# Patient Record
Sex: FEMALE | Race: WHITE | NOT HISPANIC OR LATINO
[De-identification: names, ages, dates, MRNs, and addresses within clinical notes are randomized per-mention and may not be internally consistent; named-entity substitution may affect disease eponyms.]

---

## 2017-01-02 DIAGNOSIS — E55.9 VITAMIN D DEFICIENCY, UNSPECIFIED: ICD-10-CM

## 2017-01-10 ENCOUNTER — APPOINTMENT (OUTPATIENT)
Dept: FAMILY MEDICINE | Facility: CLINIC | Age: 62
End: 2017-01-10

## 2017-01-10 VITALS
WEIGHT: 241 LBS | BODY MASS INDEX: 41.15 KG/M2 | HEIGHT: 64 IN | SYSTOLIC BLOOD PRESSURE: 140 MMHG | DIASTOLIC BLOOD PRESSURE: 80 MMHG

## 2017-03-17 ENCOUNTER — EMERGENCY (EMERGENCY)
Facility: HOSPITAL | Age: 62
LOS: 1 days | Discharge: HOME | End: 2017-03-17
Attending: EMERGENCY MEDICINE
Payer: COMMERCIAL

## 2017-03-17 VITALS
SYSTOLIC BLOOD PRESSURE: 137 MMHG | RESPIRATION RATE: 16 BRPM | DIASTOLIC BLOOD PRESSURE: 79 MMHG | TEMPERATURE: 98 F | OXYGEN SATURATION: 97 % | HEART RATE: 61 BPM

## 2017-03-17 VITALS
WEIGHT: 235.89 LBS | SYSTOLIC BLOOD PRESSURE: 167 MMHG | OXYGEN SATURATION: 99 % | HEIGHT: 64 IN | DIASTOLIC BLOOD PRESSURE: 98 MMHG | HEART RATE: 73 BPM | RESPIRATION RATE: 16 BRPM | TEMPERATURE: 98 F

## 2017-03-17 DIAGNOSIS — M54.2 CERVICALGIA: ICD-10-CM
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
## 2017-03-17 DIAGNOSIS — I10 ESSENTIAL (PRIMARY) HYPERTENSION: ICD-10-CM

## 2017-03-17 DIAGNOSIS — Z98.891 HISTORY OF UTERINE SCAR FROM PREVIOUS SURGERY: Chronic | ICD-10-CM

## 2017-03-17 DIAGNOSIS — Z98.890 OTHER SPECIFIED POSTPROCEDURAL STATES: ICD-10-CM

## 2017-03-17 DIAGNOSIS — S32.000A WEDGE COMPRESSION FRACTURE OF UNSPECIFIED LUMBAR VERTEBRA, INITIAL ENCOUNTER FOR CLOSED FRACTURE: ICD-10-CM

## 2017-03-17 DIAGNOSIS — R07.9 CHEST PAIN, UNSPECIFIED: ICD-10-CM

## 2017-03-17 DIAGNOSIS — E03.9 HYPOTHYROIDISM, UNSPECIFIED: ICD-10-CM
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
## 2017-03-17 LAB
ALBUMIN SERPL ELPH-MCNC: 4.2 G/DL — SIGNIFICANT CHANGE UP (ref 3.3–5.2)
ALP SERPL-CCNC: 87 U/L — SIGNIFICANT CHANGE UP (ref 40–120)
ALT FLD-CCNC: 32 U/L — SIGNIFICANT CHANGE UP
ANION GAP SERPL CALC-SCNC: 14 MMOL/L — SIGNIFICANT CHANGE UP (ref 5–17)
APTT BLD: 32.9 SEC — SIGNIFICANT CHANGE UP (ref 27.5–37.4)
AST SERPL-CCNC: 33 U/L — HIGH
BASOPHILS # BLD AUTO: 0 K/UL — SIGNIFICANT CHANGE UP (ref 0–0.2)
BASOPHILS NFR BLD AUTO: 0.1 % — SIGNIFICANT CHANGE UP (ref 0–2)
BILIRUB SERPL-MCNC: 0.5 MG/DL — SIGNIFICANT CHANGE UP (ref 0.4–2)
BUN SERPL-MCNC: 19 MG/DL — SIGNIFICANT CHANGE UP (ref 8–20)
CALCIUM SERPL-MCNC: 9.7 MG/DL — SIGNIFICANT CHANGE UP (ref 8.6–10.2)
CHLORIDE SERPL-SCNC: 99 MMOL/L — SIGNIFICANT CHANGE UP (ref 98–107)
CO2 SERPL-SCNC: 25 MMOL/L — SIGNIFICANT CHANGE UP (ref 22–29)
CREAT SERPL-MCNC: 0.8 MG/DL — SIGNIFICANT CHANGE UP (ref 0.5–1.3)
EOSINOPHIL # BLD AUTO: 0.1 K/UL — SIGNIFICANT CHANGE UP (ref 0–0.5)
EOSINOPHIL NFR BLD AUTO: 1.4 % — SIGNIFICANT CHANGE UP (ref 0–6)
GLUCOSE SERPL-MCNC: 104 MG/DL — SIGNIFICANT CHANGE UP (ref 70–115)
HCT VFR BLD CALC: 42.2 % — SIGNIFICANT CHANGE UP (ref 37–47)
HGB BLD-MCNC: 14.6 G/DL — SIGNIFICANT CHANGE UP (ref 12–16)
INR BLD: 1.04 RATIO — SIGNIFICANT CHANGE UP (ref 0.88–1.16)
LYMPHOCYTES # BLD AUTO: 1.6 K/UL — SIGNIFICANT CHANGE UP (ref 1–4.8)
LYMPHOCYTES # BLD AUTO: 19.3 % — LOW (ref 20–55)
MCHC RBC-ENTMCNC: 31.7 PG — HIGH (ref 27–31)
MCHC RBC-ENTMCNC: 34.6 G/DL — SIGNIFICANT CHANGE UP (ref 32–36)
MCV RBC AUTO: 91.7 FL — SIGNIFICANT CHANGE UP (ref 81–99)
MONOCYTES # BLD AUTO: 0.5 K/UL — SIGNIFICANT CHANGE UP (ref 0–0.8)
MONOCYTES NFR BLD AUTO: 6.1 % — SIGNIFICANT CHANGE UP (ref 3–10)
NEUTROPHILS # BLD AUTO: 6 K/UL — SIGNIFICANT CHANGE UP (ref 1.8–8)
NEUTROPHILS NFR BLD AUTO: 72.6 % — SIGNIFICANT CHANGE UP (ref 37–73)
PLATELET # BLD AUTO: 258 K/UL — SIGNIFICANT CHANGE UP (ref 150–400)
POTASSIUM SERPL-MCNC: 3.9 MMOL/L — SIGNIFICANT CHANGE UP (ref 3.5–5.3)
POTASSIUM SERPL-SCNC: 3.9 MMOL/L — SIGNIFICANT CHANGE UP (ref 3.5–5.3)
PROT SERPL-MCNC: 7.8 G/DL — SIGNIFICANT CHANGE UP (ref 6.6–8.7)
PROTHROM AB SERPL-ACNC: 11.5 SEC — SIGNIFICANT CHANGE UP (ref 10–13.1)
RBC # BLD: 4.6 M/UL — SIGNIFICANT CHANGE UP (ref 4.4–5.2)
RBC # FLD: 13 % — SIGNIFICANT CHANGE UP (ref 11–15.6)
SODIUM SERPL-SCNC: 138 MMOL/L — SIGNIFICANT CHANGE UP (ref 135–145)
WBC # BLD: 8.3 K/UL — SIGNIFICANT CHANGE UP (ref 4.8–10.8)
WBC # FLD AUTO: 8.3 K/UL — SIGNIFICANT CHANGE UP (ref 4.8–10.8)

## 2017-03-17 PROCEDURE — 71260 CT THORAX DX C+: CPT

## 2017-03-17 PROCEDURE — 74177 CT ABD & PELVIS W/CONTRAST: CPT | Mod: 26

## 2017-03-17 PROCEDURE — 71260 CT THORAX DX C+: CPT | Mod: 26

## 2017-03-17 PROCEDURE — 80053 COMPREHEN METABOLIC PANEL: CPT

## 2017-03-17 PROCEDURE — 99284 EMERGENCY DEPT VISIT MOD MDM: CPT

## 2017-03-17 PROCEDURE — 70491 CT SOFT TISSUE NECK W/DYE: CPT | Mod: 26

## 2017-03-17 PROCEDURE — 70491 CT SOFT TISSUE NECK W/DYE: CPT

## 2017-03-17 PROCEDURE — 71010: CPT | Mod: 26

## 2017-03-17 PROCEDURE — 99284 EMERGENCY DEPT VISIT MOD MDM: CPT | Mod: 25

## 2017-03-17 PROCEDURE — 72125 CT NECK SPINE W/O DYE: CPT

## 2017-03-17 PROCEDURE — 72125 CT NECK SPINE W/O DYE: CPT | Mod: 26

## 2017-03-17 PROCEDURE — 74177 CT ABD & PELVIS W/CONTRAST: CPT

## 2017-03-17 PROCEDURE — 90715 TDAP VACCINE 7 YRS/> IM: CPT

## 2017-03-17 PROCEDURE — 71045 X-RAY EXAM CHEST 1 VIEW: CPT

## 2017-03-17 PROCEDURE — 90471 IMMUNIZATION ADMIN: CPT

## 2017-03-17 PROCEDURE — 85610 PROTHROMBIN TIME: CPT

## 2017-03-17 PROCEDURE — 85027 COMPLETE CBC AUTOMATED: CPT

## 2017-03-17 PROCEDURE — 85730 THROMBOPLASTIN TIME PARTIAL: CPT

## 2017-03-17 PROCEDURE — 70450 CT HEAD/BRAIN W/O DYE: CPT

## 2017-03-17 PROCEDURE — 70450 CT HEAD/BRAIN W/O DYE: CPT | Mod: 26

## 2017-03-17 RX ORDER — MORPHINE SULFATE 50 MG/1
4 CAPSULE, EXTENDED RELEASE ORAL ONCE
Qty: 0 | Refills: 0 | Status: DISCONTINUED | OUTPATIENT
Start: 2017-03-17 | End: 2017-03-17

## 2017-03-17 RX ORDER — ACETAMINOPHEN 500 MG
650 TABLET ORAL ONCE
Qty: 0 | Refills: 0 | Status: COMPLETED | OUTPATIENT
Start: 2017-03-17 | End: 2017-03-17

## 2017-03-17 RX ORDER — TETANUS TOXOID, REDUCED DIPHTHERIA TOXOID AND ACELLULAR PERTUSSIS VACCINE, ADSORBED 5; 2.5; 8; 8; 2.5 [IU]/.5ML; [IU]/.5ML; UG/.5ML; UG/.5ML; UG/.5ML
0.5 SUSPENSION INTRAMUSCULAR ONCE
Qty: 0 | Refills: 0 | Status: COMPLETED | OUTPATIENT
Start: 2017-03-17 | End: 2017-03-17

## 2017-03-17 RX ADMIN — Medication 650 MILLIGRAM(S): at 19:15

## 2017-03-17 RX ADMIN — Medication 650 MILLIGRAM(S): at 15:48

## 2017-03-17 RX ADMIN — Medication 650 MILLIGRAM(S): at 14:52

## 2017-03-17 RX ADMIN — Medication 650 MILLIGRAM(S): at 11:54

## 2017-03-17 RX ADMIN — TETANUS TOXOID, REDUCED DIPHTHERIA TOXOID AND ACELLULAR PERTUSSIS VACCINE, ADSORBED 0.5 MILLILITER(S): 5; 2.5; 8; 8; 2.5 SUSPENSION INTRAMUSCULAR at 11:42
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
## 2017-03-17 NOTE — ED PROVIDER NOTE - PROGRESS NOTE DETAILS
C collar removed and patietn has no midline tenderness with good ROM I discussed lumbar fracture w/ patient and paged Dr. Leo to discuss I spoke with Dr. Leo who recommended d/c with f/u with him in the morning at his office at 10 AM for brace fitting.  I d/w patient who agrees and feels well for home and declined any stronger pain medicine. Pt w/  at bedside.

## 2017-03-17 NOTE — ED ADULT NURSE REASSESSMENT NOTE - NS ED NURSE REASSESS COMMENT FT1
C-collar remains in place.  Respirations even and unlabored.  Pt reports pain relief after po tylenol.  Soreness upon swallowing remains but now worsening.  Awaiting CT.
C collar removed by MD.  Pt ambulatory to bathroom with steady gait.  Feeling better after second dose of tylenol.
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
## 2017-03-17 NOTE — ED PROVIDER NOTE - PHYSICAL EXAMINATION
Linear erythema to L neck, R chest walll above breast.   Mildly TTP at anterior neck b/l. No crepitance/ stridor.  Posterior oropharynx normal in appearance, no edema  Mild soreness to palpation at ~C6  No lumbar spinal tenderness. Linear erythema to L neck, R chest walll above breast.   Mildly TTP at anterior neck b/l. No crepitance/ stridor.  Posterior oropharynx normal in appearance, no edema  Mild soreness to palpation at ~C6  Mild lumbar spinal tenderness.

## 2017-03-18 ENCOUNTER — APPOINTMENT (OUTPATIENT)
Dept: ORTHOPEDIC SURGERY | Facility: CLINIC | Age: 62
End: 2017-03-18

## 2017-03-18 VITALS
BODY MASS INDEX: 41.15 KG/M2 | TEMPERATURE: 97.8 F | DIASTOLIC BLOOD PRESSURE: 95 MMHG | SYSTOLIC BLOOD PRESSURE: 154 MMHG | HEIGHT: 64 IN | WEIGHT: 241 LBS | HEART RATE: 66 BPM

## 2017-03-22 ENCOUNTER — FORM ENCOUNTER (OUTPATIENT)
Age: 62
End: 2017-03-22

## 2017-03-23 ENCOUNTER — APPOINTMENT (OUTPATIENT)
Dept: MRI IMAGING | Facility: CLINIC | Age: 62
End: 2017-03-23

## 2017-03-23 ENCOUNTER — OUTPATIENT (OUTPATIENT)
Dept: OUTPATIENT SERVICES | Facility: HOSPITAL | Age: 62
LOS: 1 days | End: 2017-03-23
Payer: COMMERCIAL

## 2017-03-23 DIAGNOSIS — S32.000A WEDGE COMPRESSION FRACTURE OF UNSPECIFIED LUMBAR VERTEBRA, INITIAL ENCOUNTER FOR CLOSED FRACTURE: ICD-10-CM

## 2017-03-23 DIAGNOSIS — Z98.891 HISTORY OF UTERINE SCAR FROM PREVIOUS SURGERY: Chronic | ICD-10-CM

## 2017-03-23 PROCEDURE — 72148 MRI LUMBAR SPINE W/O DYE: CPT

## 2017-03-31 ENCOUNTER — APPOINTMENT (OUTPATIENT)
Dept: ORTHOPEDIC SURGERY | Facility: CLINIC | Age: 62
End: 2017-03-31

## 2017-04-12 ENCOUNTER — MEDICATION RENEWAL (OUTPATIENT)
Age: 62
End: 2017-04-12

## 2017-04-12 ENCOUNTER — APPOINTMENT (OUTPATIENT)
Dept: FAMILY MEDICINE | Facility: CLINIC | Age: 62
End: 2017-04-12

## 2017-04-12 VITALS
BODY MASS INDEX: 40.37 KG/M2 | HEIGHT: 64 IN | WEIGHT: 236.5 LBS | DIASTOLIC BLOOD PRESSURE: 84 MMHG | SYSTOLIC BLOOD PRESSURE: 140 MMHG

## 2017-04-12 RX ORDER — AMOXICILLIN 500 MG/1
500 CAPSULE ORAL
Qty: 21 | Refills: 0 | Status: COMPLETED | COMMUNITY
Start: 2017-04-07 | End: 2017-04-14

## 2017-04-20 ENCOUNTER — FORM ENCOUNTER (OUTPATIENT)
Age: 62
End: 2017-04-20

## 2017-04-21 ENCOUNTER — OUTPATIENT (OUTPATIENT)
Dept: OUTPATIENT SERVICES | Facility: HOSPITAL | Age: 62
LOS: 1 days | End: 2017-04-21
Payer: COMMERCIAL

## 2017-04-21 ENCOUNTER — APPOINTMENT (OUTPATIENT)
Dept: MRI IMAGING | Facility: CLINIC | Age: 62
End: 2017-04-21

## 2017-04-21 DIAGNOSIS — K76.9 LIVER DISEASE, UNSPECIFIED: ICD-10-CM

## 2017-04-21 DIAGNOSIS — Z98.891 HISTORY OF UTERINE SCAR FROM PREVIOUS SURGERY: Chronic | ICD-10-CM

## 2017-04-22 PROCEDURE — A9585: CPT

## 2017-04-22 PROCEDURE — 74183 MRI ABD W/O CNTR FLWD CNTR: CPT

## 2017-04-22 PROCEDURE — 82565 ASSAY OF CREATININE: CPT

## 2017-05-12 ENCOUNTER — APPOINTMENT (OUTPATIENT)
Dept: ORTHOPEDIC SURGERY | Facility: CLINIC | Age: 62
End: 2017-05-12

## 2017-05-12 VITALS
DIASTOLIC BLOOD PRESSURE: 97 MMHG | BODY MASS INDEX: 40.29 KG/M2 | SYSTOLIC BLOOD PRESSURE: 156 MMHG | HEART RATE: 75 BPM | HEIGHT: 64 IN | WEIGHT: 236 LBS

## 2017-05-12 DIAGNOSIS — S32.000A WEDGE COMPRESSION FRACTURE OF UNSPECIFIED LUMBAR VERTEBRA, INITIAL ENCOUNTER FOR CLOSED FRACTURE: ICD-10-CM

## 2017-06-01 ENCOUNTER — APPOINTMENT (OUTPATIENT)
Dept: ULTRASOUND IMAGING | Facility: CLINIC | Age: 62
End: 2017-06-01

## 2017-06-07 ENCOUNTER — FORM ENCOUNTER (OUTPATIENT)
Age: 62
End: 2017-06-07

## 2017-06-08 ENCOUNTER — APPOINTMENT (OUTPATIENT)
Dept: ULTRASOUND IMAGING | Facility: CLINIC | Age: 62
End: 2017-06-08

## 2017-06-08 ENCOUNTER — OUTPATIENT (OUTPATIENT)
Dept: OUTPATIENT SERVICES | Facility: HOSPITAL | Age: 62
LOS: 1 days | End: 2017-06-08
Payer: COMMERCIAL

## 2017-06-08 ENCOUNTER — APPOINTMENT (OUTPATIENT)
Dept: MAMMOGRAPHY | Facility: CLINIC | Age: 62
End: 2017-06-08

## 2017-06-08 DIAGNOSIS — K76.9 LIVER DISEASE, UNSPECIFIED: ICD-10-CM

## 2017-06-08 DIAGNOSIS — Z98.891 HISTORY OF UTERINE SCAR FROM PREVIOUS SURGERY: Chronic | ICD-10-CM

## 2017-06-08 DIAGNOSIS — N63 UNSPECIFIED LUMP IN BREAST: ICD-10-CM

## 2017-06-08 DIAGNOSIS — D25.9 LEIOMYOMA OF UTERUS, UNSPECIFIED: ICD-10-CM

## 2017-06-08 PROCEDURE — 76830 TRANSVAGINAL US NON-OB: CPT

## 2017-06-08 PROCEDURE — 76642 ULTRASOUND BREAST LIMITED: CPT

## 2017-06-08 PROCEDURE — G0279: CPT

## 2017-06-08 PROCEDURE — 77066 DX MAMMO INCL CAD BI: CPT

## 2017-06-08 PROCEDURE — 76856 US EXAM PELVIC COMPLETE: CPT

## 2017-06-12 ENCOUNTER — APPOINTMENT (OUTPATIENT)
Dept: FAMILY MEDICINE | Facility: CLINIC | Age: 62
End: 2017-06-12

## 2017-06-12 VITALS
BODY MASS INDEX: 41.32 KG/M2 | WEIGHT: 240.75 LBS | SYSTOLIC BLOOD PRESSURE: 136 MMHG | DIASTOLIC BLOOD PRESSURE: 72 MMHG

## 2017-06-12 DIAGNOSIS — N63 UNSPECIFIED LUMP IN BREAST: ICD-10-CM

## 2017-06-12 DIAGNOSIS — N28.1 CYST OF KIDNEY, ACQUIRED: ICD-10-CM

## 2017-06-12 RX ORDER — LEVOTHYROXINE SODIUM 0.05 MG/1
50 TABLET ORAL
Qty: 90 | Refills: 0 | Status: COMPLETED | COMMUNITY
Start: 2016-10-13

## 2017-06-12 RX ORDER — AMOXICILLIN AND CLAVULANATE POTASSIUM 875; 125 MG/1; MG/1
875-125 TABLET, COATED ORAL
Qty: 14 | Refills: 0 | Status: COMPLETED | COMMUNITY
Start: 2017-03-25

## 2017-08-02 ENCOUNTER — FORM ENCOUNTER (OUTPATIENT)
Age: 62
End: 2017-08-02

## 2017-08-03 ENCOUNTER — OUTPATIENT (OUTPATIENT)
Dept: OUTPATIENT SERVICES | Facility: HOSPITAL | Age: 62
LOS: 1 days | End: 2017-08-03
Payer: COMMERCIAL

## 2017-08-03 ENCOUNTER — APPOINTMENT (OUTPATIENT)
Dept: RADIOLOGY | Facility: CLINIC | Age: 62
End: 2017-08-03
Payer: COMMERCIAL

## 2017-08-03 DIAGNOSIS — S32.000D WEDGE COMPRESSION FRACTURE OF UNSPECIFIED LUMBAR VERTEBRA, SUBSEQUENT ENCOUNTER FOR FRACTURE WITH ROUTINE HEALING: ICD-10-CM

## 2017-08-03 DIAGNOSIS — Z98.891 HISTORY OF UTERINE SCAR FROM PREVIOUS SURGERY: Chronic | ICD-10-CM

## 2017-08-03 PROCEDURE — 77080 DXA BONE DENSITY AXIAL: CPT

## 2017-08-03 PROCEDURE — 77080 DXA BONE DENSITY AXIAL: CPT | Mod: 26

## 2017-08-14 ENCOUNTER — APPOINTMENT (OUTPATIENT)
Dept: FAMILY MEDICINE | Facility: CLINIC | Age: 62
End: 2017-08-14
Payer: COMMERCIAL

## 2017-08-14 VITALS
SYSTOLIC BLOOD PRESSURE: 138 MMHG | BODY MASS INDEX: 41.54 KG/M2 | DIASTOLIC BLOOD PRESSURE: 76 MMHG | WEIGHT: 243.31 LBS | HEIGHT: 64 IN

## 2017-08-14 PROCEDURE — 99214 OFFICE O/P EST MOD 30 MIN: CPT

## 2017-08-14 RX ORDER — CALCITONIN SALMON 200 [IU]/1
200 SPRAY, METERED NASAL DAILY
Qty: 1 | Refills: 2 | Status: DISCONTINUED | COMMUNITY
Start: 2017-03-18 | End: 2017-08-14

## 2017-09-08 ENCOUNTER — APPOINTMENT (OUTPATIENT)
Dept: ORTHOPEDIC SURGERY | Facility: CLINIC | Age: 62
End: 2017-09-08
Payer: COMMERCIAL

## 2017-09-08 VITALS
WEIGHT: 243 LBS | DIASTOLIC BLOOD PRESSURE: 87 MMHG | SYSTOLIC BLOOD PRESSURE: 157 MMHG | HEART RATE: 60 BPM | HEIGHT: 64 IN | BODY MASS INDEX: 41.48 KG/M2

## 2017-09-08 PROCEDURE — 99214 OFFICE O/P EST MOD 30 MIN: CPT

## 2017-09-08 PROCEDURE — 72100 X-RAY EXAM L-S SPINE 2/3 VWS: CPT

## 2017-09-28 ENCOUNTER — RX RENEWAL (OUTPATIENT)
Age: 62
End: 2017-09-28

## 2017-10-09 LAB
25(OH)D3 SERPL-MCNC: 34.2 NG/ML
ALBUMIN SERPL ELPH-MCNC: 4.1 G/DL
ALP BLD-CCNC: 79 U/L
ALT SERPL-CCNC: 22 U/L
ANION GAP SERPL CALC-SCNC: 15 MMOL/L
AST SERPL-CCNC: 24 U/L
BILIRUB SERPL-MCNC: 0.5 MG/DL
BUN SERPL-MCNC: 20 MG/DL
CALCIUM SERPL-MCNC: 9.4 MG/DL
CHLORIDE SERPL-SCNC: 103 MMOL/L
CHOLEST SERPL-MCNC: 213 MG/DL
CHOLEST/HDLC SERPL: 2.9 RATIO
CO2 SERPL-SCNC: 26 MMOL/L
CREAT SERPL-MCNC: 1.04 MG/DL
GLUCOSE SERPL-MCNC: 90 MG/DL
HDLC SERPL-MCNC: 73 MG/DL
LDLC SERPL CALC-MCNC: 125 MG/DL
POTASSIUM SERPL-SCNC: 4.8 MMOL/L
PROT SERPL-MCNC: 6.7 G/DL
SODIUM SERPL-SCNC: 144 MMOL/L
TRIGL SERPL-MCNC: 73 MG/DL
TSH SERPL-ACNC: 3.21 UIU/ML

## 2017-10-31 ENCOUNTER — RX RENEWAL (OUTPATIENT)
Age: 62
End: 2017-10-31

## 2017-11-08 ENCOUNTER — APPOINTMENT (OUTPATIENT)
Dept: FAMILY MEDICINE | Facility: CLINIC | Age: 62
End: 2017-11-08

## 2017-11-08 VITALS
OXYGEN SATURATION: 100 % | HEART RATE: 78 BPM | TEMPERATURE: 98 F | DIASTOLIC BLOOD PRESSURE: 84 MMHG | SYSTOLIC BLOOD PRESSURE: 128 MMHG

## 2017-11-17 ENCOUNTER — APPOINTMENT (OUTPATIENT)
Dept: FAMILY MEDICINE | Facility: CLINIC | Age: 62
End: 2017-11-17
Payer: COMMERCIAL

## 2017-11-17 ENCOUNTER — NON-APPOINTMENT (OUTPATIENT)
Age: 62
End: 2017-11-17

## 2017-11-17 VITALS
HEIGHT: 64 IN | DIASTOLIC BLOOD PRESSURE: 80 MMHG | HEART RATE: 79 BPM | TEMPERATURE: 98.8 F | BODY MASS INDEX: 41.83 KG/M2 | OXYGEN SATURATION: 96 % | WEIGHT: 245 LBS | SYSTOLIC BLOOD PRESSURE: 138 MMHG

## 2017-11-17 PROCEDURE — 99214 OFFICE O/P EST MOD 30 MIN: CPT | Mod: 25

## 2017-11-17 PROCEDURE — 93000 ELECTROCARDIOGRAM COMPLETE: CPT

## 2017-11-29 ENCOUNTER — APPOINTMENT (OUTPATIENT)
Dept: FAMILY MEDICINE | Facility: CLINIC | Age: 62
End: 2017-11-29
Payer: COMMERCIAL

## 2017-11-29 VITALS
SYSTOLIC BLOOD PRESSURE: 134 MMHG | WEIGHT: 247 LBS | HEIGHT: 64 IN | DIASTOLIC BLOOD PRESSURE: 86 MMHG | BODY MASS INDEX: 42.17 KG/M2

## 2017-11-29 PROCEDURE — G0008: CPT

## 2017-11-29 PROCEDURE — 90686 IIV4 VACC NO PRSV 0.5 ML IM: CPT

## 2017-11-29 RX ORDER — AMOXICILLIN AND CLAVULANATE POTASSIUM 875; 125 MG/1; MG/1
875-125 TABLET, COATED ORAL
Qty: 20 | Refills: 0 | Status: COMPLETED | COMMUNITY
Start: 2017-11-17 | End: 2017-11-27

## 2017-12-04 ENCOUNTER — APPOINTMENT (OUTPATIENT)
Dept: OBGYN | Facility: CLINIC | Age: 62
End: 2017-12-04
Payer: COMMERCIAL

## 2017-12-04 VITALS
HEART RATE: 65 BPM | HEIGHT: 64 IN | BODY MASS INDEX: 42.17 KG/M2 | SYSTOLIC BLOOD PRESSURE: 136 MMHG | WEIGHT: 247 LBS | DIASTOLIC BLOOD PRESSURE: 84 MMHG

## 2017-12-04 DIAGNOSIS — N94.10 UNSPECIFIED DYSPAREUNIA: ICD-10-CM

## 2017-12-04 PROCEDURE — 99214 OFFICE O/P EST MOD 30 MIN: CPT | Mod: 25

## 2017-12-04 PROCEDURE — 99386 PREV VISIT NEW AGE 40-64: CPT

## 2017-12-06 LAB — HPV HIGH+LOW RISK DNA PNL CVX: NOT DETECTED

## 2017-12-08 LAB — CYTOLOGY CVX/VAG DOC THIN PREP: NORMAL

## 2017-12-28 ENCOUNTER — OTHER (OUTPATIENT)
Age: 62
End: 2017-12-28

## 2018-01-29 ENCOUNTER — RX RENEWAL (OUTPATIENT)
Age: 63
End: 2018-01-29

## 2018-01-30 ENCOUNTER — OUTPATIENT (OUTPATIENT)
Dept: OUTPATIENT SERVICES | Facility: HOSPITAL | Age: 63
LOS: 1 days | End: 2018-01-30
Payer: COMMERCIAL

## 2018-01-30 DIAGNOSIS — Z01.818 ENCOUNTER FOR OTHER PREPROCEDURAL EXAMINATION: ICD-10-CM

## 2018-01-30 DIAGNOSIS — Z98.891 HISTORY OF UTERINE SCAR FROM PREVIOUS SURGERY: Chronic | ICD-10-CM

## 2018-01-30 LAB
ANION GAP SERPL CALC-SCNC: 12 MMOL/L — SIGNIFICANT CHANGE UP (ref 5–17)
APPEARANCE UR: CLEAR — SIGNIFICANT CHANGE UP
BACTERIA # UR AUTO: ABNORMAL
BASOPHILS # BLD AUTO: 0 K/UL — SIGNIFICANT CHANGE UP (ref 0–0.2)
BASOPHILS NFR BLD AUTO: 0.4 % — SIGNIFICANT CHANGE UP (ref 0–2)
BILIRUB UR-MCNC: NEGATIVE — SIGNIFICANT CHANGE UP
BLD GP AB SCN SERPL QL: SIGNIFICANT CHANGE UP
BUN SERPL-MCNC: 22 MG/DL — HIGH (ref 8–20)
CALCIUM SERPL-MCNC: 9.7 MG/DL — SIGNIFICANT CHANGE UP (ref 8.6–10.2)
CHLORIDE SERPL-SCNC: 103 MMOL/L — SIGNIFICANT CHANGE UP (ref 98–107)
CO2 SERPL-SCNC: 27 MMOL/L — SIGNIFICANT CHANGE UP (ref 22–29)
COLOR SPEC: YELLOW — SIGNIFICANT CHANGE UP
CREAT SERPL-MCNC: 0.82 MG/DL — SIGNIFICANT CHANGE UP (ref 0.5–1.3)
DIFF PNL FLD: ABNORMAL
EOSINOPHIL # BLD AUTO: 0.4 K/UL — SIGNIFICANT CHANGE UP (ref 0–0.5)
EOSINOPHIL NFR BLD AUTO: 5.7 % — SIGNIFICANT CHANGE UP (ref 0–6)
EPI CELLS # UR: SIGNIFICANT CHANGE UP
GLUCOSE SERPL-MCNC: 92 MG/DL — SIGNIFICANT CHANGE UP (ref 70–115)
GLUCOSE UR QL: NEGATIVE MG/DL — SIGNIFICANT CHANGE UP
HCT VFR BLD CALC: 43.1 % — SIGNIFICANT CHANGE UP (ref 37–47)
HGB BLD-MCNC: 14.6 G/DL — SIGNIFICANT CHANGE UP (ref 12–16)
HYALINE CASTS # UR AUTO: ABNORMAL /LPF
KETONES UR-MCNC: NEGATIVE — SIGNIFICANT CHANGE UP
LEUKOCYTE ESTERASE UR-ACNC: ABNORMAL
LYMPHOCYTES # BLD AUTO: 2.4 K/UL — SIGNIFICANT CHANGE UP (ref 1–4.8)
LYMPHOCYTES # BLD AUTO: 35.7 % — SIGNIFICANT CHANGE UP (ref 20–55)
MCHC RBC-ENTMCNC: 31.5 PG — HIGH (ref 27–31)
MCHC RBC-ENTMCNC: 33.9 G/DL — SIGNIFICANT CHANGE UP (ref 32–36)
MCV RBC AUTO: 92.9 FL — SIGNIFICANT CHANGE UP (ref 81–99)
MONOCYTES # BLD AUTO: 0.5 K/UL — SIGNIFICANT CHANGE UP (ref 0–0.8)
MONOCYTES NFR BLD AUTO: 7.3 % — SIGNIFICANT CHANGE UP (ref 3–10)
NEUTROPHILS # BLD AUTO: 3.5 K/UL — SIGNIFICANT CHANGE UP (ref 1.8–8)
NEUTROPHILS NFR BLD AUTO: 50.6 % — SIGNIFICANT CHANGE UP (ref 37–73)
NITRITE UR-MCNC: NEGATIVE — SIGNIFICANT CHANGE UP
PH UR: 6 — SIGNIFICANT CHANGE UP (ref 5–8)
PLATELET # BLD AUTO: 277 K/UL — SIGNIFICANT CHANGE UP (ref 150–400)
POTASSIUM SERPL-MCNC: 4 MMOL/L — SIGNIFICANT CHANGE UP (ref 3.5–5.3)
POTASSIUM SERPL-SCNC: 4 MMOL/L — SIGNIFICANT CHANGE UP (ref 3.5–5.3)
PROT UR-MCNC: NEGATIVE MG/DL — SIGNIFICANT CHANGE UP
RBC # BLD: 4.64 M/UL — SIGNIFICANT CHANGE UP (ref 4.4–5.2)
RBC # FLD: 13.1 % — SIGNIFICANT CHANGE UP (ref 11–15.6)
RBC CASTS # UR COMP ASSIST: SIGNIFICANT CHANGE UP /HPF (ref 0–4)
SODIUM SERPL-SCNC: 142 MMOL/L — SIGNIFICANT CHANGE UP (ref 135–145)
SP GR SPEC: 1.02 — SIGNIFICANT CHANGE UP (ref 1.01–1.02)
TYPE + AB SCN PNL BLD: SIGNIFICANT CHANGE UP
UROBILINOGEN FLD QL: NEGATIVE MG/DL — SIGNIFICANT CHANGE UP
WBC # BLD: 6.9 K/UL — SIGNIFICANT CHANGE UP (ref 4.8–10.8)
WBC # FLD AUTO: 6.9 K/UL — SIGNIFICANT CHANGE UP (ref 4.8–10.8)
WBC UR QL: ABNORMAL

## 2018-01-30 PROCEDURE — G0463: CPT

## 2018-01-30 PROCEDURE — 36415 COLL VENOUS BLD VENIPUNCTURE: CPT

## 2018-01-30 PROCEDURE — 86850 RBC ANTIBODY SCREEN: CPT

## 2018-01-30 PROCEDURE — 86901 BLOOD TYPING SEROLOGIC RH(D): CPT

## 2018-01-30 PROCEDURE — 80048 BASIC METABOLIC PNL TOTAL CA: CPT

## 2018-01-30 PROCEDURE — 81001 URINALYSIS AUTO W/SCOPE: CPT

## 2018-01-30 PROCEDURE — 85027 COMPLETE CBC AUTOMATED: CPT

## 2018-01-30 PROCEDURE — 87086 URINE CULTURE/COLONY COUNT: CPT

## 2018-01-30 PROCEDURE — 86900 BLOOD TYPING SEROLOGIC ABO: CPT

## 2018-01-31 LAB
CULTURE RESULTS: SIGNIFICANT CHANGE UP
SPECIMEN SOURCE: SIGNIFICANT CHANGE UP

## 2018-02-05 ENCOUNTER — APPOINTMENT (OUTPATIENT)
Dept: CARDIOLOGY | Facility: CLINIC | Age: 63
End: 2018-02-05
Payer: COMMERCIAL

## 2018-02-05 ENCOUNTER — NON-APPOINTMENT (OUTPATIENT)
Age: 63
End: 2018-02-05

## 2018-02-05 VITALS — SYSTOLIC BLOOD PRESSURE: 136 MMHG | HEART RATE: 82 BPM | OXYGEN SATURATION: 96 % | DIASTOLIC BLOOD PRESSURE: 72 MMHG

## 2018-02-05 PROCEDURE — 99204 OFFICE O/P NEW MOD 45 MIN: CPT

## 2018-02-05 PROCEDURE — 93000 ELECTROCARDIOGRAM COMPLETE: CPT

## 2018-02-07 ENCOUNTER — APPOINTMENT (OUTPATIENT)
Dept: CARDIOLOGY | Facility: CLINIC | Age: 63
End: 2018-02-07
Payer: COMMERCIAL

## 2018-02-07 PROCEDURE — 93306 TTE W/DOPPLER COMPLETE: CPT

## 2018-02-09 ENCOUNTER — APPOINTMENT (OUTPATIENT)
Dept: FAMILY MEDICINE | Facility: CLINIC | Age: 63
End: 2018-02-09
Payer: COMMERCIAL

## 2018-02-09 VITALS
TEMPERATURE: 98 F | WEIGHT: 250 LBS | HEIGHT: 64 IN | BODY MASS INDEX: 42.68 KG/M2 | HEART RATE: 72 BPM | OXYGEN SATURATION: 99 % | DIASTOLIC BLOOD PRESSURE: 80 MMHG | SYSTOLIC BLOOD PRESSURE: 138 MMHG

## 2018-02-09 DIAGNOSIS — B96.89 PERSONAL HISTORY OF OTHER DISEASES OF THE RESPIRATORY SYSTEM: ICD-10-CM

## 2018-02-09 DIAGNOSIS — N76.89 OTHER SPECIFIED INFLAMMATION OF VAGINA AND VULVA: ICD-10-CM

## 2018-02-09 DIAGNOSIS — Z92.29 PERSONAL HISTORY OF OTHER DRUG THERAPY: ICD-10-CM

## 2018-02-09 DIAGNOSIS — Z87.09 PERSONAL HISTORY OF OTHER DISEASES OF THE RESPIRATORY SYSTEM: ICD-10-CM

## 2018-02-09 PROCEDURE — 99244 OFF/OP CNSLTJ NEW/EST MOD 40: CPT

## 2018-02-13 ENCOUNTER — RESULT REVIEW (OUTPATIENT)
Age: 63
End: 2018-02-13

## 2018-02-26 ENCOUNTER — APPOINTMENT (OUTPATIENT)
Dept: OBGYN | Facility: CLINIC | Age: 63
End: 2018-02-26
Payer: COMMERCIAL

## 2018-02-26 VITALS
SYSTOLIC BLOOD PRESSURE: 140 MMHG | BODY MASS INDEX: 42.68 KG/M2 | WEIGHT: 250 LBS | HEIGHT: 64 IN | DIASTOLIC BLOOD PRESSURE: 86 MMHG

## 2018-02-26 DIAGNOSIS — N95.2 POSTMENOPAUSAL ATROPHIC VAGINITIS: ICD-10-CM

## 2018-02-26 PROCEDURE — 99214 OFFICE O/P EST MOD 30 MIN: CPT

## 2018-05-07 ENCOUNTER — APPOINTMENT (OUTPATIENT)
Dept: FAMILY MEDICINE | Facility: CLINIC | Age: 63
End: 2018-05-07
Payer: COMMERCIAL

## 2018-05-07 VITALS
DIASTOLIC BLOOD PRESSURE: 80 MMHG | SYSTOLIC BLOOD PRESSURE: 120 MMHG | BODY MASS INDEX: 43.19 KG/M2 | HEIGHT: 64 IN | WEIGHT: 253 LBS | OXYGEN SATURATION: 98 % | HEART RATE: 71 BPM

## 2018-05-07 PROCEDURE — 99214 OFFICE O/P EST MOD 30 MIN: CPT

## 2018-06-11 ENCOUNTER — APPOINTMENT (OUTPATIENT)
Dept: OBGYN | Facility: CLINIC | Age: 63
End: 2018-06-11
Payer: COMMERCIAL

## 2018-06-11 VITALS
SYSTOLIC BLOOD PRESSURE: 108 MMHG | HEIGHT: 64 IN | WEIGHT: 253 LBS | BODY MASS INDEX: 43.19 KG/M2 | DIASTOLIC BLOOD PRESSURE: 70 MMHG

## 2018-06-11 DIAGNOSIS — L90.0 LICHEN SCLEROSUS ET ATROPHICUS: ICD-10-CM

## 2018-06-11 PROCEDURE — 99214 OFFICE O/P EST MOD 30 MIN: CPT

## 2018-07-16 PROBLEM — I10 ESSENTIAL (PRIMARY) HYPERTENSION: Chronic | Status: ACTIVE | Noted: 2017-03-17

## 2018-07-16 PROBLEM — E03.9 HYPOTHYROIDISM, UNSPECIFIED: Chronic | Status: ACTIVE | Noted: 2017-03-17

## 2018-08-08 ENCOUNTER — FORM ENCOUNTER (OUTPATIENT)
Age: 63
End: 2018-08-08

## 2018-08-09 ENCOUNTER — APPOINTMENT (OUTPATIENT)
Dept: MAMMOGRAPHY | Facility: CLINIC | Age: 63
End: 2018-08-09
Payer: COMMERCIAL

## 2018-08-09 ENCOUNTER — OUTPATIENT (OUTPATIENT)
Dept: OUTPATIENT SERVICES | Facility: HOSPITAL | Age: 63
LOS: 1 days | End: 2018-08-09
Payer: COMMERCIAL

## 2018-08-09 DIAGNOSIS — Z98.891 HISTORY OF UTERINE SCAR FROM PREVIOUS SURGERY: Chronic | ICD-10-CM

## 2018-08-09 DIAGNOSIS — Z12.31 ENCOUNTER FOR SCREENING MAMMOGRAM FOR MALIGNANT NEOPLASM OF BREAST: ICD-10-CM

## 2018-08-09 PROCEDURE — 77063 BREAST TOMOSYNTHESIS BI: CPT | Mod: 26

## 2018-08-09 PROCEDURE — 77067 SCR MAMMO BI INCL CAD: CPT | Mod: 26

## 2018-08-09 PROCEDURE — 77067 SCR MAMMO BI INCL CAD: CPT

## 2018-08-09 PROCEDURE — 77063 BREAST TOMOSYNTHESIS BI: CPT

## 2018-08-16 ENCOUNTER — APPOINTMENT (OUTPATIENT)
Dept: CARDIOLOGY | Facility: CLINIC | Age: 63
End: 2018-08-16
Payer: COMMERCIAL

## 2018-08-16 ENCOUNTER — NON-APPOINTMENT (OUTPATIENT)
Age: 63
End: 2018-08-16

## 2018-08-16 VITALS — DIASTOLIC BLOOD PRESSURE: 80 MMHG | SYSTOLIC BLOOD PRESSURE: 168 MMHG

## 2018-08-16 VITALS — SYSTOLIC BLOOD PRESSURE: 172 MMHG | DIASTOLIC BLOOD PRESSURE: 82 MMHG

## 2018-08-16 VITALS
OXYGEN SATURATION: 98 % | DIASTOLIC BLOOD PRESSURE: 93 MMHG | HEART RATE: 65 BPM | WEIGHT: 251 LBS | BODY MASS INDEX: 42.85 KG/M2 | SYSTOLIC BLOOD PRESSURE: 172 MMHG | HEIGHT: 64 IN

## 2018-08-16 PROCEDURE — 99214 OFFICE O/P EST MOD 30 MIN: CPT

## 2018-08-16 PROCEDURE — 93000 ELECTROCARDIOGRAM COMPLETE: CPT

## 2018-08-19 ENCOUNTER — FORM ENCOUNTER (OUTPATIENT)
Age: 63
End: 2018-08-19

## 2018-08-20 ENCOUNTER — OUTPATIENT (OUTPATIENT)
Dept: OUTPATIENT SERVICES | Facility: HOSPITAL | Age: 63
LOS: 1 days | End: 2018-08-20
Payer: COMMERCIAL

## 2018-08-20 ENCOUNTER — APPOINTMENT (OUTPATIENT)
Dept: ULTRASOUND IMAGING | Facility: CLINIC | Age: 63
End: 2018-08-20
Payer: COMMERCIAL

## 2018-08-20 ENCOUNTER — APPOINTMENT (OUTPATIENT)
Dept: MAMMOGRAPHY | Facility: CLINIC | Age: 63
End: 2018-08-20
Payer: COMMERCIAL

## 2018-08-20 DIAGNOSIS — R92.8 OTHER ABNORMAL AND INCONCLUSIVE FINDINGS ON DIAGNOSTIC IMAGING OF BREAST: ICD-10-CM

## 2018-08-20 DIAGNOSIS — Z98.891 HISTORY OF UTERINE SCAR FROM PREVIOUS SURGERY: Chronic | ICD-10-CM

## 2018-08-20 PROCEDURE — 77065 DX MAMMO INCL CAD UNI: CPT | Mod: 26,RT

## 2018-08-20 PROCEDURE — 76642 ULTRASOUND BREAST LIMITED: CPT

## 2018-08-20 PROCEDURE — 76642 ULTRASOUND BREAST LIMITED: CPT | Mod: 26,RT

## 2018-08-20 PROCEDURE — 77065 DX MAMMO INCL CAD UNI: CPT

## 2018-08-20 PROCEDURE — G0279: CPT | Mod: 26

## 2018-08-20 PROCEDURE — G0279: CPT

## 2018-08-21 VITALS
DIASTOLIC BLOOD PRESSURE: 80 MMHG | SYSTOLIC BLOOD PRESSURE: 142 MMHG | RESPIRATION RATE: 18 BRPM | HEART RATE: 68 BPM | OXYGEN SATURATION: 97 %

## 2018-08-22 ENCOUNTER — FORM ENCOUNTER (OUTPATIENT)
Age: 63
End: 2018-08-22

## 2018-08-23 ENCOUNTER — APPOINTMENT (OUTPATIENT)
Dept: ULTRASOUND IMAGING | Facility: CLINIC | Age: 63
End: 2018-08-23
Payer: COMMERCIAL

## 2018-08-23 ENCOUNTER — RESULT REVIEW (OUTPATIENT)
Age: 63
End: 2018-08-23

## 2018-08-23 ENCOUNTER — APPOINTMENT (OUTPATIENT)
Dept: MAMMOGRAPHY | Facility: CLINIC | Age: 63
End: 2018-08-23
Payer: COMMERCIAL

## 2018-08-23 ENCOUNTER — OUTPATIENT (OUTPATIENT)
Dept: OUTPATIENT SERVICES | Facility: HOSPITAL | Age: 63
LOS: 1 days | End: 2018-08-23
Payer: COMMERCIAL

## 2018-08-23 DIAGNOSIS — Z00.8 ENCOUNTER FOR OTHER GENERAL EXAMINATION: ICD-10-CM

## 2018-08-23 DIAGNOSIS — Z98.891 HISTORY OF UTERINE SCAR FROM PREVIOUS SURGERY: Chronic | ICD-10-CM

## 2018-08-23 PROCEDURE — 19081 BX BREAST 1ST LESION STRTCTC: CPT | Mod: RT

## 2018-08-23 PROCEDURE — A4648: CPT

## 2018-08-23 PROCEDURE — 19083 BX BREAST 1ST LESION US IMAG: CPT | Mod: RT

## 2018-08-23 PROCEDURE — 77065 DX MAMMO INCL CAD UNI: CPT | Mod: 26,RT

## 2018-08-23 PROCEDURE — 77065 DX MAMMO INCL CAD UNI: CPT

## 2018-08-23 PROCEDURE — 19081 BX BREAST 1ST LESION STRTCTC: CPT

## 2018-08-23 PROCEDURE — 19083 BX BREAST 1ST LESION US IMAG: CPT

## 2018-08-23 PROCEDURE — 77065 DX MAMMO INCL CAD UNI: CPT | Mod: 26,RT,76

## 2018-08-25 ENCOUNTER — LABORATORY RESULT (OUTPATIENT)
Age: 63
End: 2018-08-25

## 2018-08-28 LAB
ALBUMIN SERPL ELPH-MCNC: 4.2 G/DL
ALP BLD-CCNC: 81 U/L
ALT SERPL-CCNC: 17 U/L
ANION GAP SERPL CALC-SCNC: 13 MMOL/L
AST SERPL-CCNC: 21 U/L
BASOPHILS # BLD AUTO: 0.02 K/UL
BASOPHILS NFR BLD AUTO: 0.4 %
BILIRUB SERPL-MCNC: 0.5 MG/DL
BUN SERPL-MCNC: 16 MG/DL
CALCIUM SERPL-MCNC: 9.5 MG/DL
CHLORIDE SERPL-SCNC: 105 MMOL/L
CHOLEST SERPL-MCNC: 207 MG/DL
CHOLEST/HDLC SERPL: 3.2 RATIO
CO2 SERPL-SCNC: 25 MMOL/L
CREAT SERPL-MCNC: 0.95 MG/DL
EOSINOPHIL # BLD AUTO: 0.39 K/UL
EOSINOPHIL NFR BLD AUTO: 7.1 %
GLUCOSE SERPL-MCNC: 94 MG/DL
HCT VFR BLD CALC: 41.3 %
HDLC SERPL-MCNC: 65 MG/DL
HGB BLD-MCNC: 13.2 G/DL
IMM GRANULOCYTES NFR BLD AUTO: 0.2 %
LDLC SERPL CALC-MCNC: 127 MG/DL
LYMPHOCYTES # BLD AUTO: 2.31 K/UL
LYMPHOCYTES NFR BLD AUTO: 41.9 %
MAN DIFF?: NORMAL
MCHC RBC-ENTMCNC: 30.3 PG
MCHC RBC-ENTMCNC: 32 GM/DL
MCV RBC AUTO: 94.9 FL
MONOCYTES # BLD AUTO: 0.43 K/UL
MONOCYTES NFR BLD AUTO: 7.8 %
NEUTROPHILS # BLD AUTO: 2.35 K/UL
NEUTROPHILS NFR BLD AUTO: 42.6 %
PLATELET # BLD AUTO: 266 K/UL
POTASSIUM SERPL-SCNC: 4.4 MMOL/L
PROT SERPL-MCNC: 6.7 G/DL
RBC # BLD: 4.35 M/UL
RBC # FLD: 13.8 %
SODIUM SERPL-SCNC: 143 MMOL/L
T3RU NFR SERPL: 1 INDEX
T4 FREE SERPL-MCNC: 1.6 NG/DL
THYROGLOB AB SERPL-ACNC: <20 IU/ML
THYROPEROXIDASE AB SERPL IA-ACNC: <10 IU/ML
TRIGL SERPL-MCNC: 76 MG/DL
TSH SERPL-ACNC: 3.8 UIU/ML
WBC # FLD AUTO: 5.51 K/UL

## 2018-09-10 ENCOUNTER — RX RENEWAL (OUTPATIENT)
Age: 63
End: 2018-09-10

## 2018-09-18 ENCOUNTER — APPOINTMENT (OUTPATIENT)
Dept: CARDIOLOGY | Facility: CLINIC | Age: 63
End: 2018-09-18
Payer: COMMERCIAL

## 2018-09-18 ENCOUNTER — NON-APPOINTMENT (OUTPATIENT)
Age: 63
End: 2018-09-18

## 2018-09-18 VITALS — HEART RATE: 64 BPM | DIASTOLIC BLOOD PRESSURE: 82 MMHG | SYSTOLIC BLOOD PRESSURE: 145 MMHG | OXYGEN SATURATION: 97 %

## 2018-09-18 VITALS — DIASTOLIC BLOOD PRESSURE: 78 MMHG | SYSTOLIC BLOOD PRESSURE: 140 MMHG

## 2018-09-18 VITALS — SYSTOLIC BLOOD PRESSURE: 132 MMHG | DIASTOLIC BLOOD PRESSURE: 84 MMHG

## 2018-09-18 PROCEDURE — 93000 ELECTROCARDIOGRAM COMPLETE: CPT

## 2018-09-18 PROCEDURE — 99214 OFFICE O/P EST MOD 30 MIN: CPT

## 2018-10-23 ENCOUNTER — FORM ENCOUNTER (OUTPATIENT)
Age: 63
End: 2018-10-23

## 2018-10-24 ENCOUNTER — APPOINTMENT (OUTPATIENT)
Dept: PULMONOLOGY | Facility: CLINIC | Age: 63
End: 2018-10-24
Payer: COMMERCIAL

## 2018-10-24 ENCOUNTER — APPOINTMENT (OUTPATIENT)
Dept: RADIOLOGY | Facility: CLINIC | Age: 63
End: 2018-10-24

## 2018-10-24 ENCOUNTER — OUTPATIENT (OUTPATIENT)
Dept: OUTPATIENT SERVICES | Facility: HOSPITAL | Age: 63
LOS: 1 days | End: 2018-10-24
Payer: COMMERCIAL

## 2018-10-24 VITALS
BODY MASS INDEX: 42.23 KG/M2 | HEART RATE: 67 BPM | DIASTOLIC BLOOD PRESSURE: 80 MMHG | OXYGEN SATURATION: 98 % | SYSTOLIC BLOOD PRESSURE: 140 MMHG | WEIGHT: 246 LBS

## 2018-10-24 DIAGNOSIS — Z86.79 PERSONAL HISTORY OF OTHER DISEASES OF THE CIRCULATORY SYSTEM: ICD-10-CM

## 2018-10-24 DIAGNOSIS — R06.02 SHORTNESS OF BREATH: ICD-10-CM

## 2018-10-24 DIAGNOSIS — Z98.891 HISTORY OF UTERINE SCAR FROM PREVIOUS SURGERY: Chronic | ICD-10-CM

## 2018-10-24 DIAGNOSIS — R94.2 ABNORMAL RESULTS OF PULMONARY FUNCTION STUDIES: ICD-10-CM

## 2018-10-24 PROCEDURE — 94729 DIFFUSING CAPACITY: CPT

## 2018-10-24 PROCEDURE — 71046 X-RAY EXAM CHEST 2 VIEWS: CPT

## 2018-10-24 PROCEDURE — 99204 OFFICE O/P NEW MOD 45 MIN: CPT | Mod: 25

## 2018-10-24 PROCEDURE — 94060 EVALUATION OF WHEEZING: CPT

## 2018-10-24 PROCEDURE — 71046 X-RAY EXAM CHEST 2 VIEWS: CPT | Mod: 26

## 2018-10-24 PROCEDURE — 94664 DEMO&/EVAL PT USE INHALER: CPT | Mod: 59

## 2018-10-24 PROCEDURE — 94727 GAS DIL/WSHOT DETER LNG VOL: CPT

## 2018-10-24 PROCEDURE — 85018 HEMOGLOBIN: CPT | Mod: QW

## 2018-10-25 ENCOUNTER — APPOINTMENT (OUTPATIENT)
Dept: FAMILY MEDICINE | Facility: CLINIC | Age: 63
End: 2018-10-25
Payer: COMMERCIAL

## 2018-10-25 VITALS
SYSTOLIC BLOOD PRESSURE: 160 MMHG | HEIGHT: 64 IN | DIASTOLIC BLOOD PRESSURE: 80 MMHG | OXYGEN SATURATION: 98 % | BODY MASS INDEX: 42.17 KG/M2 | WEIGHT: 247 LBS | HEART RATE: 60 BPM

## 2018-10-25 VITALS — SYSTOLIC BLOOD PRESSURE: 120 MMHG | DIASTOLIC BLOOD PRESSURE: 60 MMHG

## 2018-10-25 PROCEDURE — 99213 OFFICE O/P EST LOW 20 MIN: CPT | Mod: 25

## 2018-10-25 PROCEDURE — 90472 IMMUNIZATION ADMIN EACH ADD: CPT

## 2018-10-25 PROCEDURE — G0008: CPT

## 2018-10-25 PROCEDURE — 90686 IIV4 VACC NO PRSV 0.5 ML IM: CPT

## 2018-10-25 PROCEDURE — 90732 PPSV23 VACC 2 YRS+ SUBQ/IM: CPT

## 2018-10-25 PROCEDURE — G0009: CPT

## 2018-10-26 NOTE — HISTORY OF PRESENT ILLNESS
[FreeTextEntry1] : Patient here to follow up on her Hypertension. \par She would like to get the Flu vaccine.\par She feels well.\par

## 2018-10-26 NOTE — COUNSELING
[Weight management counseling provided] : Weight management [Healthy eating counseling provided] : healthy eating [Good understanding] : Patient has a good understanding of lifestyle changes and the steps needed to achieve self management goals

## 2018-10-26 NOTE — PHYSICAL EXAM
[No Acute Distress] : no acute distress [Normal Sclera/Conjunctiva] : normal sclera/conjunctiva [Normal Oropharynx] : the oropharynx was normal [Normal TMs] : both tympanic membranes were normal [No Lymphadenopathy] : no lymphadenopathy [No Respiratory Distress] : no respiratory distress  [Clear to Auscultation] : lungs were clear to auscultation bilaterally [Normal Rate] : normal rate  [Regular Rhythm] : with a regular rhythm [Normal S1, S2] : normal S1 and S2 [No Murmur] : no murmur heard [No Edema] : there was no peripheral edema [Normal Anterior Cervical Nodes] : no anterior cervical lymphadenopathy [Normal Affect] : the affect was normal [Normal Mood] : the mood was normal [Normal Insight/Judgement] : insight and judgment were intact [de-identified] : Lumbar nonternder. Decreased lordosis

## 2018-11-19 ENCOUNTER — OUTPATIENT (OUTPATIENT)
Dept: OUTPATIENT SERVICES | Facility: HOSPITAL | Age: 63
LOS: 1 days | End: 2018-11-19
Payer: COMMERCIAL

## 2018-11-19 DIAGNOSIS — G47.30 SLEEP APNEA, UNSPECIFIED: ICD-10-CM

## 2018-11-19 DIAGNOSIS — Z98.891 HISTORY OF UTERINE SCAR FROM PREVIOUS SURGERY: Chronic | ICD-10-CM

## 2018-11-19 PROCEDURE — 95806 SLEEP STUDY UNATT&RESP EFFT: CPT | Mod: 26

## 2018-11-19 PROCEDURE — 95806 SLEEP STUDY UNATT&RESP EFFT: CPT

## 2018-11-19 PROCEDURE — G0399: CPT

## 2018-11-30 DIAGNOSIS — M19.042 PRIMARY OSTEOARTHRITIS, LEFT HAND: ICD-10-CM

## 2018-12-11 ENCOUNTER — APPOINTMENT (OUTPATIENT)
Dept: PULMONOLOGY | Facility: CLINIC | Age: 63
End: 2018-12-11
Payer: COMMERCIAL

## 2018-12-11 VITALS — WEIGHT: 247 LBS | SYSTOLIC BLOOD PRESSURE: 140 MMHG | DIASTOLIC BLOOD PRESSURE: 80 MMHG | BODY MASS INDEX: 42.4 KG/M2

## 2018-12-11 VITALS — OXYGEN SATURATION: 95 % | HEART RATE: 74 BPM

## 2018-12-11 PROCEDURE — 99214 OFFICE O/P EST MOD 30 MIN: CPT

## 2018-12-11 RX ORDER — CLOTRIMAZOLE AND BETAMETHASONE DIPROPIONATE 10; .5 MG/G; MG/G
1-0.05 CREAM TOPICAL TWICE DAILY
Qty: 1 | Refills: 1 | Status: DISCONTINUED | COMMUNITY
Start: 2018-02-26 | End: 2018-12-11

## 2018-12-11 RX ORDER — MULTIVIT-MIN/FOLIC/VIT K/LYCOP 400-300MCG
50 MCG TABLET ORAL
Qty: 90 | Refills: 2 | Status: ACTIVE | COMMUNITY
Start: 2017-08-14

## 2018-12-11 RX ORDER — CLOBETASOL PROPIONATE 0.5 MG/G
0.05 OINTMENT TOPICAL
Qty: 45 | Refills: 1 | Status: DISCONTINUED | COMMUNITY
Start: 2018-06-11 | End: 2018-12-11

## 2018-12-20 ENCOUNTER — APPOINTMENT (OUTPATIENT)
Dept: FAMILY MEDICINE | Facility: CLINIC | Age: 63
End: 2018-12-20
Payer: COMMERCIAL

## 2018-12-20 VITALS
OXYGEN SATURATION: 97 % | WEIGHT: 247 LBS | HEIGHT: 64 IN | SYSTOLIC BLOOD PRESSURE: 130 MMHG | DIASTOLIC BLOOD PRESSURE: 78 MMHG | BODY MASS INDEX: 42.17 KG/M2 | HEART RATE: 70 BPM

## 2018-12-20 PROCEDURE — 99214 OFFICE O/P EST MOD 30 MIN: CPT | Mod: 25

## 2018-12-20 PROCEDURE — G0447 BEHAVIOR COUNSEL OBESITY 15M: CPT

## 2018-12-21 NOTE — PHYSICAL EXAM
[No Acute Distress] : no acute distress [Normal Sclera/Conjunctiva] : normal sclera/conjunctiva [Normal Oropharynx] : the oropharynx was normal [Normal TMs] : both tympanic membranes were normal [No Lymphadenopathy] : no lymphadenopathy [No Respiratory Distress] : no respiratory distress  [Clear to Auscultation] : lungs were clear to auscultation bilaterally [Normal Rate] : normal rate  [Regular Rhythm] : with a regular rhythm [Normal S1, S2] : normal S1 and S2 [No Murmur] : no murmur heard [No Edema] : there was no peripheral edema [Normal Anterior Cervical Nodes] : no anterior cervical lymphadenopathy [Normal Affect] : the affect was normal [Normal Mood] : the mood was normal [Normal Insight/Judgement] : insight and judgment were intact [de-identified] : Lumbar nonternder. Decreased lordosis

## 2018-12-21 NOTE — COUNSELING
[Weight management counseling provided] : Weight management [Healthy eating counseling provided] : healthy eating [Activity counseling provided] : activity [Good understanding] : Patient has a good understanding of lifestyle changes and the steps needed to achieve self management goals [de-identified] : 15 minutes spent counseling pt on wt optimization strategies including increased emphasis on plant based foods, eliminating fried foods, and limiting high caloric density foods.\par The pt is advised to incorporate 30 minutes of aerobic exercise into the daily routine.\par  [ - Behavioral Counseling for Obesity (Face-to-Face for 15 Minutes)] : Behavioral Counseling for Obesity (Face-to-Face for 15 Minutes)

## 2018-12-21 NOTE — ASSESSMENT
[FreeTextEntry1] : Discussed the health risks of untreated ARSLAN including increased risk for heart attack, stroke and cardiac arrhythmias.

## 2018-12-21 NOTE — REVIEW OF SYSTEMS
[Back Pain] : back pain [Suicidal] : not suicidal [Depression] : depression [Negative] : Gastrointestinal [de-identified] : She is missing her children for the holidays.

## 2018-12-21 NOTE — HISTORY OF PRESENT ILLNESS
[FreeTextEntry1] : Patient here to follow up on her Hypertension. \par She was recently dxed with severe ARSLAN. She is reluctant to start with CPAP mask as she is concerned that it will disrupt her sleep.\par  [de-identified] : Pt aware she needs to lose wt to help her sleep apnea and her overall health. She is frustrated with her wt and plans on joining [employer]. She has had minimal success with [employer] in the past.

## 2019-01-18 LAB
ALBUMIN SERPL ELPH-MCNC: 4.3 G/DL
ALP BLD-CCNC: 89 U/L
ALT SERPL-CCNC: 17 U/L
ANION GAP SERPL CALC-SCNC: 10 MMOL/L
AST SERPL-CCNC: 18 U/L
BASOPHILS # BLD AUTO: 0.02 K/UL
BASOPHILS NFR BLD AUTO: 0.3 %
BILIRUB SERPL-MCNC: 0.5 MG/DL
BUN SERPL-MCNC: 18 MG/DL
CALCIUM SERPL-MCNC: 9.3 MG/DL
CHLORIDE SERPL-SCNC: 104 MMOL/L
CHOLEST SERPL-MCNC: 225 MG/DL
CHOLEST/HDLC SERPL: 3 RATIO
CO2 SERPL-SCNC: 27 MMOL/L
CREAT SERPL-MCNC: 0.93 MG/DL
EOSINOPHIL # BLD AUTO: 0.38 K/UL
EOSINOPHIL NFR BLD AUTO: 6.2 %
GLUCOSE SERPL-MCNC: 91 MG/DL
HCT VFR BLD CALC: 43.5 %
HDLC SERPL-MCNC: 75 MG/DL
HGB BLD-MCNC: 14 G/DL
IMM GRANULOCYTES NFR BLD AUTO: 0.2 %
LDLC SERPL CALC-MCNC: 136 MG/DL
LYMPHOCYTES # BLD AUTO: 2.35 K/UL
LYMPHOCYTES NFR BLD AUTO: 38.1 %
MAN DIFF?: NORMAL
MCHC RBC-ENTMCNC: 31.1 PG
MCHC RBC-ENTMCNC: 32.2 GM/DL
MCV RBC AUTO: 96.7 FL
MONOCYTES # BLD AUTO: 0.44 K/UL
MONOCYTES NFR BLD AUTO: 7.1 %
NEUTROPHILS # BLD AUTO: 2.96 K/UL
NEUTROPHILS NFR BLD AUTO: 48.1 %
PLATELET # BLD AUTO: 280 K/UL
POTASSIUM SERPL-SCNC: 4.4 MMOL/L
PROT SERPL-MCNC: 6.9 G/DL
RBC # BLD: 4.5 M/UL
RBC # FLD: 13.6 %
SODIUM SERPL-SCNC: 141 MMOL/L
TRIGL SERPL-MCNC: 72 MG/DL
TSH SERPL-ACNC: 3.69 UIU/ML
VIT B12 SERPL-MCNC: 587 PG/ML
WBC # FLD AUTO: 6.16 K/UL

## 2019-01-22 ENCOUNTER — APPOINTMENT (OUTPATIENT)
Dept: CARDIOLOGY | Facility: CLINIC | Age: 64
End: 2019-01-22
Payer: COMMERCIAL

## 2019-01-22 ENCOUNTER — NON-APPOINTMENT (OUTPATIENT)
Age: 64
End: 2019-01-22

## 2019-01-22 VITALS
SYSTOLIC BLOOD PRESSURE: 150 MMHG | DIASTOLIC BLOOD PRESSURE: 81 MMHG | HEART RATE: 78 BPM | OXYGEN SATURATION: 97 % | HEIGHT: 64 IN | WEIGHT: 250 LBS | BODY MASS INDEX: 42.68 KG/M2

## 2019-01-22 VITALS — SYSTOLIC BLOOD PRESSURE: 144 MMHG | DIASTOLIC BLOOD PRESSURE: 85 MMHG

## 2019-01-22 PROCEDURE — 93000 ELECTROCARDIOGRAM COMPLETE: CPT

## 2019-01-22 PROCEDURE — 99214 OFFICE O/P EST MOD 30 MIN: CPT

## 2019-01-22 NOTE — PHYSICAL EXAM
[General Appearance - Well Developed] : well developed [Well Groomed] : well groomed [General Appearance - Well Nourished] : well nourished [No Deformities] : no deformities [General Appearance - In No Acute Distress] : no acute distress [Normal Conjunctiva] : the conjunctiva exhibited no abnormalities [Eyelids - No Xanthelasma] : the eyelids demonstrated no xanthelasmas [Normal Oral Mucosa] : normal oral mucosa [No Oral Pallor] : no oral pallor [No Oral Cyanosis] : no oral cyanosis [Normal Jugular Venous V Waves Present] : normal jugular venous V waves present [Respiration, Rhythm And Depth] : normal respiratory rhythm and effort [Exaggerated Use Of Accessory Muscles For Inspiration] : no accessory muscle use [Auscultation Breath Sounds / Voice Sounds] : lungs were clear to auscultation bilaterally [Heart Rate And Rhythm] : heart rate and rhythm were normal [Heart Sounds] : normal S1 and S2 [Murmurs] : no murmurs present [Arterial Pulses Normal] : the arterial pulses were normal [Abdomen Soft] : soft [Abdomen Tenderness] : non-tender [Abnormal Walk] : normal gait [Nail Clubbing] : no clubbing of the fingernails [Cyanosis, Localized] : no localized cyanosis [Petechial Hemorrhages (___cm)] : no petechial hemorrhages [Skin Color & Pigmentation] : normal skin color and pigmentation [] : no rash [No Venous Stasis] : no venous stasis [Skin Lesions] : no skin lesions [No Skin Ulcers] : no skin ulcer [No Xanthoma] : no  xanthoma was observed [Oriented To Time, Place, And Person] : oriented to person, place, and time [No Anxiety] : not feeling anxious [Edema] : no peripheral edema present [Bowel Sounds] : normal bowel sounds [FreeTextEntry1] : tearful in the office

## 2019-01-22 NOTE — REASON FOR VISIT
[Follow-Up - Clinic] : a clinic follow-up of [Abnormal ECG] : an abnormal ECG [Hypertension] : hypertension [Spouse] : spouse

## 2019-01-22 NOTE — ASSESSMENT
[FreeTextEntry1] : # hypertension- borderline elevated.  Weight loss, exercise.  Continue amlodipine, valsartan, carvedilol.  \par # hyperlipidemia - ASCVD risk score > 7%.  Lipids are higher.  Recommended starting low dose rosuvastatin.  She will think about it.  Weight loss, exercise.  Waiting to see a nutritionist. \par # RBBB on ECG - echo with borderline phtn, mild TR.  ARSLAN\par # TR - mild on last echo, no s/s of heart failure.  Repeat TTE in 1-2 yrs, earlier if becomes symptomatic.\par # ARSLAN - discussed long term cardiovascular risks of untreated ARSLAN including arrhythmias, hypertension, and heart failure.  Encourage her to try CPAP therapy \par \par RTC 3 months\par Thank you for allowing me to participate in your patient's care.  Please contact me if there are any questions or concerns.\par \par encouraged to try CPAP and statin therapy - lengthy discussion regarding risks and benefit of therapy\par going to see a nutritionist\par \par \par \par \par \par
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
## 2019-02-01 ENCOUNTER — RX RENEWAL (OUTPATIENT)
Age: 64
End: 2019-02-01

## 2019-03-21 ENCOUNTER — APPOINTMENT (OUTPATIENT)
Dept: PULMONOLOGY | Facility: CLINIC | Age: 64
End: 2019-03-21

## 2019-03-22 ENCOUNTER — APPOINTMENT (OUTPATIENT)
Dept: FAMILY MEDICINE | Facility: CLINIC | Age: 64
End: 2019-03-22
Payer: COMMERCIAL

## 2019-03-22 VITALS
OXYGEN SATURATION: 97 % | SYSTOLIC BLOOD PRESSURE: 126 MMHG | BODY MASS INDEX: 41.32 KG/M2 | WEIGHT: 242 LBS | HEART RATE: 55 BPM | HEIGHT: 64 IN | DIASTOLIC BLOOD PRESSURE: 85 MMHG

## 2019-03-22 DIAGNOSIS — Z92.29 PERSONAL HISTORY OF OTHER DRUG THERAPY: ICD-10-CM

## 2019-03-22 PROCEDURE — 99214 OFFICE O/P EST MOD 30 MIN: CPT

## 2019-03-22 RX ORDER — ESTRADIOL 0.1 MG/G
0.1 CREAM VAGINAL
Qty: 45 | Refills: 2 | Status: DISCONTINUED | COMMUNITY
Start: 2018-02-26 | End: 2019-03-22

## 2019-03-22 NOTE — HISTORY OF PRESENT ILLNESS
[FreeTextEntry1] : Patient is here to follow up on her hypertension. \par Recently started on CPAP for newly dxed severe ARSLAN.\par Saw cardio recently for updated evaluation..\par Had bw in January. cholesterol is elevated. [de-identified] : Lost 8 lbs over the past 3 months. She is following [facility].

## 2019-03-22 NOTE — REVIEW OF SYSTEMS
[Negative] : Psychiatric [Chest Pain] : no chest pain [Shortness Of Breath] : no shortness of breath

## 2019-03-22 NOTE — PHYSICAL EXAM
[No Acute Distress] : no acute distress [Normal Sclera/Conjunctiva] : normal sclera/conjunctiva [Normal Oropharynx] : the oropharynx was normal [Normal TMs] : both tympanic membranes were normal [No Lymphadenopathy] : no lymphadenopathy [No Respiratory Distress] : no respiratory distress  [Clear to Auscultation] : lungs were clear to auscultation bilaterally [Normal Rate] : normal rate  [Regular Rhythm] : with a regular rhythm [Normal S1, S2] : normal S1 and S2 [No Murmur] : no murmur heard [No Edema] : there was no peripheral edema [Normal Anterior Cervical Nodes] : no anterior cervical lymphadenopathy [Normal Affect] : the affect was normal [Normal Mood] : the mood was normal [Normal Insight/Judgement] : insight and judgment were intact

## 2019-03-25 ENCOUNTER — RX CHANGE (OUTPATIENT)
Age: 64
End: 2019-03-25

## 2019-03-26 ENCOUNTER — RX CHANGE (OUTPATIENT)
Age: 64
End: 2019-03-26

## 2019-04-15 ENCOUNTER — APPOINTMENT (OUTPATIENT)
Dept: FAMILY MEDICINE | Facility: CLINIC | Age: 64
End: 2019-04-15
Payer: COMMERCIAL

## 2019-04-15 ENCOUNTER — LABORATORY RESULT (OUTPATIENT)
Age: 64
End: 2019-04-15

## 2019-04-15 VITALS
SYSTOLIC BLOOD PRESSURE: 130 MMHG | BODY MASS INDEX: 41.83 KG/M2 | HEART RATE: 67 BPM | WEIGHT: 245 LBS | OXYGEN SATURATION: 98 % | HEIGHT: 64 IN | DIASTOLIC BLOOD PRESSURE: 60 MMHG

## 2019-04-15 PROCEDURE — 99213 OFFICE O/P EST LOW 20 MIN: CPT | Mod: 25

## 2019-04-15 PROCEDURE — 36415 COLL VENOUS BLD VENIPUNCTURE: CPT

## 2019-04-15 NOTE — PHYSICAL EXAM
[No Acute Distress] : no acute distress [No Rash] : no rash [de-identified] : Small sccabbed papule left buttocks, with marginal erythema. No clearing or scaling. No foreign body noted.

## 2019-04-15 NOTE — HISTORY OF PRESENT ILLNESS
[FreeTextEntry8] : Patient c/o a tick bite she got on 04/13/19 on her left buttocks. She states she noticed it on Sunday 04/14/19 while she was showering. \par Tick on skin approx 15 hours. No fever, myalgia, arthralgias or rash. No increased fatigue. Tick bite occurred in [place], [place].

## 2019-04-15 NOTE — ASSESSMENT
[FreeTextEntry1] : Repeat tick borne titers in 6 wks.\par Pt to f/u immediately if she develops rash, fever, myalgias, arthralgias, increased fatigue, h/a or malaise.\par Photosensitivity precautions with doxycycline discussed.

## 2019-04-17 LAB
B BURGDOR AB SER-IMP: NEGATIVE
B BURGDOR IGG+IGM SER QL IB: NORMAL
B BURGDOR IGM PATRN SER IB-IMP: NEGATIVE
B BURGDOR18/20KD IGM SER QL IB: NORMAL
B BURGDOR18KD IGG SER QL IB: NORMAL
B BURGDOR23KD IGG SER QL IB: NORMAL
B BURGDOR23KD IGM SER QL IB: NORMAL
B BURGDOR28KD AB SER QL IB: NORMAL
B BURGDOR28KD IGG SER QL IB: NORMAL
B BURGDOR30KD AB SER QL IB: NORMAL
B BURGDOR30KD IGG SER QL IB: NORMAL
B BURGDOR31KD IGG SER QL IB: NORMAL
B BURGDOR31KD IGM SER QL IB: NORMAL
B BURGDOR39KD IGG SER QL IB: NORMAL
B BURGDOR39KD IGM SER QL IB: NORMAL
B BURGDOR41KD IGG SER QL IB: NORMAL
B BURGDOR41KD IGM SER QL IB: NORMAL
B BURGDOR45KD AB SER QL IB: NORMAL
B BURGDOR45KD IGG SER QL IB: NORMAL
B BURGDOR58KD AB SER QL IB: NORMAL
B BURGDOR58KD IGG SER QL IB: NORMAL
B BURGDOR66KD IGG SER QL IB: NORMAL
B BURGDOR66KD IGM SER QL IB: NORMAL
B BURGDOR93KD IGG SER QL IB: NORMAL
B BURGDOR93KD IGM SER QL IB: NORMAL

## 2019-04-22 ENCOUNTER — APPOINTMENT (OUTPATIENT)
Dept: CARDIOLOGY | Facility: CLINIC | Age: 64
End: 2019-04-22
Payer: COMMERCIAL

## 2019-04-22 ENCOUNTER — NON-APPOINTMENT (OUTPATIENT)
Age: 64
End: 2019-04-22

## 2019-04-22 VITALS
HEIGHT: 64 IN | DIASTOLIC BLOOD PRESSURE: 68 MMHG | HEART RATE: 65 BPM | OXYGEN SATURATION: 100 % | WEIGHT: 248 LBS | SYSTOLIC BLOOD PRESSURE: 122 MMHG | BODY MASS INDEX: 42.34 KG/M2

## 2019-04-22 LAB
ALBUMIN SERPL ELPH-MCNC: 4.2 G/DL
ALP BLD-CCNC: 85 U/L
ALT SERPL-CCNC: 19 U/L
ANION GAP SERPL CALC-SCNC: 16 MMOL/L
AST SERPL-CCNC: 20 U/L
BILIRUB SERPL-MCNC: 0.5 MG/DL
BUN SERPL-MCNC: 21 MG/DL
CALCIUM SERPL-MCNC: 9.3 MG/DL
CHLORIDE SERPL-SCNC: 104 MMOL/L
CHOLEST SERPL-MCNC: 213 MG/DL
CHOLEST/HDLC SERPL: 3.1 RATIO
CO2 SERPL-SCNC: 23 MMOL/L
CREAT SERPL-MCNC: 1.04 MG/DL
ESTIMATED AVERAGE GLUCOSE: 103 MG/DL
GLUCOSE SERPL-MCNC: 89 MG/DL
HBA1C MFR BLD HPLC: 5.2 %
HDLC SERPL-MCNC: 69 MG/DL
LDLC SERPL CALC-MCNC: 132 MG/DL
POTASSIUM SERPL-SCNC: 5.1 MMOL/L
PROT SERPL-MCNC: 6.8 G/DL
SODIUM SERPL-SCNC: 143 MMOL/L
TRIGL SERPL-MCNC: 62 MG/DL
TSH SERPL-ACNC: 3.87 UIU/ML

## 2019-04-22 PROCEDURE — 93000 ELECTROCARDIOGRAM COMPLETE: CPT

## 2019-04-22 PROCEDURE — 99214 OFFICE O/P EST MOD 30 MIN: CPT
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
## 2019-04-22 NOTE — PHYSICAL EXAM
[General Appearance - Well Developed] : well developed [Normal Appearance] : normal appearance [Well Groomed] : well groomed [General Appearance - Well Nourished] : well nourished [No Deformities] : no deformities [General Appearance - In No Acute Distress] : no acute distress [Normal Conjunctiva] : the conjunctiva exhibited no abnormalities [Eyelids - No Xanthelasma] : the eyelids demonstrated no xanthelasmas [No Oral Pallor] : no oral pallor [Normal Oral Mucosa] : normal oral mucosa [Normal Jugular Venous A Waves Present] : normal jugular venous A waves present [No Oral Cyanosis] : no oral cyanosis [No Jugular Venous Bass A Waves] : no jugular venous bass A waves [Normal Jugular Venous V Waves Present] : normal jugular venous V waves present [Respiration, Rhythm And Depth] : normal respiratory rhythm and effort [Exaggerated Use Of Accessory Muscles For Inspiration] : no accessory muscle use [Auscultation Breath Sounds / Voice Sounds] : lungs were clear to auscultation bilaterally [Heart Rate And Rhythm] : heart rate and rhythm were normal [Heart Sounds] : normal S1 and S2 [Murmurs] : no murmurs present [Arterial Pulses Normal] : the arterial pulses were normal [Abdomen Soft] : soft [Abdomen Tenderness] : non-tender [Abnormal Walk] : normal gait [Nail Clubbing] : no clubbing of the fingernails [Cyanosis, Localized] : no localized cyanosis [Petechial Hemorrhages (___cm)] : no petechial hemorrhages [Skin Color & Pigmentation] : normal skin color and pigmentation [] : no rash [No Venous Stasis] : no venous stasis [Skin Lesions] : no skin lesions [No Skin Ulcers] : no skin ulcer [No Xanthoma] : no  xanthoma was observed [Oriented To Time, Place, And Person] : oriented to person, place, and time [Affect] : the affect was normal [Mood] : the mood was normal [No Anxiety] : not feeling anxious

## 2019-04-22 NOTE — ASSESSMENT
[FreeTextEntry1] : # hypertension- controlled  Weight loss, exercise.  Continue amlodipine, carvedilol for now. \par # hyperlipidemia - borderline, recommend weight loss, improve diet, more fiber. \par # RBBB on ECG - echo with borderline phtn, mild TR.  Will continue to monitor. PFTs.  \par # TR - mild on last echo, no s/s of heart failure.  Repeat TTE in 1-2 yrs, earlier if becomes symptomatic. \par \par no changes  in meds\par exercise, weight loss\par ASCVD 5%, ok for lifestyle modification still.  Advised that will need statin therapy within the next 5 yrs.  \par No signs of worsening AV block on ECG. Advised to come in sooner should she have symptoms of dizziness, fatigue.  To ER if develops syncope, pedro in setting of recent tick bite. \par Lipids check in 6 months\par RTC 8-9 months\par Thank you for allowing me to participate in your patient's care.  Please contact me if there are any questions or concerns\par \par

## 2019-04-29 ENCOUNTER — RX RENEWAL (OUTPATIENT)
Age: 64
End: 2019-04-29

## 2019-05-03 ENCOUNTER — APPOINTMENT (OUTPATIENT)
Dept: PULMONOLOGY | Facility: CLINIC | Age: 64
End: 2019-05-03
Payer: COMMERCIAL

## 2019-05-03 VITALS
WEIGHT: 244 LBS | DIASTOLIC BLOOD PRESSURE: 80 MMHG | BODY MASS INDEX: 41.88 KG/M2 | OXYGEN SATURATION: 99 % | HEART RATE: 57 BPM | SYSTOLIC BLOOD PRESSURE: 134 MMHG

## 2019-05-03 PROCEDURE — 99214 OFFICE O/P EST MOD 30 MIN: CPT

## 2019-05-03 RX ORDER — DOXYCYCLINE 100 MG/1
100 CAPSULE ORAL
Qty: 2 | Refills: 0 | Status: DISCONTINUED | COMMUNITY
Start: 2019-04-15 | End: 2019-05-03

## 2019-05-03 NOTE — CONSULT LETTER
[Dear  ___] : Dear  [unfilled], [Consult Letter:] : I had the pleasure of evaluating your patient, [unfilled]. [Please see my note below.] : Please see my note below. [Consult Closing:] : Thank you very much for allowing me to participate in the care of this patient.  If you have any questions, please do not hesitate to contact me. [DrIliana  ___] : Dr. PEREZ [Sincerely,] : Sincerely, [FreeTextEntry3] : Henry Garcia MD, FCCP, LATOYA. ABSM\par

## 2019-05-03 NOTE — HISTORY OF PRESENT ILLNESS
[None] : No associated symptoms are reported [CPAP] : CPAP [Good Compliance] : good compliance with treatment [Good Tolerance] : good tolerance of treatment [Good Symptom Control] : good symptom control [Follow-Up - Routine Clinic] : a routine clinic follow-up of [Currently Experiencing] : The patient is currently experiencing symptoms. [Dyspnea] : dyspnea [Excess Weight] : excess weight [Back Pain] : back pain [Low Calorie Diet] : low calorie diet [Fair Compliance] : fair compliance with treatment [Fair Tolerance] : fair tolerance of treatment [Poor Symptom Control] : poor symptom control [Hypertension] : hypertension [High] : high [Low Calorie] : low calorie [Well Balanced Diet] : well balanced meals [Infrequently] : exercises infrequently [Walking] : walking [FreeTextEntry1] : Patient c/o SOBOE but is otherwise without associated respiratory complaints. She is followed by cardiology and was sent for pulmonary evaluation given reported mild pulm HTN and suspicion of ARSLAN.\par  [de-identified] : 5-20 cm of water

## 2019-05-03 NOTE — REVIEW OF SYSTEMS
[Hypertension] : ~T hypertension [Thyroid Problem] : thyroid problem [As Noted in HPI] : as noted in HPI [Fever] : no fever [Chills] : no chills [Dry Eyes] : no dryness of the eyes [Nasal Congestion] : no nasal congestion [Eye Irritation] : no ~T irritation of the eyes [Epistaxis] : no nosebleeds [Postnasal Drip] : no postnasal drip [Sinus Problems] : no sinus problems [Cough] : no cough [Sputum] : not coughing up ~M sputum [Dyspnea] : no dyspnea [Chest Tightness] : no chest tightness [Pleuritic Pain] : no pleuritic pain [Wheezing] : no wheezing [Chest Discomfort] : no chest discomfort [Dysrhythmia] : no dysrhythmia [Murmurs] : no murmurs were heard [Palpitations] : no palpitations [Edema] : ~T edema was not present [Hay Fever] : no hay fever [Itchy Eyes] : no itching of ~T the eyes [Reflux] : no reflux [Nausea] : no nausea [Vomiting] : no vomiting [Constipation] : no constipation [Abdominal Pain] : no abdominal pain [Diarrhea] : no diarrhea [Dysuria] : no dysuria [Trauma] : no ~T physical trauma [Fracture] : no fracture [Headache] : no headache [Anemia] : no anemia [Dizziness] : no dizziness [Syncope] : no fainting [Numbness] : no numbness [Paralysis] : no paralysis was seen [Seizures] : no seizures [Depression] : no depression [Anxiety] : no anxiety [Diabetes] : no diabetes mellitus

## 2019-05-03 NOTE — REASON FOR VISIT
[Follow-Up] : a follow-up visit [Pulmonary Hypertension] : pulmonary hypertension [Shortness of Breath] : shortness of Breath [Sleep Apnea] : sleep apnea [FreeTextEntry2] : obesity, restriction on PFTs

## 2019-05-03 NOTE — DISCUSSION/SUMMARY
[FreeTextEntry1] : \par #1. Mild restriction on PFTs is likely related to weight; would follow lung function with weight loss as needed\par #2. CXR to further evaluate mild restriction and SOBOE was clear and restriction is likely related to weight\par #3. The patient does not appear to require chronic BD therapy at this time\par #4. Diet and exercise for weight loss\par #5. SOBOE is likely related to weight or deconditioning given restriction\par #6. Continue autoCPAP but change pressure to 10-20 cm of water\par #7. Cardiology f/u \par #8. F/u 4-6 weeks with compliance

## 2019-05-03 NOTE — PHYSICAL EXAM
[General Appearance - Well Developed] : well developed [General Appearance - In No Acute Distress] : no acute distress [Normal Appearance] : normal appearance [Normal Conjunctiva] : the conjunctiva exhibited no abnormalities [Elongated Uvula] : elongated uvula [Low Lying Soft Palate] : low lying soft palate [Enlarged Base of the Tongue] : enlargement of the base of the tongue [III] : III [Neck Appearance] : the appearance of the neck was normal [Heart Sounds] : normal S1 and S2 [Murmurs] : no murmurs present [Heart Rate And Rhythm] : heart rate and rhythm were normal [Edema] : no peripheral edema present [] : no respiratory distress [Respiration, Rhythm And Depth] : normal respiratory rhythm and effort [Exaggerated Use Of Accessory Muscles For Inspiration] : no accessory muscle use [Auscultation Breath Sounds / Voice Sounds] : lungs were clear to auscultation bilaterally [Abdomen Tenderness] : non-tender [Abdomen Soft] : soft [Cyanosis, Localized] : no localized cyanosis [Abnormal Walk] : normal gait [Nail Clubbing] : no clubbing of the fingernails [Oriented To Time, Place, And Person] : oriented to person, place, and time [No Focal Deficits] : no focal deficits [FreeTextEntry1] : No abnormalities.

## 2019-05-03 NOTE — PROCEDURE
[FreeTextEntry1] : PFTs 10/24/18 - Mild restriction with mildly reduced diffusion capacity which corrected for alveolar volume.

## 2019-05-13 ENCOUNTER — MEDICATION RENEWAL (OUTPATIENT)
Age: 64
End: 2019-05-13

## 2019-05-28 ENCOUNTER — LABORATORY RESULT (OUTPATIENT)
Age: 64
End: 2019-05-28

## 2019-06-11 ENCOUNTER — APPOINTMENT (OUTPATIENT)
Dept: PULMONOLOGY | Facility: CLINIC | Age: 64
End: 2019-06-11
Payer: COMMERCIAL

## 2019-06-11 VITALS
OXYGEN SATURATION: 97 % | SYSTOLIC BLOOD PRESSURE: 124 MMHG | HEART RATE: 68 BPM | WEIGHT: 244 LBS | DIASTOLIC BLOOD PRESSURE: 78 MMHG | BODY MASS INDEX: 41.88 KG/M2

## 2019-06-11 LAB
ANAPLASMA PHAGOCYTO IGM COMENT: NORMAL
ANAPLASMA PHAGOCYTO IGM COMMENT: NORMAL
ANAPLASMA PHAGOCYTOPHILIA IGG ANTIBODIES: NORMAL
ANAPLASMA PHAGOCYTOPHILIA IGM ANTIBODIES: NORMAL
B BURGDOR AB SER-IMP: NEGATIVE
B BURGDOR IGG+IGM SER QL IB: NORMAL
B BURGDOR IGM PATRN SER IB-IMP: NEGATIVE
B BURGDOR18/20KD IGM SER QL IB: NORMAL
B BURGDOR18KD IGG SER QL IB: NORMAL
B BURGDOR23KD IGG SER QL IB: NORMAL
B BURGDOR23KD IGM SER QL IB: NORMAL
B BURGDOR28KD AB SER QL IB: NORMAL
B BURGDOR28KD IGG SER QL IB: NORMAL
B BURGDOR30KD AB SER QL IB: NORMAL
B BURGDOR30KD IGG SER QL IB: NORMAL
B BURGDOR31KD IGG SER QL IB: NORMAL
B BURGDOR31KD IGM SER QL IB: NORMAL
B BURGDOR39KD IGG SER QL IB: NORMAL
B BURGDOR39KD IGM SER QL IB: NORMAL
B BURGDOR41KD IGG SER QL IB: PRESENT
B BURGDOR41KD IGM SER QL IB: PRESENT
B BURGDOR45KD AB SER QL IB: NORMAL
B BURGDOR45KD IGG SER QL IB: NORMAL
B BURGDOR58KD AB SER QL IB: NORMAL
B BURGDOR58KD IGG SER QL IB: NORMAL
B BURGDOR66KD IGG SER QL IB: NORMAL
B BURGDOR66KD IGM SER QL IB: NORMAL
B BURGDOR93KD IGG SER QL IB: NORMAL
B BURGDOR93KD IGM SER QL IB: NORMAL
B MICROTI AB SER QL: NORMAL
BABESIA ANTIBODIES, IGG: NORMAL
BABESIA ANTIBODIES, IGM: NORMAL
EHRLICIA CHAFFEENIS IGG ANTIBODIES: NORMAL
EHRLICIA CHAFFEENIS IGG COMMENT: NORMAL
EHRLICIA CHAFFEENIS IGG INTERP: NORMAL
EHRLICIA CHAFFEENIS IGM ANTIBODIES: NORMAL

## 2019-06-11 PROCEDURE — 99214 OFFICE O/P EST MOD 30 MIN: CPT

## 2019-06-11 NOTE — REASON FOR VISIT
[Follow-Up] : a follow-up visit [Pulmonary Hypertension] : pulmonary hypertension [Sleep Apnea] : sleep apnea [Shortness of Breath] : shortness of Breath [FreeTextEntry2] : obesity, restriction on PFTs

## 2019-06-11 NOTE — DISCUSSION/SUMMARY
[FreeTextEntry1] : \par #1. Mild restriction on PFTs is likely related to weight; would follow lung function with weight loss as needed\par #2. CXR to further evaluate mild restriction and SOBOE was clear and restriction is likely related to weight\par #3. The patient does not appear to require chronic BD therapy at this time\par #4. Diet and exercise for weight loss\par #5. SOBOE is likely related to weight or deconditioning given restriction\par #6. Continue autoCPAP but change pressure to 13-17 cm of water\par #7. Cardiology f/u \par #8. F/u 6 weeks with compliance on new pressures

## 2019-06-11 NOTE — CONSULT LETTER
[Dear  ___] : Dear  [unfilled], [Please see my note below.] : Please see my note below. [Consult Letter:] : I had the pleasure of evaluating your patient, [unfilled]. [Consult Closing:] : Thank you very much for allowing me to participate in the care of this patient.  If you have any questions, please do not hesitate to contact me. [Sincerely,] : Sincerely, [DrIliana  ___] : Dr. PEREZ [FreeTextEntry3] : Henry Garcia MD, FCCP, LATOYA. ABSM\par

## 2019-06-11 NOTE — PHYSICAL EXAM
[Normal Appearance] : normal appearance [General Appearance - Well Developed] : well developed [General Appearance - In No Acute Distress] : no acute distress [Normal Conjunctiva] : the conjunctiva exhibited no abnormalities [Low Lying Soft Palate] : low lying soft palate [Elongated Uvula] : elongated uvula [Enlarged Base of the Tongue] : enlargement of the base of the tongue [III] : III [Neck Appearance] : the appearance of the neck was normal [Heart Rate And Rhythm] : heart rate and rhythm were normal [Heart Sounds] : normal S1 and S2 [Murmurs] : no murmurs present [Edema] : no peripheral edema present [Respiration, Rhythm And Depth] : normal respiratory rhythm and effort [] : no respiratory distress [Auscultation Breath Sounds / Voice Sounds] : lungs were clear to auscultation bilaterally [Exaggerated Use Of Accessory Muscles For Inspiration] : no accessory muscle use [Abdomen Tenderness] : non-tender [Abdomen Soft] : soft [Abnormal Walk] : normal gait [Nail Clubbing] : no clubbing of the fingernails [Cyanosis, Localized] : no localized cyanosis [No Focal Deficits] : no focal deficits [Oriented To Time, Place, And Person] : oriented to person, place, and time [FreeTextEntry1] : No abnormalities.

## 2019-06-11 NOTE — HISTORY OF PRESENT ILLNESS
[CPAP] : CPAP [None] : No associated symptoms are reported [Good Compliance] : good compliance with treatment [Good Tolerance] : good tolerance of treatment [Good Symptom Control] : good symptom control [Follow-Up - Routine Clinic] : a routine clinic follow-up of [Currently Experiencing] : The patient is currently experiencing symptoms. [Excess Weight] : excess weight [Back Pain] : back pain [Dyspnea] : dyspnea [Low Calorie Diet] : low calorie diet [Fair Compliance] : fair compliance with treatment [Poor Symptom Control] : poor symptom control [Fair Tolerance] : fair tolerance of treatment [Hypertension] : hypertension [High] : high [Well Balanced Diet] : well balanced meals [Low Calorie] : low calorie [Infrequently] : exercises infrequently [Walking] : walking [FreeTextEntry1] : Patient c/o SOBOE but is otherwise without associated respiratory complaints. She is followed by cardiology and was sent for pulmonary evaluation given reported mild pulm HTN and suspicion of ARSLAN.\par  [de-identified] : 5-20 cm of water

## 2019-06-11 NOTE — REVIEW OF SYSTEMS
[Hypertension] : ~T hypertension [Thyroid Problem] : thyroid problem [As Noted in HPI] : as noted in HPI [Fever] : no fever [Chills] : no chills [Eye Irritation] : no ~T irritation of the eyes [Dry Eyes] : no dryness of the eyes [Nasal Congestion] : no nasal congestion [Sinus Problems] : no sinus problems [Epistaxis] : no nosebleeds [Postnasal Drip] : no postnasal drip [Cough] : no cough [Sputum] : not coughing up ~M sputum [Dyspnea] : no dyspnea [Chest Tightness] : no chest tightness [Pleuritic Pain] : no pleuritic pain [Wheezing] : no wheezing [Chest Discomfort] : no chest discomfort [Dysrhythmia] : no dysrhythmia [Murmurs] : no murmurs were heard [Palpitations] : no palpitations [Edema] : ~T edema was not present [Hay Fever] : no hay fever [Itchy Eyes] : no itching of ~T the eyes [Reflux] : no reflux [Nausea] : no nausea [Vomiting] : no vomiting [Constipation] : no constipation [Diarrhea] : no diarrhea [Abdominal Pain] : no abdominal pain [Dysuria] : no dysuria [Trauma] : no ~T physical trauma [Fracture] : no fracture [Anemia] : no anemia [Headache] : no headache [Dizziness] : no dizziness [Syncope] : no fainting [Numbness] : no numbness [Paralysis] : no paralysis was seen [Seizures] : no seizures [Depression] : no depression [Anxiety] : no anxiety [Diabetes] : no diabetes mellitus

## 2019-06-24 ENCOUNTER — APPOINTMENT (OUTPATIENT)
Dept: FAMILY MEDICINE | Facility: CLINIC | Age: 64
End: 2019-06-24
Payer: COMMERCIAL

## 2019-06-24 ENCOUNTER — LABORATORY RESULT (OUTPATIENT)
Age: 64
End: 2019-06-24

## 2019-06-24 VITALS
HEIGHT: 64 IN | DIASTOLIC BLOOD PRESSURE: 78 MMHG | SYSTOLIC BLOOD PRESSURE: 128 MMHG | BODY MASS INDEX: 41.66 KG/M2 | HEART RATE: 89 BPM | WEIGHT: 244 LBS | OXYGEN SATURATION: 98 %

## 2019-06-24 PROCEDURE — 36415 COLL VENOUS BLD VENIPUNCTURE: CPT

## 2019-06-24 PROCEDURE — 99214 OFFICE O/P EST MOD 30 MIN: CPT | Mod: 25

## 2019-06-24 NOTE — PHYSICAL EXAM
[No Acute Distress] : no acute distress [Normal Sclera/Conjunctiva] : normal sclera/conjunctiva [Normal Oropharynx] : the oropharynx was normal [Supple] : supple [No Respiratory Distress] : no respiratory distress  [Clear to Auscultation] : lungs were clear to auscultation bilaterally [Normal Rate] : normal rate  [Regular Rhythm] : with a regular rhythm [Normal S1, S2] : normal S1 and S2 [No Murmur] : no murmur heard [No Edema] : there was no peripheral edema [Normal Anterior Cervical Nodes] : no anterior cervical lymphadenopathy [No Rash] : no rash [Normal Mood] : the mood was normal [de-identified] : Small sccabbed papule left buttocks, with marginal erythema. No clearing or scaling. No foreign body noted.

## 2019-06-24 NOTE — HEALTH RISK ASSESSMENT
[Yes] : Yes [Monthly or less (1 pt)] : Monthly or less (1 point) [No] : In the past 12 months have you used drugs other than those required for medical reasons? No [No falls in past year] : Patient reported no falls in the past year [0] : 2) Feeling down, depressed, or hopeless: Not at all (0) [] : No [[id number]] : 0

## 2019-06-28 ENCOUNTER — FORM ENCOUNTER (OUTPATIENT)
Age: 64
End: 2019-06-28

## 2019-06-28 LAB
T3RU NFR SERPL: 1.1 TBI
T4 FREE SERPL-MCNC: 1.3 NG/DL
THYROGLOB AB SERPL-ACNC: <20 IU/ML
THYROPEROXIDASE AB SERPL IA-ACNC: <10 IU/ML
TSH SERPL-ACNC: 2.31 UIU/ML

## 2019-06-29 ENCOUNTER — APPOINTMENT (OUTPATIENT)
Dept: ULTRASOUND IMAGING | Facility: CLINIC | Age: 64
End: 2019-06-29
Payer: COMMERCIAL

## 2019-06-29 ENCOUNTER — OUTPATIENT (OUTPATIENT)
Dept: OUTPATIENT SERVICES | Facility: HOSPITAL | Age: 64
LOS: 1 days | End: 2019-06-29
Payer: COMMERCIAL

## 2019-06-29 DIAGNOSIS — Z98.891 HISTORY OF UTERINE SCAR FROM PREVIOUS SURGERY: Chronic | ICD-10-CM

## 2019-06-29 DIAGNOSIS — Z00.00 ENCOUNTER FOR GENERAL ADULT MEDICAL EXAMINATION WITHOUT ABNORMAL FINDINGS: ICD-10-CM

## 2019-06-29 DIAGNOSIS — E03.9 HYPOTHYROIDISM, UNSPECIFIED: ICD-10-CM

## 2019-06-29 PROCEDURE — 76536 US EXAM OF HEAD AND NECK: CPT

## 2019-06-29 PROCEDURE — 76536 US EXAM OF HEAD AND NECK: CPT | Mod: 26

## 2019-07-23 ENCOUNTER — APPOINTMENT (OUTPATIENT)
Dept: PULMONOLOGY | Facility: CLINIC | Age: 64
End: 2019-07-23
Payer: COMMERCIAL

## 2019-07-23 VITALS — OXYGEN SATURATION: 97 % | HEART RATE: 71 BPM

## 2019-07-23 VITALS — DIASTOLIC BLOOD PRESSURE: 62 MMHG | WEIGHT: 241 LBS | SYSTOLIC BLOOD PRESSURE: 120 MMHG | BODY MASS INDEX: 41.37 KG/M2

## 2019-07-23 PROCEDURE — 99214 OFFICE O/P EST MOD 30 MIN: CPT

## 2019-07-23 NOTE — HISTORY OF PRESENT ILLNESS
[Good Compliance] : good compliance with treatment [CPAP] : CPAP [None] : No associated symptoms are reported [Good Symptom Control] : good symptom control [Good Tolerance] : good tolerance of treatment [Excess Weight] : excess weight [Follow-Up - Routine Clinic] : a routine clinic follow-up of [Currently Experiencing] : The patient is currently experiencing symptoms. [Dyspnea] : dyspnea [Back Pain] : back pain [Low Calorie Diet] : low calorie diet [Fair Compliance] : fair compliance with treatment [Fair Tolerance] : fair tolerance of treatment [Poor Symptom Control] : poor symptom control [Hypertension] : hypertension [High] : high [Low Calorie] : low calorie [Well Balanced Diet] : well balanced meals [Infrequently] : exercises infrequently [Walking] : walking [FreeTextEntry1] : Patient c/o SOBOE but is otherwise without associated respiratory complaints. She is followed by cardiology and was sent for pulmonary evaluation given reported mild pulm HTN and suspicion of ARSLAN.\par  [de-identified] : 5-20 cm of water

## 2019-07-23 NOTE — DISCUSSION/SUMMARY
[FreeTextEntry1] : \par #1. Mild restriction on PFTs is likely related to weight; would follow lung function with weight loss as needed\par #2. CXR to further evaluate mild restriction and SOBOE was clear and restriction is likely related to weight\par #3. The patient does not appear to require chronic BD therapy at this time\par #4. Diet and exercise for weight loss\par #5. SOBOE is likely related to weight or deconditioning given restriction\par #6. Continue autoCPAP but change pressure to 15-18 cm of water for residual mild ARSLAN noted on compliance\par #7. Cardiology f/u \par #8. F/u 8 weeks with compliance on new pressures

## 2019-07-23 NOTE — REASON FOR VISIT
[Shortness of Breath] : shortness of Breath [Follow-Up] : a follow-up visit [Pulmonary Hypertension] : pulmonary hypertension [Sleep Apnea] : sleep apnea [FreeTextEntry2] : obesity, restriction on PFTs

## 2019-07-23 NOTE — CONSULT LETTER
[Consult Letter:] : I had the pleasure of evaluating your patient, [unfilled]. [Dear  ___] : Dear  [unfilled], [Consult Closing:] : Thank you very much for allowing me to participate in the care of this patient.  If you have any questions, please do not hesitate to contact me. [Please see my note below.] : Please see my note below. [Sincerely,] : Sincerely, [DrIliana  ___] : Dr. PEREZ [FreeTextEntry3] : Henry Garcia MD, FCCP, LATOYA. ABSM\par

## 2019-07-23 NOTE — PHYSICAL EXAM
[General Appearance - Well Developed] : well developed [Normal Appearance] : normal appearance [General Appearance - In No Acute Distress] : no acute distress [Low Lying Soft Palate] : low lying soft palate [Normal Conjunctiva] : the conjunctiva exhibited no abnormalities [III] : III [Elongated Uvula] : elongated uvula [Enlarged Base of the Tongue] : enlargement of the base of the tongue [Neck Appearance] : the appearance of the neck was normal [Murmurs] : no murmurs present [Heart Sounds] : normal S1 and S2 [Heart Rate And Rhythm] : heart rate and rhythm were normal [Edema] : no peripheral edema present [] : no respiratory distress [Respiration, Rhythm And Depth] : normal respiratory rhythm and effort [Exaggerated Use Of Accessory Muscles For Inspiration] : no accessory muscle use [Auscultation Breath Sounds / Voice Sounds] : lungs were clear to auscultation bilaterally [Abdomen Tenderness] : non-tender [Abdomen Soft] : soft [Abnormal Walk] : normal gait [Nail Clubbing] : no clubbing of the fingernails [No Focal Deficits] : no focal deficits [Cyanosis, Localized] : no localized cyanosis [Oriented To Time, Place, And Person] : oriented to person, place, and time [FreeTextEntry1] : No abnormalities.

## 2019-07-23 NOTE — REVIEW OF SYSTEMS
[Hypertension] : ~T hypertension [Thyroid Problem] : thyroid problem [As Noted in HPI] : as noted in HPI [Fever] : no fever [Chills] : no chills [Dry Eyes] : no dryness of the eyes [Eye Irritation] : no ~T irritation of the eyes [Postnasal Drip] : no postnasal drip [Epistaxis] : no nosebleeds [Nasal Congestion] : no nasal congestion [Sinus Problems] : no sinus problems [Cough] : no cough [Chest Tightness] : no chest tightness [Sputum] : not coughing up ~M sputum [Dyspnea] : no dyspnea [Wheezing] : no wheezing [Pleuritic Pain] : no pleuritic pain [Chest Discomfort] : no chest discomfort [Dysrhythmia] : no dysrhythmia [Murmurs] : no murmurs were heard [Edema] : ~T edema was not present [Palpitations] : no palpitations [Reflux] : no reflux [Hay Fever] : no hay fever [Nausea] : no nausea [Itchy Eyes] : no itching of ~T the eyes [Constipation] : no constipation [Vomiting] : no vomiting [Diarrhea] : no diarrhea [Trauma] : no ~T physical trauma [Dysuria] : no dysuria [Abdominal Pain] : no abdominal pain [Anemia] : no anemia [Fracture] : no fracture [Headache] : no headache [Dizziness] : no dizziness [Numbness] : no numbness [Syncope] : no fainting [Paralysis] : no paralysis was seen [Seizures] : no seizures [Diabetes] : no diabetes mellitus [Anxiety] : no anxiety [Depression] : no depression

## 2019-08-07 ENCOUNTER — RX RENEWAL (OUTPATIENT)
Age: 64
End: 2019-08-07

## 2019-08-12 ENCOUNTER — MEDICATION RENEWAL (OUTPATIENT)
Age: 64
End: 2019-08-12

## 2019-09-09 ENCOUNTER — APPOINTMENT (OUTPATIENT)
Dept: FAMILY MEDICINE | Facility: CLINIC | Age: 64
End: 2019-09-09
Payer: COMMERCIAL

## 2019-09-09 VITALS
HEART RATE: 64 BPM | OXYGEN SATURATION: 97 % | DIASTOLIC BLOOD PRESSURE: 80 MMHG | SYSTOLIC BLOOD PRESSURE: 138 MMHG | RESPIRATION RATE: 16 BRPM | BODY MASS INDEX: 41.66 KG/M2 | WEIGHT: 244 LBS | HEIGHT: 64 IN

## 2019-09-09 DIAGNOSIS — Z01.810 ENCOUNTER FOR PREPROCEDURAL CARDIOVASCULAR EXAMINATION: ICD-10-CM

## 2019-09-09 DIAGNOSIS — B35.1 TINEA UNGUIUM: ICD-10-CM

## 2019-09-09 DIAGNOSIS — K76.89 OTHER SPECIFIED DISEASES OF LIVER: ICD-10-CM

## 2019-09-09 PROCEDURE — 90674 CCIIV4 VAC NO PRSV 0.5 ML IM: CPT

## 2019-09-09 PROCEDURE — 99214 OFFICE O/P EST MOD 30 MIN: CPT | Mod: 25

## 2019-09-09 PROCEDURE — G0008: CPT

## 2019-09-10 NOTE — PLAN
[FreeTextEntry1] : Importance of wt loss and positive impact on health discussed including the need for positive mindset in sustaining  wt loss.\par F/U 3 months.

## 2019-09-10 NOTE — COUNSELING
[Potential consequences of obesity discussed] : Potential consequences of obesity discussed [Benefits of weight loss discussed] : Benefits of weight loss discussed [Encouraged to increase physical activity] : Encouraged to increase physical activity [Structured Weight Management Program suggested:] : Structured weight management program suggested [Good understanding] : Patient has a good understanding of disease, goals and obesity follow-up plan [FreeTextEntry1] : cont WW

## 2019-09-10 NOTE — PHYSICAL EXAM
[Normal Sclera/Conjunctiva] : normal sclera/conjunctiva [No Acute Distress] : no acute distress [No Respiratory Distress] : no respiratory distress  [No Lymphadenopathy] : no lymphadenopathy [Clear to Auscultation] : lungs were clear to auscultation bilaterally [Normal Rate] : normal rate  [Normal S1, S2] : normal S1 and S2 [Regular Rhythm] : with a regular rhythm [No Murmur] : no murmur heard [No Edema] : there was no peripheral edema [Normal] : affect was normal and insight and judgment were intact

## 2019-09-10 NOTE — HISTORY OF PRESENT ILLNESS
[FreeTextEntry1] : Patient at office for follow up on hypertension. Requesting renewal on Amlodipine-Valsartan to local pharmacy.\par Patient started Terbanafine for toenail fungus, took 2 doses and has noticed swelling to bilateral hands. Has hx of hepatic cysts.\par Requesting flu vaccine today. [de-identified] : Using CPAP. Some problem with air leakage.\par She is struggling with keeping wt off. She lost 6 lbs since last visit, but gained back 4 lbs. She is in [employer].\par Experiencing difficulty with portion control, food choice, and also admits to stress eating. She has lost as much as 65 lbs in the past on WW.

## 2019-09-14 ENCOUNTER — FORM ENCOUNTER (OUTPATIENT)
Age: 64
End: 2019-09-14

## 2019-09-15 ENCOUNTER — OUTPATIENT (OUTPATIENT)
Dept: OUTPATIENT SERVICES | Facility: HOSPITAL | Age: 64
LOS: 1 days | End: 2019-09-15
Payer: COMMERCIAL

## 2019-09-15 ENCOUNTER — APPOINTMENT (OUTPATIENT)
Dept: ULTRASOUND IMAGING | Facility: CLINIC | Age: 64
End: 2019-09-15
Payer: COMMERCIAL

## 2019-09-15 DIAGNOSIS — K76.89 OTHER SPECIFIED DISEASES OF LIVER: ICD-10-CM

## 2019-09-15 DIAGNOSIS — Z98.891 HISTORY OF UTERINE SCAR FROM PREVIOUS SURGERY: Chronic | ICD-10-CM

## 2019-09-15 PROCEDURE — 76700 US EXAM ABDOM COMPLETE: CPT | Mod: 26

## 2019-09-15 PROCEDURE — 76700 US EXAM ABDOM COMPLETE: CPT

## 2019-10-21 ENCOUNTER — APPOINTMENT (OUTPATIENT)
Dept: PULMONOLOGY | Facility: CLINIC | Age: 64
End: 2019-10-21
Payer: COMMERCIAL

## 2019-10-21 VITALS
RESPIRATION RATE: 16 BRPM | WEIGHT: 249 LBS | HEART RATE: 88 BPM | BODY MASS INDEX: 42.74 KG/M2 | SYSTOLIC BLOOD PRESSURE: 124 MMHG | OXYGEN SATURATION: 98 % | DIASTOLIC BLOOD PRESSURE: 84 MMHG

## 2019-10-21 PROCEDURE — 99214 OFFICE O/P EST MOD 30 MIN: CPT

## 2019-10-21 NOTE — REVIEW OF SYSTEMS
[Hypertension] : ~T hypertension [As Noted in HPI] : as noted in HPI [Thyroid Problem] : thyroid problem [Chills] : no chills [Fever] : no fever [Dry Eyes] : no dryness of the eyes [Nasal Congestion] : no nasal congestion [Eye Irritation] : no ~T irritation of the eyes [Postnasal Drip] : no postnasal drip [Sinus Problems] : no sinus problems [Epistaxis] : no nosebleeds [Dyspnea] : no dyspnea [Sputum] : not coughing up ~M sputum [Cough] : no cough [Chest Tightness] : no chest tightness [Pleuritic Pain] : no pleuritic pain [Wheezing] : no wheezing [Chest Discomfort] : no chest discomfort [Murmurs] : no murmurs were heard [Palpitations] : no palpitations [Dysrhythmia] : no dysrhythmia [Hay Fever] : no hay fever [Edema] : ~T edema was not present [Reflux] : no reflux [Nausea] : no nausea [Itchy Eyes] : no itching of ~T the eyes [Vomiting] : no vomiting [Constipation] : no constipation [Abdominal Pain] : no abdominal pain [Diarrhea] : no diarrhea [Dysuria] : no dysuria [Fracture] : no fracture [Trauma] : no ~T physical trauma [Headache] : no headache [Dizziness] : no dizziness [Anemia] : no anemia [Syncope] : no fainting [Numbness] : no numbness [Paralysis] : no paralysis was seen [Seizures] : no seizures [Depression] : no depression [Anxiety] : no anxiety [Diabetes] : no diabetes mellitus

## 2019-10-21 NOTE — CONSULT LETTER
[Dear  ___] : Dear  [unfilled], [Consult Letter:] : I had the pleasure of evaluating your patient, [unfilled]. [Please see my note below.] : Please see my note below. [Consult Closing:] : Thank you very much for allowing me to participate in the care of this patient.  If you have any questions, please do not hesitate to contact me. [Sincerely,] : Sincerely, [DrIliana  ___] : Dr. PEREZ [FreeTextEntry3] : Henry Garcia MD, FCCP, D. ABSM\par Pulmonary and Sleep Medicine\par [facility] [place]\par \par

## 2019-10-21 NOTE — DISCUSSION/SUMMARY
[FreeTextEntry1] : \par #1. Mild restriction on PFTs is likely related to weight; would follow lung function with weight loss as needed\par #2. CXR to further evaluate mild restriction and SOBOE was clear and restriction is likely related to weight\par #3. The patient does not appear to require chronic BD therapy at this time\par #4. Diet and exercise for weight loss\par #5. SOBOE is likely related to weight or deconditioning given restriction\par #6. Continue autoCPAP but change pressure to 16-20 cm of water for residual mild ARSLAN noted on compliance\par #7. Cardiology f/u \par #8. F/u 4-6 weeks with compliance on new pressures

## 2019-10-21 NOTE — PHYSICAL EXAM
[General Appearance - Well Developed] : well developed [Normal Appearance] : normal appearance [General Appearance - In No Acute Distress] : no acute distress [Normal Conjunctiva] : the conjunctiva exhibited no abnormalities [Low Lying Soft Palate] : low lying soft palate [Elongated Uvula] : elongated uvula [III] : III [Enlarged Base of the Tongue] : enlargement of the base of the tongue [Heart Rate And Rhythm] : heart rate and rhythm were normal [Neck Appearance] : the appearance of the neck was normal [Heart Sounds] : normal S1 and S2 [Murmurs] : no murmurs present [Edema] : no peripheral edema present [] : no respiratory distress [Exaggerated Use Of Accessory Muscles For Inspiration] : no accessory muscle use [Respiration, Rhythm And Depth] : normal respiratory rhythm and effort [Auscultation Breath Sounds / Voice Sounds] : lungs were clear to auscultation bilaterally [Abdomen Tenderness] : non-tender [Abdomen Soft] : soft [Abnormal Walk] : normal gait [Cyanosis, Localized] : no localized cyanosis [Nail Clubbing] : no clubbing of the fingernails [No Focal Deficits] : no focal deficits [Oriented To Time, Place, And Person] : oriented to person, place, and time [FreeTextEntry1] : No abnormalities.

## 2019-10-21 NOTE — HISTORY OF PRESENT ILLNESS
[None] : No associated symptoms are reported [CPAP] : CPAP [Good Compliance] : good compliance with treatment [Good Symptom Control] : good symptom control [Good Tolerance] : good tolerance of treatment [Follow-Up - Routine Clinic] : a routine clinic follow-up of [Currently Experiencing] : The patient is currently experiencing symptoms. [Excess Weight] : excess weight [Dyspnea] : dyspnea [Back Pain] : back pain [Fair Compliance] : fair compliance with treatment [Low Calorie Diet] : low calorie diet [Poor Symptom Control] : poor symptom control [Hypertension] : hypertension [Fair Tolerance] : fair tolerance of treatment [Low Calorie] : low calorie [High] : high [Well Balanced Diet] : well balanced meals [Infrequently] : exercises infrequently [Walking] : walking [FreeTextEntry1] : Patient c/o SOBOE but is otherwise without associated respiratory complaints. She is followed by cardiology and was sent for pulmonary evaluation given reported mild pulm HTN and suspicion of ARSLAN.\par  [de-identified] : 5-20 cm of water

## 2019-11-12 ENCOUNTER — APPOINTMENT (OUTPATIENT)
Dept: PULMONOLOGY | Facility: CLINIC | Age: 64
End: 2019-11-12

## 2019-11-18 ENCOUNTER — APPOINTMENT (OUTPATIENT)
Dept: CARDIOLOGY | Facility: CLINIC | Age: 64
End: 2019-11-18

## 2019-11-20 ENCOUNTER — MEDICATION RENEWAL (OUTPATIENT)
Age: 64
End: 2019-11-20

## 2019-11-26 ENCOUNTER — APPOINTMENT (OUTPATIENT)
Dept: CARDIOLOGY | Facility: CLINIC | Age: 64
End: 2019-11-26
Payer: MEDICARE

## 2019-11-26 VITALS
WEIGHT: 248 LBS | OXYGEN SATURATION: 96 % | BODY MASS INDEX: 42.34 KG/M2 | DIASTOLIC BLOOD PRESSURE: 74 MMHG | SYSTOLIC BLOOD PRESSURE: 138 MMHG | HEART RATE: 69 BPM | HEIGHT: 64 IN

## 2019-11-26 VITALS — SYSTOLIC BLOOD PRESSURE: 132 MMHG | DIASTOLIC BLOOD PRESSURE: 78 MMHG

## 2019-11-26 DIAGNOSIS — I07.1 RHEUMATIC TRICUSPID INSUFFICIENCY: ICD-10-CM

## 2019-11-26 PROCEDURE — 99214 OFFICE O/P EST MOD 30 MIN: CPT

## 2019-11-26 PROCEDURE — 93000 ELECTROCARDIOGRAM COMPLETE: CPT

## 2019-11-26 RX ORDER — LEVOTHYROXINE SODIUM 0.07 MG/1
75 TABLET ORAL
Qty: 90 | Refills: 0 | Status: DISCONTINUED | COMMUNITY
Start: 2019-11-20 | End: 2019-11-26

## 2019-11-26 RX ORDER — AMLODIPINE AND VALSARTAN 5; 160 MG/1; MG/1
5-160 TABLET, FILM COATED ORAL DAILY
Qty: 90 | Refills: 1 | Status: DISCONTINUED | COMMUNITY
Start: 2019-11-20 | End: 2019-11-26

## 2019-12-06 ENCOUNTER — NON-APPOINTMENT (OUTPATIENT)
Age: 64
End: 2019-12-06

## 2019-12-09 ENCOUNTER — APPOINTMENT (OUTPATIENT)
Dept: FAMILY MEDICINE | Facility: CLINIC | Age: 64
End: 2019-12-09
Payer: MEDICARE

## 2019-12-17 ENCOUNTER — APPOINTMENT (OUTPATIENT)
Dept: PULMONOLOGY | Facility: CLINIC | Age: 64
End: 2019-12-17
Payer: MEDICARE

## 2019-12-17 VITALS — OXYGEN SATURATION: 98 % | SYSTOLIC BLOOD PRESSURE: 150 MMHG | HEART RATE: 66 BPM | DIASTOLIC BLOOD PRESSURE: 80 MMHG

## 2019-12-17 VITALS — BODY MASS INDEX: 44.67 KG/M2 | WEIGHT: 249 LBS | HEIGHT: 62.5 IN

## 2019-12-17 PROCEDURE — 99214 OFFICE O/P EST MOD 30 MIN: CPT | Mod: 25

## 2019-12-17 PROCEDURE — 94010 BREATHING CAPACITY TEST: CPT

## 2019-12-17 NOTE — REVIEW OF SYSTEMS
[Hypertension] : ~T hypertension [Thyroid Problem] : thyroid problem [As Noted in HPI] : as noted in HPI [Dry Eyes] : no dryness of the eyes [Chills] : no chills [Fever] : no fever [Eye Irritation] : no ~T irritation of the eyes [Nasal Congestion] : no nasal congestion [Postnasal Drip] : no postnasal drip [Epistaxis] : no nosebleeds [Sinus Problems] : no sinus problems [Cough] : no cough [Sputum] : not coughing up ~M sputum [Dyspnea] : no dyspnea [Pleuritic Pain] : no pleuritic pain [Chest Tightness] : no chest tightness [Wheezing] : no wheezing [Chest Discomfort] : no chest discomfort [Dysrhythmia] : no dysrhythmia [Murmurs] : no murmurs were heard [Edema] : ~T edema was not present [Palpitations] : no palpitations [Itchy Eyes] : no itching of ~T the eyes [Hay Fever] : no hay fever [Reflux] : no reflux [Nausea] : no nausea [Vomiting] : no vomiting [Constipation] : no constipation [Diarrhea] : no diarrhea [Abdominal Pain] : no abdominal pain [Dysuria] : no dysuria [Fracture] : no fracture [Trauma] : no ~T physical trauma [Headache] : no headache [Anemia] : no anemia [Dizziness] : no dizziness [Numbness] : no numbness [Paralysis] : no paralysis was seen [Syncope] : no fainting [Depression] : no depression [Anxiety] : no anxiety [Seizures] : no seizures [Diabetes] : no diabetes mellitus

## 2019-12-17 NOTE — DISCUSSION/SUMMARY
[FreeTextEntry1] : \par #1. Mild restriction on PFTs is likely related to weight; would follow lung function with weight loss as needed\par #2. CXR to further evaluate mild restriction and SOBOE was clear and restriction is likely related to weight\par #3. The patient does not appear to require chronic BD therapy at this time\par #4. Diet and exercise for weight loss\par #5. SOBOE is likely related to weight or deconditioning given restriction\par #6. Continue autoCPAP but change pressure to 13-20 cm of water for residual mild-mod ARSLAN noted on compliance\par #7. Cardiology f/u \par #8. F/u 2 months with compliance on new pressures

## 2019-12-17 NOTE — HISTORY OF PRESENT ILLNESS
[None] : No associated symptoms are reported [CPAP] : CPAP [Good Compliance] : good compliance with treatment [Good Tolerance] : good tolerance of treatment [Good Symptom Control] : good symptom control [Follow-Up - Routine Clinic] : a routine clinic follow-up of [Excess Weight] : excess weight [Currently Experiencing] : The patient is currently experiencing symptoms. [Dyspnea] : dyspnea [Back Pain] : back pain [Low Calorie Diet] : low calorie diet [Fair Compliance] : fair compliance with treatment [Fair Tolerance] : fair tolerance of treatment [Poor Symptom Control] : poor symptom control [Hypertension] : hypertension [High] : high [Low Calorie] : low calorie [Well Balanced Diet] : well balanced meals [Infrequently] : exercises infrequently [Walking] : walking [FreeTextEntry1] : Patient c/o SOBOE but is otherwise without associated respiratory complaints. She is followed by cardiology and was sent for pulmonary evaluation given reported mild pulm HTN and suspicion of ARSLAN.\par  [de-identified] : 5-20 cm of water

## 2019-12-17 NOTE — PROCEDURE
[FreeTextEntry1] : PFTs 10/24/18 - Mild restriction with mildly reduced diffusion capacity which corrected for alveolar volume.\par Spirometry 12/17/19 - mild restriction

## 2019-12-17 NOTE — PHYSICAL EXAM
[General Appearance - Well Developed] : well developed [Normal Appearance] : normal appearance [General Appearance - In No Acute Distress] : no acute distress [Normal Conjunctiva] : the conjunctiva exhibited no abnormalities [Elongated Uvula] : elongated uvula [Low Lying Soft Palate] : low lying soft palate [Enlarged Base of the Tongue] : enlargement of the base of the tongue [III] : III [Neck Appearance] : the appearance of the neck was normal [Heart Rate And Rhythm] : heart rate and rhythm were normal [Heart Sounds] : normal S1 and S2 [Murmurs] : no murmurs present [] : no respiratory distress [Edema] : no peripheral edema present [Exaggerated Use Of Accessory Muscles For Inspiration] : no accessory muscle use [Respiration, Rhythm And Depth] : normal respiratory rhythm and effort [Auscultation Breath Sounds / Voice Sounds] : lungs were clear to auscultation bilaterally [Abdomen Soft] : soft [Abdomen Tenderness] : non-tender [Abnormal Walk] : normal gait [Nail Clubbing] : no clubbing of the fingernails [Cyanosis, Localized] : no localized cyanosis [No Focal Deficits] : no focal deficits [Oriented To Time, Place, And Person] : oriented to person, place, and time [FreeTextEntry1] : No abnormalities.

## 2019-12-17 NOTE — CONSULT LETTER
[Dear  ___] : Dear  [unfilled], [Consult Letter:] : I had the pleasure of evaluating your patient, [unfilled]. [Please see my note below.] : Please see my note below. [Sincerely,] : Sincerely, [Consult Closing:] : Thank you very much for allowing me to participate in the care of this patient.  If you have any questions, please do not hesitate to contact me. [DrIliana  ___] : Dr. PEREZ [FreeTextEntry3] : Henry Garcia MD, FCCP, D. ABSM\par Pulmonary and Sleep Medicine\par [facility] [place]\par \par

## 2019-12-20 ENCOUNTER — APPOINTMENT (OUTPATIENT)
Dept: FAMILY MEDICINE | Facility: CLINIC | Age: 64
End: 2019-12-20
Payer: MEDICARE

## 2019-12-20 VITALS
TEMPERATURE: 97.6 F | HEART RATE: 84 BPM | DIASTOLIC BLOOD PRESSURE: 72 MMHG | BODY MASS INDEX: 42 KG/M2 | SYSTOLIC BLOOD PRESSURE: 120 MMHG | HEIGHT: 64 IN | WEIGHT: 246 LBS | OXYGEN SATURATION: 97 %

## 2019-12-20 DIAGNOSIS — E66.01 MORBID (SEVERE) OBESITY DUE TO EXCESS CALORIES: ICD-10-CM

## 2019-12-20 PROCEDURE — G0009: CPT

## 2019-12-20 PROCEDURE — 90670 PCV13 VACCINE IM: CPT

## 2019-12-20 PROCEDURE — 99214 OFFICE O/P EST MOD 30 MIN: CPT | Mod: 25

## 2019-12-20 RX ORDER — TERBINAFINE HYDROCHLORIDE 250 MG/1
250 TABLET ORAL DAILY
Qty: 30 | Refills: 0 | Status: DISCONTINUED | COMMUNITY
Start: 2019-09-09 | End: 2019-12-20

## 2019-12-20 NOTE — COUNSELING
[Benefits of weight loss discussed] : Benefits of weight loss discussed [Potential consequences of obesity discussed] : Potential consequences of obesity discussed [Structured Weight Management Program suggested:] : Structured weight management program suggested [FreeTextEntry2] : Strategies for limiting stress eating discussed. [FreeTextEntry1] : cont WW

## 2019-12-20 NOTE — HISTORY OF PRESENT ILLNESS
[FreeTextEntry1] : Pt. presents in office today to follow up for high blood pressure and allergic rhinitis with Rx refill.\par She has ARSLAN and is doing better on new mask adjustments.\par Wt is fluctuating. She is on WW. States she is an emotional eater and has had increased stress lately related to 's health issues.

## 2019-12-20 NOTE — PHYSICAL EXAM
[Normal Sclera/Conjunctiva] : normal sclera/conjunctiva [No Acute Distress] : no acute distress [No Respiratory Distress] : no respiratory distress  [Clear to Auscultation] : lungs were clear to auscultation bilaterally [No Lymphadenopathy] : no lymphadenopathy [Regular Rhythm] : with a regular rhythm [Normal S1, S2] : normal S1 and S2 [Normal Rate] : normal rate  [No Edema] : there was no peripheral edema [No Murmur] : no murmur heard [Normal] : affect was normal and insight and judgment were intact

## 2020-01-13 LAB
ALBUMIN SERPL ELPH-MCNC: 4.3 G/DL
ALP BLD-CCNC: 93 U/L
ALT SERPL-CCNC: 17 U/L
ANION GAP SERPL CALC-SCNC: 10 MMOL/L
AST SERPL-CCNC: 19 U/L
BASOPHILS # BLD AUTO: 0.04 K/UL
BASOPHILS NFR BLD AUTO: 0.6 %
BILIRUB SERPL-MCNC: 0.5 MG/DL
BUN SERPL-MCNC: 16 MG/DL
CALCIUM SERPL-MCNC: 9.3 MG/DL
CHLORIDE SERPL-SCNC: 104 MMOL/L
CHOLEST SERPL-MCNC: 221 MG/DL
CHOLEST/HDLC SERPL: 3.1 RATIO
CO2 SERPL-SCNC: 28 MMOL/L
CREAT SERPL-MCNC: 0.95 MG/DL
EOSINOPHIL # BLD AUTO: 0.32 K/UL
EOSINOPHIL NFR BLD AUTO: 4.7 %
ESTIMATED AVERAGE GLUCOSE: 100 MG/DL
GLUCOSE SERPL-MCNC: 94 MG/DL
HBA1C MFR BLD HPLC: 5.1 %
HCT VFR BLD CALC: 43.9 %
HDLC SERPL-MCNC: 71 MG/DL
HGB BLD-MCNC: 13.9 G/DL
IMM GRANULOCYTES NFR BLD AUTO: 0.1 %
LDLC SERPL CALC-MCNC: 136 MG/DL
LYMPHOCYTES # BLD AUTO: 1.67 K/UL
LYMPHOCYTES NFR BLD AUTO: 24.5 %
MAN DIFF?: NORMAL
MCHC RBC-ENTMCNC: 31.4 PG
MCHC RBC-ENTMCNC: 31.7 GM/DL
MCV RBC AUTO: 99.3 FL
MONOCYTES # BLD AUTO: 0.55 K/UL
MONOCYTES NFR BLD AUTO: 8.1 %
NEUTROPHILS # BLD AUTO: 4.23 K/UL
NEUTROPHILS NFR BLD AUTO: 62 %
PLATELET # BLD AUTO: 251 K/UL
POTASSIUM SERPL-SCNC: 4.4 MMOL/L
PROT SERPL-MCNC: 6.7 G/DL
RBC # BLD: 4.42 M/UL
RBC # FLD: 13.2 %
SODIUM SERPL-SCNC: 142 MMOL/L
TRIGL SERPL-MCNC: 68 MG/DL
TSH SERPL-ACNC: 2.94 UIU/ML
WBC # FLD AUTO: 6.82 K/UL

## 2020-01-23 ENCOUNTER — APPOINTMENT (OUTPATIENT)
Dept: FAMILY MEDICINE | Facility: CLINIC | Age: 65
End: 2020-01-23
Payer: MEDICARE

## 2020-01-23 VITALS
SYSTOLIC BLOOD PRESSURE: 126 MMHG | DIASTOLIC BLOOD PRESSURE: 80 MMHG | OXYGEN SATURATION: 95 % | BODY MASS INDEX: 42.34 KG/M2 | HEART RATE: 76 BPM | HEIGHT: 64 IN | WEIGHT: 248 LBS | TEMPERATURE: 98.2 F

## 2020-01-23 PROCEDURE — 99214 OFFICE O/P EST MOD 30 MIN: CPT

## 2020-01-25 NOTE — PHYSICAL EXAM
[Normal Sclera/Conjunctiva] : normal sclera/conjunctiva [No Acute Distress] : no acute distress [No Respiratory Distress] : no respiratory distress  [No Lymphadenopathy] : no lymphadenopathy [Normal Rate] : normal rate  [Clear to Auscultation] : lungs were clear to auscultation bilaterally [Regular Rhythm] : with a regular rhythm [Normal S1, S2] : normal S1 and S2 [No Edema] : there was no peripheral edema [No Murmur] : no murmur heard [Normal] : affect was normal and insight and judgment were intact

## 2020-01-25 NOTE — COUNSELING
[Benefits of weight loss discussed] : Benefits of weight loss discussed [Potential consequences of obesity discussed] : Potential consequences of obesity discussed [Structured Weight Management Program suggested:] : Structured weight management program suggested [FreeTextEntry1] : DONA

## 2020-01-25 NOTE — HISTORY OF PRESENT ILLNESS
[FreeTextEntry1] : Pt presents in office today to follow up on blood work results that show elevated cholesterol. [de-identified] : 12/20/19:   Pt. presents in office today to follow up for high blood pressure and allergic rhinitis with Rx refill.\par She has ARSLAN and is doing better on new mask adjustments.\par Wt is fluctuating. She is on WW. States she is an emotional eater and has had increased stress lately related to 's health issues.

## 2020-01-25 NOTE — REVIEW OF SYSTEMS
[Back Pain] : back pain [Anxiety] : anxiety [Negative] : Neurological [FreeTextEntry9] : Chronic, intermittent

## 2020-01-25 NOTE — PLAN
[FreeTextEntry1] : Pt with longstanding elevated cholesterol.\par Start statin.\par Cont wt loss efforts and adherence to low chol diet.\par Check lipids, LFTs in 2 months.\par Side effects/ benefits of statin therapy reviewed with pt.

## 2020-01-30 ENCOUNTER — APPOINTMENT (OUTPATIENT)
Dept: FAMILY MEDICINE | Facility: CLINIC | Age: 65
End: 2020-01-30
Payer: MEDICARE

## 2020-01-30 ENCOUNTER — NON-APPOINTMENT (OUTPATIENT)
Age: 65
End: 2020-01-30

## 2020-01-30 VITALS
WEIGHT: 248 LBS | HEIGHT: 64 IN | HEART RATE: 75 BPM | OXYGEN SATURATION: 96 % | DIASTOLIC BLOOD PRESSURE: 70 MMHG | SYSTOLIC BLOOD PRESSURE: 126 MMHG | BODY MASS INDEX: 42.34 KG/M2 | TEMPERATURE: 98.5 F

## 2020-01-30 DIAGNOSIS — N89.8 OTHER SPECIFIED NONINFLAMMATORY DISORDERS OF VAGINA: ICD-10-CM

## 2020-01-30 PROCEDURE — G0402 INITIAL PREVENTIVE EXAM: CPT

## 2020-01-30 PROCEDURE — G0403: CPT

## 2020-01-31 NOTE — HEALTH RISK ASSESSMENT
[Fully functional (using the telephone, shopping, preparing meals, housekeeping, doing laundry, using] : Fully functional and needs no help or supervision to perform IADLs (using the telephone, shopping, preparing meals, housekeeping, doing laundry, using transportation, managing medications and managing finances) [Fully functional (bathing, dressing, toileting, transferring, walking, feeding)] : Fully functional (bathing, dressing, toileting, transferring, walking, feeding) [Smoke Detector] : smoke detector [Reports changes in hearing] : Reports no changes in hearing [Reports changes in vision] : Reports no changes in vision [Reports changes in dental health] : Reports no changes in dental health [Carbon Monoxide Detector] : no carbon monoxide detector [Guns at Home] : no guns at home [MammogramDate] : 08/2018 [ColonoscopyDate] : 10/2011 [PapSmearDate] : 12/2017 [BoneDensityDate] : 08/2017 [ColonoscopyComments] : polypectomy

## 2020-01-31 NOTE — PLAN
[FreeTextEntry1] : Welcome to Medicare Preventive Services Care Map completed and copy to pt. See EHR.\par Pt due for:\par -colonoscopy\par -gyn f/u with mammogram and pap\par -DEXA. Ordered today.\par -Skin cancer screening.   Derm consult ordered  today.\par -Shingrix vaccine.  Pt to check ins re: coverage.\par -HIV screening.  Ordered today. Verbal consent received.\par -Hep C screening. Ordered today.\par -Hep B titers to assess immunity status.  Ordered today.

## 2020-02-18 ENCOUNTER — APPOINTMENT (OUTPATIENT)
Dept: PULMONOLOGY | Facility: CLINIC | Age: 65
End: 2020-02-18
Payer: MEDICARE

## 2020-02-18 VITALS
DIASTOLIC BLOOD PRESSURE: 82 MMHG | HEART RATE: 62 BPM | SYSTOLIC BLOOD PRESSURE: 126 MMHG | BODY MASS INDEX: 44.47 KG/M2 | HEIGHT: 63 IN | OXYGEN SATURATION: 97 % | WEIGHT: 251 LBS

## 2020-02-18 PROCEDURE — 99214 OFFICE O/P EST MOD 30 MIN: CPT

## 2020-02-18 RX ORDER — ROSUVASTATIN CALCIUM 5 MG/1
5 TABLET, FILM COATED ORAL DAILY
Qty: 90 | Refills: 0 | Status: DISCONTINUED | COMMUNITY
Start: 2020-01-23 | End: 2020-02-18

## 2020-02-18 NOTE — DISCUSSION/SUMMARY
[FreeTextEntry1] : \par #1. Mild restriction on PFTs is likely related to weight; would follow lung function with weight loss as needed\par #2. CXR to further evaluate mild restriction and SOBOE was clear and restriction is likely related to weight\par #3. The patient does not appear to require chronic BD therapy at this time\par #4. Diet and exercise for weight loss\par #5. SOBOE is likely related to weight or deconditioning given restriction\par #6. Continue autoCPAP but change pressure to 13-20 cm of water for residual mild-mod ARSLAN noted on compliance\par #7. Cardiology f/u \par #8. F/u 6 months with compliance
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
## 2020-02-18 NOTE — CONSULT LETTER
[Consult Letter:] : I had the pleasure of evaluating your patient, [unfilled]. [Dear  ___] : Dear  [unfilled], [Please see my note below.] : Please see my note below. [Consult Closing:] : Thank you very much for allowing me to participate in the care of this patient.  If you have any questions, please do not hesitate to contact me. [Sincerely,] : Sincerely, [DrIliana  ___] : Dr. PEREZ [FreeTextEntry3] : Henry Garcia MD, FCCP, D. ABSM\par Pulmonary and Sleep Medicine\par [facility] [place]\par \par

## 2020-02-18 NOTE — HISTORY OF PRESENT ILLNESS
[None] : No associated symptoms are reported [CPAP] : CPAP [Good Tolerance] : good tolerance of treatment [Good Compliance] : good compliance with treatment [Good Symptom Control] : good symptom control [Excess Weight] : excess weight [Follow-Up - Routine Clinic] : a routine clinic follow-up of [Currently Experiencing] : The patient is currently experiencing symptoms. [Dyspnea] : dyspnea [Back Pain] : back pain [Low Calorie Diet] : low calorie diet [Fair Tolerance] : fair tolerance of treatment [Fair Compliance] : fair compliance with treatment [Poor Symptom Control] : poor symptom control [Hypertension] : hypertension [High] : high [Low Calorie] : low calorie [Walking] : walking [Well Balanced Diet] : well balanced meals [Infrequently] : exercises infrequently [FreeTextEntry1] : Patient c/o SOBOE but is otherwise without associated respiratory complaints. She is followed by cardiology and was sent for pulmonary evaluation given reported mild pulm HTN and suspicion of ARSLAN.\par  [de-identified] : 5-20 cm of water

## 2020-02-18 NOTE — PHYSICAL EXAM
[General Appearance - Well Developed] : well developed [Normal Appearance] : normal appearance [General Appearance - In No Acute Distress] : no acute distress [Normal Conjunctiva] : the conjunctiva exhibited no abnormalities [Elongated Uvula] : elongated uvula [Low Lying Soft Palate] : low lying soft palate [III] : III [Enlarged Base of the Tongue] : enlargement of the base of the tongue [Neck Appearance] : the appearance of the neck was normal [Heart Sounds] : normal S1 and S2 [Heart Rate And Rhythm] : heart rate and rhythm were normal [Murmurs] : no murmurs present [Edema] : no peripheral edema present [Respiration, Rhythm And Depth] : normal respiratory rhythm and effort [] : no respiratory distress [Exaggerated Use Of Accessory Muscles For Inspiration] : no accessory muscle use [Auscultation Breath Sounds / Voice Sounds] : lungs were clear to auscultation bilaterally [Abdomen Soft] : soft [Abdomen Tenderness] : non-tender [Nail Clubbing] : no clubbing of the fingernails [Abnormal Walk] : normal gait [No Focal Deficits] : no focal deficits [Cyanosis, Localized] : no localized cyanosis [Oriented To Time, Place, And Person] : oriented to person, place, and time [FreeTextEntry1] : No abnormalities.

## 2020-03-12 ENCOUNTER — FORM ENCOUNTER (OUTPATIENT)
Age: 65
End: 2020-03-12

## 2020-03-13 ENCOUNTER — OUTPATIENT (OUTPATIENT)
Dept: OUTPATIENT SERVICES | Facility: HOSPITAL | Age: 65
LOS: 1 days | End: 2020-03-13
Payer: MEDICARE

## 2020-03-13 ENCOUNTER — APPOINTMENT (OUTPATIENT)
Dept: RADIOLOGY | Facility: CLINIC | Age: 65
End: 2020-03-13
Payer: MEDICARE

## 2020-03-13 DIAGNOSIS — Z13.820 ENCOUNTER FOR SCREENING FOR OSTEOPOROSIS: ICD-10-CM

## 2020-03-13 DIAGNOSIS — Z00.00 ENCOUNTER FOR GENERAL ADULT MEDICAL EXAMINATION WITHOUT ABNORMAL FINDINGS: ICD-10-CM

## 2020-03-13 DIAGNOSIS — Z98.891 HISTORY OF UTERINE SCAR FROM PREVIOUS SURGERY: Chronic | ICD-10-CM

## 2020-03-13 PROCEDURE — 77080 DXA BONE DENSITY AXIAL: CPT

## 2020-03-13 PROCEDURE — 77080 DXA BONE DENSITY AXIAL: CPT | Mod: 26

## 2020-03-20 ENCOUNTER — APPOINTMENT (OUTPATIENT)
Dept: FAMILY MEDICINE | Facility: CLINIC | Age: 65
End: 2020-03-20

## 2020-03-24 DIAGNOSIS — Z78.9 OTHER SPECIFIED HEALTH STATUS: ICD-10-CM

## 2020-03-24 LAB
ALBUMIN SERPL ELPH-MCNC: 4.3 G/DL
ALP BLD-CCNC: 88 U/L
ALT SERPL-CCNC: 17 U/L
ANION GAP SERPL CALC-SCNC: 12 MMOL/L
AST SERPL-CCNC: 21 U/L
BILIRUB SERPL-MCNC: 0.6 MG/DL
BUN SERPL-MCNC: 18 MG/DL
CALCIUM SERPL-MCNC: 9 MG/DL
CHLORIDE SERPL-SCNC: 104 MMOL/L
CHOLEST SERPL-MCNC: 169 MG/DL
CHOLEST/HDLC SERPL: 2.3 RATIO
CO2 SERPL-SCNC: 26 MMOL/L
CREAT SERPL-MCNC: 1 MG/DL
GLUCOSE SERPL-MCNC: 94 MG/DL
HBV SURFACE AB SER QL: NONREACTIVE
HBV SURFACE AG SER QL: NONREACTIVE
HCV AB SER QL: NONREACTIVE
HCV S/CO RATIO: 0.12 S/CO
HDLC SERPL-MCNC: 74 MG/DL
HIV1+2 AB SPEC QL IA.RAPID: NONREACTIVE
LDLC SERPL CALC-MCNC: 84 MG/DL
POTASSIUM SERPL-SCNC: 4.5 MMOL/L
PROT SERPL-MCNC: 6.6 G/DL
SODIUM SERPL-SCNC: 142 MMOL/L
TRIGL SERPL-MCNC: 56 MG/DL

## 2020-04-21 ENCOUNTER — RX RENEWAL (OUTPATIENT)
Age: 65
End: 2020-04-21

## 2020-05-11 ENCOUNTER — APPOINTMENT (OUTPATIENT)
Dept: FAMILY MEDICINE | Facility: CLINIC | Age: 65
End: 2020-05-11
Payer: MEDICARE

## 2020-05-11 DIAGNOSIS — Z92.29 PERSONAL HISTORY OF OTHER DRUG THERAPY: ICD-10-CM

## 2020-05-11 PROCEDURE — 99214 OFFICE O/P EST MOD 30 MIN: CPT | Mod: 95
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
## 2020-05-18 ENCOUNTER — RX RENEWAL (OUTPATIENT)
Age: 65
End: 2020-05-18

## 2020-05-19 ENCOUNTER — RX RENEWAL (OUTPATIENT)
Age: 65
End: 2020-05-19

## 2020-05-22 ENCOUNTER — APPOINTMENT (OUTPATIENT)
Dept: FAMILY MEDICINE | Facility: CLINIC | Age: 65
End: 2020-05-22

## 2020-06-02 ENCOUNTER — RX RENEWAL (OUTPATIENT)
Age: 65
End: 2020-06-02

## 2020-06-08 ENCOUNTER — APPOINTMENT (OUTPATIENT)
Dept: FAMILY MEDICINE | Facility: CLINIC | Age: 65
End: 2020-06-08
Payer: MEDICARE

## 2020-06-08 ENCOUNTER — RESULT CHARGE (OUTPATIENT)
Age: 65
End: 2020-06-08

## 2020-06-08 VITALS
BODY MASS INDEX: 45.54 KG/M2 | TEMPERATURE: 98 F | DIASTOLIC BLOOD PRESSURE: 90 MMHG | WEIGHT: 257 LBS | HEART RATE: 70 BPM | HEIGHT: 63 IN | OXYGEN SATURATION: 96 % | SYSTOLIC BLOOD PRESSURE: 140 MMHG

## 2020-06-08 DIAGNOSIS — Z00.00 ENCOUNTER FOR GENERAL ADULT MEDICAL EXAMINATION W/OUT ABNORMAL FINDINGS: ICD-10-CM

## 2020-06-08 PROCEDURE — 81003 URINALYSIS AUTO W/O SCOPE: CPT | Mod: QW

## 2020-06-08 PROCEDURE — 99213 OFFICE O/P EST LOW 20 MIN: CPT | Mod: 25

## 2020-06-08 NOTE — HISTORY OF PRESENT ILLNESS
[FreeTextEntry8] : Pt c/o pressure/ burning sensation when urinating x 4 days , pt notes started over weekend, pt notes walks regularly, pt notes no fever , dysuria, pt notes urine was not cloudy, pt note some back pain she has had had a spasm on mothers day weekend, pt notes no blood i urine,  no hx of stones, pt notes dysuria has persisted, ;t had r side back spasm onmothers day weekend resolved, , pt notes can tweak,

## 2020-06-08 NOTE — PHYSICAL EXAM
[No Acute Distress] : no acute distress [Normal TMs] : both tympanic membranes were normal [Supple] : supple [Clear to Auscultation] : lungs were clear to auscultation bilaterally [Normal S1, S2] : normal S1 and S2 [Soft] : abdomen soft [No CVA Tenderness] : no CVA  tenderness [de-identified] : some lower r lumbar spasm , neg slr

## 2020-06-12 LAB — BACTERIA UR CULT: ABNORMAL

## 2020-06-29 ENCOUNTER — APPOINTMENT (OUTPATIENT)
Dept: FAMILY MEDICINE | Facility: CLINIC | Age: 65
End: 2020-06-29
Payer: MEDICARE

## 2020-06-29 ENCOUNTER — APPOINTMENT (OUTPATIENT)
Dept: RADIOLOGY | Facility: CLINIC | Age: 65
End: 2020-06-29
Payer: MEDICARE

## 2020-06-29 ENCOUNTER — OUTPATIENT (OUTPATIENT)
Dept: OUTPATIENT SERVICES | Facility: HOSPITAL | Age: 65
LOS: 1 days | End: 2020-06-29
Payer: MEDICARE

## 2020-06-29 VITALS
DIASTOLIC BLOOD PRESSURE: 76 MMHG | OXYGEN SATURATION: 95 % | TEMPERATURE: 98 F | BODY MASS INDEX: 45.18 KG/M2 | WEIGHT: 255 LBS | HEIGHT: 63 IN | SYSTOLIC BLOOD PRESSURE: 134 MMHG | HEART RATE: 76 BPM

## 2020-06-29 DIAGNOSIS — G89.29 UNSPECIFIED ABDOMINAL PAIN: ICD-10-CM

## 2020-06-29 DIAGNOSIS — Z98.891 HISTORY OF UTERINE SCAR FROM PREVIOUS SURGERY: Chronic | ICD-10-CM

## 2020-06-29 DIAGNOSIS — R10.9 UNSPECIFIED ABDOMINAL PAIN: ICD-10-CM

## 2020-06-29 PROCEDURE — 74018 RADEX ABDOMEN 1 VIEW: CPT

## 2020-06-29 PROCEDURE — 74018 RADEX ABDOMEN 1 VIEW: CPT | Mod: 26

## 2020-06-29 PROCEDURE — 36415 COLL VENOUS BLD VENIPUNCTURE: CPT

## 2020-06-29 PROCEDURE — 81003 URINALYSIS AUTO W/O SCOPE: CPT | Mod: QW

## 2020-06-29 PROCEDURE — 72100 X-RAY EXAM L-S SPINE 2/3 VWS: CPT | Mod: 26

## 2020-06-29 PROCEDURE — 72100 X-RAY EXAM L-S SPINE 2/3 VWS: CPT

## 2020-06-29 PROCEDURE — 99214 OFFICE O/P EST MOD 30 MIN: CPT | Mod: 25

## 2020-06-29 RX ORDER — NITROFURANTOIN (MONOHYDRATE/MACROCRYSTALS) 25; 75 MG/1; MG/1
100 CAPSULE ORAL
Qty: 14 | Refills: 0 | Status: DISCONTINUED | COMMUNITY
Start: 2020-06-08 | End: 2020-06-29

## 2020-06-29 NOTE — PHYSICAL EXAM
[No Acute Distress] : no acute distress [Normal Sclera/Conjunctiva] : normal sclera/conjunctiva [No Lymphadenopathy] : no lymphadenopathy [No Respiratory Distress] : no respiratory distress  [Normal Rate] : normal rate  [Clear to Auscultation] : lungs were clear to auscultation bilaterally [Regular Rhythm] : with a regular rhythm [No Murmur] : no murmur heard [Normal S1, S2] : normal S1 and S2 [No Rash] : no rash [de-identified] : There is mild tenderness at the right CVA region posteriorly.  Muscle tone is mildly increased at that region. [Normal] : no posterior cervical lymphadenopathy and no anterior cervical lymphadenopathy
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
## 2020-06-30 DIAGNOSIS — M43.8X9 OTHER SPECIFIED DEFORMING DORSOPATHIES, SITE UNSPECIFIED: ICD-10-CM

## 2020-06-30 DIAGNOSIS — M43.10 SPONDYLOLISTHESIS, SITE UNSPECIFIED: ICD-10-CM

## 2020-06-30 LAB
ANION GAP SERPL CALC-SCNC: 11 MMOL/L
APPEARANCE: ABNORMAL
BACTERIA: NEGATIVE
BILIRUBIN URINE: NEGATIVE
BLOOD URINE: NORMAL
BUN SERPL-MCNC: 24 MG/DL
CALCIUM SERPL-MCNC: 9.2 MG/DL
CHLORIDE SERPL-SCNC: 105 MMOL/L
CO2 SERPL-SCNC: 24 MMOL/L
COLOR: NORMAL
CREAT SERPL-MCNC: 0.86 MG/DL
GLUCOSE QUALITATIVE U: NEGATIVE
GLUCOSE SERPL-MCNC: 81 MG/DL
HYALINE CASTS: 3 /LPF
KETONES URINE: NEGATIVE
LEUKOCYTE ESTERASE URINE: ABNORMAL
MICROSCOPIC-UA: NORMAL
NITRITE URINE: POSITIVE
PH URINE: 6
POTASSIUM SERPL-SCNC: 4.2 MMOL/L
PROTEIN URINE: NORMAL
RED BLOOD CELLS URINE: 1 /HPF
SODIUM SERPL-SCNC: 141 MMOL/L
SPECIFIC GRAVITY URINE: 1.02
SQUAMOUS EPITHELIAL CELLS: 2 /HPF
UROBILINOGEN URINE: NORMAL
WHITE BLOOD CELLS URINE: 148 /HPF

## 2020-07-13 LAB — BACTERIA UR CULT: ABNORMAL

## 2020-08-04 ENCOUNTER — APPOINTMENT (OUTPATIENT)
Dept: CARDIOLOGY | Facility: CLINIC | Age: 65
End: 2020-08-04

## 2020-08-05 ENCOUNTER — APPOINTMENT (OUTPATIENT)
Dept: CARDIOLOGY | Facility: CLINIC | Age: 65
End: 2020-08-05
Payer: MEDICARE

## 2020-08-05 ENCOUNTER — NON-APPOINTMENT (OUTPATIENT)
Age: 65
End: 2020-08-05

## 2020-08-05 VITALS
DIASTOLIC BLOOD PRESSURE: 81 MMHG | HEART RATE: 76 BPM | OXYGEN SATURATION: 96 % | BODY MASS INDEX: 45.7 KG/M2 | WEIGHT: 258 LBS | SYSTOLIC BLOOD PRESSURE: 138 MMHG | TEMPERATURE: 98.3 F

## 2020-08-05 PROCEDURE — 99214 OFFICE O/P EST MOD 30 MIN: CPT

## 2020-08-05 PROCEDURE — 93000 ELECTROCARDIOGRAM COMPLETE: CPT

## 2020-08-05 NOTE — PHYSICAL EXAM
[General Appearance - Well Developed] : well developed [Normal Appearance] : normal appearance [General Appearance - Well Nourished] : well nourished [Normal Conjunctiva] : the conjunctiva exhibited no abnormalities [FreeTextEntry1] : wears mask [Normal Jugular Venous V Waves Present] : normal jugular venous V waves present [Normal Jugular Venous A Waves Present] : normal jugular venous A waves present [Auscultation Breath Sounds / Voice Sounds] : lungs were clear to auscultation bilaterally [] : no respiratory distress [Murmurs] : no murmurs present [Heart Sounds] : normal S1 and S2 [Edema] : no peripheral edema present [Arterial Pulses Normal] : the arterial pulses were normal [Nail Clubbing] : no clubbing of the fingernails [Cyanosis, Localized] : no localized cyanosis [Abnormal Walk] : normal gait [Skin Color & Pigmentation] : normal skin color and pigmentation [No Venous Stasis] : no venous stasis

## 2020-08-05 NOTE — ASSESSMENT
[FreeTextEntry1] : 63 y/o female with \par HTN controlled on amlodipine/valsartan 5-160 mg \par HLP , improved on rosuvastatin 5 mg LDL at goal \par Weight loss encouraged , joined weight watchers.\par

## 2020-08-05 NOTE — HISTORY OF PRESENT ILLNESS
[FreeTextEntry1] : A 63 y/o female with PMhx of HTN , RBBB , EF preserved 60^ with borderline LVH , mild PH\par no exertional chest pain or  SOB, palpitations, dizziness or syncope\par started on rosuvastatin 5 mg  LDL 84 ,  (decrease from 221 /136) \par gained 8 lbs rejoined weight watchers meetings\par \par

## 2020-08-10 ENCOUNTER — APPOINTMENT (OUTPATIENT)
Dept: FAMILY MEDICINE | Facility: CLINIC | Age: 65
End: 2020-08-10
Payer: MEDICARE

## 2020-08-10 VITALS
DIASTOLIC BLOOD PRESSURE: 86 MMHG | OXYGEN SATURATION: 94 % | WEIGHT: 258 LBS | TEMPERATURE: 98.2 F | SYSTOLIC BLOOD PRESSURE: 126 MMHG | HEIGHT: 63 IN | HEART RATE: 83 BPM | BODY MASS INDEX: 45.71 KG/M2

## 2020-08-10 PROCEDURE — 36415 COLL VENOUS BLD VENIPUNCTURE: CPT

## 2020-08-10 PROCEDURE — 99214 OFFICE O/P EST MOD 30 MIN: CPT | Mod: 25

## 2020-08-10 NOTE — PHYSICAL EXAM
[No Acute Distress] : no acute distress [Normal Sclera/Conjunctiva] : normal sclera/conjunctiva [No Lymphadenopathy] : no lymphadenopathy [Clear to Auscultation] : lungs were clear to auscultation bilaterally [No Respiratory Distress] : no respiratory distress  [Regular Rhythm] : with a regular rhythm [Normal Rate] : normal rate  [Normal S1, S2] : normal S1 and S2 [No Murmur] : no murmur heard [2+] : left 2+ [Normal] : affect was normal and insight and judgment were intact [de-identified] : There is mild increased paralumbar tone bilaterally.  The right ankle has full range of motion without pain.  There is no tenderness at the Achilles, or at the heel on the plantar surface.  The right foot is nontender to palpation.  There is no redness, except for mild redness at the site of the bunion.

## 2020-08-10 NOTE — PLAN
[FreeTextEntry1] : Patient advised to avoid walking barefoot, and to ensure that she has good arch support in her footwear.  Does not, she is advised to arrange for shoe inserts provide arch support.  She can discuss this with her podiatrist.\par Naproxen twice daily as needed can be used for plantar fasciitis pain.\par Warm foot soaks and soft tissue massage of the right foot at at bedtime may also be helpful.\par Low back pain improved.  No kidney stones no kidney stone seen on KUB.  Chronic degenerative changes of the lumbosacral spine.  Continue physical therapy.\par Follow-up 2 to 3 months.\par Labs drawn in office.\par \par
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
## 2020-08-11 LAB
ANION GAP SERPL CALC-SCNC: 11 MMOL/L
BUN SERPL-MCNC: 18 MG/DL
CALCIUM SERPL-MCNC: 9.3 MG/DL
CHLORIDE SERPL-SCNC: 104 MMOL/L
CK SERPL-CCNC: 96 U/L
CO2 SERPL-SCNC: 26 MMOL/L
CREAT SERPL-MCNC: 0.93 MG/DL
GLUCOSE SERPL-MCNC: 98 MG/DL
POTASSIUM SERPL-SCNC: 4.2 MMOL/L
SODIUM SERPL-SCNC: 141 MMOL/L
T4 FREE SERPL-MCNC: 1.4 NG/DL
TSH SERPL-ACNC: 2.32 UIU/ML

## 2020-08-12 LAB — ALDOLASE SERPL-CCNC: 4.9 U/L

## 2020-08-18 ENCOUNTER — APPOINTMENT (OUTPATIENT)
Dept: PULMONOLOGY | Facility: CLINIC | Age: 65
End: 2020-08-18
Payer: MEDICARE

## 2020-08-18 ENCOUNTER — RX RENEWAL (OUTPATIENT)
Age: 65
End: 2020-08-18

## 2020-08-18 VITALS
WEIGHT: 257 LBS | SYSTOLIC BLOOD PRESSURE: 118 MMHG | DIASTOLIC BLOOD PRESSURE: 76 MMHG | HEART RATE: 76 BPM | BODY MASS INDEX: 45.53 KG/M2 | OXYGEN SATURATION: 96 %

## 2020-08-18 PROCEDURE — 99214 OFFICE O/P EST MOD 30 MIN: CPT

## 2020-08-18 NOTE — DISCUSSION/SUMMARY
[FreeTextEntry1] : \par #1. Mild restriction on PFTs is likely related to weight; would follow lung function with weight loss as needed\par #2. CXR to further evaluate mild restriction and SOBOE was clear and restriction is likely related to weight\par #3. The patient does not appear to require chronic BD therapy at this time\par #4. Diet and exercise for weight loss\par #5. SOBOE is likely related to weight or deconditioning given restriction\par #6. Continue autoCPAP but change pressure to 13-20 cm of water for residual mild-mod ARSLAN noted on compliance\par #7. Cardiology f/u \par #8. Replace equipment as needed; ordered 8/18/20\par #9. F/u 6 months with compliance \par #10. Reviewed risks of exposure and symptoms of Covid-19 virus, including how the virus is spread.\par \par Discussed the following risk-reducing strategies:\par -Wash hands with soap and water (proper technique reviewed) \par -Use alcohol based  when hand-washing is not an option\par -Maintain a social distance of at least 6 feet whenever possible\par -Avoid contact, especially hand shaking\par -Avoid touching eyes, nose, and mouth\par -Cover face/mouth when coughing or sneezing\par -Avoid close contact with sick people\par -Seek medical help if you develop a fever and/or respiratory symptoms (e.g. cough, chest pain, SOB)\par \par Patient's questions were answered and patient appears to understand these recommendations

## 2020-08-18 NOTE — HISTORY OF PRESENT ILLNESS
[FreeTextEntry1] : Patient c/o SOBOE but is otherwise without associated respiratory complaints. She is followed by cardiology and was sent for pulmonary evaluation given reported mild pulm HTN and suspicion of ARSLAN.\par  [None] : No associated symptoms are reported [CPAP] : CPAP [Good Compliance] : good compliance with treatment [Good Tolerance] : good tolerance of treatment [Good Symptom Control] : good symptom control [de-identified] : 5-20 cm of water [Follow-Up - Routine Clinic] : a routine clinic follow-up of [Excess Weight] : excess weight [Currently Experiencing] : The patient is currently experiencing symptoms. [Dyspnea] : dyspnea [Back Pain] : back pain [Low Calorie Diet] : low calorie diet [Fair Compliance] : fair compliance with treatment [Fair Tolerance] : fair tolerance of treatment [Poor Symptom Control] : poor symptom control [Hypertension] : hypertension [High] : high [Low Calorie] : low calorie [Well Balanced Diet] : well balanced meals [Infrequently] : exercises infrequently [Walking] : walking

## 2020-08-18 NOTE — PHYSICAL EXAM
[General Appearance - Well Developed] : well developed [Normal Appearance] : normal appearance [General Appearance - In No Acute Distress] : no acute distress [Normal Conjunctiva] : the conjunctiva exhibited no abnormalities [Low Lying Soft Palate] : low lying soft palate [Elongated Uvula] : elongated uvula [Enlarged Base of the Tongue] : enlargement of the base of the tongue [III] : III [Neck Appearance] : the appearance of the neck was normal [Heart Rate And Rhythm] : heart rate and rhythm were normal [Heart Sounds] : normal S1 and S2 [Murmurs] : no murmurs present [Edema] : no peripheral edema present [] : no respiratory distress [Respiration, Rhythm And Depth] : normal respiratory rhythm and effort [Exaggerated Use Of Accessory Muscles For Inspiration] : no accessory muscle use [Auscultation Breath Sounds / Voice Sounds] : lungs were clear to auscultation bilaterally [FreeTextEntry1] : No abnormalities. [Abdomen Soft] : soft [Abdomen Tenderness] : non-tender [Abnormal Walk] : normal gait [Nail Clubbing] : no clubbing of the fingernails [Cyanosis, Localized] : no localized cyanosis [No Focal Deficits] : no focal deficits [Oriented To Time, Place, And Person] : oriented to person, place, and time

## 2020-08-18 NOTE — REVIEW OF SYSTEMS
[Fever] : no fever [Chills] : no chills [Dry Eyes] : no dryness of the eyes [Eye Irritation] : no ~T irritation of the eyes [Nasal Congestion] : no nasal congestion [Epistaxis] : no nosebleeds [Postnasal Drip] : no postnasal drip [Sinus Problems] : no sinus problems [Cough] : no cough [Sputum] : not coughing up ~M sputum [Dyspnea] : no dyspnea [Chest Tightness] : no chest tightness [Pleuritic Pain] : no pleuritic pain [Wheezing] : no wheezing [Hypertension] : ~T hypertension [Chest Discomfort] : no chest discomfort [Dysrhythmia] : no dysrhythmia [Murmurs] : no murmurs were heard [Palpitations] : no palpitations [Edema] : ~T edema was not present [Hay Fever] : no hay fever [Itchy Eyes] : no itching of ~T the eyes [Reflux] : no reflux [Nausea] : no nausea [Vomiting] : no vomiting [Constipation] : no constipation [Diarrhea] : no diarrhea [Abdominal Pain] : no abdominal pain [Dysuria] : no dysuria [Trauma] : no ~T physical trauma [Fracture] : no fracture [Anemia] : no anemia [Headache] : no headache [Dizziness] : no dizziness [Syncope] : no fainting [Numbness] : no numbness [Paralysis] : no paralysis was seen [Seizures] : no seizures [Depression] : no depression [Anxiety] : no anxiety [Diabetes] : no diabetes mellitus [Thyroid Problem] : thyroid problem [As Noted in HPI] : as noted in HPI

## 2020-08-18 NOTE — CONSULT LETTER
[Dear  ___] : Dear  [unfilled], [Consult Letter:] : I had the pleasure of evaluating your patient, [unfilled]. [Please see my note below.] : Please see my note below. [Consult Closing:] : Thank you very much for allowing me to participate in the care of this patient.  If you have any questions, please do not hesitate to contact me. [Sincerely,] : Sincerely, [FreeTextEntry3] : Henry Garcia MD, FCCP, D. ABSM\par Pulmonary and Sleep Medicine\par [facility] [place]\par \par  [DrIliana  ___] : Dr. PEREZ

## 2020-09-22 ENCOUNTER — RX RENEWAL (OUTPATIENT)
Age: 65
End: 2020-09-22

## 2020-10-01 ENCOUNTER — APPOINTMENT (OUTPATIENT)
Dept: FAMILY MEDICINE | Facility: CLINIC | Age: 65
End: 2020-10-01
Payer: MEDICARE

## 2020-10-01 VITALS
SYSTOLIC BLOOD PRESSURE: 124 MMHG | HEIGHT: 63 IN | OXYGEN SATURATION: 97 % | DIASTOLIC BLOOD PRESSURE: 80 MMHG | HEART RATE: 77 BPM | WEIGHT: 250 LBS | BODY MASS INDEX: 44.3 KG/M2

## 2020-10-01 PROCEDURE — 90662 IIV NO PRSV INCREASED AG IM: CPT

## 2020-10-01 PROCEDURE — G0008: CPT

## 2020-11-02 ENCOUNTER — APPOINTMENT (OUTPATIENT)
Dept: FAMILY MEDICINE | Facility: CLINIC | Age: 65
End: 2020-11-02
Payer: MEDICARE

## 2020-11-02 VITALS
BODY MASS INDEX: 45.89 KG/M2 | OXYGEN SATURATION: 98 % | SYSTOLIC BLOOD PRESSURE: 118 MMHG | HEART RATE: 69 BPM | WEIGHT: 259 LBS | DIASTOLIC BLOOD PRESSURE: 80 MMHG | HEIGHT: 63 IN | TEMPERATURE: 97.3 F

## 2020-11-02 DIAGNOSIS — M54.5 LOW BACK PAIN: ICD-10-CM

## 2020-11-02 PROCEDURE — 99214 OFFICE O/P EST MOD 30 MIN: CPT | Mod: 25

## 2020-11-02 PROCEDURE — 36415 COLL VENOUS BLD VENIPUNCTURE: CPT

## 2020-11-02 NOTE — HISTORY OF PRESENT ILLNESS
[FreeTextEntry1] : Patient is here for a follow up on hypertension and hyperlipidemia.\par Patient C/O right foot pain due to plantar fasciitis.  She is currently seeing a podiatrist for treatment.\par Low back pain is chronic, and has improved.\par She has gained some weight since the coronavirus shutdown.  Patient has also been less active due to chronic right foot and low back pain.\par

## 2020-11-02 NOTE — PHYSICAL EXAM
[No Acute Distress] : no acute distress [Normal Sclera/Conjunctiva] : normal sclera/conjunctiva [No Lymphadenopathy] : no lymphadenopathy [No Respiratory Distress] : no respiratory distress  [Clear to Auscultation] : lungs were clear to auscultation bilaterally [No Edema] : there was no peripheral edema [Normal Anterior Cervical Nodes] : no anterior cervical lymphadenopathy [No Rash] : no rash [Normal] : affect was normal and insight and judgment were intact [de-identified] : Decreased lordosis, lumbar. Coban tape on arch of right foot.

## 2020-11-02 NOTE — COUNSELING
[Benefits of weight loss discussed] : Benefits of weight loss discussed [Encouraged to increase physical activity] : Encouraged to increase physical activity [Good understanding] : Patient has a good understanding of disease, goals and obesity follow-up plan

## 2020-11-02 NOTE — PLAN
[FreeTextEntry1] : BP stable.\par Continue on current medications.\par Recommend increased efforts toward wt loss.\par Labs drawn in office.\par F/u 3 months.

## 2020-11-10 LAB
ANION GAP SERPL CALC-SCNC: 9 MMOL/L
BUN SERPL-MCNC: 19 MG/DL
CALCIUM SERPL-MCNC: 9.1 MG/DL
CHLORIDE SERPL-SCNC: 104 MMOL/L
CO2 SERPL-SCNC: 27 MMOL/L
CREAT SERPL-MCNC: 0.9 MG/DL
ESTIMATED AVERAGE GLUCOSE: 103 MG/DL
GLUCOSE SERPL-MCNC: 90 MG/DL
HBA1C MFR BLD HPLC: 5.2 %
POTASSIUM SERPL-SCNC: 4.5 MMOL/L
SODIUM SERPL-SCNC: 141 MMOL/L
T4 FREE SERPL-MCNC: 1.5 NG/DL
TSH SERPL-ACNC: 2.11 UIU/ML

## 2020-12-22 ENCOUNTER — RX RENEWAL (OUTPATIENT)
Age: 65
End: 2020-12-22

## 2021-02-03 ENCOUNTER — APPOINTMENT (OUTPATIENT)
Dept: FAMILY MEDICINE | Facility: CLINIC | Age: 66
End: 2021-02-03
Payer: MEDICARE

## 2021-02-03 ENCOUNTER — NON-APPOINTMENT (OUTPATIENT)
Age: 66
End: 2021-02-03

## 2021-02-03 VITALS
WEIGHT: 262 LBS | BODY MASS INDEX: 46.42 KG/M2 | HEIGHT: 63 IN | SYSTOLIC BLOOD PRESSURE: 130 MMHG | OXYGEN SATURATION: 98 % | HEART RATE: 70 BPM | TEMPERATURE: 97.4 F | DIASTOLIC BLOOD PRESSURE: 78 MMHG

## 2021-02-03 DIAGNOSIS — Z87.898 PERSONAL HISTORY OF OTHER SPECIFIED CONDITIONS: ICD-10-CM

## 2021-02-03 DIAGNOSIS — M72.2 PLANTAR FASCIAL FIBROMATOSIS: ICD-10-CM

## 2021-02-03 DIAGNOSIS — R39.9 UNSPECIFIED SYMPTOMS AND SIGNS INVOLVING THE GENITOURINARY SYSTEM: ICD-10-CM

## 2021-02-03 DIAGNOSIS — Z20.9 CONTACT WITH AND (SUSPECTED) EXPOSURE TO UNSPECIFIED COMMUNICABLE DISEASE: ICD-10-CM

## 2021-02-03 PROCEDURE — 99214 OFFICE O/P EST MOD 30 MIN: CPT

## 2021-02-03 NOTE — END OF VISIT
[FreeTextEntry3] : Moderate complexity decision making based on pt's multiple chronic medical conditions and polypharmacy.\par

## 2021-02-03 NOTE — PLAN
[FreeTextEntry1] : Blood pressure is borderline.  \par Patient is encouraged to take more assertive efforts to optimize weight.\par Fasting blood work recommended.\par Patient reminded to follow-up with her GYN for breast exam and mammogram screening.\par Follow-up in 3 months.

## 2021-02-03 NOTE — REVIEW OF SYSTEMS
[Back Pain] : back pain [Negative] : Psychiatric [Dyspnea on Exertion] : no dyspnea on exertion [FreeTextEntry6] : Has mild pulm htn [FreeTextEntry9] : Foot pain

## 2021-02-03 NOTE — HISTORY OF PRESENT ILLNESS
[FreeTextEntry1] : Pt here to follow up on HTN and cholesterol.\par Chronic low back pain is improved.\par Patient is also receiving treatment for her plantar fasciitis from the podiatrist and is also shown some improvement.\par She has been gaining weight since the pandemic.  She has gained a total of 12 pounds over the past 4 months.\par Patient has ARSLAN and is using her sleep apnea equipment nightly.\par Patient is past due for her breast cancer screening and mammogram.  She is following with GYN for breast care. [de-identified] : 11/2/20:\par Patient is here for a follow up on hypertension and hyperlipidemia.\par Patient C/O right foot pain due to plantar fasciitis.  She is currently seeing a podiatrist for treatment.\par Low back pain is chronic, and has improved.\par She has gained some weight since the coronavirus shutdown.  Patient has also been less active due to chronic right foot and low back pain.\par

## 2021-02-03 NOTE — PHYSICAL EXAM
[No Acute Distress] : no acute distress [No JVD] : no jugular venous distention [Supple] : supple [No Respiratory Distress] : no respiratory distress  [Clear to Auscultation] : lungs were clear to auscultation bilaterally [No Edema] : there was no peripheral edema [No Focal Deficits] : no focal deficits [Normal] : affect was normal and insight and judgment were intact [de-identified] : Decreased lumbar lordosis

## 2021-02-11 ENCOUNTER — APPOINTMENT (OUTPATIENT)
Dept: PULMONOLOGY | Facility: CLINIC | Age: 66
End: 2021-02-11
Payer: MEDICARE

## 2021-02-11 VITALS
RESPIRATION RATE: 15 BRPM | BODY MASS INDEX: 46.42 KG/M2 | HEIGHT: 63 IN | DIASTOLIC BLOOD PRESSURE: 80 MMHG | OXYGEN SATURATION: 96 % | WEIGHT: 262 LBS | TEMPERATURE: 98.2 F | SYSTOLIC BLOOD PRESSURE: 130 MMHG | HEART RATE: 77 BPM

## 2021-02-11 PROCEDURE — 99214 OFFICE O/P EST MOD 30 MIN: CPT

## 2021-02-11 NOTE — HISTORY OF PRESENT ILLNESS
[None] : No associated symptoms are reported [CPAP] : CPAP [Good Compliance] : good compliance with treatment [Good Tolerance] : good tolerance of treatment [Good Symptom Control] : good symptom control [Follow-Up - Routine Clinic] : a routine clinic follow-up of [Excess Weight] : excess weight [Currently Experiencing] : The patient is currently experiencing symptoms. [Dyspnea] : dyspnea [Back Pain] : back pain [Low Calorie Diet] : low calorie diet [Fair Compliance] : fair compliance with treatment [Fair Tolerance] : fair tolerance of treatment [Poor Symptom Control] : poor symptom control [Hypertension] : hypertension [High] : high [Low Calorie] : low calorie [Well Balanced Diet] : well balanced meals [Infrequently] : exercises infrequently [Walking] : walking [FreeTextEntry1] : Patient c/o SOBOE but is otherwise without associated respiratory complaints. She is followed by cardiology and was sent for pulmonary evaluation given reported mild pulm HTN and suspicion of ARSLAN.\par  [de-identified] : 5-20 cm of water

## 2021-02-11 NOTE — CONSULT LETTER
[Dear  ___] : Dear  [unfilled], [Consult Letter:] : I had the pleasure of evaluating your patient, [unfilled]. [Please see my note below.] : Please see my note below. [Consult Closing:] : Thank you very much for allowing me to participate in the care of this patient.  If you have any questions, please do not hesitate to contact me. [Sincerely,] : Sincerely, [DrIliana  ___] : Dr. PEREZ [FreeTextEntry3] : Henry Garcia MD, FCCP, D. ABSM\par Pulmonary and Sleep Medicine\par [facility] [place]\par \par

## 2021-02-11 NOTE — REVIEW OF SYSTEMS
[Hypertension] : ~T hypertension [Thyroid Problem] : thyroid problem [As Noted in HPI] : as noted in HPI [Fever] : no fever [Chills] : no chills [Dry Eyes] : no dryness of the eyes [Eye Irritation] : no ~T irritation of the eyes [Nasal Congestion] : no nasal congestion [Epistaxis] : no nosebleeds [Postnasal Drip] : no postnasal drip [Sinus Problems] : no sinus problems [Cough] : no cough [Sputum] : not coughing up ~M sputum [Dyspnea] : no dyspnea [Chest Tightness] : no chest tightness [Pleuritic Pain] : no pleuritic pain [Wheezing] : no wheezing [Chest Discomfort] : no chest discomfort [Dysrhythmia] : no dysrhythmia [Murmurs] : no murmurs were heard [Palpitations] : no palpitations [Edema] : ~T edema was not present [Hay Fever] : no hay fever [Itchy Eyes] : no itching of ~T the eyes [Reflux] : no reflux [Nausea] : no nausea [Vomiting] : no vomiting [Constipation] : no constipation [Diarrhea] : no diarrhea [Abdominal Pain] : no abdominal pain [Dysuria] : no dysuria [Trauma] : no ~T physical trauma [Fracture] : no fracture [Anemia] : no anemia [Headache] : no headache [Dizziness] : no dizziness [Syncope] : no fainting [Numbness] : no numbness [Paralysis] : no paralysis was seen [Seizures] : no seizures [Depression] : no depression [Anxiety] : no anxiety [Diabetes] : no diabetes mellitus

## 2021-02-11 NOTE — DISCUSSION/SUMMARY
[FreeTextEntry1] : \par #1. Mild restriction on PFTs is likely related to weight; would follow lung function with weight loss as needed\par #2. CXR to further evaluate mild restriction and SOBOE was clear and restriction is likely related to weight\par #3. The patient does not appear to require chronic BD therapy at this time\par #4. Diet and exercise for weight loss\par #5. SOBOE is likely related to weight or deconditioning given restriction\par #6. Continue autoCPAP 13-20 cm of water for residual mild ARSLAN noted on compliance but is improved from previous; consider further decrease in pressure as needed as pt did worse on higher pressures but will continue current therapy as pt is doing well without residual sleep complaints\par #7. Cardiology f/u \par #8. Replace equipment as needed; ordered 8/18/20\par #9. F/u 6 months with compliance \par #10. Reviewed risks of exposure and symptoms of Covid-19 virus, including how the virus is spread and precautions to avoid mary beth virus.\par \par Patient's questions were answered and patient appears to understand these recommendations

## 2021-02-13 ENCOUNTER — LABORATORY RESULT (OUTPATIENT)
Age: 66
End: 2021-02-13

## 2021-02-21 LAB
25(OH)D3 SERPL-MCNC: 36.4 NG/ML
ALBUMIN SERPL ELPH-MCNC: 4.2 G/DL
ALP BLD-CCNC: 92 U/L
ALT SERPL-CCNC: 24 U/L
ANION GAP SERPL CALC-SCNC: 12 MMOL/L
AST SERPL-CCNC: 21 U/L
BASOPHILS # BLD AUTO: 0.03 K/UL
BASOPHILS NFR BLD AUTO: 0.5 %
BILIRUB SERPL-MCNC: 0.5 MG/DL
BUN SERPL-MCNC: 19 MG/DL
CALCIUM SERPL-MCNC: 9.3 MG/DL
CHLORIDE SERPL-SCNC: 103 MMOL/L
CHOLEST SERPL-MCNC: 168 MG/DL
CO2 SERPL-SCNC: 27 MMOL/L
CREAT SERPL-MCNC: 1.04 MG/DL
EOSINOPHIL # BLD AUTO: 0.4 K/UL
EOSINOPHIL NFR BLD AUTO: 6.4 %
ESTIMATED AVERAGE GLUCOSE: 103 MG/DL
GLUCOSE SERPL-MCNC: 90 MG/DL
HBA1C MFR BLD HPLC: 5.2 %
HCT VFR BLD CALC: 43.6 %
HDLC SERPL-MCNC: 67 MG/DL
HGB BLD-MCNC: 13.8 G/DL
IMM GRANULOCYTES NFR BLD AUTO: 0.2 %
LDLC SERPL CALC-MCNC: 85 MG/DL
LYMPHOCYTES # BLD AUTO: 2.27 K/UL
LYMPHOCYTES NFR BLD AUTO: 36.2 %
MAN DIFF?: NORMAL
MCHC RBC-ENTMCNC: 31.4 PG
MCHC RBC-ENTMCNC: 31.7 GM/DL
MCV RBC AUTO: 99.1 FL
MONOCYTES # BLD AUTO: 0.59 K/UL
MONOCYTES NFR BLD AUTO: 9.4 %
NEUTROPHILS # BLD AUTO: 2.97 K/UL
NEUTROPHILS NFR BLD AUTO: 47.3 %
NONHDLC SERPL-MCNC: 101 MG/DL
PLATELET # BLD AUTO: 249 K/UL
POTASSIUM SERPL-SCNC: 4.4 MMOL/L
PROT SERPL-MCNC: 6.9 G/DL
RBC # BLD: 4.4 M/UL
RBC # FLD: 13.2 %
SODIUM SERPL-SCNC: 141 MMOL/L
T3RU NFR SERPL: 1.1 TBI
T4 FREE SERPL-MCNC: 1.4 NG/DL
THYROGLOB AB SERPL-ACNC: <20 IU/ML
THYROPEROXIDASE AB SERPL IA-ACNC: <10 IU/ML
TRIGL SERPL-MCNC: 83 MG/DL
TSH SERPL-ACNC: 2.44 UIU/ML
WBC # FLD AUTO: 6.27 K/UL

## 2021-03-16 ENCOUNTER — RX RENEWAL (OUTPATIENT)
Age: 66
End: 2021-03-16

## 2021-05-05 ENCOUNTER — NON-APPOINTMENT (OUTPATIENT)
Age: 66
End: 2021-05-05

## 2021-05-05 ENCOUNTER — APPOINTMENT (OUTPATIENT)
Dept: CARDIOLOGY | Facility: CLINIC | Age: 66
End: 2021-05-05
Payer: MEDICARE

## 2021-05-05 VITALS
HEART RATE: 66 BPM | OXYGEN SATURATION: 96 % | TEMPERATURE: 98.6 F | HEIGHT: 63 IN | WEIGHT: 262 LBS | BODY MASS INDEX: 46.42 KG/M2 | RESPIRATION RATE: 17 BRPM | SYSTOLIC BLOOD PRESSURE: 130 MMHG | DIASTOLIC BLOOD PRESSURE: 70 MMHG

## 2021-05-05 PROCEDURE — 99214 OFFICE O/P EST MOD 30 MIN: CPT

## 2021-05-05 PROCEDURE — 93000 ELECTROCARDIOGRAM COMPLETE: CPT

## 2021-05-05 NOTE — ASSESSMENT
[FreeTextEntry1] : 65 y/o female with \par HTN controlled on amlodipine/valsartan 5-160 mg and carvedilol \par asked the patient to keep a BP log ( check BP 3 times a week) \par HLP , improved on rosuvastatin 5 mg LDL at goal LDL 86 \par Weight loss encouraged , joined weight watchers.\par follow up yearly \par

## 2021-05-05 NOTE — HISTORY OF PRESENT ILLNESS
[FreeTextEntry1] : A 63 y/o female with PMhx of HTN , RBBB , EF preserved 60^ with borderline LVH , mild PH\par no exertional chest pain or  SOB, palpitations, dizziness or syncope\par started on rosuvastatin 5 mg  LDL 84 ,  (decrease from 221 /136) \par gained 8 lbs rejoined weight watchers meetings\par \par 5/5/201\par REturns for follow up . \par no exertional chest pain or  SOB, palpitations, dizziness or syncope\par HTN controlled on amlodipine,valsartan , carvedilol \par Hyperlipidemia on rosuvastatin 5 mg ( LDL 85, ) \par walks and has joined weight watchers \par \par

## 2021-05-07 ENCOUNTER — APPOINTMENT (OUTPATIENT)
Dept: FAMILY MEDICINE | Facility: CLINIC | Age: 66
End: 2021-05-07
Payer: MEDICARE

## 2021-05-07 VITALS
OXYGEN SATURATION: 96 % | HEART RATE: 83 BPM | WEIGHT: 261 LBS | HEIGHT: 63 IN | TEMPERATURE: 97.3 F | SYSTOLIC BLOOD PRESSURE: 128 MMHG | DIASTOLIC BLOOD PRESSURE: 76 MMHG | BODY MASS INDEX: 46.25 KG/M2

## 2021-05-07 VITALS — SYSTOLIC BLOOD PRESSURE: 120 MMHG | DIASTOLIC BLOOD PRESSURE: 70 MMHG

## 2021-05-07 PROCEDURE — 99214 OFFICE O/P EST MOD 30 MIN: CPT

## 2021-05-07 NOTE — PHYSICAL EXAM
[No Acute Distress] : no acute distress [No JVD] : no jugular venous distention [Supple] : supple [No Respiratory Distress] : no respiratory distress  [Clear to Auscultation] : lungs were clear to auscultation bilaterally [No Edema] : there was no peripheral edema [No Focal Deficits] : no focal deficits [Normal] : affect was normal and insight and judgment were intact [de-identified] : Decreased lumbar lordosis

## 2021-05-07 NOTE — HISTORY OF PRESENT ILLNESS
[FreeTextEntry1] : Pt is here to f/u on HTN and hyperlipidemia.\par Pt has been suffering from plantar fasciitis and it has been better. She is walking more.\par Home bp 113/79. Poss d/c of Carvedilol as per cardio. She is keeping bp log.

## 2021-05-07 NOTE — PLAN
[FreeTextEntry1] : Pt is stable. \par Cont current medications\par F/U 3 months.\par \par \par \par \par \par Moderate complexity decision making based on pt's multiple chronic medical conditions and polypharmacy.\par

## 2021-05-28 ENCOUNTER — APPOINTMENT (OUTPATIENT)
Dept: FAMILY MEDICINE | Facility: CLINIC | Age: 66
End: 2021-05-28
Payer: MEDICARE

## 2021-05-28 VITALS — DIASTOLIC BLOOD PRESSURE: 78 MMHG | SYSTOLIC BLOOD PRESSURE: 130 MMHG

## 2021-05-28 VITALS
HEART RATE: 64 BPM | TEMPERATURE: 97.7 F | SYSTOLIC BLOOD PRESSURE: 118 MMHG | OXYGEN SATURATION: 98 % | HEIGHT: 63 IN | WEIGHT: 265 LBS | BODY MASS INDEX: 46.95 KG/M2 | DIASTOLIC BLOOD PRESSURE: 68 MMHG

## 2021-05-28 DIAGNOSIS — M77.12 LATERAL EPICONDYLITIS, LEFT ELBOW: ICD-10-CM

## 2021-05-28 PROCEDURE — 99213 OFFICE O/P EST LOW 20 MIN: CPT

## 2021-05-28 NOTE — PHYSICAL EXAM
[No Acute Distress] : no acute distress [Normal TMs] : both tympanic membranes were normal [Supple] : supple [de-identified] : pos epicondyle strap -

## 2021-05-28 NOTE — REVIEW OF SYSTEMS
[Fever] : no fever [Earache] : no earache [Chest Pain] : no chest pain [Shortness Of Breath] : no shortness of breath [FreeTextEntry9] : painful resisted l wrist extension , over lateral epicondyle, some soreness in l bicep distal w resisted elbow flex,

## 2021-05-28 NOTE — HISTORY OF PRESENT ILLNESS
[FreeTextEntry8] : Patient c/o pain on left elbow about 2 weeks ago, pt notes first episode was when went to  a computer, pt noted pain in l elbow lateraly  , and since then keeps tweaking it w certin motion, pt note no neck pain or chest pain, pt has been checking bp at home but cuff is off and not get reliable reading . needs large cuff took pt bp w large cuff and bp is 130/78, pt notes no headaches, pt does admit she crochete regualry and recently was carrying groceries, may have strained arm in doing so, pt notes urine is nl color

## 2021-06-25 ENCOUNTER — NON-APPOINTMENT (OUTPATIENT)
Age: 66
End: 2021-06-25

## 2021-06-30 ENCOUNTER — NON-APPOINTMENT (OUTPATIENT)
Age: 66
End: 2021-06-30

## 2021-07-29 ENCOUNTER — APPOINTMENT (OUTPATIENT)
Dept: FAMILY MEDICINE | Facility: CLINIC | Age: 66
End: 2021-07-29
Payer: MEDICARE

## 2021-07-29 VITALS
OXYGEN SATURATION: 98 % | DIASTOLIC BLOOD PRESSURE: 76 MMHG | HEIGHT: 63 IN | WEIGHT: 260 LBS | TEMPERATURE: 97.8 F | SYSTOLIC BLOOD PRESSURE: 130 MMHG | HEART RATE: 68 BPM | BODY MASS INDEX: 46.07 KG/M2

## 2021-07-29 PROCEDURE — 99212 OFFICE O/P EST SF 10 MIN: CPT

## 2021-07-30 NOTE — HISTORY OF PRESENT ILLNESS
[FreeTextEntry8] : Patient is c/o sore right eye x 3 days.\par She noted a white bump on the inner lower lid 3 days ago.\par There is minimal redness on white of the eye, and in the morning she has crusting about the eye.\par The irritation has not affected her vision.

## 2021-07-30 NOTE — REVIEW OF SYSTEMS
[Fever] : no fever [Discharge] : discharge [Pain] : no pain [Redness] : redness [Vision Problems] : no vision problems [Itching] : itching

## 2021-07-30 NOTE — PHYSICAL EXAM
[No Acute Distress] : no acute distress [PERRL] : pupils equal round and reactive to light [EOMI] : extraocular movements intact [de-identified] : Small white nodule inner lower lid with surrounding conjunctival injection. No d/c. Left eye appears normal.

## 2021-08-06 ENCOUNTER — APPOINTMENT (OUTPATIENT)
Dept: FAMILY MEDICINE | Facility: CLINIC | Age: 66
End: 2021-08-06
Payer: MEDICARE

## 2021-08-06 DIAGNOSIS — S32.000D WEDGE COMPRESSION FRACTURE OF UNSPECIFIED LUMBAR VERTEBRA, SUBSEQUENT ENCOUNTER FOR FRACTURE WITH ROUTINE HEALING: ICD-10-CM

## 2021-08-06 LAB
25(OH)D3 SERPL-MCNC: 36.6 NG/ML
ALBUMIN SERPL ELPH-MCNC: 4.4 G/DL
ALP BLD-CCNC: 89 U/L
ALT SERPL-CCNC: 20 U/L
ANION GAP SERPL CALC-SCNC: 12 MMOL/L
AST SERPL-CCNC: 19 U/L
BASOPHILS # BLD AUTO: 0.04 K/UL
BASOPHILS NFR BLD AUTO: 0.7 %
BILIRUB SERPL-MCNC: 0.6 MG/DL
BUN SERPL-MCNC: 13 MG/DL
CALCIUM SERPL-MCNC: 9.2 MG/DL
CHLORIDE SERPL-SCNC: 105 MMOL/L
CHOLEST SERPL-MCNC: 166 MG/DL
CO2 SERPL-SCNC: 25 MMOL/L
CREAT SERPL-MCNC: 0.96 MG/DL
EOSINOPHIL # BLD AUTO: 0.44 K/UL
EOSINOPHIL NFR BLD AUTO: 7.6 %
ESTIMATED AVERAGE GLUCOSE: 100 MG/DL
GLUCOSE SERPL-MCNC: 98 MG/DL
HBA1C MFR BLD HPLC: 5.1 %
HCT VFR BLD CALC: 42.3 %
HDLC SERPL-MCNC: 68 MG/DL
HGB BLD-MCNC: 13.5 G/DL
IMM GRANULOCYTES NFR BLD AUTO: 0.2 %
LDLC SERPL CALC-MCNC: 82 MG/DL
LYMPHOCYTES # BLD AUTO: 2.1 K/UL
LYMPHOCYTES NFR BLD AUTO: 36.5 %
MAN DIFF?: NORMAL
MCHC RBC-ENTMCNC: 31.6 PG
MCHC RBC-ENTMCNC: 31.9 GM/DL
MCV RBC AUTO: 99.1 FL
MONOCYTES # BLD AUTO: 0.51 K/UL
MONOCYTES NFR BLD AUTO: 8.9 %
NEUTROPHILS # BLD AUTO: 2.66 K/UL
NEUTROPHILS NFR BLD AUTO: 46.1 %
NONHDLC SERPL-MCNC: 98 MG/DL
PLATELET # BLD AUTO: 268 K/UL
POTASSIUM SERPL-SCNC: 4.3 MMOL/L
PROT SERPL-MCNC: 6.7 G/DL
RBC # BLD: 4.27 M/UL
RBC # FLD: 13.7 %
SODIUM SERPL-SCNC: 142 MMOL/L
TRIGL SERPL-MCNC: 80 MG/DL
TSH SERPL-ACNC: 3.06 UIU/ML
WBC # FLD AUTO: 5.76 K/UL

## 2021-08-06 PROCEDURE — 99214 OFFICE O/P EST MOD 30 MIN: CPT

## 2021-08-06 NOTE — ASSESSMENT
[FreeTextEntry1] : Mild residual inflammation from infected hordeolum. Change to dexamethasone gtts for no more than 7 days.\par See ophtho if incomplete relief.\par Lipid levels are excellent.\par Blood pressure is borderline elevated.  Patient is to continue with weight loss efforts and walking exercise.\par Patient is behind on her well woman care.  She is reminded to follow-up with her GYN for breast exam, mammo and Pap.\par Knee-high compression socks recommended.\par Flu shot recommended in October.\par Follow-up in 3 months, sooner if worse.

## 2021-08-06 NOTE — PLAN
[FreeTextEntry1] : Pt encounter incorporated clinical review of the medical record including consultation from specialists, review of lab and diagnostic testing with interpretation and discussion of results with patient, general pt counseling, and coordination of care, as well as documentation update within the electronic medical record.\par \par Time spent: 35 mins.\par

## 2021-08-06 NOTE — HISTORY OF PRESENT ILLNESS
[FreeTextEntry1] : Patient c/o mild persistent right eye soreness.\par She is on tobramycin drops which she has used for 1 week with significant improvement.  She no longer sees of white bump at the internal right lower eyelid.  There is minimal crusting in the morning. The eye has not been red.\par Patient is also following up on HTN, hypothyroidism, and hyperlipidemia.\par She had recent blood work and would like to review results.\par She has been walking more to improve her fitness. [FreeTextEntry8] : 7/29/21:\par Patient is c/o sore right eye x 3 days.\par She noted a white bump on the inner lower lid 3 days ago.\par There is minimal redness on white of the eye, and in the morning she has crusting about the eye.\par The irritation has not affected her vision.

## 2021-08-06 NOTE — PHYSICAL EXAM
[No Acute Distress] : no acute distress [PERRL] : pupils equal round and reactive to light [EOMI] : extraocular movements intact [No Lymphadenopathy] : no lymphadenopathy [Supple] : supple [No Respiratory Distress] : no respiratory distress  [Clear to Auscultation] : lungs were clear to auscultation bilaterally [Normal] : affect was normal and insight and judgment were intact [de-identified] : Small erythematous focal induration inner lower lid with surrounding conjunctival injection. No d/c. Left eye appears normal. [de-identified] : lymphedema LE b/l

## 2021-09-02 ENCOUNTER — APPOINTMENT (OUTPATIENT)
Dept: FAMILY MEDICINE | Facility: CLINIC | Age: 66
End: 2021-09-02
Payer: MEDICARE

## 2021-09-02 VITALS
TEMPERATURE: 97.2 F | DIASTOLIC BLOOD PRESSURE: 80 MMHG | WEIGHT: 261 LBS | OXYGEN SATURATION: 98 % | SYSTOLIC BLOOD PRESSURE: 140 MMHG | BODY MASS INDEX: 46.25 KG/M2 | HEART RATE: 72 BPM | HEIGHT: 63 IN

## 2021-09-02 PROCEDURE — 99213 OFFICE O/P EST LOW 20 MIN: CPT

## 2021-09-02 NOTE — PHYSICAL EXAM
[Conjunctiva] : the conjunctiva were normal in both eyes [___] : a [unfilled] ~Umm stye was noted on the lower eyelid [Eyelid Swelling Left Lower Lid] : swelling of the lower eyelid was noted  [PERRL] : pupils were equal in size, round, and reactive to light [EOM Intact] : extraocular movements were intact [Normal] : normal rate, regular rhythm, normal S1 and S2 and no murmur heard [de-identified] : left lower inner eyelid with noted 1mm stye no active drainage

## 2021-09-02 NOTE — HISTORY OF PRESENT ILLNESS
[FreeTextEntry8] : Patient  c/o Lt eye infection\par \par 64 yo female, pmh of htn, hld who presents today for acute episode concerning for left eye infection. \par Patient says that she was recently treated for stye in right lower eye lid with tobramycin and steroid drops. \par Says that last night, noted the left lower lid was a bit sore, she went to pressure on it and noted pus discharge that was expressed from it. She had tobramycin drops from last infection but was not sure if she should start or not and opted to come for evaluation. \par Was recently traveling all around [facility]. No vision changes noted. No current pain or itching. It feels better after the discharge was expressed for it.

## 2021-09-02 NOTE — PHYSICAL EXAM
[Conjunctiva] : the conjunctiva were normal in both eyes [___] : a [unfilled] ~Umm stye was noted on the lower eyelid [Eyelid Swelling Left Lower Lid] : swelling of the lower eyelid was noted  [PERRL] : pupils were equal in size, round, and reactive to light [EOM Intact] : extraocular movements were intact [Normal] : normal rate, regular rhythm, normal S1 and S2 and no murmur heard [de-identified] : left lower inner eyelid with noted 1mm stye no active drainage

## 2021-09-02 NOTE — HISTORY OF PRESENT ILLNESS
[FreeTextEntry8] : Patient  c/o Lt eye infection\par \par 66 yo female, pmh of htn, hld who presents today for acute episode concerning for left eye infection. \par Patient says that she was recently treated for stye in right lower eye lid with tobramycin and steroid drops. \par Says that last night, noted the left lower lid was a bit sore, she went to pressure on it and noted pus discharge that was expressed from it. She had tobramycin drops from last infection but was not sure if she should start or not and opted to come for evaluation. \par Was recently traveling all around [facility]. No vision changes noted. No current pain or itching. It feels better after the discharge was expressed for it.

## 2021-09-02 NOTE — REVIEW OF SYSTEMS
[Pain] : pain [Negative] : Neurological [Dryness] : no dryness  [Vision Problems] : no vision problems [Itching] : no itching [FreeTextEntry3] : left lower lid pain

## 2021-09-02 NOTE — ASSESSMENT
[FreeTextEntry1] : Left Lower Eyelid Stye\par acute, uncomplicated \par Sent tobramycin opthalmic drops for antibiotic coverage given prior drainage \par advised to apply compresses prn \par return if experiencing any worsening symptoms \par wash hands, do not touch eyes. \par \par Patient agrees with plan, all further questions answered during encounter.\par

## 2021-09-08 ENCOUNTER — APPOINTMENT (OUTPATIENT)
Dept: PULMONOLOGY | Facility: CLINIC | Age: 66
End: 2021-09-08
Payer: MEDICARE

## 2021-09-08 VITALS
OXYGEN SATURATION: 96 % | DIASTOLIC BLOOD PRESSURE: 80 MMHG | HEART RATE: 59 BPM | SYSTOLIC BLOOD PRESSURE: 128 MMHG | WEIGHT: 258 LBS | RESPIRATION RATE: 16 BRPM | BODY MASS INDEX: 45.7 KG/M2

## 2021-09-08 PROCEDURE — 99214 OFFICE O/P EST MOD 30 MIN: CPT

## 2021-09-08 NOTE — HISTORY OF PRESENT ILLNESS
[None] : No associated symptoms are reported [CPAP] : CPAP [Good Compliance] : good compliance with treatment [Good Tolerance] : good tolerance of treatment [Good Symptom Control] : good symptom control [Follow-Up - Routine Clinic] : a routine clinic follow-up of [Excess Weight] : excess weight [Currently Experiencing] : The patient is currently experiencing symptoms. [Dyspnea] : dyspnea [Back Pain] : back pain [Low Calorie Diet] : low calorie diet [Fair Compliance] : fair compliance with treatment [Fair Tolerance] : fair tolerance of treatment [Poor Symptom Control] : poor symptom control [Hypertension] : hypertension [High] : high [Low Calorie] : low calorie [Well Balanced Diet] : well balanced meals [Infrequently] : exercises infrequently [Walking] : walking [FreeTextEntry1] : Patient c/o SOBOE but is otherwise without associated respiratory complaints. She is followed by cardiology and was sent for pulmonary evaluation given reported mild pulm HTN and suspicion of ARSLAN.\par  [de-identified] : 13-20 cm of water

## 2021-09-08 NOTE — REASON FOR VISIT
[Follow-Up] : a follow-up visit [Sleep Apnea] : sleep apnea [Pulmonary Hypertension] : pulmonary hypertension [Shortness of Breath] : shortness of breath [Obesity] : obesity [TextBox_44] : restriction on PFTs

## 2021-09-08 NOTE — DISCUSSION/SUMMARY
[FreeTextEntry1] : \par #1. Mild restriction on PFTs is likely related to weight; would follow lung function with weight loss as needed\par #2. CXR to further evaluate mild restriction and SOBOE was clear and restriction is likely related to weight\par #3. The patient does not appear to require chronic BD therapy at this time\par #4. Diet and exercise for weight loss\par #5. SOBOE is likely related to weight or deconditioning given restriction\par #6. Continue autoCPAP 13-20 cm of water for residual mild ARSLAN noted on compliance but is improved from previous; consider further decrease in pressure as needed as pt did worse on higher pressures but will continue current therapy as pt is doing well without residual sleep complaints\par #7. Cardiology f/u \par #8. Replace equipment as needed; ordered 9/8/21\par #9. F/u 6 months with compliance and PFTs\par #10. Pt had both Covid vaccines \par #11. Reviewed risks of exposure and symptoms of Covid-19 virus, including how the virus is spread and precautions to avoid mary beth virus.\par \par Patient's questions were answered and patient appears to understand these recommendations

## 2021-09-08 NOTE — PHYSICAL EXAM
[General Appearance - Well Developed] : well developed [Normal Appearance] : normal appearance [General Appearance - In No Acute Distress] : no acute distress [Normal Conjunctiva] : the conjunctiva exhibited no abnormalities [Low Lying Soft Palate] : low lying soft palate [Elongated Uvula] : elongated uvula [Enlarged Base of the Tongue] : enlargement of the base of the tongue [III] : III [Neck Appearance] : the appearance of the neck was normal [Heart Rate And Rhythm] : heart rate and rhythm were normal [Heart Sounds] : normal S1 and S2 [Murmurs] : no murmurs present [Edema] : no peripheral edema present [Respiration, Rhythm And Depth] : normal respiratory rhythm and effort [] : no respiratory distress [Exaggerated Use Of Accessory Muscles For Inspiration] : no accessory muscle use [Auscultation Breath Sounds / Voice Sounds] : lungs were clear to auscultation bilaterally [Abdomen Soft] : soft [Abdomen Tenderness] : non-tender [Abnormal Walk] : normal gait [Nail Clubbing] : no clubbing of the fingernails [Cyanosis, Localized] : no localized cyanosis [No Focal Deficits] : no focal deficits [Oriented To Time, Place, And Person] : oriented to person, place, and time [FreeTextEntry1] : No abnormalities.

## 2021-09-08 NOTE — CONSULT LETTER
[Dear  ___] : Dear  [unfilled], [Consult Letter:] : I had the pleasure of evaluating your patient, [unfilled]. [Please see my note below.] : Please see my note below. [Consult Closing:] : Thank you very much for allowing me to participate in the care of this patient.  If you have any questions, please do not hesitate to contact me. [Sincerely,] : Sincerely, [FreeTextEntry3] : Henry Garcia MD, FCCP, D. ABSM\par Pulmonary and Sleep Medicine\par  [facility] [place]\par \par

## 2021-10-08 ENCOUNTER — APPOINTMENT (OUTPATIENT)
Dept: FAMILY MEDICINE | Facility: CLINIC | Age: 66
End: 2021-10-08
Payer: MEDICARE

## 2021-10-08 ENCOUNTER — NON-APPOINTMENT (OUTPATIENT)
Age: 66
End: 2021-10-08

## 2021-10-08 VITALS
OXYGEN SATURATION: 97 % | SYSTOLIC BLOOD PRESSURE: 122 MMHG | HEIGHT: 63 IN | WEIGHT: 248 LBS | BODY MASS INDEX: 43.94 KG/M2 | TEMPERATURE: 97.4 F | HEART RATE: 85 BPM | DIASTOLIC BLOOD PRESSURE: 76 MMHG

## 2021-10-08 VITALS — HEART RATE: 87 BPM | OXYGEN SATURATION: 97 %

## 2021-10-08 VITALS — SYSTOLIC BLOOD PRESSURE: 122 MMHG | DIASTOLIC BLOOD PRESSURE: 72 MMHG

## 2021-10-08 PROCEDURE — G0008: CPT

## 2021-10-08 PROCEDURE — 99214 OFFICE O/P EST MOD 30 MIN: CPT | Mod: 25

## 2021-10-08 PROCEDURE — 93000 ELECTROCARDIOGRAM COMPLETE: CPT

## 2021-10-08 PROCEDURE — 90662 IIV NO PRSV INCREASED AG IM: CPT

## 2021-10-08 NOTE — REVIEW OF SYSTEMS
[Joint Pain] : joint pain [Back Pain] : back pain [Skin Rash] : no skin rash [Negative] : Respiratory [FreeTextEntry5] : Chest ache, pressure. [FreeTextEntry9] : knee pain

## 2021-10-08 NOTE — PLAN
[FreeTextEntry1] : Left chest ache after flu vaccine likely due to musculoskeletal reaction s/p flu vaccine.\par EKG is c/w pt's prior EKG and no acute change.\par To ER if chest ache/pressure recurs and persists.\par Doing well.\par F/U 3 months.\par Covid booster advised.

## 2021-10-08 NOTE — HISTORY OF PRESENT ILLNESS
[FreeTextEntry1] : Pt is here to f/u on htn and ARSLAN.\par Patient would like to get a flu shot.\par As pt was exiting exam room and walking down the [employer] after getting her flu shot, she began to feel left sided chest aching sensation.\par It lasted about a minute and resolved spontaneously. The left chest ache, pressure was worse with movement of the upper arms, especially with extension of the upper arms. No sob or palpitations. No recurrence.

## 2021-10-08 NOTE — PHYSICAL EXAM
[No Acute Distress] : no acute distress [Normal Sclera/Conjunctiva] : normal sclera/conjunctiva [No Lymphadenopathy] : no lymphadenopathy [Supple] : supple [No Respiratory Distress] : no respiratory distress  [Clear to Auscultation] : lungs were clear to auscultation bilaterally [No Focal Deficits] : no focal deficits [Normal] : affect was normal and insight and judgment were intact [de-identified] : Mild left pectoral tenderness with moderate palpation, reproducible to palpation. [de-identified] : lymphedema LE b/l

## 2021-12-23 ENCOUNTER — APPOINTMENT (OUTPATIENT)
Dept: FAMILY MEDICINE | Facility: CLINIC | Age: 66
End: 2021-12-23
Payer: MEDICARE

## 2021-12-23 PROCEDURE — 0003A: CPT

## 2022-02-02 ENCOUNTER — LABORATORY RESULT (OUTPATIENT)
Age: 67
End: 2022-02-02

## 2022-02-02 ENCOUNTER — APPOINTMENT (OUTPATIENT)
Dept: FAMILY MEDICINE | Facility: CLINIC | Age: 67
End: 2022-02-02
Payer: MEDICARE

## 2022-02-02 VITALS — DIASTOLIC BLOOD PRESSURE: 70 MMHG | SYSTOLIC BLOOD PRESSURE: 128 MMHG

## 2022-02-02 VITALS
HEIGHT: 63 IN | WEIGHT: 256 LBS | RESPIRATION RATE: 16 BRPM | OXYGEN SATURATION: 98 % | HEART RATE: 68 BPM | SYSTOLIC BLOOD PRESSURE: 136 MMHG | BODY MASS INDEX: 45.36 KG/M2 | DIASTOLIC BLOOD PRESSURE: 70 MMHG

## 2022-02-02 DIAGNOSIS — Z92.29 PERSONAL HISTORY OF OTHER DRUG THERAPY: ICD-10-CM

## 2022-02-02 DIAGNOSIS — H00.015 HORDEOLUM EXTERNUM LEFT LOWER EYELID: ICD-10-CM

## 2022-02-02 PROCEDURE — 99214 OFFICE O/P EST MOD 30 MIN: CPT | Mod: 25

## 2022-02-02 PROCEDURE — 36415 COLL VENOUS BLD VENIPUNCTURE: CPT

## 2022-02-02 RX ORDER — CETIRIZINE HYDROCHLORIDE 10 MG/1
10 TABLET, FILM COATED ORAL
Refills: 0 | Status: ACTIVE | COMMUNITY
Start: 2022-02-02

## 2022-02-02 NOTE — COUNSELING
[Benefits of weight loss discussed] : Benefits of weight loss discussed [Structured Weight Management Program suggested:] : Structured weight management program suggested [FreeTextEntry1] : Cont WW. Consider individual counseling.

## 2022-02-02 NOTE — HISTORY OF PRESENT ILLNESS
[FreeTextEntry1] : Patient is following up on thyroid and hyperlipidemia.\par Still with chronic intermittent low back pain.\par On Weight [employer]. Frustrated with their new system.\par

## 2022-02-02 NOTE — ASSESSMENT
[FreeTextEntry1] : Risks, benefits and potential side effects of the Shingrix vaccine was reviewed with the patient, including risk for low grade fever, myalgias, fatigue, malaise, headache and redness or soreness at the site of the injection. The pt is aware that two shots are recommended, two to six months apart, to complete the Shingrix vaccination series.\par Pt to check ins re: coverage.\par Continue with wt loss efforts.\par Labs drawn in office.\par

## 2022-02-02 NOTE — PHYSICAL EXAM
[No Acute Distress] : no acute distress [Normal Sclera/Conjunctiva] : normal sclera/conjunctiva [No Lymphadenopathy] : no lymphadenopathy [Supple] : supple [No Respiratory Distress] : no respiratory distress  [Clear to Auscultation] : lungs were clear to auscultation bilaterally [No Focal Deficits] : no focal deficits [Normal] : affect was normal and insight and judgment were intact [de-identified] : lymphedema LE b/l

## 2022-02-08 LAB
25(OH)D3 SERPL-MCNC: 37.3 NG/ML
ALBUMIN SERPL ELPH-MCNC: 4.6 G/DL
ALP BLD-CCNC: 97 U/L
ALT SERPL-CCNC: 23 U/L
ANION GAP SERPL CALC-SCNC: 16 MMOL/L
AST SERPL-CCNC: 21 U/L
BASOPHILS # BLD AUTO: 0.03 K/UL
BASOPHILS NFR BLD AUTO: 0.5 %
BILIRUB SERPL-MCNC: 0.5 MG/DL
BUN SERPL-MCNC: 18 MG/DL
CALCIUM SERPL-MCNC: 9.7 MG/DL
CHLORIDE SERPL-SCNC: 104 MMOL/L
CHOLEST SERPL-MCNC: 184 MG/DL
CO2 SERPL-SCNC: 22 MMOL/L
CREAT SERPL-MCNC: 0.93 MG/DL
EOSINOPHIL # BLD AUTO: 0.27 K/UL
EOSINOPHIL NFR BLD AUTO: 4.6 %
ESTIMATED AVERAGE GLUCOSE: 100 MG/DL
GLUCOSE SERPL-MCNC: 88 MG/DL
HBA1C MFR BLD HPLC: 5.1 %
HCT VFR BLD CALC: 41.6 %
HDLC SERPL-MCNC: 74 MG/DL
HGB BLD-MCNC: 13.7 G/DL
IMM GRANULOCYTES NFR BLD AUTO: 0.2 %
LDLC SERPL CALC-MCNC: 92 MG/DL
LYMPHOCYTES # BLD AUTO: 1.77 K/UL
LYMPHOCYTES NFR BLD AUTO: 30.4 %
MAN DIFF?: NORMAL
MCHC RBC-ENTMCNC: 30.9 PG
MCHC RBC-ENTMCNC: 32.9 GM/DL
MCV RBC AUTO: 93.7 FL
MONOCYTES # BLD AUTO: 0.49 K/UL
MONOCYTES NFR BLD AUTO: 8.4 %
NEUTROPHILS # BLD AUTO: 3.26 K/UL
NEUTROPHILS NFR BLD AUTO: 55.9 %
NONHDLC SERPL-MCNC: 110 MG/DL
PLATELET # BLD AUTO: 302 K/UL
POTASSIUM SERPL-SCNC: 4.4 MMOL/L
PROT SERPL-MCNC: 7.2 G/DL
RBC # BLD: 4.44 M/UL
RBC # FLD: 13.2 %
SODIUM SERPL-SCNC: 142 MMOL/L
T3RU NFR SERPL: 1 TBI
T4 FREE SERPL-MCNC: 1.5 NG/DL
THYROGLOB AB SERPL-ACNC: <20 IU/ML
THYROPEROXIDASE AB SERPL IA-ACNC: <10 IU/ML
TRIGL SERPL-MCNC: 90 MG/DL
TSH SERPL-ACNC: 2.5 UIU/ML
WBC # FLD AUTO: 5.83 K/UL

## 2022-03-07 ENCOUNTER — APPOINTMENT (OUTPATIENT)
Dept: DISASTER EMERGENCY | Facility: CLINIC | Age: 67
End: 2022-03-07

## 2022-03-10 ENCOUNTER — APPOINTMENT (OUTPATIENT)
Dept: PULMONOLOGY | Facility: CLINIC | Age: 67
End: 2022-03-10
Payer: MEDICARE

## 2022-03-10 VITALS
OXYGEN SATURATION: 95 % | DIASTOLIC BLOOD PRESSURE: 74 MMHG | SYSTOLIC BLOOD PRESSURE: 126 MMHG | RESPIRATION RATE: 16 BRPM | HEART RATE: 66 BPM

## 2022-03-10 VITALS — HEIGHT: 62 IN | WEIGHT: 259 LBS | BODY MASS INDEX: 47.66 KG/M2

## 2022-03-10 PROCEDURE — 99214 OFFICE O/P EST MOD 30 MIN: CPT | Mod: 25

## 2022-03-10 PROCEDURE — 94729 DIFFUSING CAPACITY: CPT

## 2022-03-10 PROCEDURE — 94727 GAS DIL/WSHOT DETER LNG VOL: CPT

## 2022-03-10 PROCEDURE — 94010 BREATHING CAPACITY TEST: CPT

## 2022-03-10 PROCEDURE — 85018 HEMOGLOBIN: CPT | Mod: QW

## 2022-03-10 RX ORDER — DEXAMETHASONE SODIUM PHOSPHATE 1 MG/ML
0.1 SOLUTION/ DROPS OPHTHALMIC 4 TIMES DAILY
Qty: 1 | Refills: 0 | Status: DISCONTINUED | COMMUNITY
Start: 2021-08-06 | End: 2022-03-10

## 2022-03-10 NOTE — REVIEW OF SYSTEMS
[As Noted in HPI] : as noted in HPI [Postnasal Drip] : postnasal drip [SOB on Exertion] : sob on exertion [Seasonal Allergies] : seasonal allergies [Thyroid Problem] : thyroid problem [Negative] : Endocrine

## 2022-03-10 NOTE — CONSULT LETTER
[Dear  ___] : Dear  [unfilled], [Consult Letter:] : I had the pleasure of evaluating your patient, [unfilled]. [Please see my note below.] : Please see my note below. [Consult Closing:] : Thank you very much for allowing me to participate in the care of this patient.  If you have any questions, please do not hesitate to contact me. [Sincerely,] : Sincerely, [FreeTextEntry3] : Henry Garcia MD, FCCP, D. ABSM\par Pulmonary and Sleep Medicine\par [facility] [place]\par \par

## 2022-03-10 NOTE — PROCEDURE
[FreeTextEntry1] : PFTs 10/24/18 - Mild restriction with mildly reduced diffusion capacity which corrected for alveolar volume.\par Spirometry 12/17/19 - mild restriction\par PFTs 3/10/22 - essentially normal with mildly reduced diffusion capacity which corrected for alveolar volume; slightly improved from previous

## 2022-03-10 NOTE — DISCUSSION/SUMMARY
[FreeTextEntry1] : \par #1. Mild restriction on PFTs is likely related to weight; would follow lung function with weight loss as needed\par #2. CXR to further evaluate mild restriction and SOBOE was clear and restriction is likely related to weight\par #3. The patient does not appear to require chronic BD therapy at this time\par #4. Diet and exercise for weight loss\par #5. SOBOE is likely related to weight or deconditioning given restriction\par #6. Continue autoCPAP 13-20 cm of water for residual mild ARSLAN noted on compliance but is improved from previous; consider further decrease in pressure as needed as pt did worse on higher pressures but will continue current therapy as pt is doing well without residual sleep complaints\par #7. Cardiology f/u \par #8. Replace equipment as needed; ordered 9/8/21\par #9. F/u 6 months with compliance \par #10. Pt had both Covid vaccines, booster, and flu vaccines\par #11. Reviewed risks of exposure and symptoms of Covid-19 virus, including how the virus is spread and precautions to avoid mary beth virus.\par \par The patient expressed understanding and agreement with the above recommendations/plan and accepts responsibility to be compliant with recommended testing, therapies, and f/u visits.\par All relevant questions and concerns were addressed.

## 2022-03-10 NOTE — PHYSICAL EXAM
[No Acute Distress] : no acute distress [Well Nourished] : well nourished [Well Developed] : well developed [Normal Appearance] : normal appearance [Supple] : supple [Normal Rate/Rhythm] : normal rate/rhythm [Normal S1, S2] : normal s1, s2 [No Murmurs] : no murmurs [No Resp Distress] : no resp distress [No Acc Muscle Use] : no acc muscle use [Normal Rhythm and Effort] : normal rhythm and effort [Clear to Auscultation Bilaterally] : clear to auscultation bilaterally [No Abnormalities] : no abnormalities [Benign] : benign [Not Tender] : not tender [Soft] : soft [No Clubbing] : no clubbing [No Edema] : no edema [No Focal Deficits] : no focal deficits [Oriented x3] : oriented x3 [TextBox_2] : Pt is morbidly obese

## 2022-03-10 NOTE — HISTORY OF PRESENT ILLNESS
[None] : No associated symptoms are reported [CPAP] : CPAP [Good Compliance] : good compliance with treatment [Good Tolerance] : good tolerance of treatment [Good Symptom Control] : good symptom control [Follow-Up - Routine Clinic] : a routine clinic follow-up of [Excess Weight] : excess weight [Currently Experiencing] : The patient is currently experiencing symptoms. [Dyspnea] : dyspnea [Back Pain] : back pain [Low Calorie Diet] : low calorie diet [Fair Compliance] : fair compliance with treatment [Fair Tolerance] : fair tolerance of treatment [Poor Symptom Control] : poor symptom control [Hypertension] : hypertension [High] : high [Low Calorie] : low calorie [Well Balanced Diet] : well balanced meals [Infrequently] : exercises infrequently [Walking] : walking [Never] : never [TextBox_4] : Patient c/o SOBOE but is otherwise without associated respiratory complaints. She is followed by cardiology and was sent for pulmonary evaluation given reported mild pulm HTN and suspicion of ARSLAN. \par  [FreeTextEntry1] : \par  [de-identified] : 13-20 cm of water

## 2022-03-18 ENCOUNTER — APPOINTMENT (OUTPATIENT)
Dept: RADIOLOGY | Facility: CLINIC | Age: 67
End: 2022-03-18
Payer: MEDICARE

## 2022-03-18 ENCOUNTER — OUTPATIENT (OUTPATIENT)
Dept: OUTPATIENT SERVICES | Facility: HOSPITAL | Age: 67
LOS: 1 days | End: 2022-03-18
Payer: MEDICARE

## 2022-03-18 DIAGNOSIS — Z13.820 ENCOUNTER FOR SCREENING FOR OSTEOPOROSIS: ICD-10-CM

## 2022-03-18 DIAGNOSIS — Z98.891 HISTORY OF UTERINE SCAR FROM PREVIOUS SURGERY: Chronic | ICD-10-CM

## 2022-03-18 PROCEDURE — 77080 DXA BONE DENSITY AXIAL: CPT | Mod: 26

## 2022-03-18 PROCEDURE — 77080 DXA BONE DENSITY AXIAL: CPT

## 2022-05-04 ENCOUNTER — APPOINTMENT (OUTPATIENT)
Dept: CARDIOLOGY | Facility: CLINIC | Age: 67
End: 2022-05-04
Payer: MEDICARE

## 2022-05-04 ENCOUNTER — NON-APPOINTMENT (OUTPATIENT)
Age: 67
End: 2022-05-04

## 2022-05-04 VITALS
SYSTOLIC BLOOD PRESSURE: 118 MMHG | HEIGHT: 62 IN | BODY MASS INDEX: 48.95 KG/M2 | DIASTOLIC BLOOD PRESSURE: 79 MMHG | OXYGEN SATURATION: 95 % | WEIGHT: 266 LBS | HEART RATE: 74 BPM | TEMPERATURE: 97.8 F

## 2022-05-04 PROCEDURE — 99214 OFFICE O/P EST MOD 30 MIN: CPT

## 2022-05-04 PROCEDURE — 93000 ELECTROCARDIOGRAM COMPLETE: CPT

## 2022-05-04 NOTE — ASSESSMENT
[FreeTextEntry1] : 65 y/o female with \par HTN controlled on amlodipine/valsartan 5-160 mg and carvedilol \par controlled , contineu current meds \par HLP , improved on rosuvastatin 5 mg LDL < 100, consider increasing rosuvastatin to 10 mg daily to get to < 70 mg /dl although patient goal can be < 100\par Weight loss encouraged , joined weight watchers.\par follow up yearly \par

## 2022-05-04 NOTE — HISTORY OF PRESENT ILLNESS
[FreeTextEntry1] : A 63 y/o female with PMhx of HTN , RBBB , EF preserved 60^ with borderline LVH , mild PH\par no exertional chest pain or  SOB, palpitations, dizziness or syncope\par started on rosuvastatin 5 mg  LDL 84 ,  (decrease from 221 /136) \par gained 8 lbs rejoined [employer]\par \par 5/5/201\par REturns for follow up . \par no exertional chest pain or  SOB, palpitations, dizziness or syncope\par HTN controlled on amlodipine,valsartan , carvedilol \par Hyperlipidemia on rosuvastatin 5 mg ( LDL 85, ) \par walks and has joined weight watchers \par \par \par 5/4/2022\par Returns for follow up. BP has been controlled at other office visit . \par hyperlipidemia controlled on rosuvastatin 5 mg , LDL 92 , HDL 74 \par weight cycling since joining [employer]\par no exertional chest pain or  SOB, palpitations, dizziness or syncope \par \par \par

## 2022-05-04 NOTE — PHYSICAL EXAM
[Well Developed] : well developed [Well Nourished] : well nourished [No Acute Distress] : no acute distress [Normal Conjunctiva] : normal conjunctiva [Normal Venous Pressure] : normal venous pressure [No Carotid Bruit] : no carotid bruit [Normal S1, S2] : normal S1, S2 [No Murmur] : no murmur [No Rub] : no rub [No Gallop] : no gallop [Clear Lung Fields] : clear lung fields [Good Air Entry] : good air entry [No Respiratory Distress] : no respiratory distress  [Normal Gait] : normal gait [No Edema] : no edema [No Cyanosis] : no cyanosis [No Clubbing] : no clubbing [No Varicosities] : no varicosities [No Rash] : no rash [No Skin Lesions] : no skin lesions [Moves all extremities] : moves all extremities [No Focal Deficits] : no focal deficits [Normal Speech] : normal speech [Alert and Oriented] : alert and oriented [Normal memory] : normal memory [de-identified] : wears mask

## 2022-05-16 ENCOUNTER — APPOINTMENT (OUTPATIENT)
Dept: FAMILY MEDICINE | Facility: CLINIC | Age: 67
End: 2022-05-16
Payer: MEDICARE

## 2022-05-16 VITALS
RESPIRATION RATE: 16 BRPM | WEIGHT: 263 LBS | OXYGEN SATURATION: 98 % | HEIGHT: 62 IN | DIASTOLIC BLOOD PRESSURE: 72 MMHG | BODY MASS INDEX: 48.4 KG/M2 | SYSTOLIC BLOOD PRESSURE: 126 MMHG | HEART RATE: 58 BPM

## 2022-05-16 PROCEDURE — 99214 OFFICE O/P EST MOD 30 MIN: CPT

## 2022-05-16 NOTE — HISTORY OF PRESENT ILLNESS
[FreeTextEntry1] : Patient is following up on hypertension and hypothyroid.\par Walking for exercise.\par She has mild MONTANO which hse attributes to her wt. \par Saw cardio last week. Order fo lipids for surveillance.\par She is still on WW. Wt is fluctuating but no overall significant sustainable wt loss.\par She states she is trying to decrease snacking and improve food choices.

## 2022-05-16 NOTE — HEALTH RISK ASSESSMENT
[Never] : Never [Yes] : Yes [2 - 4 times a month (2 pts)] : 2-4 times a month (2 points) [1 or 2 (0 pts)] : 1 or 2 (0 points) [Never (0 pts)] : Never (0 points) [No] : In the past 12 months have you used drugs other than those required for medical reasons? No [No falls in past year] : Patient reported no falls in the past year [0] : 2) Feeling down, depressed, or hopeless: Not at all (0) [Audit-CScore] : 2 [[id number]] : 0

## 2022-05-16 NOTE — COUNSELING
[Benefits of weight loss discussed] : Benefits of weight loss discussed [Structured Weight Management Program suggested:] : Structured weight management program suggested [Encouraged to increase physical activity] : Encouraged to increase physical activity [FreeTextEntry1] : In WW

## 2022-05-16 NOTE — ASSESSMENT
[FreeTextEntry1] : Defer Shingrix until Covid booster #2 completed.\par Pt advised to have screening colonoscopy.\par F/U 3 months.

## 2022-05-16 NOTE — PHYSICAL EXAM
[No Acute Distress] : no acute distress [Normal Sclera/Conjunctiva] : normal sclera/conjunctiva [No Lymphadenopathy] : no lymphadenopathy [Supple] : supple [No Respiratory Distress] : no respiratory distress  [Clear to Auscultation] : lungs were clear to auscultation bilaterally [No Focal Deficits] : no focal deficits [Normal] : affect was normal and insight and judgment were intact [de-identified] : lymphedema LE b/l

## 2022-05-31 ENCOUNTER — APPOINTMENT (OUTPATIENT)
Dept: FAMILY MEDICINE | Facility: CLINIC | Age: 67
End: 2022-05-31
Payer: MEDICARE

## 2022-05-31 VITALS
SYSTOLIC BLOOD PRESSURE: 142 MMHG | RESPIRATION RATE: 16 BRPM | HEART RATE: 75 BPM | HEIGHT: 62 IN | BODY MASS INDEX: 48.58 KG/M2 | WEIGHT: 264 LBS | DIASTOLIC BLOOD PRESSURE: 80 MMHG | OXYGEN SATURATION: 98 %

## 2022-05-31 PROCEDURE — 99213 OFFICE O/P EST LOW 20 MIN: CPT

## 2022-05-31 RX ORDER — TOBRAMYCIN 3 MG/G
0.3 OINTMENT OPHTHALMIC
Qty: 1 | Refills: 1 | Status: DISCONTINUED | COMMUNITY
Start: 2022-05-31 | End: 2022-05-31

## 2022-05-31 NOTE — HISTORY OF PRESENT ILLNESS
[FreeTextEntry8] : Pt c/o itchiness, irritation, clear secretion from left eye x 4 days. Pt claims she has had this before and began using tobramycin drops to treat, showed improvement starting yesterday. Pt is looking for next steps, third onset in 1 year.

## 2022-06-07 ENCOUNTER — MED ADMIN CHARGE (OUTPATIENT)
Age: 67
End: 2022-06-07

## 2022-06-07 ENCOUNTER — APPOINTMENT (OUTPATIENT)
Dept: FAMILY MEDICINE | Facility: CLINIC | Age: 67
End: 2022-06-07
Payer: MEDICARE

## 2022-06-07 PROCEDURE — 0003A: CPT

## 2022-08-03 ENCOUNTER — NON-APPOINTMENT (OUTPATIENT)
Age: 67
End: 2022-08-03

## 2022-08-03 ENCOUNTER — APPOINTMENT (OUTPATIENT)
Dept: CARDIOLOGY | Facility: CLINIC | Age: 67
End: 2022-08-03

## 2022-08-03 VITALS
TEMPERATURE: 98.3 F | SYSTOLIC BLOOD PRESSURE: 142 MMHG | HEART RATE: 61 BPM | HEIGHT: 62 IN | WEIGHT: 263 LBS | OXYGEN SATURATION: 96 % | DIASTOLIC BLOOD PRESSURE: 85 MMHG | BODY MASS INDEX: 48.4 KG/M2

## 2022-08-03 PROCEDURE — 93000 ELECTROCARDIOGRAM COMPLETE: CPT | Mod: NC

## 2022-08-03 PROCEDURE — 99214 OFFICE O/P EST MOD 30 MIN: CPT

## 2022-08-09 LAB
25(OH)D3 SERPL-MCNC: 40.9 NG/ML
ALBUMIN SERPL ELPH-MCNC: 4.2 G/DL
ALP BLD-CCNC: 96 U/L
ALT SERPL-CCNC: 18 U/L
ANION GAP SERPL CALC-SCNC: 11 MMOL/L
AST SERPL-CCNC: 19 U/L
BASOPHILS # BLD AUTO: 0.03 K/UL
BASOPHILS NFR BLD AUTO: 0.6 %
BILIRUB SERPL-MCNC: 0.5 MG/DL
BUN SERPL-MCNC: 19 MG/DL
CALCIUM SERPL-MCNC: 9.3 MG/DL
CHLORIDE SERPL-SCNC: 108 MMOL/L
CO2 SERPL-SCNC: 25 MMOL/L
CREAT SERPL-MCNC: 0.9 MG/DL
EGFR: 70 ML/MIN/1.73M2
EOSINOPHIL # BLD AUTO: 0.32 K/UL
EOSINOPHIL NFR BLD AUTO: 6 %
ESTIMATED AVERAGE GLUCOSE: 105 MG/DL
GLUCOSE SERPL-MCNC: 94 MG/DL
HBA1C MFR BLD HPLC: 5.3 %
HCT VFR BLD CALC: 41.5 %
HGB BLD-MCNC: 13.4 G/DL
IMM GRANULOCYTES NFR BLD AUTO: 0.2 %
LYMPHOCYTES # BLD AUTO: 1.73 K/UL
LYMPHOCYTES NFR BLD AUTO: 32.6 %
MAN DIFF?: NORMAL
MCHC RBC-ENTMCNC: 31.6 PG
MCHC RBC-ENTMCNC: 32.3 GM/DL
MCV RBC AUTO: 97.9 FL
MONOCYTES # BLD AUTO: 0.46 K/UL
MONOCYTES NFR BLD AUTO: 8.7 %
NEUTROPHILS # BLD AUTO: 2.76 K/UL
NEUTROPHILS NFR BLD AUTO: 51.9 %
PLATELET # BLD AUTO: 245 K/UL
POTASSIUM SERPL-SCNC: 4.3 MMOL/L
PROT SERPL-MCNC: 6.8 G/DL
RBC # BLD: 4.24 M/UL
RBC # FLD: 13.7 %
SODIUM SERPL-SCNC: 144 MMOL/L
T3 SERPL-MCNC: 110 NG/DL
T4 FREE SERPL-MCNC: 1.2 NG/DL
THYROGLOB AB SERPL-ACNC: <20 IU/ML
THYROPEROXIDASE AB SERPL IA-ACNC: <10 IU/ML
TSH SERPL-ACNC: 2.92 UIU/ML
WBC # FLD AUTO: 5.31 K/UL

## 2022-08-11 ENCOUNTER — APPOINTMENT (OUTPATIENT)
Dept: FAMILY MEDICINE | Facility: CLINIC | Age: 67
End: 2022-08-11

## 2022-08-11 VITALS
SYSTOLIC BLOOD PRESSURE: 135 MMHG | TEMPERATURE: 97.4 F | OXYGEN SATURATION: 97 % | HEIGHT: 62 IN | RESPIRATION RATE: 16 BRPM | HEART RATE: 83 BPM | BODY MASS INDEX: 48.03 KG/M2 | WEIGHT: 261 LBS | DIASTOLIC BLOOD PRESSURE: 84 MMHG

## 2022-08-11 VITALS — DIASTOLIC BLOOD PRESSURE: 70 MMHG | SYSTOLIC BLOOD PRESSURE: 122 MMHG

## 2022-08-11 DIAGNOSIS — L02.229 FURUNCLE OF TRUNK, UNSPECIFIED: ICD-10-CM

## 2022-08-11 DIAGNOSIS — J98.4 OTHER DISORDERS OF LUNG: ICD-10-CM

## 2022-08-11 DIAGNOSIS — H00.15 CHALAZION LEFT LOWER EYELID: ICD-10-CM

## 2022-08-11 DIAGNOSIS — R93.8 ABNORMAL FINDINGS ON DIAGNOSTIC IMAGING OF OTHER SPECIFIED BODY STRUCTURES: ICD-10-CM

## 2022-08-11 DIAGNOSIS — S30.860A INSECT BITE (NONVENOMOUS) OF LOWER BACK AND PELVIS, INITIAL ENCOUNTER: ICD-10-CM

## 2022-08-11 DIAGNOSIS — Z12.11 ENCOUNTER FOR SCREENING FOR MALIGNANT NEOPLASM OF COLON: ICD-10-CM

## 2022-08-11 DIAGNOSIS — S39.012A STRAIN OF MUSCLE, FASCIA AND TENDON OF LOWER BACK, INITIAL ENCOUNTER: ICD-10-CM

## 2022-08-11 DIAGNOSIS — M62.89 OTHER SPECIFIED DISORDERS OF MUSCLE: ICD-10-CM

## 2022-08-11 DIAGNOSIS — R07.89 OTHER CHEST PAIN: ICD-10-CM

## 2022-08-11 DIAGNOSIS — H00.012 HORDEOLUM EXTERNUM RIGHT LOWER EYELID: ICD-10-CM

## 2022-08-11 DIAGNOSIS — H26.9 UNSPECIFIED CATARACT: ICD-10-CM
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
## 2022-08-11 DIAGNOSIS — Z23 ENCOUNTER FOR IMMUNIZATION: ICD-10-CM

## 2022-08-11 DIAGNOSIS — Z86.39 PERSONAL HISTORY OF OTHER ENDOCRINE, NUTRITIONAL AND METABOLIC DISEASE: ICD-10-CM

## 2022-08-11 DIAGNOSIS — Z87.898 PERSONAL HISTORY OF OTHER SPECIFIED CONDITIONS: ICD-10-CM

## 2022-08-11 PROCEDURE — 99215 OFFICE O/P EST HI 40 MIN: CPT

## 2022-08-11 RX ORDER — ERYTHROMYCIN 5 MG/G
5 OINTMENT OPHTHALMIC
Qty: 4 | Refills: 0 | Status: DISCONTINUED | COMMUNITY
Start: 2022-07-08 | End: 2022-08-11

## 2022-08-11 NOTE — ASSESSMENT
[Patient Optimized for Surgery] : Patient optimized for surgery [No Further Testing Recommended] : no further testing recommended [FreeTextEntry4] : There is no medical contraindication to the proposed procedures. The patient is medically cleared to proceed with the planned bilateral cataract surgeries. \par This medical clearance will apply to her cataract surgery on 8/17/22 as well as her cataract surgery on 8/30/22 so long as there is no change is her clinical condition and her Covid-19 PCR screen is negative.\par  [FreeTextEntry7] : Lakshmi is to take her amlodipine, and carvedilol in the am of her procedure with a small sip of water.

## 2022-08-11 NOTE — PHYSICAL EXAM
[No Acute Distress] : no acute distress [Well Nourished] : well nourished [Well Developed] : well developed [Well-Appearing] : well-appearing [PERRL] : pupils equal round and reactive to light [EOMI] : extraocular movements intact [Normal Outer Ear/Nose] : the outer ears and nose were normal in appearance [Normal Oropharynx] : the oropharynx was normal [Normal TMs] : both tympanic membranes were normal [No JVD] : no jugular venous distention [No Lymphadenopathy] : no lymphadenopathy [Supple] : supple [No Respiratory Distress] : no respiratory distress  [Clear to Auscultation] : lungs were clear to auscultation bilaterally [Normal Rate] : normal rate  [Regular Rhythm] : with a regular rhythm [Normal S1, S2] : normal S1 and S2 [No Murmur] : no murmur heard [No Carotid Bruits] : no carotid bruits [No Abdominal Bruit] : a ~M bruit was not heard ~T in the abdomen [No Varicosities] : no varicosities [Pedal Pulses Present] : the pedal pulses are present [No Edema] : there was no peripheral edema [No Palpable Aorta] : no palpable aorta [No Extremity Clubbing/Cyanosis] : no extremity clubbing/cyanosis [Soft] : abdomen soft [Non Tender] : non-tender [Non-distended] : non-distended [No Masses] : no abdominal mass palpated [No HSM] : no HSM [Normal Bowel Sounds] : normal bowel sounds [Normal Posterior Cervical Nodes] : no posterior cervical lymphadenopathy [Normal Anterior Cervical Nodes] : no anterior cervical lymphadenopathy [No CVA Tenderness] : no CVA  tenderness [No Spinal Tenderness] : no spinal tenderness [Grossly Normal Strength/Tone] : grossly normal strength/tone [No Rash] : no rash [Coordination Grossly Intact] : coordination grossly intact [No Focal Deficits] : no focal deficits [Normal Gait] : normal gait [Deep Tendon Reflexes (DTR)] : deep tendon reflexes were 2+ and symmetric [Normal Affect] : the affect was normal [Normal Mood] : the mood was normal [Normal Insight/Judgement] : insight and judgment were intact [de-identified] : Cataracts b/l. Mild scar left lower lid.

## 2022-08-11 NOTE — RESULTS/DATA
[] : results reviewed [de-identified] : 7/30/22:  KATHY [de-identified] : WNL [de-identified] : NSR 73 bpm. RBBB. No acute or interval change.

## 2022-08-11 NOTE — REVIEW OF SYSTEMS
[Vision Problems] : vision problems [Dyspnea on Exertion] : dyspnea on exertion [Negative] : Heme/Lymph [FreeTextEntry3] : cataracts [FreeTextEntry6] : chronic, no change

## 2022-08-11 NOTE — HISTORY OF PRESENT ILLNESS
[No Pertinent Cardiac History] : no history of aortic stenosis, atrial fibrillation, coronary artery disease, recent myocardial infarction, or implantable device/pacemaker [FreeTextEntry1] : Cataract Surgery, Bilateral [FreeTextEntry2] : 8/17/22 and 8/30/22 [FreeTextEntry3] : Dr. Lara [FreeTextEntry4] : Patient is a 66-year-old female with a past medical history of hypertension, hyperlipidemia, hypothyroidism, and obstructive sleep apnea who presents for preoperative clearance for bilateral cataract surgery. She is feeling well. [FreeTextEntry7] : Echo 2/7/2018: EF 60 to 65%.  Borderline concentric left ventricular hypertrophy.  No significant valvular abnormalities.

## 2022-09-30 ENCOUNTER — APPOINTMENT (OUTPATIENT)
Dept: PULMONOLOGY | Facility: CLINIC | Age: 67
End: 2022-09-30

## 2022-09-30 VITALS
HEART RATE: 71 BPM | RESPIRATION RATE: 16 BRPM | HEIGHT: 62 IN | BODY MASS INDEX: 48.58 KG/M2 | DIASTOLIC BLOOD PRESSURE: 74 MMHG | WEIGHT: 264 LBS | OXYGEN SATURATION: 97 % | SYSTOLIC BLOOD PRESSURE: 122 MMHG

## 2022-09-30 PROCEDURE — 99214 OFFICE O/P EST MOD 30 MIN: CPT | Mod: 25

## 2022-09-30 PROCEDURE — G0008: CPT

## 2022-09-30 PROCEDURE — 90662 IIV NO PRSV INCREASED AG IM: CPT

## 2022-09-30 RX ORDER — OFLOXACIN 3 MG/ML
0.3 SOLUTION/ DROPS OPHTHALMIC
Qty: 5 | Refills: 0 | Status: DISCONTINUED | COMMUNITY
Start: 2022-07-11 | End: 2022-09-30

## 2022-09-30 RX ORDER — TOBRAMYCIN 3 MG/ML
0.3 SOLUTION/ DROPS OPHTHALMIC 4 TIMES DAILY
Qty: 1 | Refills: 1 | Status: DISCONTINUED | COMMUNITY
Start: 2021-07-29 | End: 2022-09-30

## 2022-09-30 RX ORDER — NEOMYCIN AND POLYMYXIN B SULFATES AND DEXAMETHASONE 3.5; 10000; 1 MG/G; [IU]/G; MG/G
3.5-10000-0.1 OINTMENT OPHTHALMIC
Qty: 4 | Refills: 0 | Status: DISCONTINUED | COMMUNITY
Start: 2022-07-13 | End: 2022-09-30

## 2022-09-30 RX ORDER — PREDNISOLONE ACETATE 10 MG/ML
1 SUSPENSION/ DROPS OPHTHALMIC
Qty: 5 | Refills: 0 | Status: DISCONTINUED | COMMUNITY
Start: 2022-07-11 | End: 2022-09-30

## 2022-09-30 RX ORDER — BROMFENAC SODIUM 0.7 MG/ML
0.07 SOLUTION/ DROPS OPHTHALMIC
Qty: 3 | Refills: 0 | Status: DISCONTINUED | COMMUNITY
Start: 2022-07-11 | End: 2022-09-30

## 2022-09-30 NOTE — DISCUSSION/SUMMARY
Med Rec completed per patient   Allergies reviewed  No ORAL antibiotics in last 30 days       [FreeTextEntry1] : \par #1. Mild restriction on PFTs is likely related to weight; would follow lung function with weight loss as needed\par #2. CXR to further evaluate mild restriction and SOBOE was clear and restriction is likely related to weight\par #3. The patient does not appear to require chronic BD therapy at this time\par #4. Diet and exercise for weight loss\par #5. SOBOE is likely related to weight or deconditioning given restriction\par #6. Continue autoCPAP 13-20 cm of water for residual mild ARSLAN noted on compliance but is improved from previous; consider further decrease in pressure as needed as pt did worse on higher pressures but will continue current therapy as pt is doing well without residual sleep complaints\par #7. Cardiology f/u \par #8. Replace equipment as needed; ordered 9/30/22\par #9. F/u 6 months with compliance \par #10. Pt had both Covid vaccines, booster, and flu vaccines\par #11. Reviewed risks of exposure and symptoms of Covid-19 virus, including how the virus is spread and precautions to avoid mary beth virus.\par \par The patient expressed understanding and agreement with the above recommendations/plan and accepts responsibility to be compliant with recommended testing, therapies, and f/u visits.\par All relevant questions and concerns were addressed.

## 2022-09-30 NOTE — REVIEW OF SYSTEMS
[Postnasal Drip] : postnasal drip [SOB on Exertion] : sob on exertion [Seasonal Allergies] : seasonal allergies [Thyroid Problem] : thyroid problem [Negative] : Endocrine [As Noted in HPI] : as noted in HPI

## 2022-09-30 NOTE — CONSULT LETTER
[Dear  ___] : Dear  [unfilled], [Consult Letter:] : I had the pleasure of evaluating your patient, [unfilled]. [Please see my note below.] : Please see my note below. [Consult Closing:] : Thank you very much for allowing me to participate in the care of this patient.  If you have any questions, please do not hesitate to contact me. [Sincerely,] : Sincerely, [FreeTextEntry3] : Henry Garcia MD, FCCP, D. ABSM\par Pulmonary and Sleep Medicine\par [facility] [place]\par \par

## 2022-09-30 NOTE — HISTORY OF PRESENT ILLNESS
[Never] : never [None] : No associated symptoms are reported [CPAP] : CPAP [Good Compliance] : good compliance with treatment [Good Tolerance] : good tolerance of treatment [Good Symptom Control] : good symptom control [Follow-Up - Routine Clinic] : a routine clinic follow-up of [Excess Weight] : excess weight [Currently Experiencing] : The patient is currently experiencing symptoms. [Dyspnea] : dyspnea [Back Pain] : back pain [Low Calorie Diet] : low calorie diet [Fair Compliance] : fair compliance with treatment [Fair Tolerance] : fair tolerance of treatment [Poor Symptom Control] : poor symptom control [Hypertension] : hypertension [High] : high [Low Calorie] : low calorie [Well Balanced Diet] : well balanced meals [Infrequently] : exercises infrequently [Walking] : walking [TextBox_4] : Patient c/o SOBOE but is otherwise without associated respiratory complaints. She is followed by cardiology and was sent for pulmonary evaluation given reported mild pulm HTN and suspicion of ARSLAN. \par  [FreeTextEntry1] : \par  [de-identified] : 13-20 cm of water

## 2022-11-18 ENCOUNTER — APPOINTMENT (OUTPATIENT)
Dept: FAMILY MEDICINE | Facility: CLINIC | Age: 67
End: 2022-11-18

## 2022-11-18 VITALS
WEIGHT: 266 LBS | HEART RATE: 69 BPM | OXYGEN SATURATION: 97 % | SYSTOLIC BLOOD PRESSURE: 132 MMHG | DIASTOLIC BLOOD PRESSURE: 78 MMHG | TEMPERATURE: 97.7 F | HEIGHT: 62 IN | BODY MASS INDEX: 48.95 KG/M2

## 2022-11-18 DIAGNOSIS — M76.62 ACHILLES TENDINITIS, LEFT LEG: ICD-10-CM

## 2022-11-18 DIAGNOSIS — Z01.818 ENCOUNTER FOR OTHER PREPROCEDURAL EXAMINATION: ICD-10-CM

## 2022-11-18 PROCEDURE — 90471 IMMUNIZATION ADMIN: CPT

## 2022-11-18 PROCEDURE — 99214 OFFICE O/P EST MOD 30 MIN: CPT | Mod: 25

## 2022-11-18 PROCEDURE — 90750 HZV VACC RECOMBINANT IM: CPT | Mod: GY

## 2022-11-19 NOTE — PHYSICAL EXAM
[No Acute Distress] : no acute distress [Well-Appearing] : well-appearing [PERRL] : pupils equal round and reactive to light [EOMI] : extraocular movements intact [Normal Outer Ear/Nose] : the outer ears and nose were normal in appearance [Normal Oropharynx] : the oropharynx was normal [No JVD] : no jugular venous distention [No Lymphadenopathy] : no lymphadenopathy [Supple] : supple [No Respiratory Distress] : no respiratory distress  [Clear to Auscultation] : lungs were clear to auscultation bilaterally [Normal Rate] : normal rate  [Regular Rhythm] : with a regular rhythm [Normal S1, S2] : normal S1 and S2 [No Murmur] : no murmur heard [No Carotid Bruits] : no carotid bruits [No Edema] : there was no peripheral edema [Normal Posterior Cervical Nodes] : no posterior cervical lymphadenopathy [Normal Anterior Cervical Nodes] : no anterior cervical lymphadenopathy [Grossly Normal Strength/Tone] : grossly normal strength/tone [No Rash] : no rash [Coordination Grossly Intact] : coordination grossly intact [No Focal Deficits] : no focal deficits [Normal Gait] : normal gait [Deep Tendon Reflexes (DTR)] : deep tendon reflexes were 2+ and symmetric [Normal Affect] : the affect was normal [Normal Mood] : the mood was normal [Normal Insight/Judgement] : insight and judgment were intact [de-identified] : Tenderness at right medial proximal thigh noted with abduction of the RLE. FROM hips b/l w/o pain. No erythema or edema of the RLE.

## 2022-11-19 NOTE — HISTORY OF PRESENT ILLNESS
[FreeTextEntry1] : Pt c/o strain to the left achilles tendon  last month.\par It is healing slowly.\par She has not been as active with [employer] lately.\par She had cataract surgery 3 months ago and some visual difficulties with distance initially, which seems to be improving.\par Pt c/o pinching sensation in the right groin, worse with turning torso left or right when sitting.\par Pt is also following up on hypertension, hyperlipidemia and hypothyroidism.

## 2022-11-19 NOTE — ASSESSMENT
[FreeTextEntry1] : Risks, benefits and potential side effects of the Shingrix vaccine was reviewed with the patient, including risk for low grade fever, myalgias, fatigue, malaise, headache and redness or soreness at the site of the injection. The pt is aware that two shots are recommended, two to six months apart, to complete the Shingrix vaccination series.\par Warm compresses and Advil prn right thigh pain. \par Return to office if symptoms worsen or persist.\par

## 2022-11-19 NOTE — COUNSELING
[Benefits of weight loss discussed] : Benefits of weight loss discussed [Structured Weight Management Program suggested:] : Structured weight management program suggested [FreeTextEntry1] : Pt to improve adherence to WW program

## 2022-12-09 ENCOUNTER — RX ONLY (RX ONLY)
Age: 67
End: 2022-12-09

## 2022-12-09 ENCOUNTER — OFFICE (OUTPATIENT)
Dept: URBAN - METROPOLITAN AREA CLINIC 94 | Facility: CLINIC | Age: 67
Setting detail: OPHTHALMOLOGY
End: 2022-12-09
Payer: MEDICARE

## 2022-12-09 DIAGNOSIS — H01.002: ICD-10-CM

## 2022-12-09 DIAGNOSIS — H01.001: ICD-10-CM

## 2022-12-09 DIAGNOSIS — H01.004: ICD-10-CM

## 2022-12-09 DIAGNOSIS — H01.005: ICD-10-CM

## 2022-12-09 DIAGNOSIS — H02.135: ICD-10-CM

## 2022-12-09 DIAGNOSIS — H04.123: ICD-10-CM

## 2022-12-09 DIAGNOSIS — H02.423: ICD-10-CM

## 2022-12-09 DIAGNOSIS — D48.5: ICD-10-CM

## 2022-12-09 PROBLEM — Z96.1 PSEUDOPHAKIA ; BOTH EYES: Status: ACTIVE | Noted: 2022-12-09

## 2022-12-09 PROCEDURE — 92012 INTRM OPH EXAM EST PATIENT: CPT | Performed by: OPHTHALMOLOGY

## 2022-12-09 ASSESSMENT — REFRACTION_MANIFEST
OD_CYLINDER: -0.75
OS_AXIS: 157
OD_SPHERE: -5.00
OS_ADD: +1.75
OS_VA1: 20/30
OD_SPHERE: -1.00
OS_VA1: 20/25
OS_SPHERE: PLANO
OS_CYLINDER: -1.00
OD_AXIS: 024
OD_ADD: +1.75
OD_VA1: 20/40
OD_AXIS: 010
OS_AXIS: 171
OS_CYLINDER: -1.00
OD_VA1: 20/25
OU_VA: 20/25
OS_SPHERE: -5.50
OD_CYLINDER: -0.75

## 2022-12-09 ASSESSMENT — KERATOMETRY
OD_K2POWER_DIOPTERS: 44.75
METHOD_AUTO_MANUAL: AUTO
OS_AXISANGLE_DEGREES: 084
OS_K1POWER_DIOPTERS: 43.25
OD_K1POWER_DIOPTERS: 43.50
OD_AXISANGLE_DEGREES: 102
OS_K2POWER_DIOPTERS: 44.50

## 2022-12-09 ASSESSMENT — AXIALLENGTH_DERIVED
OD_AL: 25.6165
OS_AL: 26.018
OS_AL: 23.6981
OD_AL: 23.9021
OD_AL: 24.0025

## 2022-12-09 ASSESSMENT — LID POSITION - ECTROPION: OS_ECTROPION: LLL 1+

## 2022-12-09 ASSESSMENT — REFRACTION_AUTOREFRACTION
OS_SPHERE: -0.25
OD_SPHERE: -1.25
OD_CYLINDER: -0.75
OS_AXIS: 171
OD_AXIS: 010
OS_CYLINDER: -0.75

## 2022-12-09 ASSESSMENT — CONFRONTATIONAL VISUAL FIELD TEST (CVF)
OD_FINDINGS: FULL
OS_FINDINGS: FULL

## 2022-12-09 ASSESSMENT — LID POSITION - PTOSIS
OD_PTOSIS: RUL 1+
OS_PTOSIS: LUL 1+

## 2022-12-09 ASSESSMENT — VISUAL ACUITY
OD_BCVA: 20/20
OS_BCVA: 20/30

## 2022-12-09 ASSESSMENT — TONOMETRY
OS_IOP_MMHG: 11
OD_IOP_MMHG: 13

## 2022-12-09 ASSESSMENT — SPHEQUIV_DERIVED
OS_SPHEQUIV: -6
OD_SPHEQUIV: -1.375
OD_SPHEQUIV: -5.375
OD_SPHEQUIV: -1.625
OS_SPHEQUIV: -0.625

## 2022-12-09 ASSESSMENT — CORNEAL EDEMA CLINICAL DESCRIPTION: OS_CORNEALEDEMA: T

## 2022-12-16 ENCOUNTER — LABORATORY RESULT (OUTPATIENT)
Age: 67
End: 2022-12-16

## 2022-12-23 ENCOUNTER — TRANSCRIPTION ENCOUNTER (OUTPATIENT)
Age: 67
End: 2022-12-23

## 2023-01-10 LAB
ALBUMIN SERPL ELPH-MCNC: 4.3 G/DL
ALP BLD-CCNC: 103 U/L
ALT SERPL-CCNC: 20 U/L
ANION GAP SERPL CALC-SCNC: 10 MMOL/L
AST SERPL-CCNC: 20 U/L
BASOPHILS # BLD AUTO: 0.03 K/UL
BASOPHILS NFR BLD AUTO: 0.5 %
BILIRUB SERPL-MCNC: 0.6 MG/DL
BUN SERPL-MCNC: 20 MG/DL
CALCIUM SERPL-MCNC: 9.6 MG/DL
CHLORIDE SERPL-SCNC: 105 MMOL/L
CHOLEST SERPL-MCNC: 168 MG/DL
CO2 SERPL-SCNC: 27 MMOL/L
CREAT SERPL-MCNC: 1.03 MG/DL
EGFR: 59 ML/MIN/1.73M2
EOSINOPHIL # BLD AUTO: 0.37 K/UL
EOSINOPHIL NFR BLD AUTO: 5.9 %
ESTIMATED AVERAGE GLUCOSE: 105 MG/DL
GLUCOSE SERPL-MCNC: 93 MG/DL
HBA1C MFR BLD HPLC: 5.3 %
HCT VFR BLD CALC: 41.2 %
HDLC SERPL-MCNC: 70 MG/DL
HGB BLD-MCNC: 13.7 G/DL
IMM GRANULOCYTES NFR BLD AUTO: 0.2 %
LDLC SERPL CALC-MCNC: 85 MG/DL
LYMPHOCYTES # BLD AUTO: 2.12 K/UL
LYMPHOCYTES NFR BLD AUTO: 34 %
MAN DIFF?: NORMAL
MCHC RBC-ENTMCNC: 31.5 PG
MCHC RBC-ENTMCNC: 33.3 GM/DL
MCV RBC AUTO: 94.7 FL
MONOCYTES # BLD AUTO: 0.61 K/UL
MONOCYTES NFR BLD AUTO: 9.8 %
NEUTROPHILS # BLD AUTO: 3.1 K/UL
NEUTROPHILS NFR BLD AUTO: 49.6 %
NONHDLC SERPL-MCNC: 98 MG/DL
PLATELET # BLD AUTO: 264 K/UL
POTASSIUM SERPL-SCNC: 4.3 MMOL/L
PROT SERPL-MCNC: 7.1 G/DL
RBC # BLD: 4.35 M/UL
RBC # FLD: 13.2 %
SODIUM SERPL-SCNC: 142 MMOL/L
T3RU NFR SERPL: 1.1 TBI
T4 FREE SERPL-MCNC: 1.4 NG/DL
THYROGLOB AB SERPL-ACNC: <20 IU/ML
THYROPEROXIDASE AB SERPL IA-ACNC: <10 IU/ML
TRIGL SERPL-MCNC: 66 MG/DL
TSH SERPL-ACNC: 2.82 UIU/ML
WBC # FLD AUTO: 6.24 K/UL

## 2023-02-24 ENCOUNTER — APPOINTMENT (OUTPATIENT)
Dept: RADIOLOGY | Facility: CLINIC | Age: 68
End: 2023-02-24
Payer: MEDICARE

## 2023-02-24 ENCOUNTER — APPOINTMENT (OUTPATIENT)
Dept: FAMILY MEDICINE | Facility: CLINIC | Age: 68
End: 2023-02-24
Payer: MEDICARE

## 2023-02-24 ENCOUNTER — OUTPATIENT (OUTPATIENT)
Dept: OUTPATIENT SERVICES | Facility: HOSPITAL | Age: 68
LOS: 1 days | End: 2023-02-24
Payer: MEDICARE

## 2023-02-24 VITALS
WEIGHT: 264 LBS | HEART RATE: 96 BPM | BODY MASS INDEX: 48.58 KG/M2 | DIASTOLIC BLOOD PRESSURE: 82 MMHG | HEIGHT: 62 IN | SYSTOLIC BLOOD PRESSURE: 130 MMHG | TEMPERATURE: 97.2 F

## 2023-02-24 DIAGNOSIS — M25.551 PAIN IN RIGHT HIP: ICD-10-CM

## 2023-02-24 DIAGNOSIS — Z98.891 HISTORY OF UTERINE SCAR FROM PREVIOUS SURGERY: Chronic | ICD-10-CM

## 2023-02-24 PROCEDURE — 73502 X-RAY EXAM HIP UNI 2-3 VIEWS: CPT

## 2023-02-24 PROCEDURE — 73502 X-RAY EXAM HIP UNI 2-3 VIEWS: CPT | Mod: 26,RT

## 2023-02-24 PROCEDURE — 36415 COLL VENOUS BLD VENIPUNCTURE: CPT

## 2023-02-24 PROCEDURE — 99214 OFFICE O/P EST MOD 30 MIN: CPT | Mod: 25

## 2023-02-24 NOTE — ASSESSMENT
[FreeTextEntry1] : We discussed the importance of committing to making changes in eating habits that will help her lose wt and sustain the wt loss.\par F/U 3 months.\par Labs drawn in office.\par

## 2023-02-24 NOTE — PLAN
[FreeTextEntry1] : Pt encounter incorporated clinical review of the medical record including consultation from specialists, review of lab and diagnostic testing with interpretation and discussion of results with patient, general pt counseling, and coordination of care, as well as documentation update within the electronic medical record.\par \par Time spent: 36 mins.\par

## 2023-02-24 NOTE — HISTORY OF PRESENT ILLNESS
[FreeTextEntry1] : Pt c/o pinching sensation to the right groin, chronic intermittent worse over the past month.\par Pinching sensation is worse with planting and twisting motion.\par Had been symptom free of right groin pain for years.\par

## 2023-02-24 NOTE — PHYSICAL EXAM
[No Acute Distress] : no acute distress [Well-Appearing] : well-appearing [PERRL] : pupils equal round and reactive to light [EOMI] : extraocular movements intact [Normal Outer Ear/Nose] : the outer ears and nose were normal in appearance [No JVD] : no jugular venous distention [No Lymphadenopathy] : no lymphadenopathy [Supple] : supple [No Respiratory Distress] : no respiratory distress  [Clear to Auscultation] : lungs were clear to auscultation bilaterally [Normal Rate] : normal rate  [Regular Rhythm] : with a regular rhythm [Normal S1, S2] : normal S1 and S2 [No Murmur] : no murmur heard [No Carotid Bruits] : no carotid bruits [No Edema] : there was no peripheral edema [Grossly Normal Strength/Tone] : grossly normal strength/tone [No Rash] : no rash [Coordination Grossly Intact] : coordination grossly intact [No Focal Deficits] : no focal deficits [Normal Gait] : normal gait [Deep Tendon Reflexes (DTR)] : deep tendon reflexes were 2+ and symmetric [Normal Affect] : the affect was normal [Normal Mood] : the mood was normal [Normal Insight/Judgement] : insight and judgment were intact [de-identified] : Tenderness at right inguinal and right medial proximal thigh exacerbated mild exacerbation with abduction of right leg. FROM hips b/l w/o pain. No erythema or edema of the RLE.

## 2023-02-28 LAB
ANION GAP SERPL CALC-SCNC: 12 MMOL/L
BUN SERPL-MCNC: 19 MG/DL
CALCIUM SERPL-MCNC: 9.5 MG/DL
CHLORIDE SERPL-SCNC: 103 MMOL/L
CO2 SERPL-SCNC: 27 MMOL/L
CREAT SERPL-MCNC: 0.94 MG/DL
EGFR: 66 ML/MIN/1.73M2
ESTIMATED AVERAGE GLUCOSE: 105 MG/DL
GLUCOSE SERPL-MCNC: 90 MG/DL
HBA1C MFR BLD HPLC: 5.3 %
POTASSIUM SERPL-SCNC: 4.7 MMOL/L
SODIUM SERPL-SCNC: 142 MMOL/L
T4 FREE SERPL-MCNC: 1.4 NG/DL
TSH SERPL-ACNC: 2.2 UIU/ML

## 2023-04-11 ENCOUNTER — APPOINTMENT (OUTPATIENT)
Dept: PULMONOLOGY | Facility: CLINIC | Age: 68
End: 2023-04-11
Payer: MEDICARE

## 2023-04-11 VITALS
RESPIRATION RATE: 16 BRPM | OXYGEN SATURATION: 95 % | HEART RATE: 68 BPM | BODY MASS INDEX: 48.58 KG/M2 | SYSTOLIC BLOOD PRESSURE: 132 MMHG | DIASTOLIC BLOOD PRESSURE: 70 MMHG | HEIGHT: 62 IN | WEIGHT: 264 LBS

## 2023-04-11 PROCEDURE — 99214 OFFICE O/P EST MOD 30 MIN: CPT

## 2023-04-11 NOTE — HISTORY OF PRESENT ILLNESS
[None] : No associated symptoms are reported [CPAP] : CPAP [Good Compliance] : good compliance with treatment [Good Tolerance] : good tolerance of treatment [Good Symptom Control] : good symptom control [Follow-Up - Routine Clinic] : a routine clinic follow-up of [Excess Weight] : excess weight [Currently Experiencing] : The patient is currently experiencing symptoms. [Dyspnea] : dyspnea [Back Pain] : back pain [Low Calorie Diet] : low calorie diet [Fair Compliance] : fair compliance with treatment [Fair Tolerance] : fair tolerance of treatment [Poor Symptom Control] : poor symptom control [Hypertension] : hypertension [High] : high [Low Calorie] : low calorie [Well Balanced Diet] : well balanced meals [Infrequently] : exercises infrequently [Walking] : walking [TextBox_4] : Patient c/o SOBOE but is otherwise without associated respiratory complaints. She is followed by cardiology and was sent for pulmonary evaluation given reported mild pulm HTN and suspicion of ARSLAN. \par  [FreeTextEntry1] : \par  [de-identified] : 13-20 cm of water

## 2023-04-11 NOTE — DISCUSSION/SUMMARY
[FreeTextEntry1] : \par #1. Mild restriction on PFTs is likely related to weight; follow lung function with weight loss as needed\par #2. CXR to further evaluate mild restriction and SOBOE was clear and restriction is likely related to weight\par #3. The patient does not appear to require chronic BD therapy at this time\par #4. Diet and exercise for weight loss\par #5. SOBOE is likely related to weight or deconditioning given restriction\par #6. Continue autoCPAP 13-20 cm of water for severe ARSLAN; The patient is using and benefitting from CPAP therapy with excellent compliance\par #7. Cardiology f/u \par #8. Replace equipment as needed; ordered 9/30/22\par #9. F/u 6 months with compliance and spirometry\par #10. Pt had both Covid vaccines, booster, and flu vaccines\par #11. Reviewed risks of exposure and symptoms of Covid-19 virus, including how the virus is spread and precautions to avoid mary beth virus.\par \par The patient expressed understanding and agreement with the above recommendations/plan and accepts responsibility to be compliant with recommended testing, therapies, and f/u visits.\par All relevant questions and concerns were addressed.

## 2023-04-11 NOTE — CONSULT LETTER
[Dear  ___] : Dear  [unfilled], [Consult Letter:] : I had the pleasure of evaluating your patient, [unfilled]. [Please see my note below.] : Please see my note below. [Consult Closing:] : Thank you very much for allowing me to participate in the care of this patient.  If you have any questions, please do not hesitate to contact me. [Sincerely,] : Sincerely, [FreeTextEntry3] : Henry Garcia MD, FCCP, D. ABSM\par Pulmonary and Sleep Medicine\par [facility] at [place]\par \par

## 2023-04-11 NOTE — PROCEDURE
Yes [FreeTextEntry1] : PFTs 10/24/18 - Mild restriction with mildly reduced diffusion capacity which corrected for alveolar volume.\par Spirometry 12/17/19 - mild restriction\par PFTs 3/10/22 - essentially normal with mildly reduced diffusion capacity which corrected for alveolar volume; slightly improved from previous

## 2023-05-03 ENCOUNTER — APPOINTMENT (OUTPATIENT)
Dept: CARDIOLOGY | Facility: CLINIC | Age: 68
End: 2023-05-03
Payer: MEDICARE

## 2023-05-03 ENCOUNTER — NON-APPOINTMENT (OUTPATIENT)
Age: 68
End: 2023-05-03

## 2023-05-03 VITALS
WEIGHT: 269 LBS | HEIGHT: 62 IN | SYSTOLIC BLOOD PRESSURE: 138 MMHG | DIASTOLIC BLOOD PRESSURE: 81 MMHG | OXYGEN SATURATION: 94 % | BODY MASS INDEX: 49.5 KG/M2 | HEART RATE: 69 BPM | TEMPERATURE: 97.5 F

## 2023-05-03 PROCEDURE — 99214 OFFICE O/P EST MOD 30 MIN: CPT

## 2023-05-03 PROCEDURE — 93000 ELECTROCARDIOGRAM COMPLETE: CPT

## 2023-05-03 NOTE — ASSESSMENT
[FreeTextEntry1] : HTN controlled on carvedilol and amlodipine \par Hyperlipidemia LDL 85 , HDL 70 , continue rosuvastatin 5 mg \par Obesity, weight loss advised, currently in weight watchers\par RBBB , similar to prior ekg. \par f/u 1 year .

## 2023-05-03 NOTE — REVIEW OF SYSTEMS
[Feeling Fatigued] : not feeling fatigued [SOB] : no shortness of breath [Dyspnea on exertion] : not dyspnea during exertion [Chest Discomfort] : no chest discomfort [Lower Ext Edema] : no extremity edema [Leg Claudication] : no intermittent leg claudication [Palpitations] : no palpitations [Orthopnea] : no orthopnea [PND] : no PND [Syncope] : no syncope [Dizziness] : no dizziness

## 2023-05-03 NOTE — PHYSICAL EXAM
[No Acute Distress] : no acute distress [Obese] : obese [No Carotid Bruit] : no carotid bruit [Normal S1, S2] : normal S1, S2 [No Murmur] : no murmur [Clear Lung Fields] : clear lung fields [No Respiratory Distress] : no respiratory distress  [Normal Bowel Sounds] : normal bowel sounds [Normal Gait] : normal gait [No Edema] : no edema [Normal Radial B/L] : normal radial B/L [Normal PT B/L] : normal PT B/L [Moves all extremities] : moves all extremities [Normal Speech] : normal speech [Alert and Oriented] : alert and oriented [Normal memory] : normal memory [Well Developed] : well developed [de-identified] : no pallor

## 2023-05-03 NOTE — HISTORY OF PRESENT ILLNESS
[FreeTextEntry1] : Old note:\par A 63 y/o female with PMhx of HTN , RBBB , EF preserved 60^ with borderline LVH , mild PH\par no exertional chest pain or SOB, palpitations, dizziness or syncope\par started on rosuvastatin 5 mg LDL 84 ,  (decrease from 221 /136) \par gained 8 lbs rejoined weight [employer] meetings\par \par 5/5/201\par REturns for follow up . \par no exertional chest pain or SOB, palpitations, dizziness or syncope\par HTN controlled on amlodipine,valsartan , carvedilol \par Hyperlipidemia on rosuvastatin 5 mg ( LDL 85, ) \par walks and has joined [employer] \par \par 5/4/2022\par Returns for follow up. BP has been controlled at other office visit . \par hyperlipidemia controlled on rosuvastatin 5 mg , LDL 92 , HDL 74 \par weight cycling since joining [employer]\par no exertional chest pain or SOB, palpitations, dizziness or syncope \par \par 8/3/2022:\par Patient presents to the office for preoperative cardiovascular assessment for upcoming right and left cataract surgery on 8/17/2022 and 8/30/2022 respectively. Reports feeling well today. She has been doing weight watchers and lost 4 lbs last month. Denies chest pain, SOB at rest, MONTANO, palpitations, lightheadedness, dizziness, fatigue, syncope, near syncope and LE edema. Denies exertional SOB or chest pain with exercise. Compliant with CPAP use. \par \par 5/3/2023\par Returns for follow . Had bilateral cataract surgery in 8/2022. currently no exertional chest pain or  SOB, palpitations, dizziness or syncope\par still in weight watchers , loosing weight \par compliant with CPAP use \par LDL 85 , HDL 70

## 2023-05-03 NOTE — CARDIOLOGY SUMMARY
[de-identified] : 5/3/2023: Sinus  Rhythm \par -Right bundle branch block. Consistent with prior EKG.

## 2023-05-10 ENCOUNTER — APPOINTMENT (OUTPATIENT)
Dept: FAMILY MEDICINE | Facility: CLINIC | Age: 68
End: 2023-05-10
Payer: MEDICARE

## 2023-05-10 ENCOUNTER — OUTPATIENT (OUTPATIENT)
Dept: OUTPATIENT SERVICES | Facility: HOSPITAL | Age: 68
LOS: 1 days | End: 2023-05-10
Payer: MEDICARE

## 2023-05-10 ENCOUNTER — APPOINTMENT (OUTPATIENT)
Dept: RADIOLOGY | Facility: CLINIC | Age: 68
End: 2023-05-10
Payer: MEDICARE

## 2023-05-10 ENCOUNTER — RESULT REVIEW (OUTPATIENT)
Age: 68
End: 2023-05-10

## 2023-05-10 VITALS
WEIGHT: 262 LBS | DIASTOLIC BLOOD PRESSURE: 70 MMHG | TEMPERATURE: 97.3 F | BODY MASS INDEX: 48.21 KG/M2 | SYSTOLIC BLOOD PRESSURE: 126 MMHG | HEIGHT: 62 IN | OXYGEN SATURATION: 96 % | HEART RATE: 82 BPM

## 2023-05-10 DIAGNOSIS — Z01.810 ENCOUNTER FOR PREPROCEDURAL CARDIOVASCULAR EXAMINATION: ICD-10-CM

## 2023-05-10 DIAGNOSIS — M75.82 OTHER SHOULDER LESIONS, LEFT SHOULDER: ICD-10-CM

## 2023-05-10 DIAGNOSIS — K76.0 FATTY (CHANGE OF) LIVER, NOT ELSEWHERE CLASSIFIED: ICD-10-CM

## 2023-05-10 DIAGNOSIS — Z98.891 HISTORY OF UTERINE SCAR FROM PREVIOUS SURGERY: Chronic | ICD-10-CM

## 2023-05-10 PROCEDURE — 73030 X-RAY EXAM OF SHOULDER: CPT

## 2023-05-10 PROCEDURE — 99214 OFFICE O/P EST MOD 30 MIN: CPT

## 2023-05-10 PROCEDURE — 73030 X-RAY EXAM OF SHOULDER: CPT | Mod: 26,LT

## 2023-05-10 RX ORDER — FLUTICASONE PROPIONATE 50 UG/1
50 SPRAY, METERED NASAL DAILY
Qty: 1 | Refills: 2 | Status: ACTIVE | COMMUNITY
Start: 1900-01-01 | End: 1900-01-01

## 2023-05-10 NOTE — PHYSICAL EXAM
[No Acute Distress] : no acute distress [Supple] : supple [No Edema] : there was no peripheral edema [No Focal Deficits] : no focal deficits [Normal] : Normal [AC Joint] : AC joint [Deltoid] : deltoid [Pain] : painful [Hawkin's] : positive Hawkin's test [Bicipital Groove] : not the bicipital groove [Rhomboids] : not the rhomboid [Supraspinatus] : not the supraspinatus muscle [Trapezius Muscle] : not the trapezius muscle [Crepitus] : no crepitus [(A)Abnl___deg] : normal AROM

## 2023-05-10 NOTE — HISTORY OF PRESENT ILLNESS
[FreeTextEntry8] : Patient c/o stiffness in left shoulder x 2 weeks.\par No hx of shoulder injury / trauma. Pain waxes and wanes, achy with occ sharp flares, located at the left anterior shoulder and radiates down the medial and lateral left upper arm. She is unable to fully lift her left arm above her head due to left shoulder pain.  She denies any neck pain or stiffness.\par Lost 2 pounds over the past month.  She has been attending [employer] for many years and is a lifetime member.\par She states that she has been taking efforts to reduce portion size and trying to implement healthier food choices.\par Complains of increased nasal congestion since onset of spring season.  Has adequate relief with fluticasone nasal spray.

## 2023-05-10 NOTE — ASSESSMENT
[FreeTextEntry1] : Warm compresses to left shoulder.\par Naproxen prn pain, with food, sparingly.\par Recommend Wegovy for treatment of morbid obesity.  Risk/benefits reviewed.  Patient declines.  She is a lifelong member of [employer] and wishes to continue with this program without prescription weight loss medication.\par Breast exam at follow-up. Pt encouraged to f/u with her gyn.\par Fasting blood work prior to follow-up.

## 2023-05-10 NOTE — PLAN
[FreeTextEntry1] : Pt encounter incorporated clinical review of the medical record including consultation from specialists, review of lab and diagnostic testing with interpretation and discussion of results with patient, general pt counseling, and coordination of care, as well as documentation update within the electronic medical record.\par \par Time spent: 32 mins.\par

## 2023-05-26 ENCOUNTER — APPOINTMENT (OUTPATIENT)
Dept: FAMILY MEDICINE | Facility: CLINIC | Age: 68
End: 2023-05-26
Payer: MEDICARE

## 2023-05-26 VITALS
DIASTOLIC BLOOD PRESSURE: 76 MMHG | OXYGEN SATURATION: 97 % | SYSTOLIC BLOOD PRESSURE: 124 MMHG | HEART RATE: 78 BPM | HEIGHT: 62 IN | WEIGHT: 263 LBS | BODY MASS INDEX: 48.4 KG/M2

## 2023-05-26 DIAGNOSIS — M75.82 OTHER SHOULDER LESIONS, LEFT SHOULDER: ICD-10-CM

## 2023-05-26 LAB
ALBUMIN SERPL ELPH-MCNC: 4.3 G/DL
ALP BLD-CCNC: 99 U/L
ALT SERPL-CCNC: 16 U/L
ANION GAP SERPL CALC-SCNC: 9 MMOL/L
AST SERPL-CCNC: 18 U/L
BILIRUB SERPL-MCNC: 0.6 MG/DL
BUN SERPL-MCNC: 17 MG/DL
CALCIUM SERPL-MCNC: 9.2 MG/DL
CHLORIDE SERPL-SCNC: 105 MMOL/L
CHOLEST SERPL-MCNC: 165 MG/DL
CO2 SERPL-SCNC: 29 MMOL/L
CREAT SERPL-MCNC: 1.04 MG/DL
EGFR: 59 ML/MIN/1.73M2
ESTIMATED AVERAGE GLUCOSE: 105 MG/DL
GLUCOSE SERPL-MCNC: 96 MG/DL
HBA1C MFR BLD HPLC: 5.3 %
HDLC SERPL-MCNC: 64 MG/DL
LDLC SERPL CALC-MCNC: 86 MG/DL
NONHDLC SERPL-MCNC: 101 MG/DL
POTASSIUM SERPL-SCNC: 4.4 MMOL/L
PROT SERPL-MCNC: 6.8 G/DL
SODIUM SERPL-SCNC: 143 MMOL/L
TRIGL SERPL-MCNC: 76 MG/DL

## 2023-05-26 PROCEDURE — 99214 OFFICE O/P EST MOD 30 MIN: CPT

## 2023-05-26 NOTE — ASSESSMENT
[FreeTextEntry1] : Hypertension controlled.\par Lipids in good range.\par Breast exam normal.  Patient to have mammogram done and follow-up with her GYN.\par Left shoulder range of motion is significantly restricted.  Patient would benefit from orthopedic evaluation.\par Suspect left rotator cuff tear.  Patient to have MRI left shoulder.\par Patient with chronic difficulty losing weight.  We will discuss pharmacotherapy in greater detail at follow-up.\par F/U 3 months.

## 2023-05-26 NOTE — PHYSICAL EXAM
[No Acute Distress] : no acute distress [No JVD] : no jugular venous distention [No Edema] : there was no peripheral edema [Normal Appearance] : normal in appearance [No Masses] : no palpable masses [No Nipple Discharge] : no nipple discharge [No Axillary Lymphadenopathy] : no axillary lymphadenopathy [Normal] : Normal [Deltoid] : deltoid [Supraspinatus] : supraspinatus muscle [(A)Abnl___deg] : restricted AROM [unfilled] ~Udegrees [Pain] : painful [Crepitus] : no crepitus

## 2023-06-06 ENCOUNTER — RESULT REVIEW (OUTPATIENT)
Age: 68
End: 2023-06-06

## 2023-06-06 ENCOUNTER — OUTPATIENT (OUTPATIENT)
Dept: OUTPATIENT SERVICES | Facility: HOSPITAL | Age: 68
LOS: 1 days | End: 2023-06-06
Payer: MEDICARE

## 2023-06-06 ENCOUNTER — APPOINTMENT (OUTPATIENT)
Dept: MRI IMAGING | Facility: CLINIC | Age: 68
End: 2023-06-06
Payer: MEDICARE

## 2023-06-06 ENCOUNTER — APPOINTMENT (OUTPATIENT)
Dept: MAMMOGRAPHY | Facility: CLINIC | Age: 68
End: 2023-06-06
Payer: MEDICARE

## 2023-06-06 DIAGNOSIS — Z12.39 ENCOUNTER FOR OTHER SCREENING FOR MALIGNANT NEOPLASM OF BREAST: ICD-10-CM

## 2023-06-06 DIAGNOSIS — Z98.891 HISTORY OF UTERINE SCAR FROM PREVIOUS SURGERY: Chronic | ICD-10-CM

## 2023-06-06 DIAGNOSIS — M75.82 OTHER SHOULDER LESIONS, LEFT SHOULDER: ICD-10-CM

## 2023-06-06 PROCEDURE — 77063 BREAST TOMOSYNTHESIS BI: CPT | Mod: 26

## 2023-06-06 PROCEDURE — 77067 SCR MAMMO BI INCL CAD: CPT | Mod: 26

## 2023-06-06 PROCEDURE — 77067 SCR MAMMO BI INCL CAD: CPT

## 2023-06-06 PROCEDURE — 73221 MRI JOINT UPR EXTREM W/O DYE: CPT | Mod: 26,LT,MH

## 2023-06-06 PROCEDURE — 73221 MRI JOINT UPR EXTREM W/O DYE: CPT

## 2023-06-06 PROCEDURE — 77063 BREAST TOMOSYNTHESIS BI: CPT

## 2023-06-15 ENCOUNTER — APPOINTMENT (OUTPATIENT)
Dept: ORTHOPEDIC SURGERY | Facility: CLINIC | Age: 68
End: 2023-06-15
Payer: MEDICARE

## 2023-06-15 VITALS
HEART RATE: 86 BPM | BODY MASS INDEX: 48.4 KG/M2 | DIASTOLIC BLOOD PRESSURE: 81 MMHG | HEIGHT: 62 IN | SYSTOLIC BLOOD PRESSURE: 133 MMHG | WEIGHT: 263 LBS

## 2023-06-15 PROCEDURE — 99214 OFFICE O/P EST MOD 30 MIN: CPT

## 2023-06-15 NOTE — REVIEW OF SYSTEMS
[Joint Pain] : joint pain [Negative] : Heme/Lymph [FreeTextEntry9] : left shoulder pain and weakness

## 2023-06-15 NOTE — CONSULT LETTER
[Dear  ___] : Dear  [unfilled], [Consult Letter:] : I had the pleasure of evaluating your patient, [unfilled]. [( Thank you for referring [unfilled] for consultation for _____ )] : Thank you for referring [unfilled] for consultation for [unfilled] [Please see my note below.] : Please see my note below. [Consult Closing:] : Thank you very much for allowing me to participate in the care of this patient.  If you have any questions, please do not hesitate to contact me. [Sincerely,] : Sincerely, [FreeTextEntry2] : KATHERYN MIRAMONTES\par  [FreeTextEntry3] : Trey Su DO.\par Sports Medicine \par Orthopaedic Surgery\par \par [facility] [place] \par [address]\[place] [address]\par \par Tel [phone]\par Fax [phone]\par \par For same day and next day orthopedic appointments contact:\par [email or web address] |[phone] Appointments available nights and weekends!  \par \par [facility] [place]\par Visit us at [facility]/orthopaedic

## 2023-06-15 NOTE — PHYSICAL EXAM
[de-identified] : General:\par Awake, alert, no acute distress, Patient was cooperative and appropriate during the examination.\par \par The patient is of obese for height and age.\par \par Walks without an antalgic gait.\par \par Full, painless range of motion of the neck and back.\par \par Exam of the bilateral lower extremities is intact and symmetric with regards to dermatologic, vascular, and neurologic exam. Bilateral lower extremity sensation is grossly intact to light touch in the DP/SP/T/S/S nerve distributions. Intact DF/PF/EHL. BIlateral lower extremities warm and well-perfused with brisk capillary refill.\par \par \par Pulmonary:\par Regular, nonlabored breathing\par \par Abdomen:\par Soft, nontender, nondistended.\par \par Lymphatic:\par No evidence of axillary lymphadenopathy\par \par Left shoulder Exam:\par Physical exam of the shoulder demonstrates normal skin without signs of skin changes or abnormalities. No erythema, warmth, or joint effusion appreciated.  \par  \par Sensation intact light touch C5-T1\par Palpable radial pulse\par Radial/ulnar/median/axillary/musculocutaneous/AIN/PIN nerves grossly intact\par  \par Range of motion:\par Forward Flexion: 80 actively, 120 passively\par Abduction: 80 actively, 100 passively\par External Rotation: 30\par Internal Rotation: Lower lumbar level\par  \par Palpation:\par Not tender to palpation over the glenohumeral joint\par Moderately tender palpation over the rotator cuff insertion on the greater tuberosity\par Not tender to palpation over the AC joint\par Not tender to palpation of the biceps tendon/bicipital groove\par  \par Strength testing:\par Supraspinatus: 4/5\par Infraspinatus: 5-/5\par Subscapularis: 5/5\par  \par Special test:\par Empty can test positive\par Lawson impingement test negative\par Speeds test negative\par [place]'s test negative\par Lift-off test negative\par Bell-press test negative\par Cross-arm adduction test negative\par   [de-identified] : MRI of the left shoulder taken at [facility] on 6/6/2023 revealed a full-thickness full width tear of the supraspinatus with retraction to the level of the AC joint.  There is mild interstitial tearing of the anterior fibers of the infraspinatus with mild intra-articular biceps tendinopathy

## 2023-06-15 NOTE — HISTORY OF PRESENT ILLNESS
[Worsening] : worsening [Direct Pressure] : worsened by direct pressure [Lifting] : worsened by lifting [Rest] : relieved by rest [de-identified] : 06/15/2023 : RANJAN DUMONT  is a 67 year  old female who presents to the office for evaluation of her left shoulder.  She states in April without any acute traumatic injury she started developing pain into her left shoulder.  She states it radiates down the arm is worse with activity and motion and alleviated with rest.  She states she saw her primary care who recommended anti-inflammatories and physical therapy which she began as of the beginning of June.  She states she does feel little bit better after physical therapy however she still is unable to get her arm moving because of pain and weakness in the shoulder.  She had an MRI completed and is here for specialist opinion today.  She denies any numbness or tingling distally.  She has no other complaints today.

## 2023-06-15 NOTE — DISCUSSION/SUMMARY
[de-identified] : Assessment: Patient is a 67-year-old female with left shoulder full-thickness rotator cuff tear\par \par Plan: I had a long discussion with the patient today regarding the nature of their diagnosis and treatment plan.  I reviewed the MRI today that reveals a large full-thickness myotendinous tear of the supraspinatus.  On examination patient does have weakness to resisted forward elevation and also has limitations to active and passive range of motion indicative of developing adhesive capsulitis.  She does not have much arthritis on her MRI. .  We discussed the risks and benefits of no treatment as well as nonoperative and operative treatments.  Nonsurgical treatment would include modalities such as resting, icing, heating, stretching, anti-inflammatory medicines as needed, activity/lifestyle modifications, physical therapy, possible cortisone injections, and a gradual return to activities as tolerated.  The benefits of nonsurgical treatment are that it may improve some of the patient's symptoms while avoiding the risks and rehabilitation associated with surgery.  The disadvantages of nonsurgical treatment are that they may incompletely alleviate the patient's symptoms.  Full-thickness rotator cuff tears, if treated nonsurgically, have the potential to get larger over time which could lead to worsening pain and dysfunction of the shoulder.  Furthermore, large, full-thickness rotator cuff tears that become more chronic or associated with muscular atrophy can become more difficult, if not impossible, to repair primarily which could necessitate larger and more invasive surgeries.  Surgical treatment would include a left shoulder arthroscopic rotator cuff repair, subacromial decompression, synovectomy, and possible open subpectoral biceps tenodesis versus tenotomy.  The benefits of surgical treatment include improved shoulder pain and function.  The risks of surgery include but are not limited to infection, blood loss, blood clots, neurovascular injury, stiffness/loss of range of motion, persistent pain, progressive arthritis, fracture, instability, failure of hardware, failure of the tendon(s) to heal, anesthesia related complications including paralysis and death, and the need for further surgery in the future.  Postoperatively the patient will be nonweightbearing in a sling for a minimum of 6 weeks.  They will be required to use the sling at all times except for hygiene, pendulum exercises, elbow/hand/wrist exercises, and physical therapy.  They will not be permitted to drive while wearing the sling and/or while taking narcotic pain medications.  Physical therapy after surgery is generally recommended for 2 to 3 days/week and will continue for a minimum of 4 to 6 months after postoperatively.  I expect most patients to return to unrestricted activities 6 months postoperatively although some may take longer to fully recover. Noncompliance with any postoperative restrictions and/or physical therapy could lead to a suboptimal outcome or surgical failure.  The patient verbalizes their understanding of all surgical risks as well as the anticipated postoperative course.\par \par At this time the patient has significant stiffness on her examination.  I recommend continuing and completing her course of physical therapy to restore her arc of motion.  She will follow-up in 6 to 8 weeks for repeat clinical assessment.  If she has improvements in her range of motion we may discuss surgery in greater detail.  She is advised that she does have a very large myotendinous tear and may need to be protected for a longer duration of time after any surgical procedure.\par \par The patient verbalizes their understanding and agrees with this plan.  All questions were answered to their satisfaction.

## 2023-08-03 ENCOUNTER — APPOINTMENT (OUTPATIENT)
Dept: ORTHOPEDIC SURGERY | Facility: CLINIC | Age: 68
End: 2023-08-03
Payer: MEDICARE

## 2023-08-03 VITALS
HEIGHT: 62 IN | SYSTOLIC BLOOD PRESSURE: 141 MMHG | HEART RATE: 71 BPM | DIASTOLIC BLOOD PRESSURE: 85 MMHG | BODY MASS INDEX: 48.4 KG/M2 | WEIGHT: 263 LBS

## 2023-08-03 PROCEDURE — 99214 OFFICE O/P EST MOD 30 MIN: CPT

## 2023-08-03 NOTE — DISCUSSION/SUMMARY
[de-identified] : Assessment: Patient is a 67-year-old female with left shoulder full-thickness rotator cuff tear  Plan: I had a long discussion with the patient today regarding the nature of their diagnosis and treatment plan.  I reviewed the MRI today that reveals a large full-thickness myotendinous tear of the supraspinatus.  On examination patient does have weakness to resisted forward elevation and also has limitations to active and passive range of motion indicative of developing adhesive capsulitis.  She does not have much arthritis on her MRI. .  We discussed the risks and benefits of no treatment as well as nonoperative and operative treatments.  Nonsurgical treatment would include modalities such as resting, icing, heating, stretching, anti-inflammatory medicines as needed, activity/lifestyle modifications, physical therapy, possible cortisone injections, and a gradual return to activities as tolerated.  The benefits of nonsurgical treatment are that it may improve some of the patient's symptoms while avoiding the risks and rehabilitation associated with surgery.  The disadvantages of nonsurgical treatment are that they may incompletely alleviate the patient's symptoms.  Full-thickness rotator cuff tears, if treated nonsurgically, have the potential to get larger over time which could lead to worsening pain and dysfunction of the shoulder.  Furthermore, large, full-thickness rotator cuff tears that become more chronic or associated with muscular atrophy can become more difficult, if not impossible, to repair primarily which could necessitate larger and more invasive surgeries.  Surgical treatment would include a left shoulder arthroscopic rotator cuff repair, subacromial decompression, synovectomy, and possible biceps tenotomy.  The benefits of surgical treatment include improved shoulder pain and function.  The risks of surgery include but are not limited to infection, blood loss, blood clots, neurovascular injury, stiffness/loss of range of motion, persistent pain, progressive arthritis, fracture, instability, failure of hardware, failure of the tendon(s) to heal, anesthesia related complications including paralysis and death, and the need for further surgery in the future.  Postoperatively the patient will be nonweightbearing in a sling for a minimum of 6-8 weeks.  They will be required to use the sling at all times except for hygiene, pendulum exercises, elbow/hand/wrist exercises, and physical therapy.  They will not be permitted to drive while wearing the sling and/or while taking narcotic pain medications.  Physical therapy after surgery is generally recommended for 2 to 3 days/week and will continue for a minimum of 4 to 6 months after postoperatively.  I expect most patients to return to unrestricted activities 6-8 months postoperatively although some may take longer to fully recover. Noncompliance with any postoperative restrictions and/or physical therapy could lead to a suboptimal outcome or surgical failure.  The patient verbalizes their understanding of all surgical risks as well as the anticipated postoperative course.  At this time the patient's range of motion is improved.  She is does have stiffness, however, she lacks full range of motion in her opposite shoulder and she states this is close to normal for her.  We may not proceed with surgery.  The patient verbalizes their understanding and agrees with this plan.  All questions were answered to their satisfaction.

## 2023-08-03 NOTE — REASON FOR VISIT
After Visit Summary   3/10/2017    Chio Kingston    MRN: [id number]           Patient Information     Date Of Birth          2005        Visit Information        Provider Department      3/10/2017 10:15 AM [facility] [place] Aislinn Tillman        Today's Diagnoses     Encounter for immunization    -  1       Follow-ups after your visit        Who to contact     If you have questions or need follow up information about today's clinic visit or your schedule please contact [facility] [place] directly at [phone].  Normal or non-critical lab and imaging results will be communicated to you by MyChart, letter or phone within 4 business days after the clinic has received the results. If you do not hear from us within 7 days, please contact the clinic through [employer] or phone. If you have a critical or abnormal lab result, we will notify you by phone as soon as possible.  Submit refill requests through [employer] or call your pharmacy and they will forward the refill request to us. Please allow 3 business days for your refill to be completed.          Additional Information About Your Visit        MyChart Information     [employer] gives you secure access to your electronic health record. If you see a primary care provider, you can also send messages to your care team and make appointments. If you have questions, please call your primary care clinic.  If you do not have a primary care provider, please call [phone] and they will assist you.        Care EveryWhere ID     This is your Care EveryWhere ID. This could be used by other organizations to access your [place] [facility] medical records  [id number]         Blood Pressure from Last 3 Encounters:   01/30/17 120/76   05/12/16 99/62   01/15/16 109/64    Weight from Last 3 Encounters:   01/30/17 111 lb 4 oz (50.5 kg) (76 %)*   05/12/16 108 lb 9.6 oz (49.3 kg) (82 %)*   01/15/16 94 lb 8 oz (42.9 kg) (69 %)*     * Growth percentiles are based on CDC  2-20 Years data.              We Performed the Following     ADMIN 1st VACCINE     HUMAN PAPILLOMA VIRUS VACCINE        Primary Care Provider Office Phone # Fax #    Yohana Lala [phone]       [facility] [address] [facility]  [place] [address]        Thank you!     Thank you for choosing [facility]  for your care. Our goal is always to provide you with excellent care. Hearing back from our patients is one way we can continue to improve our services. Please take a few minutes to complete the written survey that you may receive in the mail after your visit with us. Thank you!             Your Updated Medication List - Protect others around you: Learn how to safely use, store and throw away your medicines at www.disposemymeds.org.          This list is accurate as of: 3/10/17 10:37 AM.  Always use your most recent med list.                   Brand Name Dispense Instructions for use    FISH OIL PO      Take  by mouth.       IBUPROFEN PO      Take by mouth every 6 hours as needed for moderate pain       MULTI VIT/FL PO      Take  by mouth.       VITAMIN D PO      Take  by mouth daily.          [Initial Visit] : an initial visit for [FreeTextEntry2] : Left shoulder pain HD[R]

## 2023-08-03 NOTE — HISTORY OF PRESENT ILLNESS
[Worsening] : worsening [Direct Pressure] : worsened by direct pressure [Lifting] : worsened by lifting [Rest] : relieved by rest [de-identified] : 08/03/2023 : RANJAN DUMONT  is a 67 year  old female who presents to the office for follow-up evaluation of her left shoulder.  She states that since the last office visit she has been doing therapy and taking some medicine and her pain and function has improved.  She states her range of motion is improved as well and she still has some stiffness overhead but is otherwise doing well.  She states however when she uses the arm more she still gets worsening pain in the shoulder and still has weakness.  She is here for specialist opinion to discuss surgery.  She denies any numbness or tingling distally.  She has no other complaints today.  06/15/2023 : RANJAN DUMONT  is a 67 year  old female who presents to the office for evaluation of her left shoulder.  She states in April without any acute traumatic injury she started developing pain into her left shoulder.  She states it radiates down the arm is worse with activity and motion and alleviated with rest.  She states she saw her primary care who recommended anti-inflammatories and physical therapy which she began as of the beginning of June.  She states she does feel little bit better after physical therapy however she still is unable to get her arm moving because of pain and weakness in the shoulder.  She had an MRI completed and is here for specialist opinion today.  She denies any numbness or tingling distally.  She has no other complaints today.

## 2023-08-03 NOTE — PHYSICAL EXAM
[de-identified] : General: Awake, alert, no acute distress, Patient was cooperative and appropriate during the examination.  The patient is of obese for height and age.  Walks without an antalgic gait.  Full, painless range of motion of the neck and back.  Exam of the bilateral lower extremities is intact and symmetric with regards to dermatologic, vascular, and neurologic exam. Bilateral lower extremity sensation is grossly intact to light touch in the DP/SP/T/S/S nerve distributions. Intact DF/PF/EHL. BIlateral lower extremities warm and well-perfused with brisk capillary refill.   Pulmonary: Regular, nonlabored breathing  Abdomen: Soft, nontender, nondistended.  Lymphatic: No evidence of axillary lymphadenopathy  Left shoulder Exam: Physical exam of the shoulder demonstrates normal skin without signs of skin changes or abnormalities. No erythema, warmth, or joint effusion appreciated.     Sensation intact light touch C5-T1 Palpable radial pulse Radial/ulnar/median/axillary/musculocutaneous/AIN/PIN nerves grossly intact   Range of motion: Forward Flexion: 140 actively, 160 passively; equal to opposite side Abduction: 100 actively; equal to the opposite side External Rotation: 40; equal to opposite side 30Internal Rotation: Mid lumbar level Internal rotation: L3 bilaterally  Palpation: Not tender to palpation over the glenohumeral joint Mildly tender palpation over the rotator cuff insertion on the greater tuberosity Not tender to palpation over the AC joint Not tender to palpation of the biceps tendon/bicipital groove   Strength testing: Supraspinatus: 4/5 Infraspinatus: 5-/5 Subscapularis: 5/5   Special test: Empty can test positive Lawson impingement test negative Speeds test negative [place]'s test negative Lift-off test negative Bell-press test negative Cross-arm adduction test negative   [de-identified] : MRI of the left shoulder taken at [facility] on 6/6/2023 revealed a full-thickness full width tear of the supraspinatus with retraction to the level of the AC joint.  There is mild interstitial tearing of the anterior fibers of the infraspinatus with mild intra-articular biceps tendinopathy

## 2023-08-03 NOTE — CONSULT LETTER
[Dear  ___] : Dear  [unfilled], [Consult Letter:] : I had the pleasure of evaluating your patient, [unfilled]. [( Thank you for referring [unfilled] for consultation for _____ )] : Thank you for referring [unfilled] for consultation for [unfilled] [Please see my note below.] : Please see my note below. [Consult Closing:] : Thank you very much for allowing me to participate in the care of this patient.  If you have any questions, please do not hesitate to contact me. [Sincerely,] : Sincerely, [FreeTextEntry2] : KATHERYN MIRAMONTES\par   [FreeTextEntry3] : Trey Su DO.\par  Sports Medicine \par  Orthopaedic Surgery\par  \par  [facility] [place] \par  [address]\[place] [address]\par  \par  Tel [phone]\par  Fax [phone]\par  \par  For same day and next day orthopedic appointments contact:\par  [email or web address] |[phone]  Appointments available nights and weekends!  \par  \par  [facility] [place]\par  Visit us at [facility]/orthopaedic

## 2023-08-11 ENCOUNTER — APPOINTMENT (OUTPATIENT)
Dept: CARDIOLOGY | Facility: CLINIC | Age: 68
End: 2023-08-11
Payer: MEDICARE

## 2023-08-11 VITALS
HEART RATE: 67 BPM | HEIGHT: 62 IN | DIASTOLIC BLOOD PRESSURE: 70 MMHG | OXYGEN SATURATION: 98 % | SYSTOLIC BLOOD PRESSURE: 136 MMHG | BODY MASS INDEX: 48.21 KG/M2 | WEIGHT: 262 LBS

## 2023-08-11 DIAGNOSIS — I45.10 UNSPECIFIED RIGHT BUNDLE-BRANCH BLOCK: ICD-10-CM

## 2023-08-11 DIAGNOSIS — R60.0 LOCALIZED EDEMA: ICD-10-CM

## 2023-08-11 PROCEDURE — 93000 ELECTROCARDIOGRAM COMPLETE: CPT

## 2023-08-11 PROCEDURE — 99214 OFFICE O/P EST MOD 30 MIN: CPT

## 2023-08-14 ENCOUNTER — APPOINTMENT (OUTPATIENT)
Dept: CARDIOLOGY | Facility: CLINIC | Age: 68
End: 2023-08-14
Payer: MEDICARE

## 2023-08-14 PROCEDURE — 93306 TTE W/DOPPLER COMPLETE: CPT

## 2023-08-15 ENCOUNTER — OUTPATIENT (OUTPATIENT)
Dept: OUTPATIENT SERVICES | Facility: HOSPITAL | Age: 68
LOS: 1 days | End: 2023-08-15
Payer: MEDICARE

## 2023-08-15 VITALS
OXYGEN SATURATION: 96 % | TEMPERATURE: 97 F | HEART RATE: 76 BPM | WEIGHT: 264.55 LBS | RESPIRATION RATE: 20 BRPM | DIASTOLIC BLOOD PRESSURE: 70 MMHG | HEIGHT: 67 IN | SYSTOLIC BLOOD PRESSURE: 130 MMHG

## 2023-08-15 DIAGNOSIS — Z01.818 ENCOUNTER FOR OTHER PREPROCEDURAL EXAMINATION: ICD-10-CM

## 2023-08-15 DIAGNOSIS — G47.33 OBSTRUCTIVE SLEEP APNEA (ADULT) (PEDIATRIC): ICD-10-CM

## 2023-08-15 DIAGNOSIS — M75.100 UNSPECIFIED ROTATOR CUFF TEAR OR RUPTURE OF UNSPECIFIED SHOULDER, NOT SPECIFIED AS TRAUMATIC: ICD-10-CM

## 2023-08-15 DIAGNOSIS — I10 ESSENTIAL (PRIMARY) HYPERTENSION: ICD-10-CM

## 2023-08-15 DIAGNOSIS — E03.9 HYPOTHYROIDISM, UNSPECIFIED: ICD-10-CM

## 2023-08-15 DIAGNOSIS — Z98.49 CATARACT EXTRACTION STATUS, UNSPECIFIED EYE: Chronic | ICD-10-CM

## 2023-08-15 DIAGNOSIS — Z91.89 OTHER SPECIFIED PERSONAL RISK FACTORS, NOT ELSEWHERE CLASSIFIED: ICD-10-CM

## 2023-08-15 DIAGNOSIS — Z98.890 OTHER SPECIFIED POSTPROCEDURAL STATES: Chronic | ICD-10-CM

## 2023-08-15 DIAGNOSIS — Z98.891 HISTORY OF UTERINE SCAR FROM PREVIOUS SURGERY: Chronic | ICD-10-CM

## 2023-08-15 LAB
A1C WITH ESTIMATED AVERAGE GLUCOSE RESULT: 5.3 % — SIGNIFICANT CHANGE UP (ref 4–5.6)
ANION GAP SERPL CALC-SCNC: 12 MMOL/L — SIGNIFICANT CHANGE UP (ref 5–17)
APTT BLD: 33.2 SEC — SIGNIFICANT CHANGE UP (ref 24.5–35.6)
BASOPHILS # BLD AUTO: 0.04 K/UL — SIGNIFICANT CHANGE UP (ref 0–0.2)
BASOPHILS NFR BLD AUTO: 0.6 % — SIGNIFICANT CHANGE UP (ref 0–2)
BLD GP AB SCN SERPL QL: SIGNIFICANT CHANGE UP
BUN SERPL-MCNC: 18.1 MG/DL — SIGNIFICANT CHANGE UP (ref 8–20)
CALCIUM SERPL-MCNC: 9.1 MG/DL — SIGNIFICANT CHANGE UP (ref 8.4–10.5)
CHLORIDE SERPL-SCNC: 106 MMOL/L — SIGNIFICANT CHANGE UP (ref 96–108)
CO2 SERPL-SCNC: 24 MMOL/L — SIGNIFICANT CHANGE UP (ref 22–29)
CREAT SERPL-MCNC: 0.93 MG/DL — SIGNIFICANT CHANGE UP (ref 0.5–1.3)
EGFR: 67 ML/MIN/1.73M2 — SIGNIFICANT CHANGE UP
EOSINOPHIL # BLD AUTO: 0.38 K/UL — SIGNIFICANT CHANGE UP (ref 0–0.5)
EOSINOPHIL NFR BLD AUTO: 5.7 % — SIGNIFICANT CHANGE UP (ref 0–6)
ESTIMATED AVERAGE GLUCOSE: 105 MG/DL — SIGNIFICANT CHANGE UP (ref 68–114)
GLUCOSE SERPL-MCNC: 91 MG/DL — SIGNIFICANT CHANGE UP (ref 70–99)
HCT VFR BLD CALC: 38.8 % — SIGNIFICANT CHANGE UP (ref 34.5–45)
HGB BLD-MCNC: 13 G/DL — SIGNIFICANT CHANGE UP (ref 11.5–15.5)
IMM GRANULOCYTES NFR BLD AUTO: 0.3 % — SIGNIFICANT CHANGE UP (ref 0–0.9)
INR BLD: 1.01 RATIO — SIGNIFICANT CHANGE UP (ref 0.85–1.18)
LYMPHOCYTES # BLD AUTO: 2.49 K/UL — SIGNIFICANT CHANGE UP (ref 1–3.3)
LYMPHOCYTES # BLD AUTO: 37.4 % — SIGNIFICANT CHANGE UP (ref 13–44)
MCHC RBC-ENTMCNC: 31.6 PG — SIGNIFICANT CHANGE UP (ref 27–34)
MCHC RBC-ENTMCNC: 33.5 GM/DL — SIGNIFICANT CHANGE UP (ref 32–36)
MCV RBC AUTO: 94.2 FL — SIGNIFICANT CHANGE UP (ref 80–100)
MONOCYTES # BLD AUTO: 0.57 K/UL — SIGNIFICANT CHANGE UP (ref 0–0.9)
MONOCYTES NFR BLD AUTO: 8.6 % — SIGNIFICANT CHANGE UP (ref 2–14)
NEUTROPHILS # BLD AUTO: 3.15 K/UL — SIGNIFICANT CHANGE UP (ref 1.8–7.4)
NEUTROPHILS NFR BLD AUTO: 47.4 % — SIGNIFICANT CHANGE UP (ref 43–77)
PLATELET # BLD AUTO: 271 K/UL — SIGNIFICANT CHANGE UP (ref 150–400)
POTASSIUM SERPL-MCNC: 3.9 MMOL/L — SIGNIFICANT CHANGE UP (ref 3.5–5.3)
POTASSIUM SERPL-SCNC: 3.9 MMOL/L — SIGNIFICANT CHANGE UP (ref 3.5–5.3)
PROTHROM AB SERPL-ACNC: 11.2 SEC — SIGNIFICANT CHANGE UP (ref 9.5–13)
RBC # BLD: 4.12 M/UL — SIGNIFICANT CHANGE UP (ref 3.8–5.2)
RBC # FLD: 13.2 % — SIGNIFICANT CHANGE UP (ref 10.3–14.5)
SODIUM SERPL-SCNC: 142 MMOL/L — SIGNIFICANT CHANGE UP (ref 135–145)
T3 SERPL-MCNC: 106 NG/DL — SIGNIFICANT CHANGE UP (ref 80–200)
T4 AB SER-ACNC: 10.3 UG/DL — SIGNIFICANT CHANGE UP (ref 4.5–12)
TSH SERPL-MCNC: 1.74 UIU/ML — SIGNIFICANT CHANGE UP (ref 0.27–4.2)
WBC # BLD: 6.65 K/UL — SIGNIFICANT CHANGE UP (ref 3.8–10.5)
WBC # FLD AUTO: 6.65 K/UL — SIGNIFICANT CHANGE UP (ref 3.8–10.5)

## 2023-08-15 PROCEDURE — G0463: CPT

## 2023-08-15 RX ORDER — CHOLECALCIFEROL (VITAMIN D3) 125 MCG
0 CAPSULE ORAL
Qty: 0 | Refills: 0 | DISCHARGE

## 2023-08-15 RX ORDER — LEVOTHYROXINE SODIUM 125 MCG
0 TABLET ORAL
Qty: 0 | Refills: 0 | DISCHARGE

## 2023-08-15 RX ORDER — AMLODIPINE BESYLATE 2.5 MG/1
0 TABLET ORAL
Qty: 0 | Refills: 0 | DISCHARGE

## 2023-08-15 RX ORDER — METOPROLOL TARTRATE 50 MG
0 TABLET ORAL
Qty: 0 | Refills: 0 | DISCHARGE

## 2023-08-15 NOTE — H&P PST ADULT - MUSCULOSKELETAL
details… ROM intact/no calf tenderness/decreased ROM due to pain/normal gait/strength 5/5 bilateral upper extremities/strength 5/5 bilateral lower extremities

## 2023-08-15 NOTE — H&P PST ADULT - PROBLEM SELECTOR PLAN 1
medical clearance, cardiac clearance pending  Patient is scheduled for left shoulder arthroscopic rotator cuff repair subacromial decompression, synovectomy and possible open subpectoral biceps tenotomy on 8/31/23 with Dr. Su.

## 2023-08-15 NOTE — H&P PST ADULT - PROBLEM SELECTOR PROBLEM 1
At risk for venous thromboembolism (VTE) Unspecified rotator cuff tear or rupture of unspecified shoulder, not specified as traumatic

## 2023-08-15 NOTE — H&P PST ADULT - NSICDXPASTMEDICALHX_GEN_ALL_CORE_FT
PAST MEDICAL HISTORY:  Hypertension     Hypothyroid      PAST MEDICAL HISTORY:  Hypertension     Hypothyroid     ARSLAN on CPAP     RBBB

## 2023-08-15 NOTE — H&P PST ADULT - NSICDXPASTSURGICALHX_GEN_ALL_CORE_FT
PAST SURGICAL HISTORY:  H/O:  section      PAST SURGICAL HISTORY:  H/O cataract extraction     H/O dilation and curettage     H/O:  section

## 2023-08-15 NOTE — H&P PST ADULT - HISTORY OF PRESENT ILLNESS
66 y/o female with PMH of ARSLAN on CPAP, HTN, RBBB and hypothyroidism presents to PST with complaints left shoulder pain x 4 months that has gotten progressively worse. States she was away on vacation went to reach for something and felt sudden shoulder pain. Reports pain to be intermittent, radiates down left arm, described as sharp and achy, 7/10 in severity, worse lifting arm, relieved NSAIDs and rest. She has gone for physical therapy with minimal relief. MRI done 6/6/23 which found Full-thickness, essentially full width tear of the supraspinatus on a background of severe tendinopathy with tendinous retraction to the level of the acromioclavicular joint, mild interstitial tearing is noted to the most anterior fibers of the infraspinatus tendon, Mild biceps intra-articular tendinopathy. Denies fevers, chills, nausea or vomiting. Patient is scheduled for    66 y/o female with PMH of ARSLAN on CPAP, HTN, RBBB and hypothyroidism presents to PST with complaints left shoulder pain x 4 months that has gotten progressively worse. States she was away on vacation went to reach for something and felt sudden shoulder pain. Reports pain to be intermittent, radiates down left arm, described as sharp and achy, 7/10 in severity, worse lifting arm, relieved NSAIDs and rest. She has gone for physical therapy with minimal relief. MRI done 6/6/23 which found Full-thickness, essentially full width tear of the supraspinatus on a background of severe tendinopathy with tendinous retraction to the level of the acromioclavicular joint, mild interstitial tearing is noted to the most anterior fibers of the infraspinatus tendon, Mild biceps intra-articular tendinopathy. Denies fevers, chills, nausea or vomiting. Patient is scheduled for left shoulder arthroscopic rotator cuff repair subacromial decompression, synovectomy and possible open subpectoral biceps tenotomy on 8/31/23 with Dr. Su.

## 2023-08-15 NOTE — H&P PST ADULT - ASSESSMENT
Patient educated on surgical scrub, preadmission instructions, ERP protocol, medical clearance and day of procedure medications, verbalizes understanding. Pt instructed to stop vitamins/supplements/herbal medications/ASA/NSAIDS for one week prior to surgery and discuss with PMD.    CAPRINI SCORE    AGE RELATED RISK FACTORS                                                             [ ] Age 41-60 years                                            (1 Point)  [ ] Age: 61-74 years                                           (2 Points)                 [ ] Age= 75 years                                                (3 Points)             DISEASE RELATED RISK FACTORS                                                       [ ] Edema in the lower extremities                 (1 Point)                     [ ] Varicose veins                                               (1 Point)                                 [ ] BMI > 25 Kg/m2                                            (1 Point)                                  [ ] Serious infection (ie PNA)                            (1 Point)                     [ ] Lung disease ( COPD, Emphysema)            (1 Point)                                                                          [ ] Acute myocardial infarction                         (1 Point)                  [ ] Congestive heart failure (in the previous month)  (1 Point)         [ ] Inflammatory bowel disease                            (1 Point)                  [ ] Central venous access, PICC or Port               (2 points)       (within the last month)                                                                [ ] Stroke (in the previous month)                        (5 Points)    [ ] Previous or present malignancy                       (2 points)                                                                                                                                                         HEMATOLOGY RELATED FACTORS                                                         [ ] Prior episodes of VTE                                     (3 Points)                     [ ] Positive family history for VTE                      (3 Points)                  [ ] Prothrombin 56122 A                                     (3 Points)                     [ ] Factor V Leiden                                                (3 Points)                        [ ] Lupus anticoagulants                                      (3 Points)                                                           [ ] Anticardiolipin antibodies                              (3 Points)                                                       [ ] High homocysteine in the blood                   (3 Points)                                             [ ] Other congenital or acquired thrombophilia      (3 Points)                                                [ ] Heparin induced thrombocytopenia                  (3 Points)                                        MOBILITY RELATED FACTORS  [ ] Bed rest                                                         (1 Point)  [ ] Plaster cast                                                    (2 points)  [ ] Bed bound for more than 72 hours           (2 Points)    GENDER SPECIFIC FACTORS  [ ] Pregnancy or had a baby within the last month   (1 Point)  [ ] Post-partum < 6 weeks                                   (1 Point)  [ ] Hormonal therapy  or oral contraception   (1 Point)  [ ] History of pregnancy complications              (1 point)  [ ] Unexplained or recurrent              (1 Point)    OTHER RISK FACTORS                                           (1 Point)  [ ] BMI >40, smoking, diabetes requiring insulin, chemotherapy  blood transfusions and length of surgery over 2 hours    SURGERY RELATED RISK FACTORS  [ ]  Section within the last month     (1 Point)  [ ] Minor surgery                                                  (1 Point)  [ ] Arthroscopic surgery                                       (2 Points)  [ ] Planned major surgery lasting more            (2 Points)      than 45 minutes     [ ] Elective hip or knee joint replacement       (5 points)       surgery                                                TRAUMA RELATED RISK FACTORS  [ ] Fracture of the hip, pelvis, or leg                       (5 Points)  [ ] Spinal cord injury resulting in paralysis             (5 points)       (in the previous month)    [ ] Paralysis  (less than 1 month)                             (5 Points)  [ ] Multiple Trauma within 1 month                        (5 Points)    Total Score [        ]    Caprini Score 0-2: Low Risk, NO VTE prophylaxis required for most patients, encourage ambulation  Caprini Score 3-6: Moderate Risk , pharmacologic VTE prophylaxis is indicated for most patients (in the absence of contraindications)  Caprini Score Greater than or =7: High risk, pharmocologic VTE prophylaxis indicated for most patients (in the absence of contraindications)    OPIOID RISK TOOL    CATHY EACH BOX THAT APPLIES AND ADD TOTALS AT THE END    FAMILY HISTORY OF SUBSTANCE ABUSE                 FEMALE         MALE                                                Alcohol                             [  ]1 pt          [  ]3pts                                               Illegal Durgs                     [  ]2 pts        [  ]3pts                                               Rx Drugs                           [  ]4 pts        [  ]4 pts    PERSONAL HISTORY OF SUBSTANCE ABUSE                                                                                          Alcohol                             [  ]3 pts       [  ]3 pts                                               Illegal Drugs                     [  ]4 pts        [  ]4 pts                                               Rx Drugs                           [  ]5 pts        [  ]5 pts    AGE BETWEEN 16-45 YEARS                                      [  ]1 pt         [  ]1 pt    HISTORY OF PREADOLESCENT   SEXUAL ABUSE                                                             [  ]3 pts        [  ]0pts    PSYCHOLOGICAL DISEASE                     ADD, OCD, Bipolar, Schizophrenia        [  ]2 pts         [  ]2 pts                      Depression                                               [  ]1 pt           [  ]1 pt           SCORING TOTAL   (add numbers and type here)              (***)                                     A score of 3 or lower indicated LOW risk for future opioid abuse  A score of 4 to 7 indicated moderate risk for future opioid abuse  A score of 8 or higher indicates a high risk for opioid abuse     Patient educated on surgical scrub, preadmission instructions, ERP protocol, medical clearance, cardiac clearance and day of procedure medications, verbalizes understanding. Pt instructed to stop vitamins/supplements/herbal medications/ASA/NSAIDS for one week prior to surgery and discuss with PMD.    CAPRINI SCORE    AGE RELATED RISK FACTORS                                                             [ ] Age 41-60 years                                            (1 Point)  [ ] Age: 61-74 years                                           (2 Points)                 [ ] Age= 75 years                                                (3 Points)             DISEASE RELATED RISK FACTORS                                                       [ ] Edema in the lower extremities                 (1 Point)                     [ ] Varicose veins                                               (1 Point)                                 [ ] BMI > 25 Kg/m2                                            (1 Point)                                  [ ] Serious infection (ie PNA)                            (1 Point)                     [ ] Lung disease ( COPD, Emphysema)            (1 Point)                                                                          [ ] Acute myocardial infarction                         (1 Point)                  [ ] Congestive heart failure (in the previous month)  (1 Point)         [ ] Inflammatory bowel disease                            (1 Point)                  [ ] Central venous access, PICC or Port               (2 points)       (within the last month)                                                                [ ] Stroke (in the previous month)                        (5 Points)    [ ] Previous or present malignancy                       (2 points)                                                                                                                                                         HEMATOLOGY RELATED FACTORS                                                         [ ] Prior episodes of VTE                                     (3 Points)                     [ ] Positive family history for VTE                      (3 Points)                  [ ] Prothrombin 13211 A                                     (3 Points)                     [ ] Factor V Leiden                                                (3 Points)                        [ ] Lupus anticoagulants                                      (3 Points)                                                           [ ] Anticardiolipin antibodies                              (3 Points)                                                       [ ] High homocysteine in the blood                   (3 Points)                                             [ ] Other congenital or acquired thrombophilia      (3 Points)                                                [ ] Heparin induced thrombocytopenia                  (3 Points)                                        MOBILITY RELATED FACTORS  [ ] Bed rest                                                         (1 Point)  [ ] Plaster cast                                                    (2 points)  [ ] Bed bound for more than 72 hours           (2 Points)    GENDER SPECIFIC FACTORS  [ ] Pregnancy or had a baby within the last month   (1 Point)  [ ] Post-partum < 6 weeks                                   (1 Point)  [ ] Hormonal therapy  or oral contraception   (1 Point)  [ ] History of pregnancy complications              (1 point)  [ ] Unexplained or recurrent              (1 Point)    OTHER RISK FACTORS                                           (1 Point)  [ ] BMI >40, smoking, diabetes requiring insulin, chemotherapy  blood transfusions and length of surgery over 2 hours    SURGERY RELATED RISK FACTORS  [ ]  Section within the last month     (1 Point)  [ ] Minor surgery                                                  (1 Point)  [ ] Arthroscopic surgery                                       (2 Points)  [ ] Planned major surgery lasting more            (2 Points)      than 45 minutes     [ ] Elective hip or knee joint replacement       (5 points)       surgery                                                TRAUMA RELATED RISK FACTORS  [ ] Fracture of the hip, pelvis, or leg                       (5 Points)  [ ] Spinal cord injury resulting in paralysis             (5 points)       (in the previous month)    [ ] Paralysis  (less than 1 month)                             (5 Points)  [ ] Multiple Trauma within 1 month                        (5 Points)    Total Score [        ]    Caprini Score 0-2: Low Risk, NO VTE prophylaxis required for most patients, encourage ambulation  Caprini Score 3-6: Moderate Risk , pharmacologic VTE prophylaxis is indicated for most patients (in the absence of contraindications)  Caprini Score Greater than or =7: High risk, pharmocologic VTE prophylaxis indicated for most patients (in the absence of contraindications)    OPIOID RISK TOOL    CATHY EACH BOX THAT APPLIES AND ADD TOTALS AT THE END    FAMILY HISTORY OF SUBSTANCE ABUSE                 FEMALE         MALE                                                Alcohol                             [  ]1 pt          [  ]3pts                                               Illegal Durgs                     [  ]2 pts        [  ]3pts                                               Rx Drugs                           [  ]4 pts        [  ]4 pts    PERSONAL HISTORY OF SUBSTANCE ABUSE                                                                                          Alcohol                             [  ]3 pts       [  ]3 pts                                               Illegal Drugs                     [  ]4 pts        [  ]4 pts                                               Rx Drugs                           [  ]5 pts        [  ]5 pts    AGE BETWEEN 16-45 YEARS                                      [  ]1 pt         [  ]1 pt    HISTORY OF PREADOLESCENT   SEXUAL ABUSE                                                             [  ]3 pts        [  ]0pts    PSYCHOLOGICAL DISEASE                     ADD, OCD, Bipolar, Schizophrenia        [  ]2 pts         [  ]2 pts                      Depression                                               [  ]1 pt           [  ]1 pt           SCORING TOTAL   (add numbers and type here)              (***)                                     A score of 3 or lower indicated LOW risk for future opioid abuse  A score of 4 to 7 indicated moderate risk for future opioid abuse  A score of 8 or higher indicates a high risk for opioid abuse   68 y/o female with PMH of ARSLAN on CPAP, HTN, RBBB and hypothyroidism presents to PST with complaints left shoulder pain x 4 months that has gotten progressively worse. States she was away on vacation went to reach for something and felt sudden shoulder pain. Reports pain to be intermittent, radiates down left arm, described as sharp and achy, 7/10 in severity, worse lifting arm, relieved NSAIDs and rest. She has gone for physical therapy with minimal relief. MRI done 23 which found Full-thickness, essentially full width tear of the supraspinatus on a background of severe tendinopathy with tendinous retraction to the level of the acromioclavicular joint, mild interstitial tearing is noted to the most anterior fibers of the infraspinatus tendon, Mild biceps intra-articular tendinopathy. Denies fevers, chills, nausea or vomiting. Patient is scheduled for left shoulder arthroscopic rotator cuff repair subacromial decompression, synovectomy and possible open subpectoral biceps tenotomy on 23 with Dr. Su. Patient educated on surgical scrub, preadmission instructions, ERP protocol, medical clearance, cardiac clearance and day of procedure medications, verbalizes understanding. Pt instructed to stop vitamins/supplements/herbal medications/ASA/NSAIDS for one week prior to surgery and discuss with PMD.    CAPRINI SCORE    AGE RELATED RISK FACTORS                                                             [ ] Age 41-60 years                                            (1 Point)  [x ] Age: 61-74 years                                           (2 Points)                 [ ] Age= 75 years                                                (3 Points)             DISEASE RELATED RISK FACTORS                                                       [ x] Edema in the lower extremities                 (1 Point)                     [ ] Varicose veins                                               (1 Point)                                 [x ] BMI > 25 Kg/m2                                            (1 Point)                                  [ ] Serious infection (ie PNA)                            (1 Point)                     [ ] Lung disease ( COPD, Emphysema)            (1 Point)                                                                          [ ] Acute myocardial infarction                         (1 Point)                  [ ] Congestive heart failure (in the previous month)  (1 Point)         [ ] Inflammatory bowel disease                            (1 Point)                  [ ] Central venous access, PICC or Port               (2 points)       (within the last month)                                                                [ ] Stroke (in the previous month)                        (5 Points)    [ ] Previous or present malignancy                       (2 points)                                                                                                                                                         HEMATOLOGY RELATED FACTORS                                                         [ ] Prior episodes of VTE                                     (3 Points)                     [ ] Positive family history for VTE                      (3 Points)                  [ ] Prothrombin 79289 A                                     (3 Points)                     [ ] Factor V Leiden                                                (3 Points)                        [ ] Lupus anticoagulants                                      (3 Points)                                                           [ ] Anticardiolipin antibodies                              (3 Points)                                                       [ ] High homocysteine in the blood                   (3 Points)                                             [ ] Other congenital or acquired thrombophilia      (3 Points)                                                [ ] Heparin induced thrombocytopenia                  (3 Points)                                        MOBILITY RELATED FACTORS  [ ] Bed rest                                                         (1 Point)  [ ] Plaster cast                                                    (2 points)  [ ] Bed bound for more than 72 hours           (2 Points)    GENDER SPECIFIC FACTORS  [ ] Pregnancy or had a baby within the last month   (1 Point)  [ ] Post-partum < 6 weeks                                   (1 Point)  [ ] Hormonal therapy  or oral contraception   (1 Point)  [ ] History of pregnancy complications              (1 point)  [ ] Unexplained or recurrent              (1 Point)    OTHER RISK FACTORS                                           (1 Point)  [ x] BMI >40, smoking, diabetes requiring insulin, chemotherapy  blood transfusions and length of surgery over 2 hours    SURGERY RELATED RISK FACTORS  [ ]  Section within the last month     (1 Point)  [ ] Minor surgery                                                  (1 Point)  [ x] Arthroscopic surgery                                       (2 Points)  [ ] Planned major surgery lasting more            (2 Points)      than 45 minutes     [ ] Elective hip or knee joint replacement       (5 points)       surgery                                                TRAUMA RELATED RISK FACTORS  [ ] Fracture of the hip, pelvis, or leg                       (5 Points)  [ ] Spinal cord injury resulting in paralysis             (5 points)       (in the previous month)    [ ] Paralysis  (less than 1 month)                             (5 Points)  [ ] Multiple Trauma within 1 month                        (5 Points)    Total Score [  6      ]    Caprini Score 0-2: Low Risk, NO VTE prophylaxis required for most patients, encourage ambulation  Caprini Score 3-6: Moderate Risk , pharmacologic VTE prophylaxis is indicated for most patients (in the absence of contraindications)  Caprini Score Greater than or =7: High risk, pharmocologic VTE prophylaxis indicated for most patients (in the absence of contraindications)    OPIOID RISK TOOL    CATHY EACH BOX THAT APPLIES AND ADD TOTALS AT THE END    FAMILY HISTORY OF SUBSTANCE ABUSE                 FEMALE         MALE                                                Alcohol                             [  ]1 pt          [  ]3pts                                               Illegal Durgs                     [  ]2 pts        [  ]3pts                                               Rx Drugs                           [  ]4 pts        [  ]4 pts    PERSONAL HISTORY OF SUBSTANCE ABUSE                                                                                          Alcohol                             [  ]3 pts       [  ]3 pts                                               Illegal Drugs                     [  ]4 pts        [  ]4 pts                                               Rx Drugs                           [  ]5 pts        [  ]5 pts    AGE BETWEEN 16-45 YEARS                                      [  ]1 pt         [  ]1 pt    HISTORY OF PREADOLESCENT   SEXUAL ABUSE                                                             [  ]3 pts        [  ]0pts    PSYCHOLOGICAL DISEASE                     ADD, OCD, Bipolar, Schizophrenia        [  ]2 pts         [  ]2 pts                      Depression                                               [  ]1 pt           [  ]1 pt           SCORING TOTAL   (add numbers and type here)              (*0**)                                     A score of 3 or lower indicated LOW risk for future opioid abuse  A score of 4 to 7 indicated moderate risk for future opioid abuse  A score of 8 or higher indicates a high risk for opioid abuse

## 2023-08-16 ENCOUNTER — NON-APPOINTMENT (OUTPATIENT)
Age: 68
End: 2023-08-16

## 2023-08-17 ENCOUNTER — APPOINTMENT (OUTPATIENT)
Dept: CARDIOLOGY | Facility: CLINIC | Age: 68
End: 2023-08-17
Payer: MEDICARE

## 2023-08-17 PROBLEM — G47.33 OBSTRUCTIVE SLEEP APNEA (ADULT) (PEDIATRIC): Chronic | Status: ACTIVE | Noted: 2023-08-15

## 2023-08-17 PROBLEM — I45.10 UNSPECIFIED RIGHT BUNDLE-BRANCH BLOCK: Chronic | Status: ACTIVE | Noted: 2023-08-15

## 2023-08-17 PROCEDURE — 93015 CV STRESS TEST SUPVJ I&R: CPT

## 2023-08-17 PROCEDURE — 78452 HT MUSCLE IMAGE SPECT MULT: CPT

## 2023-08-17 PROCEDURE — A9500: CPT

## 2023-08-18 ENCOUNTER — NON-APPOINTMENT (OUTPATIENT)
Age: 68
End: 2023-08-18

## 2023-08-21 ENCOUNTER — APPOINTMENT (OUTPATIENT)
Dept: CARDIOLOGY | Facility: CLINIC | Age: 68
End: 2023-08-21

## 2023-08-24 ENCOUNTER — APPOINTMENT (OUTPATIENT)
Dept: FAMILY MEDICINE | Facility: CLINIC | Age: 68
End: 2023-08-24
Payer: MEDICARE

## 2023-08-24 VITALS
WEIGHT: 261 LBS | HEIGHT: 62 IN | SYSTOLIC BLOOD PRESSURE: 162 MMHG | TEMPERATURE: 97.7 F | HEART RATE: 72 BPM | DIASTOLIC BLOOD PRESSURE: 76 MMHG | OXYGEN SATURATION: 99 % | BODY MASS INDEX: 48.03 KG/M2

## 2023-08-24 DIAGNOSIS — Z12.39 ENCOUNTER FOR OTHER SCREENING FOR MALIGNANT NEOPLASM OF BREAST: ICD-10-CM

## 2023-08-24 PROCEDURE — 99215 OFFICE O/P EST HI 40 MIN: CPT

## 2023-08-24 RX ORDER — ASPIRIN 81 MG/1
81 TABLET ORAL DAILY
Refills: 3 | Status: DISCONTINUED | COMMUNITY
Start: 2017-11-17 | End: 2023-08-24

## 2023-08-24 NOTE — RESULTS/DATA
[] : results reviewed [de-identified] : 8/15/23: KATHY [de-identified] : WNL [de-identified] : WNL [de-identified] : NSR 63 bpm.  Right bundle branch block.  No acute or interval change. [de-identified] : Hemoglobin A1c 5.3 TSH 1.74 T3 106 Total T4 10.3 ABO Rh: A positive Antibody screen: negative

## 2023-08-24 NOTE — HISTORY OF PRESENT ILLNESS
[(Patient denies any chest pain, claudication, dyspnea on exertion, orthopnea, palpitations or syncope)] : Patient denies any chest pain, claudication, dyspnea on exertion, orthopnea, palpitations or syncope [FreeTextEntry1] : Arthroscopic Left Rotator Cuff Repair [FreeTextEntry2] : 08/31/2023 [FreeTextEntry3] : Dr. Trey Su [FreeTextEntry4] : The patient is a 67-year-old female with a past medical history of obesity, hypertension, hyperlipidemia, hypothyroidism, obstructive sleep apnea, and pulmonary hypertension who presents for preoperative clearance for repair of the left rotator cuff. She is feeling well today. [FreeTextEntry7] : Nuclear stress test 8/18/2023: EF 69%.  No evidence of ischemia. Echo 8/16/23:  EF 70% Trace .

## 2023-08-24 NOTE — PHYSICAL EXAM
[No Acute Distress] : no acute distress [Well Nourished] : well nourished [Well Developed] : well developed [Well-Appearing] : well-appearing [Normal Sclera/Conjunctiva] : normal sclera/conjunctiva [PERRL] : pupils equal round and reactive to light [EOMI] : extraocular movements intact [Normal Outer Ear/Nose] : the outer ears and nose were normal in appearance [Normal Oropharynx] : the oropharynx was normal [Normal TMs] : both tympanic membranes were normal [No JVD] : no jugular venous distention [No Lymphadenopathy] : no lymphadenopathy [Supple] : supple [No Respiratory Distress] : no respiratory distress  [Clear to Auscultation] : lungs were clear to auscultation bilaterally [Normal Rate] : normal rate  [Regular Rhythm] : with a regular rhythm [Normal S1, S2] : normal S1 and S2 [No Murmur] : no murmur heard [No Carotid Bruits] : no carotid bruits [No Abdominal Bruit] : a ~M bruit was not heard ~T in the abdomen [No Varicosities] : no varicosities [Pedal Pulses Present] : the pedal pulses are present [No Edema] : there was no peripheral edema [No Palpable Aorta] : no palpable aorta [No Extremity Clubbing/Cyanosis] : no extremity clubbing/cyanosis [Soft] : abdomen soft [Non Tender] : non-tender [Non-distended] : non-distended [No Masses] : no abdominal mass palpated [No HSM] : no HSM [Normal Bowel Sounds] : normal bowel sounds [Normal Posterior Cervical Nodes] : no posterior cervical lymphadenopathy [Normal Anterior Cervical Nodes] : no anterior cervical lymphadenopathy [No Joint Swelling] : no joint swelling [Grossly Normal Strength/Tone] : grossly normal strength/tone [No Rash] : no rash [Coordination Grossly Intact] : coordination grossly intact [No Focal Deficits] : no focal deficits [Normal Gait] : normal gait [Deep Tendon Reflexes (DTR)] : deep tendon reflexes were 2+ and symmetric [Normal Affect] : the affect was normal [Normal Mood] : the mood was normal [Normal Insight/Judgement] : insight and judgment were intact

## 2023-08-24 NOTE — ASSESSMENT
[Patient Optimized for Surgery] : Patient optimized for surgery [No Further Testing Recommended] : no further testing recommended [As per surgery] : as per surgery [FreeTextEntry4] : The patient is a 67-year-old female with a past medical history of obesity, hypertension, hyperlipidemia, hypothyroidism, obstructive sleep apnea, and pulmonary hypertension who presents for preoperative clearance for repair of the left rotator cuff. There is no medical contraindication to the proposed procedure. The patient is medically cleared to proceed with the planned surgery. [FreeTextEntry7] : Patient is to take her amlodipine–valsartan and her carvedilol in the a.m. of the procedure with a small sip of water.

## 2023-08-28 ENCOUNTER — TRANSCRIPTION ENCOUNTER (OUTPATIENT)
Age: 68
End: 2023-08-28

## 2023-08-30 ENCOUNTER — TRANSCRIPTION ENCOUNTER (OUTPATIENT)
Age: 68
End: 2023-08-30

## 2023-08-30 NOTE — ASU PATIENT PROFILE, ADULT - NSICDXPASTSURGICALHX_GEN_ALL_CORE_FT
PAST SURGICAL HISTORY:  H/O cataract extraction     H/O dilation and curettage     H/O:  section

## 2023-08-31 ENCOUNTER — OUTPATIENT (OUTPATIENT)
Dept: INPATIENT UNIT | Facility: HOSPITAL | Age: 68
LOS: 1 days | End: 2023-08-31
Payer: MEDICARE

## 2023-08-31 ENCOUNTER — APPOINTMENT (OUTPATIENT)
Dept: ORTHOPEDIC SURGERY | Facility: HOSPITAL | Age: 68
End: 2023-08-31

## 2023-08-31 ENCOUNTER — TRANSCRIPTION ENCOUNTER (OUTPATIENT)
Age: 68
End: 2023-08-31

## 2023-08-31 VITALS
RESPIRATION RATE: 16 BRPM | SYSTOLIC BLOOD PRESSURE: 167 MMHG | WEIGHT: 264.55 LBS | TEMPERATURE: 97 F | DIASTOLIC BLOOD PRESSURE: 85 MMHG | HEIGHT: 67 IN | OXYGEN SATURATION: 98 % | HEART RATE: 70 BPM

## 2023-08-31 VITALS
SYSTOLIC BLOOD PRESSURE: 150 MMHG | DIASTOLIC BLOOD PRESSURE: 76 MMHG | HEART RATE: 76 BPM | RESPIRATION RATE: 19 BRPM | OXYGEN SATURATION: 96 % | TEMPERATURE: 98 F

## 2023-08-31 DIAGNOSIS — M75.100 UNSPECIFIED ROTATOR CUFF TEAR OR RUPTURE OF UNSPECIFIED SHOULDER, NOT SPECIFIED AS TRAUMATIC: ICD-10-CM

## 2023-08-31 DIAGNOSIS — Z01.818 ENCOUNTER FOR OTHER PREPROCEDURAL EXAMINATION: ICD-10-CM

## 2023-08-31 DIAGNOSIS — Z98.890 OTHER SPECIFIED POSTPROCEDURAL STATES: Chronic | ICD-10-CM

## 2023-08-31 DIAGNOSIS — Z98.891 HISTORY OF UTERINE SCAR FROM PREVIOUS SURGERY: Chronic | ICD-10-CM

## 2023-08-31 DIAGNOSIS — Z98.49 CATARACT EXTRACTION STATUS, UNSPECIFIED EYE: Chronic | ICD-10-CM

## 2023-08-31 PROCEDURE — 29827 SHO ARTHRS SRG RT8TR CUF RPR: CPT | Mod: LT

## 2023-08-31 PROCEDURE — 94640 AIRWAY INHALATION TREATMENT: CPT

## 2023-08-31 PROCEDURE — C1713: CPT

## 2023-08-31 PROCEDURE — 29823 SHO ARTHRS SRG XTNSV DBRDMT: CPT | Mod: LT

## 2023-08-31 PROCEDURE — C9399: CPT

## 2023-08-31 PROCEDURE — 29826 SHO ARTHRS SRG DECOMPRESSION: CPT | Mod: LT

## 2023-08-31 DEVICE — ANCHOR HELIX ADV 3 SUT 4.5MM: Type: IMPLANTABLE DEVICE | Site: LEFT | Status: FUNCTIONAL

## 2023-08-31 RX ORDER — ACETAMINOPHEN 500 MG
975 TABLET ORAL ONCE
Refills: 0 | Status: COMPLETED | OUTPATIENT
Start: 2023-08-31 | End: 2023-08-31

## 2023-08-31 RX ORDER — CEFAZOLIN SODIUM 1 G
2000 VIAL (EA) INJECTION ONCE
Refills: 0 | Status: DISCONTINUED | OUTPATIENT
Start: 2023-08-31 | End: 2023-08-31

## 2023-08-31 RX ORDER — IPRATROPIUM/ALBUTEROL SULFATE 18-103MCG
3 AEROSOL WITH ADAPTER (GRAM) INHALATION ONCE
Refills: 0 | Status: COMPLETED | OUTPATIENT
Start: 2023-08-31 | End: 2023-08-31

## 2023-08-31 RX ORDER — CEFAZOLIN SODIUM 1 G
3000 VIAL (EA) INJECTION ONCE
Refills: 0 | Status: DISCONTINUED | OUTPATIENT
Start: 2023-08-31 | End: 2023-08-31

## 2023-08-31 RX ADMIN — Medication 3 MILLILITER(S): at 16:31

## 2023-08-31 RX ADMIN — Medication 975 MILLIGRAM(S): at 07:59

## 2023-08-31 NOTE — BRIEF OPERATIVE NOTE - OPERATION/FINDINGS
right shoulder full-thickness supraspinatus tear, split tear of long head of biceps, subacromial impingement, synovitis

## 2023-08-31 NOTE — ASU DISCHARGE PLAN (ADULT/PEDIATRIC) - CARE PROVIDER_API CALL
Trey Su  Orthopaedic Surgery  [address] [place] [id number]  Phone: [phone]  Fax: [phone]  Follow Up Time: 2 weeks

## 2023-08-31 NOTE — ASU DISCHARGE PLAN (ADULT/PEDIATRIC) - NS MD DC FALL RISK RISK
For information on Fall & Injury Prevention, visit: [email or web address] OR  [email or web address] OR  https://www.cdc.gov/steadi/patient.html

## 2023-08-31 NOTE — BRIEF OPERATIVE NOTE - NSICDXBRIEFPOSTOP_GEN_ALL_CORE_FT
POST-OP DIAGNOSIS:  Rotator cuff tear 31-Aug-2023 12:46:57  Trey Su  Biceps tendon tear 31-Aug-2023 12:47:05  Trey Su

## 2023-08-31 NOTE — ASU DISCHARGE PLAN (ADULT/PEDIATRIC) - PROCEDURE
Status Post left shoulder arthroscopic rotator cuff repair, subacromial decompression, debridement, biceps tenotomy, synovectomy

## 2023-08-31 NOTE — BRIEF OPERATIVE NOTE - NSICDXBRIEFPREOP_GEN_ALL_CORE_FT
PRE-OP DIAGNOSIS:  Rotator cuff tear 31-Aug-2023 12:46:33  Trey Su  Biceps tendinopathy 31-Aug-2023 12:46:40  Trey Su

## 2023-08-31 NOTE — BRIEF OPERATIVE NOTE - NSICDXBRIEFPROCEDURE_GEN_ALL_CORE_FT
PROCEDURES:  Repair, rotator cuff, arthroscopic 31-Aug-2023 12:46:25  Trey Su  Tenotomy, biceps 31-Aug-2023 12:46:49  Trey Su

## 2023-09-08 ENCOUNTER — OUTPATIENT (OUTPATIENT)
Dept: OUTPATIENT SERVICES | Facility: HOSPITAL | Age: 68
LOS: 1 days | End: 2023-09-08
Payer: MEDICARE

## 2023-09-08 ENCOUNTER — APPOINTMENT (OUTPATIENT)
Dept: RADIOLOGY | Facility: CLINIC | Age: 68
End: 2023-09-08
Payer: MEDICARE

## 2023-09-08 ENCOUNTER — APPOINTMENT (OUTPATIENT)
Dept: FAMILY MEDICINE | Facility: CLINIC | Age: 68
End: 2023-09-08
Payer: MEDICARE

## 2023-09-08 VITALS
TEMPERATURE: 97.8 F | OXYGEN SATURATION: 97 % | HEART RATE: 72 BPM | BODY MASS INDEX: 47.29 KG/M2 | HEIGHT: 62 IN | WEIGHT: 257 LBS | DIASTOLIC BLOOD PRESSURE: 70 MMHG | SYSTOLIC BLOOD PRESSURE: 120 MMHG

## 2023-09-08 DIAGNOSIS — M25.551 PAIN IN RIGHT HIP: ICD-10-CM

## 2023-09-08 DIAGNOSIS — Z01.810 ENCOUNTER FOR PREPROCEDURAL CARDIOVASCULAR EXAMINATION: ICD-10-CM

## 2023-09-08 DIAGNOSIS — Z98.49 CATARACT EXTRACTION STATUS, UNSPECIFIED EYE: Chronic | ICD-10-CM

## 2023-09-08 DIAGNOSIS — Z98.890 OTHER SPECIFIED POSTPROCEDURAL STATES: Chronic | ICD-10-CM

## 2023-09-08 DIAGNOSIS — Z01.818 ENCOUNTER FOR OTHER PREPROCEDURAL EXAMINATION: ICD-10-CM

## 2023-09-08 DIAGNOSIS — Z98.891 HISTORY OF UTERINE SCAR FROM PREVIOUS SURGERY: Chronic | ICD-10-CM

## 2023-09-08 PROCEDURE — 73502 X-RAY EXAM HIP UNI 2-3 VIEWS: CPT

## 2023-09-08 PROCEDURE — 99214 OFFICE O/P EST MOD 30 MIN: CPT

## 2023-09-08 PROCEDURE — 73502 X-RAY EXAM HIP UNI 2-3 VIEWS: CPT | Mod: 26,RT

## 2023-09-08 NOTE — HISTORY OF PRESENT ILLNESS
[FreeTextEntry1] : RANJAN is a 67 y.o female here for follow up from left rotator cuff surgery 1 week ago. She c/o the left arm being hot from the sling. The left shoulder is mildly tender and is now taking Tylenol with adequate pain control. She is doing home exercises with the left shoulder as directed by her orthopedist.  She denies any swelling or numbness of the fingers. Patient complains of right hip pain, chronic, worse over the past week.  Patient notes pain in the right anterior groin worse when changing position from sitting to standing.  She feels a tightening in the right groin and then slowly needs to stretch it before advancing to walk.  She denies any right hip or leg trauma.  She also c/o pain in groin when trying to stand.

## 2023-09-08 NOTE — PHYSICAL EXAM
[No Acute Distress] : no acute distress [Soft] : abdomen soft [Non Tender] : non-tender [No Masses] : no abdominal mass palpated [Normal] : no posterior cervical lymphadenopathy and no anterior cervical lymphadenopathy [No Hernias] : no hernias [No Rash] : no rash [de-identified] : There is no edema of the left shoulder.  No edema of the right hip.  There is no hernia appreciated at the right inguinal region.  Gait is antalgic months patient is fully standing. [de-identified] : Left shoulder surgical wound sites are clean and dry.

## 2023-09-08 NOTE — END OF VISIT
[Time Spent: ___ minutes] : I have spent [unfilled] minutes of time on the encounter.
Alert and oriented to person, place and time

## 2023-09-08 NOTE — ASSESSMENT
[FreeTextEntry1] : Patient overall is doing very well status post left rotator cuff repair. Chronic right hip pain possibly due to tendinitis.  She did have x-ray of the right hip several months ago and no evidence of AVN.  We will recheck x-rays due to worsening symptoms. PT for right hip when cleared by orthopedist s/p left rotator cuff repair. Tylenol prn. F/U 1 month.

## 2023-09-11 ENCOUNTER — RX RENEWAL (OUTPATIENT)
Age: 68
End: 2023-09-11

## 2023-09-11 RX ORDER — CARVEDILOL 12.5 MG/1
12.5 TABLET, FILM COATED ORAL TWICE DAILY
Qty: 180 | Refills: 2 | Status: ACTIVE | COMMUNITY
Start: 2018-08-16 | End: 1900-01-01

## 2023-09-15 ENCOUNTER — APPOINTMENT (OUTPATIENT)
Dept: ORTHOPEDIC SURGERY | Facility: CLINIC | Age: 68
End: 2023-09-15
Payer: MEDICARE

## 2023-09-15 DIAGNOSIS — Z98.890 OTHER SPECIFIED POSTPROCEDURAL STATES: ICD-10-CM

## 2023-09-15 PROCEDURE — 99024 POSTOP FOLLOW-UP VISIT: CPT

## 2023-09-20 ENCOUNTER — APPOINTMENT (OUTPATIENT)
Dept: ORTHOPEDIC SURGERY | Facility: CLINIC | Age: 68
End: 2023-09-20
Payer: MEDICARE

## 2023-09-20 VITALS
BODY MASS INDEX: 47.29 KG/M2 | DIASTOLIC BLOOD PRESSURE: 86 MMHG | WEIGHT: 257 LBS | HEART RATE: 69 BPM | HEIGHT: 62 IN | SYSTOLIC BLOOD PRESSURE: 129 MMHG

## 2023-09-20 PROCEDURE — 99213 OFFICE O/P EST LOW 20 MIN: CPT | Mod: 24

## 2023-09-20 PROCEDURE — 73502 X-RAY EXAM HIP UNI 2-3 VIEWS: CPT

## 2023-09-20 RX ORDER — MELOXICAM 15 MG/1
15 TABLET ORAL
Qty: 30 | Refills: 1 | Status: ACTIVE | COMMUNITY
Start: 2023-09-20 | End: 1900-01-01

## 2023-09-28 ENCOUNTER — RESULT REVIEW (OUTPATIENT)
Age: 68
End: 2023-09-28

## 2023-09-28 ENCOUNTER — OUTPATIENT (OUTPATIENT)
Dept: OUTPATIENT SERVICES | Facility: HOSPITAL | Age: 68
LOS: 1 days | End: 2023-09-28

## 2023-09-28 ENCOUNTER — APPOINTMENT (OUTPATIENT)
Dept: INTERVENTIONAL RADIOLOGY/VASCULAR | Facility: CLINIC | Age: 68
End: 2023-09-28
Payer: MEDICARE

## 2023-09-28 DIAGNOSIS — Z98.890 OTHER SPECIFIED POSTPROCEDURAL STATES: Chronic | ICD-10-CM

## 2023-09-28 DIAGNOSIS — M16.11 UNILATERAL PRIMARY OSTEOARTHRITIS, RIGHT HIP: ICD-10-CM

## 2023-09-28 DIAGNOSIS — Z98.891 HISTORY OF UTERINE SCAR FROM PREVIOUS SURGERY: Chronic | ICD-10-CM

## 2023-09-28 DIAGNOSIS — Z98.49 CATARACT EXTRACTION STATUS, UNSPECIFIED EYE: Chronic | ICD-10-CM

## 2023-09-28 PROCEDURE — 73525 CONTRAST X-RAY OF HIP: CPT | Mod: 26,RT

## 2023-09-28 PROCEDURE — 27093 INJECTION FOR HIP X-RAY: CPT | Mod: RT

## 2023-10-11 ENCOUNTER — APPOINTMENT (OUTPATIENT)
Dept: PULMONOLOGY | Facility: CLINIC | Age: 68
End: 2023-10-11
Payer: MEDICARE

## 2023-10-11 VITALS
SYSTOLIC BLOOD PRESSURE: 130 MMHG | OXYGEN SATURATION: 98 % | DIASTOLIC BLOOD PRESSURE: 80 MMHG | RESPIRATION RATE: 16 BRPM | HEART RATE: 69 BPM

## 2023-10-11 VITALS — WEIGHT: 258 LBS | BODY MASS INDEX: 47.48 KG/M2 | HEIGHT: 62 IN

## 2023-10-11 PROCEDURE — 99214 OFFICE O/P EST MOD 30 MIN: CPT | Mod: 25

## 2023-10-11 PROCEDURE — 94010 BREATHING CAPACITY TEST: CPT

## 2023-10-12 ENCOUNTER — APPOINTMENT (OUTPATIENT)
Dept: FAMILY MEDICINE | Facility: CLINIC | Age: 68
End: 2023-10-12
Payer: MEDICARE

## 2023-10-12 PROCEDURE — G0008: CPT

## 2023-10-12 PROCEDURE — 90662 IIV NO PRSV INCREASED AG IM: CPT

## 2023-10-16 ENCOUNTER — APPOINTMENT (OUTPATIENT)
Dept: ORTHOPEDIC SURGERY | Facility: CLINIC | Age: 68
End: 2023-10-16
Payer: MEDICARE

## 2023-10-16 PROCEDURE — 99024 POSTOP FOLLOW-UP VISIT: CPT

## 2023-10-17 ENCOUNTER — APPOINTMENT (OUTPATIENT)
Dept: ORTHOPEDIC SURGERY | Facility: CLINIC | Age: 68
End: 2023-10-17
Payer: MEDICARE

## 2023-10-17 PROCEDURE — 99214 OFFICE O/P EST MOD 30 MIN: CPT | Mod: 24

## 2023-10-23 ENCOUNTER — OUTPATIENT (OUTPATIENT)
Dept: OUTPATIENT SERVICES | Facility: HOSPITAL | Age: 68
LOS: 1 days | End: 2023-10-23
Payer: MEDICARE

## 2023-10-23 ENCOUNTER — APPOINTMENT (OUTPATIENT)
Dept: FAMILY MEDICINE | Facility: CLINIC | Age: 68
End: 2023-10-23
Payer: MEDICARE

## 2023-10-23 ENCOUNTER — APPOINTMENT (OUTPATIENT)
Dept: MRI IMAGING | Facility: CLINIC | Age: 68
End: 2023-10-23
Payer: MEDICARE

## 2023-10-23 VITALS
SYSTOLIC BLOOD PRESSURE: 120 MMHG | OXYGEN SATURATION: 96 % | HEIGHT: 62 IN | TEMPERATURE: 97.2 F | HEART RATE: 69 BPM | DIASTOLIC BLOOD PRESSURE: 74 MMHG | WEIGHT: 261.13 LBS | BODY MASS INDEX: 48.05 KG/M2

## 2023-10-23 DIAGNOSIS — E66.01 MORBID (SEVERE) OBESITY DUE TO EXCESS CALORIES: ICD-10-CM

## 2023-10-23 DIAGNOSIS — M54.50 LOW BACK PAIN, UNSPECIFIED: ICD-10-CM

## 2023-10-23 DIAGNOSIS — Z98.891 HISTORY OF UTERINE SCAR FROM PREVIOUS SURGERY: Chronic | ICD-10-CM

## 2023-10-23 DIAGNOSIS — Z23 ENCOUNTER FOR IMMUNIZATION: ICD-10-CM

## 2023-10-23 DIAGNOSIS — Z98.890 OTHER SPECIFIED POSTPROCEDURAL STATES: Chronic | ICD-10-CM

## 2023-10-23 DIAGNOSIS — M16.11 UNILATERAL PRIMARY OSTEOARTHRITIS, RIGHT HIP: ICD-10-CM

## 2023-10-23 DIAGNOSIS — Z98.49 CATARACT EXTRACTION STATUS, UNSPECIFIED EYE: Chronic | ICD-10-CM

## 2023-10-23 DIAGNOSIS — M25.551 PAIN IN RIGHT HIP: ICD-10-CM

## 2023-10-23 DIAGNOSIS — M76.891 OTHER SPECIFIED ENTHESOPATHIES OF RIGHT LOWER LIMB, EXCLUDING FOOT: ICD-10-CM

## 2023-10-23 PROCEDURE — 73721 MRI JNT OF LWR EXTRE W/O DYE: CPT

## 2023-10-23 PROCEDURE — 99214 OFFICE O/P EST MOD 30 MIN: CPT

## 2023-10-23 PROCEDURE — 73721 MRI JNT OF LWR EXTRE W/O DYE: CPT | Mod: 26,RT,MH

## 2023-10-23 RX ORDER — OXYCODONE HYDROCHLORIDE 5 MG/1
5 CAPSULE ORAL
Qty: 30 | Refills: 0 | Status: DISCONTINUED | COMMUNITY
Start: 2023-08-31 | End: 2023-10-23

## 2023-10-24 DIAGNOSIS — Z23 ENCOUNTER FOR IMMUNIZATION: ICD-10-CM

## 2023-10-31 ENCOUNTER — APPOINTMENT (OUTPATIENT)
Dept: OBGYN | Facility: CLINIC | Age: 68
End: 2023-10-31
Payer: MEDICARE

## 2023-10-31 VITALS
WEIGHT: 259 LBS | SYSTOLIC BLOOD PRESSURE: 136 MMHG | HEIGHT: 62 IN | BODY MASS INDEX: 47.66 KG/M2 | DIASTOLIC BLOOD PRESSURE: 86 MMHG

## 2023-10-31 DIAGNOSIS — L23.9 ALLERGIC CONTACT DERMATITIS, UNSPECIFIED CAUSE: ICD-10-CM

## 2023-10-31 DIAGNOSIS — Z01.419 ENCOUNTER FOR GYNECOLOGICAL EXAMINATION (GENERAL) (ROUTINE) W/OUT ABNORMAL FINDINGS: ICD-10-CM

## 2023-10-31 DIAGNOSIS — Z12.31 ENCOUNTER FOR SCREENING MAMMOGRAM FOR MALIGNANT NEOPLASM OF BREAST: ICD-10-CM

## 2023-10-31 PROCEDURE — 99387 INIT PM E/M NEW PAT 65+ YRS: CPT | Mod: GY

## 2023-10-31 PROCEDURE — 99203 OFFICE O/P NEW LOW 30 MIN: CPT | Mod: 25

## 2023-10-31 PROCEDURE — G0101: CPT

## 2023-11-01 ENCOUNTER — APPOINTMENT (OUTPATIENT)
Dept: ORTHOPEDIC SURGERY | Facility: CLINIC | Age: 68
End: 2023-11-01
Payer: MEDICARE

## 2023-11-01 PROCEDURE — 99441: CPT | Mod: 95

## 2023-11-08 LAB
A VAGINAE DNA VAG QL NAA+PROBE: NORMAL
BACTERIA GENITAL AEROBE CULT: ABNORMAL
BVAB2 DNA VAG QL NAA+PROBE: NORMAL
C KRUSEI DNA VAG QL NAA+PROBE: NEGATIVE
C TRACH RRNA SPEC QL NAA+PROBE: NEGATIVE
CANDIDA DNA VAG QL NAA+PROBE: NEGATIVE
CYTOLOGY CVX/VAG DOC THIN PREP: ABNORMAL
HPV HIGH+LOW RISK DNA PNL CVX: NOT DETECTED
MEGA1 DNA VAG QL NAA+PROBE: NORMAL
N GONORRHOEA RRNA SPEC QL NAA+PROBE: NEGATIVE
T VAGINALIS RRNA SPEC QL NAA+PROBE: NEGATIVE

## 2023-11-08 RX ORDER — FLUCONAZOLE 150 MG/1
150 TABLET ORAL
Qty: 2 | Refills: 1 | Status: ACTIVE | COMMUNITY
Start: 2023-11-08 | End: 1900-01-01

## 2023-11-27 ENCOUNTER — APPOINTMENT (OUTPATIENT)
Dept: ORTHOPEDIC SURGERY | Facility: CLINIC | Age: 68
End: 2023-11-27
Payer: MEDICARE

## 2023-11-27 DIAGNOSIS — M75.100 UNSPECIFIED ROTATOR CUFF TEAR OR RUPTURE OF UNSPECIFIED SHOULDER, NOT SPECIFIED AS TRAUMATIC: ICD-10-CM

## 2023-11-27 PROCEDURE — 99213 OFFICE O/P EST LOW 20 MIN: CPT | Mod: 24

## 2023-12-18 ENCOUNTER — NON-APPOINTMENT (OUTPATIENT)
Age: 68
End: 2023-12-18

## 2023-12-19 DIAGNOSIS — M25.562 PAIN IN LEFT KNEE: ICD-10-CM

## 2023-12-20 ENCOUNTER — APPOINTMENT (OUTPATIENT)
Dept: ORTHOPEDIC SURGERY | Facility: CLINIC | Age: 68
End: 2023-12-20
Payer: MEDICARE

## 2023-12-20 VITALS
SYSTOLIC BLOOD PRESSURE: 131 MMHG | WEIGHT: 262 LBS | DIASTOLIC BLOOD PRESSURE: 82 MMHG | BODY MASS INDEX: 48.21 KG/M2 | HEIGHT: 62 IN | HEART RATE: 73 BPM

## 2023-12-20 PROCEDURE — 73564 X-RAY EXAM KNEE 4 OR MORE: CPT | Mod: LT

## 2023-12-20 PROCEDURE — 20610 DRAIN/INJ JOINT/BURSA W/O US: CPT | Mod: LT

## 2023-12-20 PROCEDURE — 99214 OFFICE O/P EST MOD 30 MIN: CPT | Mod: 25

## 2023-12-20 NOTE — ADDENDUM
[FreeTextEntry1] : This note was written by Jayla Meza, acting as the  for Dr. Martinez. This note accurately reflects the work and decisions made by Dr. Martinez.

## 2023-12-20 NOTE — PHYSICAL EXAM
[de-identified] : Left knee exam shows mild effusion, ROM is 0-120 degrees, no instability, no pain with Vanessa, lateral joint line tenderness. 5/5 motor strength in bilateral lower extremities. Sensory: Intact in bilateral lower extremities. DTRs: Biceps, brachioradialis, triceps, patellar, ankle and plantar 2+ and symmetric bilaterally. Pulses: dorsalis pedis, posterior tibial, femoral, popliteal, and radial 2+ and symmetric bilaterally. [de-identified] : 4 views of the left knee obtained the office today show no acute fracture or dislocation.  There is mild medial joint space narrowing small osteophyte formation patellofemoral thrice consistent with Kellgren-Nathaniel grade 1 2 changes

## 2023-12-20 NOTE — DISCUSSION/SUMMARY
[Medication Risks Reviewed] : Medication risks reviewed [6 Weeks] : in 6 weeks [de-identified] : Patient is a 68-year-old female with mild left knee osteoarthritis possible ruptured Baker's cyst presenting today for initial evaluation.  She is only been having pain for 1 day and states that her pain has been improving with rest as well as ice and elevation.  I recommended conservative treatment at this time.  I gave her an injection of cortisone in the left knee which she tolerated well.  We discussed low impact activity and exercise.  I recommend she continue to ice and elevate the leg.  I recommend that she take naproxen as needed for the pain.  I will see her back in 6 weeks for repeat evaluation.  If no improvement symptoms we will order an MRI at that time.  All questions were asked and answered

## 2023-12-20 NOTE — PROCEDURE
[Injection] : Injection [Left] : of the left [Knee Joint] : knee joint [Osteoarthritis] : Osteoarthritis [Joint Pain] : Joint pain [Patient] : patient [Verbal Consent Obtained] : verbal consent was obtained prior to the procedure [Alcohol] : Alcohol [Ethyl Chloride Spray] : ethyl chloride spray was used as a topical anesthetic [Lateral] : lateral [22] : a 22-gauge [1% Lidocaine___(mL)] : [unfilled] mL of 1% Lidocaine [Methylpred. 40mg/mL___(mL)] : [unfilled] mL of 40mg/mL methylprednisolone [Bandage Applied] : a bandage [Tolerated Well] : The patient tolerated the procedure well [None] : none [No Strenuous Activity___day(s)] : avoid strenuous activity for [unfilled] day(s) [___ Month(s)] : in [unfilled] month(s) [de-identified] : I injected the left knee.   I discussed at length with the patient the planned steroid and lidocaine injection. The risks, benefits, convalescence and alternatives were reviewed. The possible side effects discussed included but were not limited to: pain, swelling, heat, bleeding, and redness. Symptoms are generally mild but if they are extensive then contact the office. Giving pain relievers by mouth such as NSAIDs or Tylenol can generally treat the reactions to steroid and lidocaine. Rare cases of infection have been noted. Rash, hives and itching may occur post injection. If you have muscle pain or cramps, flushing and or swelling of the face, rapid heart beat, nausea, dizziness, fever, chills, headache, difficulty breathing, swelling in the arms or legs, or have a prickly feeling of your skin, contact a health care provider immediately. Following this discussion, the knee was prepped with Alcohol and under sterile condition the 80 mg Depo-Medrol and 6 cc Lidocaine injection was performed with a 22 gauge needle through a superolateral injection site. The needle was introduced into the joint, aspiration was performed to ensure intra-articular placement and the medication was injected. Upon withdrawal of the needle the site was cleaned with alcohol and a band aid applied. The patient tolerated the injection well and there were no adverse effects. Post injection instructions included no strenuous activity for 24 hours, cryotherapy and if there are any adverse effects to contact the office.

## 2023-12-20 NOTE — REVIEW OF SYSTEMS
Epidermal Sutures: 4-0 Ethilon [Joint Pain] : joint pain [Joint Stiffness] : joint stiffness [Joint Swelling] : joint swelling [Negative] : Heme/Lymph [FreeTextEntry9] : left knee pain

## 2023-12-20 NOTE — HISTORY OF PRESENT ILLNESS
[Pain Location] : pain [] : left knee [Worsening] : worsening [___ yrs] : [unfilled] year(s) ago [Standing] : standing [Constant] : ~He/She~ states the symptoms seem to be constant [Sitting] : worsened by sitting [Running] : worsened by running [Walking] : worsened by walking [Knee Flexion] : worsened with knee flexion [Knee Extension] : worsened with knee extension [Acetaminophen] : relieved by acetaminophen [NSAIDs] : relieved by nonsteroidal anti-inflammatory drugs [Physical Therapy] : relieved by physical therapy [de-identified] : Patient is a 68-year-old female here today for evaluation of left knee pain.  Patient states that she felt a twisting injury and a pop in the back of the knee yesterday.  States that she was able to ambulate but had pain in the back of the knee.  States that when she iced and elevated as well as took her naproxen and felt better.  States that today feels better than it did yesterday.  However she still has pain mainly over the back and lateral aspect of the knee.  Denies any hip pain.  Denies any neurovascular compromise.  Denies any locking or popping [de-identified] : rising from a seated position, going up and down the stairs

## 2023-12-21 ENCOUNTER — APPOINTMENT (OUTPATIENT)
Dept: DERMATOLOGY | Facility: CLINIC | Age: 68
End: 2023-12-21
Payer: MEDICARE

## 2023-12-21 PROCEDURE — 99203 OFFICE O/P NEW LOW 30 MIN: CPT

## 2024-01-08 ENCOUNTER — APPOINTMENT (OUTPATIENT)
Dept: FAMILY MEDICINE | Facility: CLINIC | Age: 69
End: 2024-01-08
Payer: MEDICARE

## 2024-01-08 VITALS
WEIGHT: 255 LBS | HEIGHT: 62 IN | HEART RATE: 75 BPM | DIASTOLIC BLOOD PRESSURE: 80 MMHG | SYSTOLIC BLOOD PRESSURE: 132 MMHG | BODY MASS INDEX: 46.93 KG/M2 | TEMPERATURE: 97.5 F | OXYGEN SATURATION: 98 %

## 2024-01-08 DIAGNOSIS — G89.29 PAIN IN LEFT KNEE: ICD-10-CM

## 2024-01-08 DIAGNOSIS — S83.412A SPRAIN OF MEDIAL COLLATERAL LIGAMENT OF LEFT KNEE, INITIAL ENCOUNTER: ICD-10-CM

## 2024-01-08 DIAGNOSIS — M25.562 PAIN IN LEFT KNEE: ICD-10-CM

## 2024-01-08 DIAGNOSIS — Z13.820 ENCOUNTER FOR SCREENING FOR OSTEOPOROSIS: ICD-10-CM

## 2024-01-08 DIAGNOSIS — E04.2 NONTOXIC MULTINODULAR GOITER: ICD-10-CM

## 2024-01-08 DIAGNOSIS — S83.419A SPRAIN OF MEDIAL COLLATERAL LIGAMENT OF UNSPECIFIED KNEE, INITIAL ENCOUNTER: ICD-10-CM

## 2024-01-08 DIAGNOSIS — S83.412D SPRAIN OF MEDIAL COLLATERAL LIGAMENT OF LEFT KNEE, SUBSEQUENT ENCOUNTER: ICD-10-CM

## 2024-01-08 PROCEDURE — 99214 OFFICE O/P EST MOD 30 MIN: CPT | Mod: 25

## 2024-01-08 PROCEDURE — G0444 DEPRESSION SCREEN ANNUAL: CPT | Mod: 59

## 2024-01-08 NOTE — HISTORY OF PRESENT ILLNESS
[FreeTextEntry1] : Pt c/o sudden pain to the back of her left knee after stepping on a sticky paper [place] the the mall 3 weeks ago on 12/19/23. She twisted her left leg to step on the edge of the paper stuck under her right shoe and felt sudden sharp pain behind the left knee.  Seen in UC. Posterior left knee pain has persisted. Pt followed up with orthopedist for the left knee pain and recommended to continue Naprosyn and Tylenol. Pain steady at 4/ 10  in spite of Naprosyn and Tylenol. She lost 7 lbs over the past month, intentional, with diet modification. Patient c/o persistent right hip pain, chronic. Txed by orthopedist for this. Pt is going to  PT for rotator cuff tendonitis.

## 2024-01-08 NOTE — PHYSICAL EXAM
[Joint Hypertrophy] : joint hypertrophy [Anteromedial, Diffuse] : diffuse anteromedial aspect [Popliteal Fossa] : popliteal fossa [Tibial Plateau, Medial] : medial tibial plateau [Popliteal Mass] : popliteal mass [Crepitus] : no crepitus [Patella Ballottable] : no patella ballottement [Full] : Full [Vanessa (Med)] : negative medial Vanessa test [Vanessa (Lat)] : negative lateral Vanessa test [Anterior Drawer] : negative Anterior Drawer sign [Post Drawer] : negative Posterior Drawer sign [Valgus Stress___] : positive laxity on Valgus Stress ~U([unfilled]) [Varus Stress___] : no  laxity on Varus Stress [No Acute Distress] : no acute distress [Normal Sclera/Conjunctiva] : normal sclera/conjunctiva [Supple] : supple [Normal] : no posterior cervical lymphadenopathy and no anterior cervical lymphadenopathy [No Rash] : no rash [Normal Affect] : the affect was normal [Normal Mood] : the mood was normal

## 2024-01-08 NOTE — ASSESSMENT
[FreeTextEntry1] : Pt advised to have her screening colonoscopy. Depression symptoms are reactive to chronic knee pain.  Try Diclofenac gel to knees with Tylenol. F/U after MRI left knee r/o meniscal tear. F/U 1 month.

## 2024-01-08 NOTE — HEALTH RISK ASSESSMENT
[PHQ-2 Positive] : PHQ-2 Positive [I have developed a follow-up plan documented below in the note.] : I have developed a follow-up plan documented below in the note.

## 2024-01-17 ENCOUNTER — OUTPATIENT (OUTPATIENT)
Dept: OUTPATIENT SERVICES | Facility: HOSPITAL | Age: 69
LOS: 1 days | End: 2024-01-17
Payer: MEDICARE

## 2024-01-17 ENCOUNTER — APPOINTMENT (OUTPATIENT)
Dept: MRI IMAGING | Facility: CLINIC | Age: 69
End: 2024-01-17
Payer: MEDICARE

## 2024-01-17 DIAGNOSIS — Z13.820 ENCOUNTER FOR SCREENING FOR OSTEOPOROSIS: ICD-10-CM

## 2024-01-17 DIAGNOSIS — Z98.49 CATARACT EXTRACTION STATUS, UNSPECIFIED EYE: Chronic | ICD-10-CM

## 2024-01-17 DIAGNOSIS — Z98.890 OTHER SPECIFIED POSTPROCEDURAL STATES: Chronic | ICD-10-CM

## 2024-01-17 DIAGNOSIS — Z98.891 HISTORY OF UTERINE SCAR FROM PREVIOUS SURGERY: Chronic | ICD-10-CM

## 2024-01-17 PROCEDURE — 73721 MRI JNT OF LWR EXTRE W/O DYE: CPT

## 2024-01-17 PROCEDURE — 73721 MRI JNT OF LWR EXTRE W/O DYE: CPT | Mod: 26,LT,MH

## 2024-01-30 DIAGNOSIS — M25.462 EFFUSION, LEFT KNEE: ICD-10-CM

## 2024-01-31 ENCOUNTER — APPOINTMENT (OUTPATIENT)
Dept: ORTHOPEDIC SURGERY | Facility: CLINIC | Age: 69
End: 2024-01-31
Payer: MEDICARE

## 2024-01-31 VITALS
WEIGHT: 255 LBS | SYSTOLIC BLOOD PRESSURE: 129 MMHG | HEIGHT: 62 IN | HEART RATE: 60 BPM | BODY MASS INDEX: 46.93 KG/M2 | DIASTOLIC BLOOD PRESSURE: 81 MMHG

## 2024-01-31 PROCEDURE — 20610 DRAIN/INJ JOINT/BURSA W/O US: CPT | Mod: LT

## 2024-01-31 PROCEDURE — 99213 OFFICE O/P EST LOW 20 MIN: CPT | Mod: 25

## 2024-01-31 NOTE — PROCEDURE
[Injection] : Injection [Left] : of the left [Knee Joint] : knee joint [Osteoarthritis] : Osteoarthritis [Joint Pain] : Joint pain [Patient] : patient [Verbal Consent Obtained] : verbal consent was obtained prior to the procedure [Alcohol] : Alcohol [Ethyl Chloride Spray] : ethyl chloride spray was used as a topical anesthetic [Lateral] : lateral [22] : a 22-gauge [Bandage Applied] : a bandage [Tolerated Well] : The patient tolerated the procedure well [None] : none [No Strenuous Activity___day(s)] : avoid strenuous activity for [unfilled] day(s) [___ Week(s)] : in [unfilled] week(s) [de-identified] : Orthovisc # 1 Left Knee  Discussed at length with the patient the planned Orthovisc injection. The risks, benefits, convalescence and alternatives were reviewed. The possible side effects discussed included but were not limited to: pain, swelling, heat and redness. There symptoms are generally mild but if they are extensive then contact the office. Giving pain relievers by mouth such as NSAIDs or Tylenol can generally treat the reactions to Orthovisc. Rare cases of infection have been noted. Rash, hives and itching may occur post injection. If you have muscle pain or cramps, flushing and or swelling of the face, rapid heart beat, nausea, dizziness, fever, chills, headache, difficulty breathing, swelling in the arms or legs, or have a prickly feeling of your skin, contact a health care provider immediately.  Following this discussion, the knee was prepped with Betadine and under sterile condition the Orthovisc injection was performed with a 22 gauge needle. The needle was introduced into the joint, aspiration was performed to ensure intra-articular placement and the medication was injected. Upon withdrawal of the needle the site was cleaned with alcohol and a Band-Aid applied. The patient tolerated the injection well and there were no adverse effects. Post injection instructions included no strenuous activity for 24 hours, cryotherapy and if there are any adverse effects to contact the office. [FreeTextEntry8] : 2 mL Orthovisc (Lot #: [id number] | EXP: 02/28/2026)

## 2024-01-31 NOTE — REASON FOR VISIT
[Other: ____] : [unfilled] [_______] : danis PEREZ  for [FreeTextEntry2] : Lot #: [id number] | EXP: 02/28/2026

## 2024-01-31 NOTE — HISTORY OF PRESENT ILLNESS
[Pain Location] : pain [] : left knee [Worsening] : worsening [___ yrs] : [unfilled] year(s) ago [Standing] : standing [Constant] : ~He/She~ states the symptoms seem to be constant [Sitting] : worsened by sitting [Running] : worsened by running [Walking] : worsened by walking [Knee Flexion] : worsened with knee flexion [Knee Extension] : worsened with knee extension [Acetaminophen] : relieved by acetaminophen [NSAIDs] : relieved by nonsteroidal anti-inflammatory drugs [Physical Therapy] : relieved by physical therapy [de-identified] : Patient is a 68-year-old female here today for follow-up of her left knee pain and osteoarthritis and to go over the results of her recent MRI.  She states that the cortisone injection provided mild relief of the pain in her knee. However, she still has pain over the medial and posterior aspect of the knee.  Hurts to go up and down stairs rising to seated position.  Feels like it is causing her significant pain and dysfunction.  Is taking naproxen as well as using Voltaren gel without relief.  Denies any recent falls or trauma. [de-identified] : rising from a seated position, going up and down the stairs

## 2024-01-31 NOTE — DISCUSSION/SUMMARY
[Medication Risks Reviewed] : Medication risks reviewed [1 Week] : in 1 week [de-identified] : Patient is a 68-year-old female here today for follow-up of her left knee pain and to go over the results of her recent MRI.  Her MRI does show moderate arthritis and meniscus tear as well as a subchondral insufficiency fracture.  At this time, I do believe that the majority the pain she is experiencing is coming from her subchondral insufficiency fracture.  I had a thorough discussion with her about conservative surgical treatment options.  I would recommend conservative treatment at this time.  We discussed protected weightbearing with the use of a cane.  I have again recommended physical therapy.  I recommended low impact activity and exercise.  I have recommended viscosupplementation.  I gave her injection of Orthovisc in the left knee which she tolerated well.  She should continue take Tylenol and NSAIDs as needed for the pain.  I will see her back in 1 week for repeat viscosupplementation.  All questions were asked and answered. The patient verbalized understanding the plan.

## 2024-01-31 NOTE — PHYSICAL EXAM
[de-identified] : Left knee exam shows mild effusion, ROM is 0-120 degrees, no instability, no pain with Vanessa, lateral joint line tenderness. 5/5 motor strength in bilateral lower extremities. Sensory: Intact in bilateral lower extremities. DTRs: Biceps, brachioradialis, triceps, patellar, ankle and plantar 2+ and symmetric bilaterally. Pulses: dorsalis pedis, posterior tibial, femoral, popliteal, and radial 2+ and symmetric bilaterally. [de-identified] : 01/17/2024 - [facility]: MRI of the Left Knee without Contrast - IMPRESSION: * High-grade tear of the posterior root attachment of the medial meniscus with a subchondral insufficiency fracture of the medial femoral condyle and with moderate medial compartment arthrosis. * Moderate patellofemoral and mild lateral compartment arthrosis. * Large joint effusion with synovial thickening compatible with synovitis.

## 2024-02-07 ENCOUNTER — APPOINTMENT (OUTPATIENT)
Dept: ORTHOPEDIC SURGERY | Facility: CLINIC | Age: 69
End: 2024-02-07
Payer: MEDICARE

## 2024-02-07 VITALS
DIASTOLIC BLOOD PRESSURE: 84 MMHG | HEIGHT: 62 IN | SYSTOLIC BLOOD PRESSURE: 139 MMHG | HEART RATE: 72 BPM | BODY MASS INDEX: 46.93 KG/M2 | WEIGHT: 255 LBS

## 2024-02-07 PROCEDURE — 20610 DRAIN/INJ JOINT/BURSA W/O US: CPT | Mod: LT

## 2024-02-07 NOTE — REASON FOR VISIT
[_______] : danis PEREZ  for [Other: ____] : [unfilled] [FreeTextEntry2] : Lot #: [id number] | EXP: 02/28/2026

## 2024-02-07 NOTE — HISTORY OF PRESENT ILLNESS
[Pain Location] : pain [] : left knee [Worsening] : worsening [___ yrs] : [unfilled] year(s) ago [Standing] : standing [Constant] : ~He/She~ states the symptoms seem to be constant [Sitting] : worsened by sitting [Running] : worsened by running [Walking] : worsened by walking [Knee Flexion] : worsened with knee flexion [Knee Extension] : worsened with knee extension [Acetaminophen] : relieved by acetaminophen [NSAIDs] : relieved by nonsteroidal anti-inflammatory drugs [Physical Therapy] : relieved by physical therapy [de-identified] : 68 year old female presents today for follow-up of her left knee pain and to receive her 2nd of 3 left knee Orthovisc Gel Injections in the left knee. [de-identified] : rising from a seated position, going up and down the stairs

## 2024-02-07 NOTE — PROCEDURE
[Injection] : Injection [Left] : of the left [Knee Joint] : knee joint [Osteoarthritis] : Osteoarthritis [Joint Pain] : Joint pain [Patient] : patient [Verbal Consent Obtained] : verbal consent was obtained prior to the procedure [Alcohol] : Alcohol [Ethyl Chloride Spray] : ethyl chloride spray was used as a topical anesthetic [Lateral] : lateral [22] : a 22-gauge [Bandage Applied] : a bandage [Tolerated Well] : The patient tolerated the procedure well [None] : none [No Strenuous Activity___day(s)] : avoid strenuous activity for [unfilled] day(s) [___ Week(s)] : in [unfilled] week(s) [de-identified] : Orthovisc # 2 Left Knee  Discussed at length with the patient the planned Orthovisc injection. The risks, benefits, convalescence and alternatives were reviewed. The possible side effects discussed included but were not limited to: pain, swelling, heat and redness. There symptoms are generally mild but if they are extensive then contact the office. Giving pain relievers by mouth such as NSAIDs or Tylenol can generally treat the reactions to Orthovisc. Rare cases of infection have been noted. Rash, hives and itching may occur post injection. If you have muscle pain or cramps, flushing and or swelling of the face, rapid heart beat, nausea, dizziness, fever, chills, headache, difficulty breathing, swelling in the arms or legs, or have a prickly feeling of your skin, contact a health care provider immediately.  Following this discussion, the knee was prepped with Betadine and under sterile condition the Orthovisc injection was performed with a 22 gauge needle. The needle was introduced into the joint, aspiration was performed to ensure intra-articular placement and the medication was injected. Upon withdrawal of the needle the site was cleaned with alcohol and a Band-Aid applied. The patient tolerated the injection well and there were no adverse effects. Post injection instructions included no strenuous activity for 24 hours, cryotherapy and if there are any adverse effects to contact the office. [FreeTextEntry8] : 2 mL Orthovisc (Lot #: [id number] | EXP: 02/28/2026)

## 2024-02-07 NOTE — DISCUSSION/SUMMARY
[Medication Risks Reviewed] : Medication risks reviewed [1 Week] : in 1 week [de-identified] : 68 year old female presents today status post her 2nd of 3 Orthovisc Gel injections in the left knee. The patient tolerated the procedure well. I will see her back in 1 week for repeat injections.  All questions were asked and answered. The patient verbalized understanding the plan.

## 2024-02-09 ENCOUNTER — APPOINTMENT (OUTPATIENT)
Dept: FAMILY MEDICINE | Facility: CLINIC | Age: 69
End: 2024-02-09
Payer: MEDICARE

## 2024-02-09 VITALS
BODY MASS INDEX: 46.93 KG/M2 | HEIGHT: 62 IN | WEIGHT: 255 LBS | SYSTOLIC BLOOD PRESSURE: 136 MMHG | DIASTOLIC BLOOD PRESSURE: 82 MMHG | OXYGEN SATURATION: 96 % | TEMPERATURE: 97.3 F | HEART RATE: 83 BPM

## 2024-02-09 DIAGNOSIS — E55.9 VITAMIN D DEFICIENCY, UNSPECIFIED: ICD-10-CM

## 2024-02-09 DIAGNOSIS — S83.207A UNSPECIFIED TEAR OF UNSPECIFIED MENISCUS, CURRENT INJURY, LEFT KNEE, INITIAL ENCOUNTER: ICD-10-CM

## 2024-02-09 PROCEDURE — 99214 OFFICE O/P EST MOD 30 MIN: CPT

## 2024-02-09 PROCEDURE — G2211 COMPLEX E/M VISIT ADD ON: CPT

## 2024-02-14 ENCOUNTER — APPOINTMENT (OUTPATIENT)
Dept: ORTHOPEDIC SURGERY | Facility: CLINIC | Age: 69
End: 2024-02-14
Payer: MEDICARE

## 2024-02-14 DIAGNOSIS — M17.12 UNILATERAL PRIMARY OSTEOARTHRITIS, LEFT KNEE: ICD-10-CM

## 2024-02-14 PROCEDURE — 20610 DRAIN/INJ JOINT/BURSA W/O US: CPT | Mod: LT

## 2024-02-14 NOTE — DISCUSSION/SUMMARY
[Medication Risks Reviewed] : Medication risks reviewed [de-identified] : 68 year old female presents today status post her 3rd of 3 Orthovisc Gel injections in the left knee. The patient tolerated the procedure well. I will see her back in 3 months for repeat evaluation. All questions were asked and answered. The patient verbalized understanding the plan.

## 2024-02-14 NOTE — ADDENDUM
[FreeTextEntry1] : This note was written by Jayla Meza, acting as the  for MESSI Patel. This note accurately reflects the work and decisions made by MESSI Patel.

## 2024-02-14 NOTE — REASON FOR VISIT
[_______] : danis PEREZ  for [Other: ____] : [unfilled] [FreeTextEntry2] : Lot #: [id number] | EXP: 02/28/2026

## 2024-02-14 NOTE — HISTORY OF PRESENT ILLNESS
[Pain Location] : pain [] : left knee [Worsening] : worsening [___ yrs] : [unfilled] year(s) ago [Standing] : standing [Constant] : ~He/She~ states the symptoms seem to be constant [Sitting] : worsened by sitting [Running] : worsened by running [Walking] : worsened by walking [Knee Flexion] : worsened with knee flexion [Knee Extension] : worsened with knee extension [Acetaminophen] : relieved by acetaminophen [NSAIDs] : relieved by nonsteroidal anti-inflammatory drugs [Physical Therapy] : relieved by physical therapy [de-identified] : 68 year old female presents today for follow-up of her left knee pain and to receive her 3rd of 3 left knee Orthovisc Gel Injections in the left knee. [de-identified] : rising from a seated position, going up and down the stairs

## 2024-02-26 ENCOUNTER — APPOINTMENT (OUTPATIENT)
Dept: ORTHOPEDIC SURGERY | Facility: CLINIC | Age: 69
End: 2024-02-26
Payer: MEDICARE

## 2024-02-26 DIAGNOSIS — M75.42 IMPINGEMENT SYNDROME OF LEFT SHOULDER: ICD-10-CM

## 2024-02-26 PROCEDURE — 99213 OFFICE O/P EST LOW 20 MIN: CPT

## 2024-02-26 NOTE — HISTORY OF PRESENT ILLNESS
[de-identified] : Status post left shoulder arthroscopic rotator cuff repair, subacromial decompression, synovectomy and debridement, and biceps tenotomy on 8/31/2023 [de-identified] : Lakshmi is a 68-year-old female who presents today for follow-up status post left shoulder arthroscopic rotator cuff repair and biceps tenotomy.  The patient states that she is doing well and has minimal discomfort.  She is overall pleased with her progress and is here today for routine follow-up.  The patient denies any fevers, chills, sweats, recent illnesses, numbness, tingling, weakness, or pain elsewhere at this time. [de-identified] : On exam, the patient is pleasant.  They  are awake, alert, and oriented x3.  The patient walks into my office without an antalgic gait. Weight is appropriate for height Full range of motion of cervical spine without instability Full range of motion of back without instability Intact neurologic, vascular, and dermatologic exam to right and left upper extremities Intact neurologic, vascular, and dermatologic exam to right and left lower extremities  Left upper Extremity The patient's incisions are well-healed.  No erythema, warmth, or signs of infection.  No onofre-incisional tenderness. No bony tenderness to palpation about the shoulder. AROM: Forward flexion to 170, external rotation to 45, internal rotation to L1. Strength: Supraspinatus 5/5, infraspinatus 5/5, subscapularis 5/5.  AIN/PIN/radial/ulnar/median/musculocutaneous/axillary motor function is intact, sensations intact light touch in C5-T1 distributions, radial pulses 2+, compartments are soft and compressible. [de-identified] : 68-year-old female status post left shoulder arthroscopic rotator cuff repair, subacromial decompression, synovectomy debridement, biceps tenotomy on 8/31/2023 [de-identified] : Lakshmi is recovering well from her surgery.  She has had improvements in her pain, swelling, and range of motion since the day of surgery.  At this point she may begin to progress her activities as tolerated without restriction.  She will continue with strengthening exercises on her own at home.  She will follow-up in 6 months as needed.  She verbalizes her understanding and agrees to the plan.  All questions were answered to her satisfaction.

## 2024-02-28 ENCOUNTER — APPOINTMENT (OUTPATIENT)
Dept: OBGYN | Facility: CLINIC | Age: 69
End: 2024-02-28
Payer: MEDICARE

## 2024-02-28 VITALS
SYSTOLIC BLOOD PRESSURE: 147 MMHG | HEIGHT: 62 IN | WEIGHT: 256 LBS | DIASTOLIC BLOOD PRESSURE: 91 MMHG | BODY MASS INDEX: 47.11 KG/M2

## 2024-02-28 PROCEDURE — 99214 OFFICE O/P EST MOD 30 MIN: CPT

## 2024-02-28 RX ORDER — CLOBETASOL PROPIONATE 0.5 MG/G
0.05 OINTMENT TOPICAL
Qty: 1 | Refills: 2 | Status: ACTIVE | COMMUNITY
Start: 2023-10-31 | End: 1900-01-01

## 2024-02-29 NOTE — PHYSICAL EXAM
[Vulvitis] : vulvitis [Labia Majora] : normal [Labia Minora] : normal [Uterine Adnexae] : normal [Normal] : normal [FreeTextEntry1] : midline agglutination, absent labia minoras, depigmented patches noted.

## 2024-02-29 NOTE — HISTORY OF PRESENT ILLNESS
[FreeTextEntry1] : 67 yo pt w chronic vulvitis here to fu after 4 mo cascade w clobetasol.  reports irritation much implroved.  using clobetasol biw at present.

## 2024-02-29 NOTE — PLAN
[FreeTextEntry1] : chrnic vulvitis less symp toamatic post clobetasol cascade.  fu 6 mos.  spent 35 min in encounter.

## 2024-03-04 ENCOUNTER — APPOINTMENT (OUTPATIENT)
Dept: ORTHOPEDIC SURGERY | Facility: CLINIC | Age: 69
End: 2024-03-04

## 2024-03-20 ENCOUNTER — APPOINTMENT (OUTPATIENT)
Dept: RADIOLOGY | Facility: CLINIC | Age: 69
End: 2024-03-20
Payer: MEDICARE

## 2024-03-20 ENCOUNTER — OUTPATIENT (OUTPATIENT)
Dept: OUTPATIENT SERVICES | Facility: HOSPITAL | Age: 69
LOS: 1 days | End: 2024-03-20
Payer: MEDICARE

## 2024-03-20 DIAGNOSIS — Z13.820 ENCOUNTER FOR SCREENING FOR OSTEOPOROSIS: ICD-10-CM

## 2024-03-20 DIAGNOSIS — Z98.49 CATARACT EXTRACTION STATUS, UNSPECIFIED EYE: Chronic | ICD-10-CM

## 2024-03-20 DIAGNOSIS — Z98.891 HISTORY OF UTERINE SCAR FROM PREVIOUS SURGERY: Chronic | ICD-10-CM

## 2024-03-20 DIAGNOSIS — Z98.890 OTHER SPECIFIED POSTPROCEDURAL STATES: Chronic | ICD-10-CM

## 2024-03-20 PROCEDURE — 77080 DXA BONE DENSITY AXIAL: CPT

## 2024-03-20 PROCEDURE — 77080 DXA BONE DENSITY AXIAL: CPT | Mod: 26

## 2024-04-08 ENCOUNTER — APPOINTMENT (OUTPATIENT)
Dept: PULMONOLOGY | Facility: CLINIC | Age: 69
End: 2024-04-08
Payer: MEDICARE

## 2024-04-08 VITALS
OXYGEN SATURATION: 97 % | HEIGHT: 62 IN | SYSTOLIC BLOOD PRESSURE: 126 MMHG | RESPIRATION RATE: 16 BRPM | DIASTOLIC BLOOD PRESSURE: 82 MMHG | HEART RATE: 64 BPM | BODY MASS INDEX: 47.11 KG/M2 | WEIGHT: 256 LBS

## 2024-04-08 DIAGNOSIS — R06.02 SHORTNESS OF BREATH: ICD-10-CM

## 2024-04-08 DIAGNOSIS — R94.2 ABNORMAL RESULTS OF PULMONARY FUNCTION STUDIES: ICD-10-CM

## 2024-04-08 DIAGNOSIS — J30.9 ALLERGIC RHINITIS, UNSPECIFIED: ICD-10-CM

## 2024-04-08 DIAGNOSIS — E66.01 MORBID (SEVERE) OBESITY DUE TO EXCESS CALORIES: ICD-10-CM

## 2024-04-08 DIAGNOSIS — I27.20 PULMONARY HYPERTENSION, UNSPECIFIED: ICD-10-CM

## 2024-04-08 DIAGNOSIS — G47.33 OBSTRUCTIVE SLEEP APNEA (ADULT) (PEDIATRIC): ICD-10-CM

## 2024-04-08 PROCEDURE — 99214 OFFICE O/P EST MOD 30 MIN: CPT

## 2024-04-08 PROCEDURE — G2211 COMPLEX E/M VISIT ADD ON: CPT

## 2024-04-08 RX ORDER — SEMAGLUTIDE 0.25 MG/.5ML
0.25 INJECTION, SOLUTION SUBCUTANEOUS
Qty: 1 | Refills: 1 | Status: DISCONTINUED | COMMUNITY
Start: 2023-10-23 | End: 2024-04-08

## 2024-04-08 NOTE — HISTORY OF PRESENT ILLNESS
[None] : No associated symptoms are reported [CPAP] : CPAP [Good Compliance] : good compliance with treatment [Good Tolerance] : good tolerance of treatment [Good Symptom Control] : good symptom control [Follow-Up - Routine Clinic] : a routine clinic follow-up of [Excess Weight] : excess weight [Currently Experiencing] : The patient is currently experiencing symptoms. [Dyspnea] : dyspnea [Back Pain] : back pain [Low Calorie Diet] : low calorie diet [Fair Compliance] : fair compliance with treatment [Fair Tolerance] : fair tolerance of treatment [Poor Symptom Control] : poor symptom control [Hypertension] : hypertension [High] : high [Low Calorie] : low calorie [Well Balanced Diet] : well balanced meals [Infrequently] : exercises infrequently [Walking] : walking [TextBox_4] : Patient c/o SOBOE but is otherwise without associated respiratory complaints. She is followed by cardiology and was sent for pulmonary evaluation given reported mild pulm HTN and suspicion of ARSLAN.  S/p L shoulder surgery with L sling.  [FreeTextEntry1] : \par   [de-identified] : 13-20 cm of water

## 2024-04-08 NOTE — REVIEW OF SYSTEMS
[Postnasal Drip] : postnasal drip [SOB on Exertion] : sob on exertion [Seasonal Allergies] : seasonal allergies [Thyroid Problem] : thyroid problem [Negative] : Endocrine

## 2024-04-08 NOTE — RESULTS/DATA
[TextEntry] : Echo 2/2018 with reported mild pulm HTN per cardiology notes. CXR from 10/24/18 was clear. Home PSG from 11/19/18 revealed severe ARSLAN with an AHI of 37.6 Echo from 8/14/23 was essentially normal but PAP could not be determined. The patient's overall compliance is 100% with a >4hr compliance of 100% on autoCPAP with a median and max pressure of 13-13.2 and 13.6-14.6 cm of water, respectively with a residual AHI of 0.6-1.3 which is normal and improved from previous.

## 2024-04-08 NOTE — DISCUSSION/SUMMARY
[FreeTextEntry1] : #1. Mild restriction on PFTs is likely related to weight; Subsequent spirometry studies have been normal. #2. CXR to further evaluate mild restriction and SOBOE was clear. #3. The patient does not appear to require chronic BD therapy at this time. #4. Diet and exercise for weight loss #5. SOBOE is likely related to weight or deconditioning given restriction though now with normal bryce. #6. Continue autoCPAP 13-20 cm of water for severe ARSLAN; The patient is using and benefitting from CPAP therapy with excellent compliance. #7. Cardiology f/u. #8. Replace equipment as needed; ordered 10/11/23. #9. F/u 6 months with compliance. #10. Pt had both Covid vaccines, booster, and flu vaccines.  The patient expressed understanding and agreement with the above recommendations/plan and accepts responsibility to be compliant with recommended testing, therapies, and f/u visits. All relevant questions and concerns were addressed.

## 2024-04-08 NOTE — PROCEDURE
[FreeTextEntry1] : PFTs 10/24/18 - Mild restriction with mildly reduced diffusion capacity which corrected for alveolar volume. Spirometry 12/17/19 - mild restriction PFTs 3/10/22 - essentially normal with mildly reduced diffusion capacity which corrected for alveolar volume; slightly improved from previous. [place] 10/11/23 - normal.

## 2024-04-08 NOTE — CONSULT LETTER
[Dear  ___] : Dear  [unfilled], [Consult Letter:] : I had the pleasure of evaluating your patient, [unfilled]. [Please see my note below.] : Please see my note below. [Consult Closing:] : Thank you very much for allowing me to participate in the care of this patient.  If you have any questions, please do not hesitate to contact me. [Sincerely,] : Sincerely, [FreeTextEntry3] : Henry Garcia MD, FCCP, D. ABSM\par  Pulmonary and Sleep Medicine\par  [facility] [place]\par  \par

## 2024-04-08 NOTE — END OF VISIT
[Time Spent: ___ minutes] : I have spent [unfilled] minutes of time on the encounter. [TextEntry] : Discussed with pt at length regarding ARSLAN, obesity, soboe; reviewed compliance with pt.

## 2024-04-10 ENCOUNTER — APPOINTMENT (OUTPATIENT)
Dept: ORTHOPEDIC SURGERY | Facility: CLINIC | Age: 69
End: 2024-04-10
Payer: MEDICARE

## 2024-04-10 ENCOUNTER — APPOINTMENT (OUTPATIENT)
Dept: CARDIOLOGY | Facility: CLINIC | Age: 69
End: 2024-04-10

## 2024-04-10 VITALS
SYSTOLIC BLOOD PRESSURE: 121 MMHG | HEIGHT: 62 IN | OXYGEN SATURATION: 97 % | WEIGHT: 258.6 LBS | HEART RATE: 84 BPM | TEMPERATURE: 97.6 F | BODY MASS INDEX: 47.59 KG/M2 | DIASTOLIC BLOOD PRESSURE: 77 MMHG

## 2024-04-10 VITALS
WEIGHT: 256 LBS | BODY MASS INDEX: 47.11 KG/M2 | SYSTOLIC BLOOD PRESSURE: 140 MMHG | HEIGHT: 62 IN | HEART RATE: 65 BPM | DIASTOLIC BLOOD PRESSURE: 82 MMHG

## 2024-04-10 PROCEDURE — 99214 OFFICE O/P EST MOD 30 MIN: CPT

## 2024-04-10 PROCEDURE — 73502 X-RAY EXAM HIP UNI 2-3 VIEWS: CPT

## 2024-04-10 PROCEDURE — G2211 COMPLEX E/M VISIT ADD ON: CPT

## 2024-04-10 PROCEDURE — 99215 OFFICE O/P EST HI 40 MIN: CPT

## 2024-04-10 NOTE — HISTORY OF PRESENT ILLNESS
[FreeTextEntry1] : Old note: A 65 y/o female with PMhx of HTN , RBBB , EF preserved 60^ with borderline LVH , mild PH no exertional chest pain or SOB, palpitations, dizziness or syncope started on rosuvastatin 5 mg LDL 84 ,  (decrease from 221 /136)  gained 8 lbs rejoined [employer] meetings  5/5/201 REturns for follow up .  no exertional chest pain or SOB, palpitations, dizziness or syncope HTN controlled on amlodipine,valsartan , carvedilol  Hyperlipidemia on rosuvastatin 5 mg ( LDL 85, )  walks and has joined [employer]   5/4/2022 Returns for follow up. BP has been controlled at other office visit .  hyperlipidemia controlled on rosuvastatin 5 mg , LDL 92 , HDL 74  weight cycling since joining [employer] no exertional chest pain or SOB, palpitations, dizziness or syncope   8/3/2022: Patient presents to the office for preoperative cardiovascular assessment for upcoming right and left cataract surgery on 8/17/2022 and 8/30/2022 respectively. Reports feeling well today. She has been doing weight watchers and lost 4 lbs last month. Denies chest pain, SOB at rest, MONTANO, palpitations, lightheadedness, dizziness, fatigue, syncope, near syncope and LE edema. Denies exertional SOB or chest pain with exercise. Compliant with CPAP use.   5/3/2023 Returns for follow . Had bilateral cataract surgery in 8/2022. currently no exertional chest pain or  SOB, palpitations, dizziness or syncope still in weight watchers , loosing weight  compliant with CPAP use  LDL 85 , HDL 70   8/11/2023  67-year-old female with PMH of HTN, HLD, hypothyroidism, RBBB, ARSLAN, mild pulmonary hypertension, and morbid obesity here for cardiac risk stratification for upcoming left shoulder surgery, rotator cuff repair. She states she is feeling well. She denies chest pain, SOB, palpitations, orthopnea, lightheadedness, near syncope or syncope. Does have intermittent LE edema. She is currently doing weight watchers, no recent weight loss. She walks around the mall 5-6 days/week, denies chest pain. Reports compliance with her medications.

## 2024-04-10 NOTE — HISTORY OF PRESENT ILLNESS
[de-identified] : Patient is a 68-year-old female here today for follow-up of her right hip pain.  She states that she has been having increasing pain in the right hip.  She has been in physical therapy for the past 3 months and states that has not been helping.  She is now having difficulty putting on socks and shoes and going up and down stairs rising to seated position.  Is ambulating with a cane due to the pain in her hip.  Hurts into the groin.  Is causing significant dysfunction.  Is impacting her activities of daily living.  Is here today to discuss further treatment options as her anti-inflammatories are no longer helping with the pain

## 2024-04-10 NOTE — PHYSICAL EXAM
[de-identified] :  Ambulates with a cane. Right hip exam showed severe groin pain with SLR, ROM is full flexion with pain at approximately 40 degrees of external rotation and 0 degrees of internal rotation, EDDIE positive, FADIR positive. 5/5 motor strength in bilateral lower extremities. Sensory: Intact in bilateral lower extremities. DTRs: Biceps, brachioradialis, triceps, patellar, ankle and plantar 2+ and symmetric bilaterally. Pulses: dorsalis pedis, posterior tibial, femoral, popliteal, and radial 2+ and symmetric bilaterally.   [de-identified] : AP pelvis and 2 views of the right hip obtained the office today show no acute fracture or dislocation.  There is severe joint space narrowing bone-on-bone osteoarthritis consistent with severe right hip osteoarthritis

## 2024-04-10 NOTE — DISCUSSION/SUMMARY
[Medication Risks Reviewed] : Medication risks reviewed [Surgical risks reviewed] : Surgical risks reviewed [de-identified] : Patient is a 68-year-old female with severe right hip osteoarthritis that has now progressed from her previous x-ray presenting today for follow-up.  We have thorough discussion about conservative versus surgical treatment options.  Due to the fact that she has tried exhaust conservative treatment over the past 3 months including directed physical therapy active modification NSAID use and is failed that relief of the pain in her hip she wishes to proceed with total hip arthroplasty and I do think that is indicated.  We discussed risk benefits alternatives, limited to blood loss infection damage neurovascular structures risk need for revision surgery risk of periprosthetic fracture dislocation leg length inequality.  Patient is in agreement and wishes proceed.  Will obtain a CT scan for William planning.  Will obtain medical clearance.  I will see her back postoperatively for repeat evaluation.  All questions were asked and answered  The patient is a 68 year old female who presents with bone on bone right hip arthritis. Based upon the patients continued symptoms and failure to respond to conservative treatment I have recommended a right total hip replacement for the patient. A discussion was had with the patient regarding a right total hip replacement. Anterior and posterior exposures were discussed. The patient would like to proceed with an posterior approach. A long discussion was had with the patient as what the total joint replacement would entail. A model was used to demonstrate the operation and to discuss bearing surfaces of the implants. The hospitalization and rehabilitation were discussed. The use of perioperative antibiotics and DVT prophylaxis were discussed. The risks, benefits and alternatives to surgical intervention were discussed at length with the patient. Specific risks discussed included: infection, wound breakdown, numbness and damage to nerves, tendon, muscle, arteries or other blood vessels, and the possibility of leg length discrepancy. The possibility of recurrent pain, no improvement in pain and actual worsening of the pain were also mentioned in conversation with the patient. Medical complications related to the patient's general medical health including deep vein thrombosis, pulmonary embolism, heart attack, stroke, death and other complications from anesthesia were discussed as well. The patient was told that we will take steps to minimize these risks by using sterile technique, antibiotics and DVT prophylaxis when appropriate and following the patient postoperatively in the clinic setting to monitor progress. The benefits of surgery were discussed with the patient including the potential to improve the current clinical condition through operative intervention. Alternatives to surgical intervention include continued conservative management which may yield less than optimal results in this particular patient. All questions were answered to the satisfaction of the patient. Models were used as an educational tool. We did discuss implant choices and fixation, with shared decision making with the patient.   I had a long discussion with the patient regarding increased risk of perioperative complications with joint replacement in patients with obesity. We discussed that this may include but is not limited to wound healing complications, DVT and PE, and increased infection rate. At this point the patient's pain is debilitating and it is significantly limiting their daily function and quality of life. The patient acknowledges this and is willing to accept this increased risk to proceed with joint replacement surgery.

## 2024-04-10 NOTE — CARDIOLOGY SUMMARY
[de-identified] : 8/11/2023 Sinus Rhythm, RBBB  5/3/2023: Sinus Rhythm  -Right bundle branch block. Consistent with prior EKG.

## 2024-04-10 NOTE — DISCUSSION/SUMMARY
[FreeTextEntry1] : Plan 1. Hyperlipidemia LDL 86, HDL 64, continue rosuvastatin  2. HTN- stable, continue current meds 3. Morbid obesity- weight loss advised, currently in weight watchers and walks almost daily.  4. intermittent leg edema- likely VI, last echo in 2018, repeat echo to evaluate LVEF a 5. hx mild PAH/ARSLAN- follows with pulmonology- repeat TTE 6. risk stratification- Last ischemic eval >6 years ago. Pt unable to walk on treadmill, has tried in past and unable to keep up and test converted to pharm. Iliana NST and TTE ordered.   Karma Gipson, DREAD_C

## 2024-04-10 NOTE — PHYSICAL EXAM
[Well Developed] : well developed [No Acute Distress] : no acute distress [Obese] : obese [No Carotid Bruit] : no carotid bruit [Normal S1, S2] : normal S1, S2 [No Murmur] : no murmur [Clear Lung Fields] : clear lung fields [No Respiratory Distress] : no respiratory distress  [Normal Bowel Sounds] : normal bowel sounds [Normal Gait] : normal gait [No Edema] : no edema [Normal Radial B/L] : normal radial B/L [Normal PT B/L] : normal PT B/L [Moves all extremities] : moves all extremities [Normal Speech] : normal speech [Alert and Oriented] : alert and oriented [Normal memory] : normal memory [de-identified] : no pallor

## 2024-04-10 NOTE — REVIEW OF SYSTEMS
[SOB] : no shortness of breath [Dyspnea on exertion] : not dyspnea during exertion [Chest Discomfort] : no chest discomfort [Lower Ext Edema] : no extremity edema [Leg Claudication] : no intermittent leg claudication [Orthopnea] : no orthopnea [PND] : no PND [Syncope] : no syncope [Dizziness] : no dizziness [Negative] : Heme/Lymph [FreeTextEntry9] : back and left shoulder pain

## 2024-04-10 NOTE — ADDENDUM
[FreeTextEntry1] : 8/22/2023 Patient had TTE which showed normal LVEF and no significant valvular abnormalities. NST showed normal perfusion, no evidence of ischemia or infarction. She is optimized from a cardiac standpoint to proceed with upcoming shoulder surgery.   JEREMIAS Dan

## 2024-04-11 ENCOUNTER — APPOINTMENT (OUTPATIENT)
Dept: FAMILY MEDICINE | Facility: CLINIC | Age: 69
End: 2024-04-11
Payer: MEDICARE

## 2024-04-11 ENCOUNTER — NON-APPOINTMENT (OUTPATIENT)
Age: 69
End: 2024-04-11

## 2024-04-11 VITALS
WEIGHT: 256 LBS | OXYGEN SATURATION: 99 % | DIASTOLIC BLOOD PRESSURE: 74 MMHG | BODY MASS INDEX: 47.11 KG/M2 | HEART RATE: 64 BPM | HEIGHT: 62 IN | SYSTOLIC BLOOD PRESSURE: 132 MMHG

## 2024-04-11 DIAGNOSIS — B00.9 HERPESVIRAL INFECTION, UNSPECIFIED: ICD-10-CM

## 2024-04-11 DIAGNOSIS — G89.29 LOW BACK PAIN, UNSPECIFIED: ICD-10-CM

## 2024-04-11 DIAGNOSIS — M54.50 LOW BACK PAIN, UNSPECIFIED: ICD-10-CM

## 2024-04-11 LAB
25(OH)D3 SERPL-MCNC: 34.5 NG/ML
ALBUMIN SERPL ELPH-MCNC: 4.3 G/DL
ALP BLD-CCNC: 98 U/L
ALT SERPL-CCNC: 28 U/L
ANION GAP SERPL CALC-SCNC: 9 MMOL/L
AST SERPL-CCNC: 21 U/L
BASOPHILS # BLD AUTO: 0.04 K/UL
BASOPHILS NFR BLD AUTO: 0.7 %
BILIRUB SERPL-MCNC: 0.5 MG/DL
BUN SERPL-MCNC: 21 MG/DL
CALCIUM SERPL-MCNC: 9.3 MG/DL
CHLORIDE SERPL-SCNC: 104 MMOL/L
CHOLEST SERPL-MCNC: 175 MG/DL
CO2 SERPL-SCNC: 27 MMOL/L
CREAT SERPL-MCNC: 0.96 MG/DL
EGFR: 64 ML/MIN/1.73M2
EOSINOPHIL # BLD AUTO: 0.3 K/UL
EOSINOPHIL NFR BLD AUTO: 5.4 %
ESTIMATED AVERAGE GLUCOSE: 100 MG/DL
GLUCOSE SERPL-MCNC: 92 MG/DL
HBA1C MFR BLD HPLC: 5.1 %
HCT VFR BLD CALC: 40.1 %
HDLC SERPL-MCNC: 72 MG/DL
HGB BLD-MCNC: 13 G/DL
IMM GRANULOCYTES NFR BLD AUTO: 0.4 %
LDLC SERPL CALC-MCNC: 90 MG/DL
LYMPHOCYTES # BLD AUTO: 1.81 K/UL
LYMPHOCYTES NFR BLD AUTO: 32.6 %
MAN DIFF?: NORMAL
MCHC RBC-ENTMCNC: 31 PG
MCHC RBC-ENTMCNC: 32.4 GM/DL
MCV RBC AUTO: 95.5 FL
MONOCYTES # BLD AUTO: 0.49 K/UL
MONOCYTES NFR BLD AUTO: 8.8 %
NEUTROPHILS # BLD AUTO: 2.89 K/UL
NEUTROPHILS NFR BLD AUTO: 52.1 %
NONHDLC SERPL-MCNC: 103 MG/DL
PLATELET # BLD AUTO: 270 K/UL
POTASSIUM SERPL-SCNC: 4.5 MMOL/L
PROT SERPL-MCNC: 7.1 G/DL
RBC # BLD: 4.2 M/UL
RBC # FLD: 13.6 %
SODIUM SERPL-SCNC: 140 MMOL/L
T4 FREE SERPL-MCNC: 1.4 NG/DL
TRIGL SERPL-MCNC: 73 MG/DL
TSH SERPL-ACNC: 2.88 UIU/ML
WBC # FLD AUTO: 5.55 K/UL

## 2024-04-11 PROCEDURE — 99214 OFFICE O/P EST MOD 30 MIN: CPT

## 2024-04-11 RX ORDER — ROSUVASTATIN CALCIUM 5 MG/1
5 TABLET, FILM COATED ORAL
Qty: 90 | Refills: 2 | Status: ACTIVE | COMMUNITY
Start: 2020-01-23 | End: 1900-01-01

## 2024-04-11 RX ORDER — METHOCARBAMOL 500 MG/1
500 TABLET, FILM COATED ORAL 3 TIMES DAILY
Qty: 90 | Refills: 0 | Status: ACTIVE | COMMUNITY
Start: 2024-04-11 | End: 1900-01-01

## 2024-04-11 RX ORDER — CEPHALEXIN 500 MG/1
500 CAPSULE ORAL 3 TIMES DAILY
Qty: 15 | Refills: 0 | Status: DISCONTINUED | COMMUNITY
Start: 2023-11-08 | End: 2024-04-11

## 2024-04-11 RX ORDER — VALACYCLOVIR 1 G/1
1 TABLET, FILM COATED ORAL
Qty: 4 | Refills: 3 | Status: ACTIVE | COMMUNITY
Start: 2024-04-11 | End: 1900-01-01

## 2024-04-11 NOTE — PHYSICAL EXAM
[No Acute Distress] : no acute distress [Normal Sclera/Conjunctiva] : normal sclera/conjunctiva [Supple] : supple [No Edema] : there was no peripheral edema [Normal] : no posterior cervical lymphadenopathy and no anterior cervical lymphadenopathy [No Rash] : no rash [Normal Affect] : the affect was normal [Normal Mood] : the mood was normal

## 2024-04-12 ENCOUNTER — APPOINTMENT (OUTPATIENT)
Dept: CT IMAGING | Facility: CLINIC | Age: 69
End: 2024-04-12
Payer: MEDICARE

## 2024-04-12 ENCOUNTER — OUTPATIENT (OUTPATIENT)
Dept: OUTPATIENT SERVICES | Facility: HOSPITAL | Age: 69
LOS: 1 days | End: 2024-04-12
Payer: MEDICARE

## 2024-04-12 DIAGNOSIS — Z98.891 HISTORY OF UTERINE SCAR FROM PREVIOUS SURGERY: Chronic | ICD-10-CM

## 2024-04-12 DIAGNOSIS — Z98.890 OTHER SPECIFIED POSTPROCEDURAL STATES: Chronic | ICD-10-CM

## 2024-04-12 DIAGNOSIS — Z98.49 CATARACT EXTRACTION STATUS, UNSPECIFIED EYE: Chronic | ICD-10-CM

## 2024-04-12 DIAGNOSIS — M16.11 UNILATERAL PRIMARY OSTEOARTHRITIS, RIGHT HIP: ICD-10-CM

## 2024-04-12 PROCEDURE — 73700 CT LOWER EXTREMITY W/O DYE: CPT | Mod: 26,RT,MH

## 2024-04-12 PROCEDURE — 73700 CT LOWER EXTREMITY W/O DYE: CPT

## 2024-04-19 ENCOUNTER — NON-APPOINTMENT (OUTPATIENT)
Age: 69
End: 2024-04-19

## 2024-04-22 ENCOUNTER — OUTPATIENT (OUTPATIENT)
Dept: OUTPATIENT SERVICES | Facility: HOSPITAL | Age: 69
LOS: 1 days | End: 2024-04-22
Payer: MEDICARE

## 2024-04-22 VITALS
DIASTOLIC BLOOD PRESSURE: 60 MMHG | SYSTOLIC BLOOD PRESSURE: 110 MMHG | WEIGHT: 253.53 LBS | HEART RATE: 97 BPM | RESPIRATION RATE: 18 BRPM | TEMPERATURE: 97 F | OXYGEN SATURATION: 96 % | HEIGHT: 61 IN

## 2024-04-22 DIAGNOSIS — M16.11 UNILATERAL PRIMARY OSTEOARTHRITIS, RIGHT HIP: ICD-10-CM

## 2024-04-22 DIAGNOSIS — Z98.890 OTHER SPECIFIED POSTPROCEDURAL STATES: Chronic | ICD-10-CM

## 2024-04-22 DIAGNOSIS — E03.9 HYPOTHYROIDISM, UNSPECIFIED: ICD-10-CM

## 2024-04-22 DIAGNOSIS — Z01.818 ENCOUNTER FOR OTHER PREPROCEDURAL EXAMINATION: ICD-10-CM

## 2024-04-22 DIAGNOSIS — Z98.891 HISTORY OF UTERINE SCAR FROM PREVIOUS SURGERY: Chronic | ICD-10-CM

## 2024-04-22 DIAGNOSIS — Z29.9 ENCOUNTER FOR PROPHYLACTIC MEASURES, UNSPECIFIED: ICD-10-CM

## 2024-04-22 DIAGNOSIS — Z98.49 CATARACT EXTRACTION STATUS, UNSPECIFIED EYE: Chronic | ICD-10-CM

## 2024-04-22 DIAGNOSIS — I10 ESSENTIAL (PRIMARY) HYPERTENSION: ICD-10-CM

## 2024-04-22 DIAGNOSIS — Z13.89 ENCOUNTER FOR SCREENING FOR OTHER DISORDER: ICD-10-CM

## 2024-04-22 DIAGNOSIS — G47.33 OBSTRUCTIVE SLEEP APNEA (ADULT) (PEDIATRIC): ICD-10-CM

## 2024-04-22 LAB
APTT BLD: 30.6 SEC — SIGNIFICANT CHANGE UP (ref 24.5–35.6)
BLD GP AB SCN SERPL QL: SIGNIFICANT CHANGE UP
INR BLD: 1.49 RATIO — HIGH (ref 0.85–1.18)
MRSA PCR RESULT.: SIGNIFICANT CHANGE UP
PROTHROM AB SERPL-ACNC: 16.4 SEC — HIGH (ref 9.5–13)
S AUREUS DNA NOSE QL NAA+PROBE: SIGNIFICANT CHANGE UP

## 2024-04-22 PROCEDURE — 93010 ELECTROCARDIOGRAM REPORT: CPT

## 2024-04-22 RX ORDER — LORATADINE 10 MG/1
1 TABLET ORAL
Refills: 0 | DISCHARGE

## 2024-04-22 RX ORDER — ROSUVASTATIN CALCIUM 5 MG/1
1 TABLET ORAL
Refills: 0 | DISCHARGE

## 2024-04-22 RX ORDER — CHOLECALCIFEROL (VITAMIN D3) 125 MCG
1 CAPSULE ORAL
Refills: 0 | DISCHARGE

## 2024-04-22 RX ORDER — ASPIRIN/CALCIUM CARB/MAGNESIUM 324 MG
1 TABLET ORAL
Refills: 0 | DISCHARGE

## 2024-04-22 RX ORDER — SODIUM CHLORIDE 9 MG/ML
3 INJECTION, SOLUTION INTRAMUSCULAR; INTRAVENOUS; SUBCUTANEOUS EVERY 8 HOURS
Refills: 0 | Status: DISCONTINUED | OUTPATIENT
Start: 2024-12-27 | End: 2024-12-27

## 2024-04-22 RX ORDER — ACETAMINOPHEN 500 MG
2 TABLET ORAL
Refills: 0 | DISCHARGE

## 2024-04-22 RX ORDER — CETIRIZINE HYDROCHLORIDE 10 MG/1
1 TABLET ORAL
Refills: 0 | DISCHARGE

## 2024-04-22 RX ORDER — LEVOTHYROXINE SODIUM 125 MCG
1 TABLET ORAL
Refills: 0 | DISCHARGE

## 2024-04-22 RX ORDER — CARVEDILOL PHOSPHATE 80 MG/1
1 CAPSULE, EXTENDED RELEASE ORAL
Refills: 0 | DISCHARGE

## 2024-04-22 NOTE — H&P PST ADULT - PROBLEM SELECTOR PLAN 3
Thyroid panel performed.   Patient instructed to continue levothyroxine as prescribed.  Patient instructed to take levothyroxine with a sip of water morning of surgery, verbalized understanding.   Medical clearance pending

## 2024-04-22 NOTE — H&P PST ADULT - ASSESSMENT
This is a       CAPRINI SCORE    AGE RELATED RISK FACTORS                                                             [ ] Age 41-60 years                                            (1 Point)  [ ] Age: 61-74 years                                           (2 Points)                 [ ] Age= 75 years                                                (3 Points)             DISEASE RELATED RISK FACTORS                                                       [ ] Edema in the lower extremities                 (1 Point)                     [ ] Varicose veins                                               (1 Point)                                 [ ] BMI > 25 Kg/m2                                            (1 Point)                                  [ ] Serious infection (ie PNA)                            (1 Point)                     [ ] Lung disease ( COPD, Emphysema)            (1 Point)                                                                          [ ] Acute myocardial infarction                         (1 Point)                  [ ] Congestive heart failure (in the previous month)  (1 Point)         [ ] Inflammatory bowel disease                            (1 Point)                  [ ] Central venous access, PICC or Port               (2 points)       (within the last month)                                                                [ ] Stroke (in the previous month)                        (5 Points)    [ ] Previous or present malignancy                       (2 points)                                                                                                                                                         HEMATOLOGY RELATED FACTORS                                                         [ ] Prior episodes of VTE                                     (3 Points)                     [ ] Positive family history for VTE                      (3 Points)                  [ ] Prothrombin 32082 A                                     (3 Points)                     [ ] Factor V Leiden                                                (3 Points)                        [ ] Lupus anticoagulants                                      (3 Points)                                                           [ ] Anticardiolipin antibodies                              (3 Points)                                                       [ ] High homocysteine in the blood                   (3 Points)                                             [ ] Other congenital or acquired thrombophilia      (3 Points)                                                [ ] Heparin induced thrombocytopenia                  (3 Points)                                        MOBILITY RELATED FACTORS  [ ] Bed rest                                                         (1 Point)  [ ] Plaster cast                                                    (2 points)  [ ] Bed bound for more than 72 hours           (2 Points)    GENDER SPECIFIC FACTORS  [ ] Pregnancy or had a baby within the last month   (1 Point)  [ ] Post-partum < 6 weeks                                   (1 Point)  [ ] Hormonal therapy  or oral contraception   (1 Point)  [ ] History of pregnancy complications              (1 point)  [ ] Unexplained or recurrent              (1 Point)    OTHER RISK FACTORS                                           (1 Point)  [ ] BMI >40, smoking, diabetes requiring insulin, chemotherapy  blood transfusions and length of surgery over 2 hours    SURGERY RELATED RISK FACTORS  [ ]  Section within the last month     (1 Point)  [ ] Minor surgery                                                  (1 Point)  [ ] Arthroscopic surgery                                       (2 Points)  [ ] Planned major surgery lasting more            (2 Points)      than 45 minutes     [ ] Elective hip or knee joint replacement       (5 points)       surgery                                                TRAUMA RELATED RISK FACTORS  [ ] Fracture of the hip, pelvis, or leg                       (5 Points)  [ ] Spinal cord injury resulting in paralysis             (5 points)       (in the previous month)    [ ] Paralysis  (less than 1 month)                             (5 Points)  [ ] Multiple Trauma within 1 month                        (5 Points)    Total Score [        ]    Caprini Score 0-2: Low Risk, NO VTE prophylaxis required for most patients, encourage ambulation  Caprini Score 3-6: Moderate Risk , pharmacologic VTE prophylaxis is indicated for most patients (in the absence of contraindications)  Caprini Score Greater than or =7: High risk, pharmocologic VTE prophylaxis indicated for most patients (in the absence of contraindications)      OPIOID RISK TOOL    CATHY EACH BOX THAT APPLIES AND ADD TOTALS AT THE END    FAMILY HISTORY OF SUBSTANCE ABUSE                 FEMALE         MALE                                                Alcohol                             [  ]1 pt          [  ]3pts                                               Illegal Durgs                     [  ]2 pts        [  ]3pts                                               Rx Drugs                           [  ]4 pts        [  ]4 pts    PERSONAL HISTORY OF SUBSTANCE ABUSE                                                                                          Alcohol                             [  ]3 pts       [  ]3 pts                                               Illegal Drugs                     [  ]4 pts        [  ]4 pts                                               Rx Drugs                           [  ]5 pts        [  ]5 pts    AGE BETWEEN 16-45 YEARS                                      [  ]1 pt         [  ]1 pt    HISTORY OF PREADOLESCENT   SEXUAL ABUSE                                                             [  ]3 pts        [  ]0pts    PSYCHOLOGICAL DISEASE                     ADD, OCD, Bipolar, Schizophrenia        [  ]2 pts         [  ]2 pts                      Depression                                               [  ]1 pt           [  ]1 pt           SCORING TOTAL   (add numbers and type here)              (***)                                     A score of 3 or lower indicated LOW risk for future opioid abuse  A score of 4 to 7 indicated moderate risk for future opioid abuse  A score of 8 or higher indicates a high risk for opioid abuse                             This is a pleasant, obese 68  year old  female, in NAD presenting today for PST, PMH includes HTN, hypothyroidism, and ARSLAN (CPAP). Patient c/o right hip pain for about a year that has progressively worsened. Reports pain is located in right groin and right buttock.  Describes pain as intermittent, and unable to describe pain. Current pain level 4/10 and maximum pain level of 6/10. Patient  has tried conservative treatments including PT and injections, last injection 2023, with minimal relief. Now scheduled for right posterior total hip replacement with EDMUND on 5/3/24 with Dr. Martinez pending medical and cardiac clearance.      CAPRINI SCORE    AGE RELATED RISK FACTORS                                                             [ ] Age 41-60 years                                            (1 Point)  [ X] Age: 61-74 years                                           (2 Points)                 [ ] Age= 75 years                                                (3 Points)             DISEASE RELATED RISK FACTORS                                                       [ ] Edema in the lower extremities                 (1 Point)                     [X ] Varicose veins                                               (1 Point)                                 [X] BMI > 25 Kg/m2                                            (1 Point)                                  [ ] Serious infection (ie PNA)                            (1 Point)                     [ ] Lung disease ( COPD, Emphysema)            (1 Point)                                                                          [ ] Acute myocardial infarction                         (1 Point)                  [ ] Congestive heart failure (in the previous month)  (1 Point)         [ ] Inflammatory bowel disease                            (1 Point)                  [ ] Central venous access, PICC or Port               (2 points)       (within the last month)                                                                [ ] Stroke (in the previous month)                        (5 Points)    [ ] Previous or present malignancy                       (2 points)                                                                                                                                                         HEMATOLOGY RELATED FACTORS                                                         [ ] Prior episodes of VTE                                     (3 Points)                     [ ] Positive family history for VTE                      (3 Points)                  [ ] Prothrombin 34162 A                                     (3 Points)                     [ ] Factor V Leiden                                                (3 Points)                        [ ] Lupus anticoagulants                                      (3 Points)                                                           [ ] Anticardiolipin antibodies                              (3 Points)                                                       [ ] High homocysteine in the blood                   (3 Points)                                             [ ] Other congenital or acquired thrombophilia      (3 Points)                                                [ ] Heparin induced thrombocytopenia                  (3 Points)                                        MOBILITY RELATED FACTORS  [ ] Bed rest                                                         (1 Point)  [ ] Plaster cast                                                    (2 points)  [ ] Bed bound for more than 72 hours           (2 Points)    GENDER SPECIFIC FACTORS  [ ] Pregnancy or had a baby within the last month   (1 Point)  [ ] Post-partum < 6 weeks                                   (1 Point)  [ ] Hormonal therapy  or oral contraception   (1 Point)  [ ] History of pregnancy complications              (1 point)  [ ] Unexplained or recurrent              (1 Point)    OTHER RISK FACTORS                                           (1 Point)  [ X] BMI >40, smoking, diabetes requiring insulin, chemotherapy  blood transfusions and length of surgery over 2 hours    SURGERY RELATED RISK FACTORS  [ ]  Section within the last month     (1 Point)  [ ] Minor surgery                                                  (1 Point)  [ ] Arthroscopic surgery                                       (2 Points)  [ ] Planned major surgery lasting more            (2 Points)      than 45 minutes     [X ] Elective hip or knee joint replacement       (5 points)       surgery                                                TRAUMA RELATED RISK FACTORS  [ ] Fracture of the hip, pelvis, or leg                       (5 Points)  [ ] Spinal cord injury resulting in paralysis             (5 points)       (in the previous month)    [ ] Paralysis  (less than 1 month)                             (5 Points)  [ ] Multiple Trauma within 1 month                        (5 Points)    Total Score [   10     ]    Caprini Score 0-2: Low Risk, NO VTE prophylaxis required for most patients, encourage ambulation  Caprini Score 3-6: Moderate Risk , pharmacologic VTE prophylaxis is indicated for most patients (in the absence of contraindications)  Caprini Score Greater than or =7: High risk, pharmocologic VTE prophylaxis indicated for most patients (in the absence of contraindications)      OPIOID RISK TOOL    CATHY EACH BOX THAT APPLIES AND ADD TOTALS AT THE END    FAMILY HISTORY OF SUBSTANCE ABUSE                 FEMALE         MALE                                                Alcohol                             [  ]1 pt          [  ]3pts                                               Illegal Durgs                     [  ]2 pts        [  ]3pts                                               Rx Drugs                           [  ]4 pts        [  ]4 pts    PERSONAL HISTORY OF SUBSTANCE ABUSE                                                                                          Alcohol                             [  ]3 pts       [  ]3 pts                                               Illegal Drugs                     [  ]4 pts        [  ]4 pts                                               Rx Drugs                           [  ]5 pts        [  ]5 pts    AGE BETWEEN 16-45 YEARS                                      [  ]1 pt         [  ]1 pt    HISTORY OF PREADOLESCENT   SEXUAL ABUSE                                                             [  ]3 pts        [  ]0pts    PSYCHOLOGICAL DISEASE                     ADD, OCD, Bipolar, Schizophrenia        [  ]2 pts         [  ]2 pts                      Depression                                               [  ]1 pt           [  ]1 pt           SCORING TOTAL   (add numbers and type here)              (*0**)                                     A score of 3 or lower indicated LOW risk for future opioid abuse  A score of 4 to 7 indicated moderate risk for future opioid abuse  A score of 8 or higher indicates a high risk for opioid abuse

## 2024-04-22 NOTE — H&P PST ADULT - OPHTHALMOLOGIC
Telephone Encounter by Stephany Quezada RN at 05/18/17 02:12 PM     Author:  Stephany Quezada RN Service:  (none) Author Type:  Registered Nurse     Filed:  05/18/17 02:12 PM Encounter Date:  5/18/2017 Status:  Signed     :  Stephany Quezada RN (Registered Nurse)       From: Na Faulkner  To: Jj Rushing MD  Sent: 5/18/2017 12:39 PM CDT  Subject: Lab Tests  Test Related Questions    This message is being sent by Kye Neal on behalf of Na Faulkner    Are the lab work results from my Mom's blood work on Tuesday, May 16   available yet?  Thanks,  Gianna       Revision History        Date/Time User Provider Type Action    > 05/18/17 02:12 PM Stephany Quezada, RN Registered Nurse Sign    Attribution information within the note text is not available.            
Telephone Encounter by Stephany Quezada RN at 05/18/17 02:14 PM     Author:  Stephany Quezada RN Service:  (none) Author Type:  Registered Nurse     Filed:  05/18/17 02:14 PM Encounter Date:  5/18/2017 Status:  Signed     :  Stephany Quezada RN (Registered Nurse)            Labs released[DC1.1M]        Revision History        User Key Date/Time User Provider Type Action    > DC1.1 05/18/17 02:14 PM Stephany Quezada RN Registered Nurse Sign    M - Manual            
details…

## 2024-04-22 NOTE — H&P PST ADULT - PROBLEM SELECTOR PLAN 2
Telemetry called stating patient was tachy () sustained for short duration. Patient was getting blood cultures drawn at the time. Writer immediately went in to take vital signs. BP was 94/53 HR 96, 99.2 T, 20 R, 96% on 3L O2. Dr. Farhat Álvarez notified. Lactic and blood cultures had already been recently ordered. No new orders at this time. At 1204, VS - BP 93/54, HR 92, 98.6 T, 20 R, 96% on 3L O2. Dr. Farhat Álvarez notified. Orders to contact him if able to wean patient down on oxygen for possible bolus. At  patient weaned to 1L of O2, 95% O2. Orders placed for 500 mL bolus at 75/hr. Lasix was held. At 1350 BP increased to 101/55. BP today 110/60  Continue medication.   EKG performed.  Patient instructed to hold Amlodipine-Valsartan  24 hours prior to surgery, last dose 5/2 am , verbalized understanding.   Cardiac clearance pending.

## 2024-04-22 NOTE — H&P PST ADULT - NSICDXPASTSURGICALHX_GEN_ALL_CORE_FT
PAST SURGICAL HISTORY:  H/O cataract extraction     H/O dilation and curettage     H/O:  section      PAST SURGICAL HISTORY:  H/O cataract extraction     H/O dilation and curettage     H/O:  section     S/P left rotator cuff repair

## 2024-04-22 NOTE — H&P PST ADULT - MUSCULOSKELETAL
details… no calf tenderness/decreased ROM due to pain/strength 5/5 bilateral upper extremities/back exam/extremities exam/abnormal gait

## 2024-04-22 NOTE — H&P PST ADULT - PROBLEM SELECTOR PLAN 4
Patient instructed to bring CPAP day of surgery, verbalized understanding  Medical clearance pending

## 2024-04-22 NOTE — H&P PST ADULT - PROBLEM SELECTOR PLAN 1
Scheduled for right posterior total hip replacement with EDMUND on 5/3/24 with Dr. Martinez pending medical and cardiac clearance.  Labs, EKG and MRSA/MSSA performed  Written and verbal instructions provided  Patient educated on surgical scrub, preadmission instructions, clearance and day of procedure medications, verbalizes understanding.  Patient was given information on joint class, joint book provided, ERP protocol reviewed with patient, MSSA/MRSA swabbed in PST, results pending  Patient instructed to stop vitamins/supplements/herbal medications/ASA/NSAIDS for one week prior to surgery and discuss with PMD, verbalized understanding.  Patient verbalized understanding of instructions and was given the opportunity to ask questions and have them answered.  Out patient medications reviewed and verified with patient.

## 2024-04-22 NOTE — H&P PST ADULT - HISTORY OF PRESENT ILLNESS
Patient is a 68  year old  female presenting today for PST, PMH includes HTN, hypothyroidism, ARSLAN (CPAP)  Patient c/o longstanding h/o right hip pain.   Describes pain as constant/intermittent  Sharp, stabbing, throbbing, achy dull or burning.  Current pain level /10 and maximum pain level of /10.  Reports pain/swelling/buckling.  Pain aggravated by weight bearing activities including, walking, standing, stairs and standing from seated position. Pain minimally relieved by rest/medication. Patient has tried conservative treatments including PT and injections, last injection  Now scheduled for right posterior total hip replacement with EDMUND on 5/3/24 with Dr. Martinez pending medical and cardiac clearance.     Patient is a 68  year old  female presenting today for PST, PMH includes HTN, hypothyroidism, and ARSLAN (CPAP). Patient c/o right hip pain for about a year that has progressively worsened. Reports pain is located in right groin and right buttock.  Describes pain as intermittent, and unable to describe pain. Current pain level 4/10 and maximum pain level of 6/10. Reports right hip "gives out on her". Ambulates with cane for fear of falling. Pain aggravated by weight bearing activities including, walking, standing, stairs and standing from seated position. Pain minimally relieved by rest, medication, and heat. Patient has tried conservative treatments including PT and injections, last injection October 2023, with minimal relief. Now scheduled for right posterior total hip replacement with EDMUND on 5/3/24 with Dr. Martinez pending medical and cardiac clearance.

## 2024-04-23 ENCOUNTER — NON-APPOINTMENT (OUTPATIENT)
Age: 69
End: 2024-04-23

## 2024-04-23 ENCOUNTER — APPOINTMENT (OUTPATIENT)
Dept: CARDIOLOGY | Facility: CLINIC | Age: 69
End: 2024-04-23
Payer: MEDICARE

## 2024-04-23 ENCOUNTER — TRANSCRIPTION ENCOUNTER (OUTPATIENT)
Age: 69
End: 2024-04-23

## 2024-04-23 VITALS
WEIGHT: 254 LBS | OXYGEN SATURATION: 97 % | DIASTOLIC BLOOD PRESSURE: 78 MMHG | HEIGHT: 62 IN | BODY MASS INDEX: 46.74 KG/M2 | SYSTOLIC BLOOD PRESSURE: 124 MMHG | HEART RATE: 99 BPM | TEMPERATURE: 98 F

## 2024-04-23 DIAGNOSIS — E78.5 HYPERLIPIDEMIA, UNSPECIFIED: ICD-10-CM

## 2024-04-23 DIAGNOSIS — Z01.810 ENCOUNTER FOR PREPROCEDURAL CARDIOVASCULAR EXAMINATION: ICD-10-CM

## 2024-04-23 LAB
THYROGLOB AB SERPL-ACNC: <1 IU/ML
THYROPEROXIDASE AB SERPL IA-ACNC: 12 IU/ML

## 2024-04-23 PROCEDURE — 99214 OFFICE O/P EST MOD 30 MIN: CPT

## 2024-04-23 RX ORDER — HYALURONATE SODIUM 30 MG/2 ML
30 SYRINGE (ML) INTRAARTICULAR
Qty: 3 | Refills: 0 | Status: DISCONTINUED | OUTPATIENT
Start: 2024-01-31 | End: 2024-04-23

## 2024-04-23 RX ORDER — METHOCARBAMOL 750 MG/1
750 TABLET, FILM COATED ORAL 3 TIMES DAILY
Qty: 30 | Refills: 0 | Status: DISCONTINUED | COMMUNITY
Start: 2020-05-11 | End: 2024-04-23

## 2024-04-24 ENCOUNTER — INPATIENT (INPATIENT)
Facility: HOSPITAL | Age: 69
LOS: 13 days | Discharge: HOME HEALTH | DRG: 392 | End: 2024-05-08
Attending: SURGERY | Admitting: SURGERY
Payer: MEDICARE

## 2024-04-24 ENCOUNTER — APPOINTMENT (OUTPATIENT)
Dept: FAMILY MEDICINE | Facility: CLINIC | Age: 69
End: 2024-04-24
Payer: MEDICARE

## 2024-04-24 VITALS
WEIGHT: 252.2 LBS | BODY MASS INDEX: 46.41 KG/M2 | TEMPERATURE: 98.1 F | HEIGHT: 62 IN | HEART RATE: 84 BPM | SYSTOLIC BLOOD PRESSURE: 112 MMHG | OXYGEN SATURATION: 96 % | DIASTOLIC BLOOD PRESSURE: 68 MMHG

## 2024-04-24 VITALS
HEART RATE: 93 BPM | TEMPERATURE: 98 F | RESPIRATION RATE: 18 BRPM | DIASTOLIC BLOOD PRESSURE: 77 MMHG | OXYGEN SATURATION: 95 % | SYSTOLIC BLOOD PRESSURE: 123 MMHG | WEIGHT: 251.99 LBS | HEIGHT: 61 IN

## 2024-04-24 DIAGNOSIS — Z98.890 OTHER SPECIFIED POSTPROCEDURAL STATES: Chronic | ICD-10-CM

## 2024-04-24 DIAGNOSIS — Z98.891 HISTORY OF UTERINE SCAR FROM PREVIOUS SURGERY: Chronic | ICD-10-CM

## 2024-04-24 DIAGNOSIS — R10.11 RIGHT UPPER QUADRANT PAIN: ICD-10-CM

## 2024-04-24 DIAGNOSIS — K31.89 OTHER DISEASES OF STOMACH AND DUODENUM: ICD-10-CM

## 2024-04-24 DIAGNOSIS — E86.0 DEHYDRATION: ICD-10-CM

## 2024-04-24 DIAGNOSIS — R31.29 OTHER MICROSCOPIC HEMATURIA: ICD-10-CM

## 2024-04-24 DIAGNOSIS — Z98.49 CATARACT EXTRACTION STATUS, UNSPECIFIED EYE: Chronic | ICD-10-CM

## 2024-04-24 LAB
ALBUMIN SERPL ELPH-MCNC: 3.1 G/DL — LOW (ref 3.3–5.2)
ALP SERPL-CCNC: 100 U/L — SIGNIFICANT CHANGE UP (ref 40–120)
ALT FLD-CCNC: 15 U/L — SIGNIFICANT CHANGE UP
ANION GAP SERPL CALC-SCNC: 17 MMOL/L — SIGNIFICANT CHANGE UP (ref 5–17)
APPEARANCE UR: ABNORMAL
APTT BLD: 22.9 SEC — LOW (ref 24.5–35.6)
AST SERPL-CCNC: 28 U/L — SIGNIFICANT CHANGE UP
BACTERIA # UR AUTO: NEGATIVE /HPF — SIGNIFICANT CHANGE UP
BASE EXCESS BLDV CALC-SCNC: -2.4 MMOL/L — LOW (ref -2–3)
BASOPHILS # BLD AUTO: 0 K/UL — SIGNIFICANT CHANGE UP (ref 0–0.2)
BASOPHILS NFR BLD AUTO: 0 % — SIGNIFICANT CHANGE UP (ref 0–2)
BILIRUB SERPL-MCNC: 0.5 MG/DL — SIGNIFICANT CHANGE UP (ref 0.4–2)
BILIRUB UR QL STRIP: NEGATIVE
BILIRUB UR-MCNC: NEGATIVE — SIGNIFICANT CHANGE UP
BLD GP AB SCN SERPL QL: SIGNIFICANT CHANGE UP
BUN SERPL-MCNC: 42.5 MG/DL — HIGH (ref 8–20)
CA-I SERPL-SCNC: 1.09 MMOL/L — LOW (ref 1.15–1.33)
CALCIUM SERPL-MCNC: 8.7 MG/DL — SIGNIFICANT CHANGE UP (ref 8.4–10.5)
CAST: 2 /LPF — SIGNIFICANT CHANGE UP (ref 0–4)
CHLORIDE BLDV-SCNC: 101 MMOL/L — SIGNIFICANT CHANGE UP (ref 96–108)
CHLORIDE SERPL-SCNC: 100 MMOL/L — SIGNIFICANT CHANGE UP (ref 96–108)
CO2 SERPL-SCNC: 20 MMOL/L — LOW (ref 22–29)
COLOR SPEC: YELLOW — SIGNIFICANT CHANGE UP
CREAT SERPL-MCNC: 1.06 MG/DL — SIGNIFICANT CHANGE UP (ref 0.5–1.3)
DIFF PNL FLD: ABNORMAL
EGFR: 57 ML/MIN/1.73M2 — LOW
EOSINOPHIL # BLD AUTO: 0 K/UL — SIGNIFICANT CHANGE UP (ref 0–0.5)
EOSINOPHIL NFR BLD AUTO: 0 % — SIGNIFICANT CHANGE UP (ref 0–6)
GAS PNL BLDV: 133 MMOL/L — LOW (ref 136–145)
GAS PNL BLDV: SIGNIFICANT CHANGE UP
GLUCOSE BLDV-MCNC: 99 MG/DL — SIGNIFICANT CHANGE UP (ref 70–99)
GLUCOSE SERPL-MCNC: 89 MG/DL — SIGNIFICANT CHANGE UP (ref 70–99)
GLUCOSE UR QL: NEGATIVE MG/DL — SIGNIFICANT CHANGE UP
GLUCOSE UR-MCNC: NEGATIVE
HCG UR QL: 1 EU/DL
HCO3 BLDV-SCNC: 23 MMOL/L — SIGNIFICANT CHANGE UP (ref 22–29)
HCT VFR BLD CALC: 39.7 % — SIGNIFICANT CHANGE UP (ref 34.5–45)
HCT VFR BLDA CALC: 42 % — SIGNIFICANT CHANGE UP
HGB BLD CALC-MCNC: 14.1 G/DL — SIGNIFICANT CHANGE UP (ref 11.7–16.1)
HGB BLD-MCNC: 13.4 G/DL — SIGNIFICANT CHANGE UP (ref 11.5–15.5)
HGB UR QL STRIP.AUTO: ABNORMAL
INR BLD: 1.2 RATIO — HIGH (ref 0.85–1.18)
KETONES UR-MCNC: ABNORMAL MG/DL
KETONES UR-MCNC: NEGATIVE
LACTATE BLDV-MCNC: 2.4 MMOL/L — HIGH (ref 0.5–2)
LEUKOCYTE ESTERASE UR QL STRIP: ABNORMAL
LEUKOCYTE ESTERASE UR-ACNC: ABNORMAL
LYMPHOCYTES # BLD AUTO: 0.7 K/UL — LOW (ref 1–3.3)
LYMPHOCYTES # BLD AUTO: 6.1 % — LOW (ref 13–44)
MANUAL SMEAR VERIFICATION: SIGNIFICANT CHANGE UP
MCHC RBC-ENTMCNC: 31.8 PG — SIGNIFICANT CHANGE UP (ref 27–34)
MCHC RBC-ENTMCNC: 33.8 GM/DL — SIGNIFICANT CHANGE UP (ref 32–36)
MCV RBC AUTO: 94.3 FL — SIGNIFICANT CHANGE UP (ref 80–100)
MONOCYTES # BLD AUTO: 0.81 K/UL — SIGNIFICANT CHANGE UP (ref 0–0.9)
MONOCYTES NFR BLD AUTO: 7 % — SIGNIFICANT CHANGE UP (ref 2–14)
NEUTROPHILS # BLD AUTO: 10 K/UL — HIGH (ref 1.8–7.4)
NEUTROPHILS NFR BLD AUTO: 86.9 % — HIGH (ref 43–77)
NITRITE UR QL STRIP: NEGATIVE
NITRITE UR-MCNC: NEGATIVE — SIGNIFICANT CHANGE UP
OVALOCYTES BLD QL SMEAR: SLIGHT — SIGNIFICANT CHANGE UP
PCO2 BLDV: 42 MMHG — SIGNIFICANT CHANGE UP (ref 39–42)
PH BLDV: 7.35 — SIGNIFICANT CHANGE UP (ref 7.32–7.43)
PH UR STRIP: 6
PH UR: 6 — SIGNIFICANT CHANGE UP (ref 5–8)
PLAT MORPH BLD: NORMAL — SIGNIFICANT CHANGE UP
PLATELET # BLD AUTO: 307 K/UL — SIGNIFICANT CHANGE UP (ref 150–400)
PO2 BLDV: 68 MMHG — HIGH (ref 25–45)
POIKILOCYTOSIS BLD QL AUTO: SLIGHT — SIGNIFICANT CHANGE UP
POLYCHROMASIA BLD QL SMEAR: SIGNIFICANT CHANGE UP
POTASSIUM BLDV-SCNC: 3.7 MMOL/L — SIGNIFICANT CHANGE UP (ref 3.5–5.1)
POTASSIUM SERPL-MCNC: 4.8 MMOL/L — SIGNIFICANT CHANGE UP (ref 3.5–5.3)
POTASSIUM SERPL-SCNC: 4.8 MMOL/L — SIGNIFICANT CHANGE UP (ref 3.5–5.3)
PROT SERPL-MCNC: 7.1 G/DL — SIGNIFICANT CHANGE UP (ref 6.6–8.7)
PROT UR STRIP-MCNC: >300
PROT UR-MCNC: 100 MG/DL
PROTHROM AB SERPL-ACNC: 13.2 SEC — HIGH (ref 9.5–13)
RBC # BLD: 4.21 M/UL — SIGNIFICANT CHANGE UP (ref 3.8–5.2)
RBC # FLD: 13.9 % — SIGNIFICANT CHANGE UP (ref 10.3–14.5)
RBC BLD AUTO: ABNORMAL
RBC CASTS # UR COMP ASSIST: 6 /HPF — HIGH (ref 0–4)
SAO2 % BLDV: 95.2 % — SIGNIFICANT CHANGE UP
SMUDGE CELLS # BLD: PRESENT — SIGNIFICANT CHANGE UP
SODIUM SERPL-SCNC: 137 MMOL/L — SIGNIFICANT CHANGE UP (ref 135–145)
SP GR SPEC: 1.03 — SIGNIFICANT CHANGE UP (ref 1–1.03)
SP GR UR STRIP: 1.02
SQUAMOUS # UR AUTO: 6 /HPF — HIGH (ref 0–5)
UROBILINOGEN FLD QL: 1 MG/DL — SIGNIFICANT CHANGE UP (ref 0.2–1)
WBC # BLD: 11.51 K/UL — HIGH (ref 3.8–10.5)
WBC # FLD AUTO: 11.51 K/UL — HIGH (ref 3.8–10.5)
WBC UR QL: 8 /HPF — HIGH (ref 0–5)

## 2024-04-24 PROCEDURE — S2900 ROBOTIC SURGICAL SYSTEM: CPT | Mod: NC

## 2024-04-24 PROCEDURE — 99215 OFFICE O/P EST HI 40 MIN: CPT

## 2024-04-24 PROCEDURE — 81003 URINALYSIS AUTO W/O SCOPE: CPT | Mod: QW

## 2024-04-24 PROCEDURE — 43659 UNLISTED LAPS PX STOMACH: CPT

## 2024-04-24 PROCEDURE — 99285 EMERGENCY DEPT VISIT HI MDM: CPT

## 2024-04-24 PROCEDURE — 71045 X-RAY EXAM CHEST 1 VIEW: CPT | Mod: 26,77

## 2024-04-24 PROCEDURE — 74177 CT ABD & PELVIS W/CONTRAST: CPT | Mod: 26,MC

## 2024-04-24 PROCEDURE — 43840 GSTRRPHY SUTR DUOL/GSTR ULCR: CPT

## 2024-04-24 PROCEDURE — 71045 X-RAY EXAM CHEST 1 VIEW: CPT | Mod: 26

## 2024-04-24 RX ORDER — PIPERACILLIN AND TAZOBACTAM 4; .5 G/20ML; G/20ML
3.38 INJECTION, POWDER, LYOPHILIZED, FOR SOLUTION INTRAVENOUS ONCE
Refills: 0 | Status: CANCELLED | OUTPATIENT
Start: 2024-04-25 | End: 2024-04-24

## 2024-04-24 RX ORDER — ACETAMINOPHEN 500 MG
1000 TABLET ORAL EVERY 6 HOURS
Refills: 0 | Status: DISCONTINUED | OUTPATIENT
Start: 2024-04-24 | End: 2024-05-01

## 2024-04-24 RX ORDER — HYDROMORPHONE HYDROCHLORIDE 2 MG/ML
1 INJECTION INTRAMUSCULAR; INTRAVENOUS; SUBCUTANEOUS EVERY 4 HOURS
Refills: 0 | Status: DISCONTINUED | OUTPATIENT
Start: 2024-04-24 | End: 2024-04-26

## 2024-04-24 RX ORDER — PIPERACILLIN AND TAZOBACTAM 4; .5 G/20ML; G/20ML
3.38 INJECTION, POWDER, LYOPHILIZED, FOR SOLUTION INTRAVENOUS EVERY 8 HOURS
Refills: 0 | Status: DISCONTINUED | OUTPATIENT
Start: 2024-04-24 | End: 2024-04-28

## 2024-04-24 RX ORDER — PIPERACILLIN AND TAZOBACTAM 4; .5 G/20ML; G/20ML
3.38 INJECTION, POWDER, LYOPHILIZED, FOR SOLUTION INTRAVENOUS EVERY 8 HOURS
Refills: 0 | Status: DISCONTINUED | OUTPATIENT
Start: 2024-04-24 | End: 2024-04-24

## 2024-04-24 RX ORDER — MORPHINE SULFATE 50 MG/1
4 CAPSULE, EXTENDED RELEASE ORAL ONCE
Refills: 0 | Status: DISCONTINUED | OUTPATIENT
Start: 2024-04-24 | End: 2024-04-24

## 2024-04-24 RX ORDER — HYDROMORPHONE HYDROCHLORIDE 2 MG/ML
0.5 INJECTION INTRAMUSCULAR; INTRAVENOUS; SUBCUTANEOUS EVERY 4 HOURS
Refills: 0 | Status: DISCONTINUED | OUTPATIENT
Start: 2024-04-24 | End: 2024-04-24

## 2024-04-24 RX ORDER — ONDANSETRON 8 MG/1
4 TABLET, FILM COATED ORAL ONCE
Refills: 0 | Status: DISCONTINUED | OUTPATIENT
Start: 2024-04-24 | End: 2024-04-25

## 2024-04-24 RX ORDER — ACETAMINOPHEN 500 MG
1000 TABLET ORAL EVERY 6 HOURS
Refills: 0 | Status: COMPLETED | OUTPATIENT
Start: 2024-04-24 | End: 2024-04-25

## 2024-04-24 RX ORDER — HYDROMORPHONE HYDROCHLORIDE 2 MG/ML
0.5 INJECTION INTRAMUSCULAR; INTRAVENOUS; SUBCUTANEOUS EVERY 4 HOURS
Refills: 0 | Status: DISCONTINUED | OUTPATIENT
Start: 2024-04-24 | End: 2024-04-27

## 2024-04-24 RX ORDER — HYDROMORPHONE HYDROCHLORIDE 2 MG/ML
1 INJECTION INTRAMUSCULAR; INTRAVENOUS; SUBCUTANEOUS EVERY 4 HOURS
Refills: 0 | Status: DISCONTINUED | OUTPATIENT
Start: 2024-04-24 | End: 2024-04-24

## 2024-04-24 RX ORDER — AMLODIPINE AND VALSARTAN 5; 320 MG/1; MG/1
1 TABLET, FILM COATED ORAL
Refills: 0 | DISCHARGE

## 2024-04-24 RX ORDER — FENTANYL CITRATE 50 UG/ML
25 INJECTION INTRAVENOUS
Refills: 0 | Status: DISCONTINUED | OUTPATIENT
Start: 2024-04-24 | End: 2024-04-25

## 2024-04-24 RX ORDER — HEPARIN SODIUM 5000 [USP'U]/ML
5000 INJECTION INTRAVENOUS; SUBCUTANEOUS EVERY 8 HOURS
Refills: 0 | Status: DISCONTINUED | OUTPATIENT
Start: 2024-04-24 | End: 2024-04-25

## 2024-04-24 RX ORDER — SODIUM CHLORIDE 9 MG/ML
1000 INJECTION INTRAMUSCULAR; INTRAVENOUS; SUBCUTANEOUS ONCE
Refills: 0 | Status: COMPLETED | OUTPATIENT
Start: 2024-04-24 | End: 2024-04-24

## 2024-04-24 RX ORDER — PANTOPRAZOLE SODIUM 20 MG/1
40 TABLET, DELAYED RELEASE ORAL DAILY
Refills: 0 | Status: DISCONTINUED | OUTPATIENT
Start: 2024-04-24 | End: 2024-04-25

## 2024-04-24 RX ORDER — HEPARIN SODIUM 5000 [USP'U]/ML
5000 INJECTION INTRAVENOUS; SUBCUTANEOUS EVERY 8 HOURS
Refills: 0 | Status: DISCONTINUED | OUTPATIENT
Start: 2024-04-24 | End: 2024-04-24

## 2024-04-24 RX ORDER — PIPERACILLIN AND TAZOBACTAM 4; .5 G/20ML; G/20ML
3.38 INJECTION, POWDER, LYOPHILIZED, FOR SOLUTION INTRAVENOUS ONCE
Refills: 0 | Status: COMPLETED | OUTPATIENT
Start: 2024-04-24 | End: 2024-04-24

## 2024-04-24 RX ORDER — PANTOPRAZOLE SODIUM 20 MG/1
40 TABLET, DELAYED RELEASE ORAL DAILY
Refills: 0 | Status: DISCONTINUED | OUTPATIENT
Start: 2024-04-24 | End: 2024-04-24

## 2024-04-24 RX ORDER — METOPROLOL TARTRATE 50 MG
5 TABLET ORAL EVERY 6 HOURS
Refills: 0 | Status: DISCONTINUED | OUTPATIENT
Start: 2024-04-24 | End: 2024-05-02

## 2024-04-24 RX ORDER — LEVOTHYROXINE SODIUM 125 MCG
50 TABLET ORAL AT BEDTIME
Refills: 0 | Status: CANCELLED | OUTPATIENT
Start: 2024-04-25 | End: 2024-04-24

## 2024-04-24 RX ORDER — SODIUM CHLORIDE 9 MG/ML
1000 INJECTION, SOLUTION INTRAVENOUS
Refills: 0 | Status: DISCONTINUED | OUTPATIENT
Start: 2024-04-24 | End: 2024-04-24

## 2024-04-24 RX ORDER — METOPROLOL TARTRATE 50 MG
5 TABLET ORAL EVERY 6 HOURS
Refills: 0 | Status: DISCONTINUED | OUTPATIENT
Start: 2024-04-24 | End: 2024-04-24

## 2024-04-24 RX ORDER — LEVOTHYROXINE SODIUM 125 MCG
50 TABLET ORAL AT BEDTIME
Refills: 0 | Status: DISCONTINUED | OUTPATIENT
Start: 2024-04-25 | End: 2024-05-02

## 2024-04-24 RX ADMIN — PIPERACILLIN AND TAZOBACTAM 200 GRAM(S): 4; .5 INJECTION, POWDER, LYOPHILIZED, FOR SOLUTION INTRAVENOUS at 18:33

## 2024-04-24 RX ADMIN — SODIUM CHLORIDE 1000 MILLILITER(S): 9 INJECTION INTRAMUSCULAR; INTRAVENOUS; SUBCUTANEOUS at 16:21

## 2024-04-24 RX ADMIN — MORPHINE SULFATE 4 MILLIGRAM(S): 50 CAPSULE, EXTENDED RELEASE ORAL at 16:21

## 2024-04-24 RX ADMIN — MORPHINE SULFATE 4 MILLIGRAM(S): 50 CAPSULE, EXTENDED RELEASE ORAL at 19:00

## 2024-04-24 RX ADMIN — Medication 1000 MILLIGRAM(S): at 23:54

## 2024-04-24 RX ADMIN — MORPHINE SULFATE 4 MILLIGRAM(S): 50 CAPSULE, EXTENDED RELEASE ORAL at 19:07

## 2024-04-24 RX ADMIN — FENTANYL CITRATE 25 MICROGRAM(S): 50 INJECTION INTRAVENOUS at 23:54

## 2024-04-24 RX ADMIN — Medication 400 MILLIGRAM(S): at 23:27

## 2024-04-24 NOTE — ED ADULT NURSE NOTE - NSICDXPASTSURGICALHX_GEN_ALL_CORE_FT
PAST SURGICAL HISTORY:  H/O cataract extraction     H/O dilation and curettage     H/O:  section     S/P left rotator cuff repair

## 2024-04-24 NOTE — ED PROVIDER NOTE - PROGRESS NOTE DETAILS
radiologist report free air under diaphragm, concerned for perforation, priority CT called.     Given the significant and immediate threats to this patient based on initial presentation, the benefits of emergency contrast-enhanced CT imaging without obtaining GFR/creatinine serum level results greatly outweigh the potential risk of harm due to contrast-induced nephropathy CT showed perforated gastric ulcer with free air in abdomen. surgery consulted. blood culture and zosyn ordered.

## 2024-04-24 NOTE — ED PROVIDER NOTE - IV ALTEPLASE EXCL REL HIDDEN
Notify Dr. Ortiz of black tarry stool. No new orders received. Notify of blood glucose levels and insulin infusion status. Receive orders to discontinue DKA protocol. Notify of Hgb recheck of 7.2 following transfusion of 1 unit today. Receive orders to recheck H&H at 2300. RN will continue to monitor closely.   
show
(4) excellent

## 2024-04-24 NOTE — ED ADULT NURSE NOTE - OBJECTIVE STATEMENT
pt AOx4, breathing even and unlabored. placed on CCM. pt arrives to ED c/o 10/10 RLQ abdominal pain radiating to R flank. fluids, antbx and meds given, pt states pain relief. CT completed. pending transport to OR.

## 2024-04-24 NOTE — H&P ADULT - HISTORY OF PRESENT ILLNESS
68F with PMH hypertension, hypothyroidism, arthritis and PSH  presents to ER with complaint of abdominal pain. Patient reports pain woke her from sleep 3 days prior to presentation. The pain has progressively worsened since. She was able to see her PCP today and was sent to the ER 2/2 concerning abdominal exam. Patient reports she has never had similar pain in the past. This pain was originally generally upper abdominal and has now localized primarily to the RUQ; no associated N/V/CP, no subjective fevers or chills. Patient does feel as though she is a little short of breath which is not her baseline.

## 2024-04-24 NOTE — ED PROVIDER NOTE - NS ED ROS FT
CONSTITUTIONAL - no  fever, no diaphoresis, no weight change  SKIN - no rash  HEMATOLOGIC - no bleeding, no bruising  EYES - no eye pain, no blurred vision  ENT - no change in hearing, no pain  RESPIRATORY - no shortness of breath, no cough  CARDIAC - no chest pain, no palpitations  GI - (+) abd pain, no nausea, no vomiting, no diarrhea, no constipation, no bleeding  GENITO-URINARY - no discharge, no dysuria; no hematuria,   ENDO - no polydipsia, no polyuria, no heat/no cold intolerance  MUSCULOSKELETAL - no joint pain, no swelling, no redness  NEUROLOGIC - no weakness, no headache, no anesthesia, no paresthesias  PSYCH - no anxiety, non suicidal, non homicidal, no hallucination, no depression

## 2024-04-24 NOTE — H&P ADULT - ATTENDING COMMENTS
I have seen and examined the patient around 1840hrs  details as above  3 day of epigastric pain with progressive RLQ pain, seen by PCP and sent to ed. is preparing for hip replacement on Nsaid sand increased stress.    On exam:  NAD, non toxic appearing  Abd is obese, epigastric discomfort, voluntary guarding o rebound tenderness, RUQ discomfort.  imagers reviewed, pneumoperitoneum and collection by antrum c/d perforated ulcer.    A/P   HD normal perforated ulcer  Admit to surgery  prepare for OR, gastrorrhaphy robotic, vs lap vs open.  I have discussed the pathology, and plan to patient and family, all questions answered   Zosyn    To OR when appropriate room available. I have seen and examined the patient around 1840hrs  details as above  3 day of epigastric pain with progressive RLQ pain, seen by PCP and sent to ed. is preparing for hip replacement on Nsaid sand increased stress.    On exam:  NAD, non toxic appearing  Abd is obese, epigastric discomfort, voluntary guarding o rebound tenderness, RUQ discomfort.  imagers reviewed, pneumoperitoneum and collection by antrum c/d perforated ulcer.    A/P   HD normal perforated ulcer in morbid obese patient ( BMI 47.6)  Admit to surgery  prepare for OR, gastrorrhaphy robotic, vs lap vs open.  I have discussed the pathology, and plan to patient and family, all questions answered   Zosyn    To OR when appropriate room available.

## 2024-04-24 NOTE — ASSESSMENT
[FreeTextEntry1] : 68 y.o. female with a PMHx of htn, hyperlipidemia, hypothyroidism, chronic low back pain, and osteoarthritis who presents with acute abdominal pain, initially left sided now in RUQ. Would r/o enteritis, r/o gb / liver pathology r/o renal disease. Pt is extremely uncomfortable due to her severe abdominal pain and unable to sit or stand comfortably. She is not an appropriate candidate for outpatient management. EMS called for ambulance transport.

## 2024-04-24 NOTE — H&P ADULT - NSICDXPASTMEDICALHX_GEN_ALL_CORE_FT
PAST MEDICAL HISTORY:  Hypertension     Hypothyroid     ARSLAN on CPAP     RBBB      PAST MEDICAL HISTORY:  Hypertension     Hypothyroid     Morbid obesity     ARSLAN on CPAP     RBBB

## 2024-04-24 NOTE — ED PROVIDER NOTE - PHYSICAL EXAMINATION
VITAL SIGNS: I have reviewed nursing notes and confirm.  CONSTITUTIONAL: (+) uncomfortable looking  SKIN: Skin exam is warm and dry, no acute rash.  HEAD: Normocephalic; atraumatic.  EYES: PERRL, EOM intact; conjunctiva and sclera clear.  ENT: No nasal discharge; airway clear. Throat clear.  NECK: Supple; non tender.    CARD: Regular rate and rhythm.  RESP: No wheezes,  (+) crackle at right base   ABD:  soft; non-distended; (+) RLQ/ Right mid abdominal pain  EXT: Normal ROM. No clubbing, cyanosis or edema.  NEURO: Alert, oriented. Grossly unremarkable. No focal deficits.  moves all extremities,  normal gait   PSYCH: Cooperative, appropriate.

## 2024-04-24 NOTE — HISTORY OF PRESENT ILLNESS
[Nausea] : nausea [Severe] : severe [___ Days ago] : [unfilled] days ago [Constant] : constant [Abdominal Pain] : abdominal pain [OTC Remedies] : OTC remedies [In Morning] : in the morning [At Night] : at night [Stable] : stable [FreeTextEntry5] : Tylenol, Naproxene [FreeTextEntry8] : Pt c/o severe pain across the lower abdomen that woke her from sleep at 1:56 am 3 days ago. Pain is sharp radiating left to right initially, now pain has settled in the right lateral abdomen. Pain is constant, waxes and wanes, worse with change in position. She denies any N/V/D or fever. + anorexia. + BMs but less than usual and stool appears normal. Denies urinary burning or frequency.

## 2024-04-24 NOTE — ED PROVIDER NOTE - CLINICAL SUMMARY MEDICAL DECISION MAKING FREE TEXT BOX
67 yo F hx of HTN, hypothyroidism, ARSLAN, right hip pain (schedule for hip surgery 5/3/24) Presents with right-sided abdominal pain.  Patient reports diffuse lower abdominal pain started 4 days ago and now localized to the right lower quadrant and right mid abdomen.  Pain is constant.  Decreased appetite.  Patient went to see her PMD had a urine test that showed blood and protein.  She was sent to the ED for further evaluation.  No fever, no nausea or vomiting, no chest pain or shortness of breath.      Will get blood work, urine, CT abdomen.  Morphine for pain, IV fluid hydration. 69 yo F hx of HTN, hypothyroidism, ARSLAN, right hip pain (schedule for hip surgery 5/3/24) Presents with right-sided abdominal pain.  Patient reports diffuse lower abdominal pain started 4 days ago and now localized to the right lower quadrant and right mid abdomen.  Pain is constant.  Decreased appetite.  Patient went to see her PMD had a urine test that showed blood and protein.  She was sent to the ED for further evaluation.  No fever, no nausea or vomiting, no chest pain or shortness of breath.    Patient found to have free air under the right diaphragm on chest x-ray.  CT abdomen showed perforated gastric ulcer with free air and free fluid in the abdomen.   WBC 11.5, lactate 2.4.  urine showed 8 white cells and 6 red cells small leuk esterase.  Surgery consulted, patient admitted to Dr. Orellana.  Will go to the OR [facility].

## 2024-04-24 NOTE — H&P ADULT - NSHPREVIEWOFSYSTEMS_GEN_ALL_CORE
REVIEW OF SYSTEMS:    CONSTITUTIONAL:  No weakness, fevers or chills  EYES/ENT:  No visual changes;  No vertigo or throat pain   NECK:  No pain or stiffness  RESPIRATORY:  No cough, wheezing, hemoptysis; No shortness of breath  CARDIOVASCULAR:  No chest pain or palpitations  GASTROINTESTINAL:  +abdominal pain. No nausea, vomiting, or hematemesis; No diarrhea or constipation. No melena or hematochezia.  GENITOURINARY:  No dysuria, frequency or hematuria  MUSCULOSKELETAL:  FROM all extremities, normal strength, No calf tenderness  NEUROLOGICAL:  No numbness or weakness  SKIN:  No itching, rashes

## 2024-04-24 NOTE — ED PROVIDER NOTE - OBJECTIVE STATEMENT
69 yo F hx of HTN, hypothyroidism, ARSALN, right hip pain (schedule for hip surgery 5/3/24) Presents with right-sided abdominal pain.  Patient reports diffuse lower abdominal pain started 4 days ago and now localized to the right lower quadrant and right mid abdomen.  Pain is constant.  Decreased appetite.  Patient went to see her PMD had a urine test that showed blood and protein.  She was sent to the ED for further evaluation.  No fever, no nausea or vomiting, no chest pain or shortness of breath.

## 2024-04-24 NOTE — H&P ADULT - ASSESSMENT
68F presenting with worsening abdominal pain x3 days. Afebrile, leukocytosis to 11.5, lactate 2.4. CT demonstrating gastric perforation with air-fluid collection primarily in RUQ, consistent with abdominal exam.    Plan:  -admit to surgery - Dr. Callaway  -NPO/IVF  -Zosyn  -OR for robotic possible open josé manuel patch repair  -home meds as able  -floor vs SICU post op pending course

## 2024-04-24 NOTE — H&P ADULT - NSHPPHYSICALEXAM_GEN_ALL_CORE
Constitutional: NAD  HEENT: PERRL, no drainage or redness  Respiratory: respirations even, unlabored on room air; mild SOB while speaking  Cardiovascular: RRR  Gastrointestinal: firm, RUQ peritonitis, TTP RUQ/epigastrium with rebound tenderness  Neurological: A&O x 3; no gross deficits from baseline  Psychiatric: Normal mood, normal affect  Musculoskeletal: baseline R hip/L knee pain  Skin: No rashes

## 2024-04-24 NOTE — H&P ADULT - NSHPLABSRESULTS_GEN_ALL_CORE
13.4   11.51 )-----------( 307      ( 24 Apr 2024 16:17 )             39.7     04-24    137  |  100  |  42.5<H>  ----------------------------<  89  4.8   |  20.0<L>  |  1.06    Ca    8.7      24 Apr 2024 16:17    TPro  7.1  /  Alb  3.1<L>  /  TBili  0.5  /  DBili  x   /  AST  28  /  ALT  15  /  AlkPhos  100  04-24

## 2024-04-24 NOTE — PHYSICAL EXAM
[Normal Sclera/Conjunctiva] : normal sclera/conjunctiva [PERRL] : pupils equal round and reactive to light [No JVD] : no jugular venous distention [No Lymphadenopathy] : no lymphadenopathy [Supple] : supple [No Respiratory Distress] : no respiratory distress  [Clear to Auscultation] : lungs were clear to auscultation bilaterally [No Edema] : there was no peripheral edema [Obese] : obese [RUQ] : in the right upper quadrant [Rebound] : no rebound [Guarding] : guarding [No HSM] : no hepatosplenomegaly noted [None] : no hernias were palpable [Right] : right CVA tenderness present [No Rash] : no rash [No Focal Deficits] : no focal deficits [Normal] : affect was normal and insight and judgment were intact [de-identified] : Appears pale and uncomfortable. [de-identified] : Tongue dry

## 2024-04-25 ENCOUNTER — APPOINTMENT (OUTPATIENT)
Dept: ORTHOPEDIC SURGERY | Facility: CLINIC | Age: 69
End: 2024-04-25

## 2024-04-25 LAB
ANION GAP SERPL CALC-SCNC: 14 MMOL/L — SIGNIFICANT CHANGE UP (ref 5–17)
ANISOCYTOSIS BLD QL: SLIGHT — SIGNIFICANT CHANGE UP
BASOPHILS # BLD AUTO: 0 K/UL — SIGNIFICANT CHANGE UP (ref 0–0.2)
BASOPHILS NFR BLD AUTO: 0 % — SIGNIFICANT CHANGE UP (ref 0–2)
BUN SERPL-MCNC: 29.7 MG/DL — HIGH (ref 8–20)
BURR CELLS BLD QL SMEAR: PRESENT — SIGNIFICANT CHANGE UP
CALCIUM SERPL-MCNC: 8.1 MG/DL — LOW (ref 8.4–10.5)
CHLORIDE SERPL-SCNC: 101 MMOL/L — SIGNIFICANT CHANGE UP (ref 96–108)
CO2 SERPL-SCNC: 23 MMOL/L — SIGNIFICANT CHANGE UP (ref 22–29)
CREAT SERPL-MCNC: 0.85 MG/DL — SIGNIFICANT CHANGE UP (ref 0.5–1.3)
EGFR: 74 ML/MIN/1.73M2 — SIGNIFICANT CHANGE UP
EOSINOPHIL # BLD AUTO: 0 K/UL — SIGNIFICANT CHANGE UP (ref 0–0.5)
EOSINOPHIL NFR BLD AUTO: 0 % — SIGNIFICANT CHANGE UP (ref 0–6)
GIANT PLATELETS BLD QL SMEAR: PRESENT — SIGNIFICANT CHANGE UP
GLUCOSE SERPL-MCNC: 89 MG/DL — SIGNIFICANT CHANGE UP (ref 70–99)
HCT VFR BLD CALC: 34.9 % — SIGNIFICANT CHANGE UP (ref 34.5–45)
HGB BLD-MCNC: 11.7 G/DL — SIGNIFICANT CHANGE UP (ref 11.5–15.5)
LYMPHOCYTES # BLD AUTO: 0.34 K/UL — LOW (ref 1–3.3)
LYMPHOCYTES # BLD AUTO: 5.2 % — LOW (ref 13–44)
MAGNESIUM SERPL-MCNC: 1.9 MG/DL — SIGNIFICANT CHANGE UP (ref 1.6–2.6)
MANUAL SMEAR VERIFICATION: SIGNIFICANT CHANGE UP
MCHC RBC-ENTMCNC: 31.4 PG — SIGNIFICANT CHANGE UP (ref 27–34)
MCHC RBC-ENTMCNC: 33.5 GM/DL — SIGNIFICANT CHANGE UP (ref 32–36)
MCV RBC AUTO: 93.6 FL — SIGNIFICANT CHANGE UP (ref 80–100)
MICROCYTES BLD QL: SLIGHT — SIGNIFICANT CHANGE UP
MONOCYTES # BLD AUTO: 0.17 K/UL — SIGNIFICANT CHANGE UP (ref 0–0.9)
MONOCYTES NFR BLD AUTO: 2.6 % — SIGNIFICANT CHANGE UP (ref 2–14)
NEUTROPHILS # BLD AUTO: 5.91 K/UL — SIGNIFICANT CHANGE UP (ref 1.8–7.4)
NEUTROPHILS NFR BLD AUTO: 89.6 % — HIGH (ref 43–77)
PHOSPHATE SERPL-MCNC: 3.8 MG/DL — SIGNIFICANT CHANGE UP (ref 2.4–4.7)
PLAT MORPH BLD: NORMAL — SIGNIFICANT CHANGE UP
PLATELET # BLD AUTO: 282 K/UL — SIGNIFICANT CHANGE UP (ref 150–400)
POIKILOCYTOSIS BLD QL AUTO: SIGNIFICANT CHANGE UP
POLYCHROMASIA BLD QL SMEAR: SLIGHT — SIGNIFICANT CHANGE UP
POTASSIUM SERPL-MCNC: 3.6 MMOL/L — SIGNIFICANT CHANGE UP (ref 3.5–5.3)
POTASSIUM SERPL-SCNC: 3.6 MMOL/L — SIGNIFICANT CHANGE UP (ref 3.5–5.3)
RBC # BLD: 3.73 M/UL — LOW (ref 3.8–5.2)
RBC # FLD: 14 % — SIGNIFICANT CHANGE UP (ref 10.3–14.5)
RBC BLD AUTO: ABNORMAL
SODIUM SERPL-SCNC: 138 MMOL/L — SIGNIFICANT CHANGE UP (ref 135–145)
VARIANT LYMPHS # BLD: 2.6 % — SIGNIFICANT CHANGE UP (ref 0–6)
WBC # BLD: 6.6 K/UL — SIGNIFICANT CHANGE UP (ref 3.8–10.5)
WBC # FLD AUTO: 6.6 K/UL — SIGNIFICANT CHANGE UP (ref 3.8–10.5)

## 2024-04-25 PROCEDURE — 93010 ELECTROCARDIOGRAM REPORT: CPT

## 2024-04-25 PROCEDURE — 99024 POSTOP FOLLOW-UP VISIT: CPT

## 2024-04-25 RX ORDER — BENZOCAINE AND MENTHOL 5; 1 G/100ML; G/100ML
1 LIQUID ORAL
Refills: 0 | Status: DISCONTINUED | OUTPATIENT
Start: 2024-04-25 | End: 2024-05-02

## 2024-04-25 RX ORDER — ENOXAPARIN SODIUM 100 MG/ML
40 INJECTION SUBCUTANEOUS EVERY 12 HOURS
Refills: 0 | Status: DISCONTINUED | OUTPATIENT
Start: 2024-04-25 | End: 2024-04-30

## 2024-04-25 RX ORDER — POTASSIUM CHLORIDE 20 MEQ
10 PACKET (EA) ORAL
Refills: 0 | Status: COMPLETED | OUTPATIENT
Start: 2024-04-25 | End: 2024-04-25

## 2024-04-25 RX ORDER — METHOCARBAMOL 500 MG/1
750 TABLET, FILM COATED ORAL EVERY 6 HOURS
Refills: 0 | Status: COMPLETED | OUTPATIENT
Start: 2024-04-25 | End: 2024-04-28

## 2024-04-25 RX ORDER — PANTOPRAZOLE SODIUM 20 MG/1
40 TABLET, DELAYED RELEASE ORAL EVERY 12 HOURS
Refills: 0 | Status: DISCONTINUED | OUTPATIENT
Start: 2024-04-25 | End: 2024-05-02

## 2024-04-25 RX ORDER — SODIUM CHLORIDE 9 MG/ML
1000 INJECTION, SOLUTION INTRAVENOUS
Refills: 0 | Status: DISCONTINUED | OUTPATIENT
Start: 2024-04-25 | End: 2024-04-30

## 2024-04-25 RX ADMIN — BENZOCAINE AND MENTHOL 1 LOZENGE: 5; 1 LIQUID ORAL at 21:55

## 2024-04-25 RX ADMIN — HYDROMORPHONE HYDROCHLORIDE 0.5 MILLIGRAM(S): 2 INJECTION INTRAMUSCULAR; INTRAVENOUS; SUBCUTANEOUS at 06:31

## 2024-04-25 RX ADMIN — PIPERACILLIN AND TAZOBACTAM 25 GRAM(S): 4; .5 INJECTION, POWDER, LYOPHILIZED, FOR SOLUTION INTRAVENOUS at 05:39

## 2024-04-25 RX ADMIN — PIPERACILLIN AND TAZOBACTAM 25 GRAM(S): 4; .5 INJECTION, POWDER, LYOPHILIZED, FOR SOLUTION INTRAVENOUS at 14:31

## 2024-04-25 RX ADMIN — METHOCARBAMOL 215 MILLIGRAM(S): 500 TABLET, FILM COATED ORAL at 23:30

## 2024-04-25 RX ADMIN — SODIUM CHLORIDE 100 MILLILITER(S): 9 INJECTION, SOLUTION INTRAVENOUS at 06:31

## 2024-04-25 RX ADMIN — Medication 100 MILLIEQUIVALENT(S): at 08:23

## 2024-04-25 RX ADMIN — PIPERACILLIN AND TAZOBACTAM 25 GRAM(S): 4; .5 INJECTION, POWDER, LYOPHILIZED, FOR SOLUTION INTRAVENOUS at 21:55

## 2024-04-25 RX ADMIN — Medication 400 MILLIGRAM(S): at 12:13

## 2024-04-25 RX ADMIN — Medication 50 MICROGRAM(S): at 21:55

## 2024-04-25 RX ADMIN — BENZOCAINE AND MENTHOL 1 LOZENGE: 5; 1 LIQUID ORAL at 04:49

## 2024-04-25 RX ADMIN — BENZOCAINE AND MENTHOL 1 LOZENGE: 5; 1 LIQUID ORAL at 10:56

## 2024-04-25 RX ADMIN — FENTANYL CITRATE 25 MICROGRAM(S): 50 INJECTION INTRAVENOUS at 00:05

## 2024-04-25 RX ADMIN — Medication 100 MILLIEQUIVALENT(S): at 10:32

## 2024-04-25 RX ADMIN — Medication 400 MILLIGRAM(S): at 05:39

## 2024-04-25 RX ADMIN — Medication 100 MILLIEQUIVALENT(S): at 09:30

## 2024-04-25 RX ADMIN — Medication 5 MILLIGRAM(S): at 12:46

## 2024-04-25 RX ADMIN — Medication 1000 MILLIGRAM(S): at 06:39

## 2024-04-25 RX ADMIN — Medication 5 MILLIGRAM(S): at 05:39

## 2024-04-25 RX ADMIN — ENOXAPARIN SODIUM 40 MILLIGRAM(S): 100 INJECTION SUBCUTANEOUS at 18:13

## 2024-04-25 RX ADMIN — METHOCARBAMOL 215 MILLIGRAM(S): 500 TABLET, FILM COATED ORAL at 06:10

## 2024-04-25 RX ADMIN — Medication 400 MILLIGRAM(S): at 17:56

## 2024-04-25 RX ADMIN — METHOCARBAMOL 215 MILLIGRAM(S): 500 TABLET, FILM COATED ORAL at 18:53

## 2024-04-25 RX ADMIN — HYDROMORPHONE HYDROCHLORIDE 0.5 MILLIGRAM(S): 2 INJECTION INTRAMUSCULAR; INTRAVENOUS; SUBCUTANEOUS at 07:31

## 2024-04-25 RX ADMIN — HEPARIN SODIUM 5000 UNIT(S): 5000 INJECTION INTRAVENOUS; SUBCUTANEOUS at 05:39

## 2024-04-25 RX ADMIN — Medication 5 MILLIGRAM(S): at 23:36

## 2024-04-25 RX ADMIN — PANTOPRAZOLE SODIUM 40 MILLIGRAM(S): 20 TABLET, DELAYED RELEASE ORAL at 18:13

## 2024-04-25 RX ADMIN — Medication 5 MILLIGRAM(S): at 01:24

## 2024-04-25 RX ADMIN — METHOCARBAMOL 215 MILLIGRAM(S): 500 TABLET, FILM COATED ORAL at 12:46

## 2024-04-25 RX ADMIN — BENZOCAINE AND MENTHOL 1 LOZENGE: 5; 1 LIQUID ORAL at 18:12

## 2024-04-25 RX ADMIN — Medication 5 MILLIGRAM(S): at 18:13

## 2024-04-25 NOTE — PATIENT PROFILE ADULT - FALL HARM RISK - FALLEN IN PAST
Spoke to pt  for refill of Tremfya indicates no medication or significant health changes since last pharmacist counseling. No questions for the pharmacist. Medication shipped to pt home via [employer] for delivery on 4/7/21.     [facility]   Specialty Pharmacist  [facility]  Phone: [phone]  [email or web address]

## 2024-04-25 NOTE — PROGRESS NOTE ADULT - ASSESSMENT
68F presenting with worsening abdominal pain x3 days. Afebrile, leukocytosis to 11.5, lactate 2.4. CT demonstrating gastric perforation with air-fluid collection primarily in RUQ, consistent with abdominal exam. Now POD 0 s/p robotic -assisted josé manuel patch repair.     Plan:  -NGT decompression   -NPO/IVF  -IV abx - Zosyn   - F/Y Hpylori   - PPI BID    - Pain control MMD, avoid NSAIDS  - Optimize lytes and replete PRN  - DVT ppx  68F POD# 0 s/p robotic -assisted josé manuel patch repair for perforated duodenal ulcer.     Plan:  -NGT decompression   -NPO/IVF  -IV abx - Zosyn   - F/Y Hpylori   - PPI BID    - Pain control MMD, avoid NSAIDS  - Optimize lytes and replete PRN  - DVT ppx

## 2024-04-25 NOTE — PATIENT PROFILE ADULT - FALL HARM RISK - HARM RISK INTERVENTIONS

## 2024-04-25 NOTE — PROGRESS NOTE ADULT - SUBJECTIVE AND OBJECTIVE BOX
HPI/OVERNIGHT EVENTS:  POD 0 s/p robotic- assisted josé manuel patch repair. Patient tolerated procedure well. No acute events. NGT remains in place with bilious o/p. Denies any flatus/BM. Pain is well controlled at this time.   Afebrile and HDS.     MEDICATIONS  (STANDING):  acetaminophen   IVPB .. 1000 milliGRAM(s) IV Intermittent every 6 hours  heparin   Injectable 5000 Unit(s) SubCutaneous every 8 hours  levothyroxine Injectable 50 MICROGram(s) IV Push at bedtime  methocarbamol IVPB 750 milliGRAM(s) IV Intermittent every 6 hours  metoprolol tartrate Injectable 5 milliGRAM(s) IV Push every 6 hours  multiple electrolytes Injection Type 1 1000 milliLiter(s) (100 mL/Hr) IV Continuous <Continuous>  pantoprazole  Injectable 40 milliGRAM(s) IV Push every 12 hours  piperacillin/tazobactam IVPB.. 3.375 Gram(s) IV Intermittent every 8 hours    MEDICATIONS  (PRN):  acetaminophen   IVPB .. 1000 milliGRAM(s) IV Intermittent every 6 hours PRN Mild Pain (1 - 3), Moderate Pain (4 - 6), Severe Pain (7 - 10)  benzocaine/menthol Lozenge 1 Lozenge Oral every 3 hours PRN Sore Throat  HYDROmorphone  Injectable 0.5 milliGRAM(s) IV Push every 4 hours PRN Severe Pain (7 - 10)  HYDROmorphone  Injectable 1 milliGRAM(s) IV Push every 4 hours PRN breakthrough      Vital Signs Last 24 Hrs  T(C): 36.4 (25 Apr 2024 09:04), Max: 37 (25 Apr 2024 04:21)  T(F): 97.6 (25 Apr 2024 09:04), Max: 98.6 (25 Apr 2024 04:21)  HR: 92 (25 Apr 2024 09:04) (92 - 100)  BP: 119/70 (25 Apr 2024 09:04) (100/69 - 128/80)  BP(mean): 77 (25 Apr 2024 00:20) (76 - 88)  RR: 18 (25 Apr 2024 09:04) (18 - 20)  SpO2: 95% (25 Apr 2024 09:04) (92% - 100%)    Parameters below as of 25 Apr 2024 04:21  Patient On (Oxygen Delivery Method): nasal cannula  O2 Flow (L/min): 2      Constitutional: patient resting comfortably in bed, in no acute distress  Respiratory: respirations are unlabored, no accessory muscle use, no conversational dyspnea  Cardiovascular: regular rate & rhythm  Gastrointestinal: Abdomen soft, appropriately tender, non-distended, no rebound tenderness / guarding, incisional c/d/i  NGT with bilious o/p    Neurological: A&O x 3  I&O's Detail    24 Apr 2024 07:01  -  25 Apr 2024 07:00  --------------------------------------------------------  IN:  Total IN: 0 mL    OUT:    Nasogastric/Oral tube (mL): 325 mL    Voided (mL): 150 mL  Total OUT: 475 mL    Total NET: -475 mL          LABS:                        11.7   6.60  )-----------( 282      ( 25 Apr 2024 05:55 )             34.9     04-25    138  |  101  |  29.7<H>  ----------------------------<  89  3.6   |  23.0  |  0.85    Ca    8.1<L>      25 Apr 2024 05:55  Phos  3.8     04-25  Mg     1.9     04-25    TPro  7.1  /  Alb  3.1<L>  /  TBili  0.5  /  DBili  x   /  AST  28  /  ALT  15  /  AlkPhos  100  04-24    PT/INR - ( 24 Apr 2024 16:17 )   PT: 13.2 sec;   INR: 1.20 ratio         PTT - ( 24 Apr 2024 16:17 )  PTT:22.9 sec  Urinalysis Basic - ( 25 Apr 2024 05:55 )    Color: x / Appearance: x / SG: x / pH: x  Gluc: 89 mg/dL / Ketone: x  / Bili: x / Urobili: x   Blood: x / Protein: x / Nitrite: x   Leuk Esterase: x / RBC: x / WBC x   Sq Epi: x / Non Sq Epi: x / Bacteria: x

## 2024-04-26 ENCOUNTER — APPOINTMENT (OUTPATIENT)
Dept: FAMILY MEDICINE | Facility: CLINIC | Age: 69
End: 2024-04-26

## 2024-04-26 LAB
ANION GAP SERPL CALC-SCNC: 15 MMOL/L — SIGNIFICANT CHANGE UP (ref 5–17)
BASOPHILS # BLD AUTO: 0.05 K/UL — SIGNIFICANT CHANGE UP (ref 0–0.2)
BASOPHILS NFR BLD AUTO: 0.5 % — SIGNIFICANT CHANGE UP (ref 0–2)
BUN SERPL-MCNC: 25.6 MG/DL — HIGH (ref 8–20)
BURR CELLS BLD QL SMEAR: PRESENT — SIGNIFICANT CHANGE UP
CALCIUM SERPL-MCNC: 8 MG/DL — LOW (ref 8.4–10.5)
CHLORIDE SERPL-SCNC: 102 MMOL/L — SIGNIFICANT CHANGE UP (ref 96–108)
CO2 SERPL-SCNC: 22 MMOL/L — SIGNIFICANT CHANGE UP (ref 22–29)
CREAT SERPL-MCNC: 0.87 MG/DL — SIGNIFICANT CHANGE UP (ref 0.5–1.3)
CULTURE RESULTS: SIGNIFICANT CHANGE UP
EGFR: 72 ML/MIN/1.73M2 — SIGNIFICANT CHANGE UP
EOSINOPHIL # BLD AUTO: 0.1 K/UL — SIGNIFICANT CHANGE UP (ref 0–0.5)
EOSINOPHIL NFR BLD AUTO: 1.1 % — SIGNIFICANT CHANGE UP (ref 0–6)
GLUCOSE SERPL-MCNC: 97 MG/DL — SIGNIFICANT CHANGE UP (ref 70–99)
HCT VFR BLD CALC: 31.9 % — LOW (ref 34.5–45)
HGB BLD-MCNC: 10.3 G/DL — LOW (ref 11.5–15.5)
IMM GRANULOCYTES NFR BLD AUTO: 1.4 % — HIGH (ref 0–0.9)
LYMPHOCYTES # BLD AUTO: 1 K/UL — SIGNIFICANT CHANGE UP (ref 1–3.3)
LYMPHOCYTES # BLD AUTO: 10.9 % — LOW (ref 13–44)
MAGNESIUM SERPL-MCNC: 2.2 MG/DL — SIGNIFICANT CHANGE UP (ref 1.8–2.6)
MANUAL SMEAR VERIFICATION: SIGNIFICANT CHANGE UP
MCHC RBC-ENTMCNC: 31.1 PG — SIGNIFICANT CHANGE UP (ref 27–34)
MCHC RBC-ENTMCNC: 32.3 GM/DL — SIGNIFICANT CHANGE UP (ref 32–36)
MCV RBC AUTO: 96.4 FL — SIGNIFICANT CHANGE UP (ref 80–100)
MONOCYTES # BLD AUTO: 0.72 K/UL — SIGNIFICANT CHANGE UP (ref 0–0.9)
MONOCYTES NFR BLD AUTO: 7.9 % — SIGNIFICANT CHANGE UP (ref 2–14)
NEUTROPHILS # BLD AUTO: 7.14 K/UL — SIGNIFICANT CHANGE UP (ref 1.8–7.4)
NEUTROPHILS NFR BLD AUTO: 78.2 % — HIGH (ref 43–77)
PHOSPHATE SERPL-MCNC: 4 MG/DL — SIGNIFICANT CHANGE UP (ref 2.4–4.7)
PLAT MORPH BLD: NORMAL — SIGNIFICANT CHANGE UP
PLATELET # BLD AUTO: 272 K/UL — SIGNIFICANT CHANGE UP (ref 150–400)
POIKILOCYTOSIS BLD QL AUTO: SLIGHT — SIGNIFICANT CHANGE UP
POLYCHROMASIA BLD QL SMEAR: SLIGHT — SIGNIFICANT CHANGE UP
POTASSIUM SERPL-MCNC: 3.4 MMOL/L — LOW (ref 3.5–5.3)
POTASSIUM SERPL-SCNC: 3.4 MMOL/L — LOW (ref 3.5–5.3)
RBC # BLD: 3.31 M/UL — LOW (ref 3.8–5.2)
RBC # FLD: 14.4 % — SIGNIFICANT CHANGE UP (ref 10.3–14.5)
RBC BLD AUTO: NORMAL — SIGNIFICANT CHANGE UP
SODIUM SERPL-SCNC: 139 MMOL/L — SIGNIFICANT CHANGE UP (ref 135–145)
SPECIMEN SOURCE: SIGNIFICANT CHANGE UP
WBC # BLD: 9.14 K/UL — SIGNIFICANT CHANGE UP (ref 3.8–10.5)
WBC # FLD AUTO: 9.14 K/UL — SIGNIFICANT CHANGE UP (ref 3.8–10.5)

## 2024-04-26 PROCEDURE — 99024 POSTOP FOLLOW-UP VISIT: CPT

## 2024-04-26 RX ORDER — POTASSIUM CHLORIDE 20 MEQ
20 PACKET (EA) ORAL
Refills: 0 | Status: COMPLETED | OUTPATIENT
Start: 2024-04-26 | End: 2024-04-26

## 2024-04-26 RX ADMIN — PIPERACILLIN AND TAZOBACTAM 25 GRAM(S): 4; .5 INJECTION, POWDER, LYOPHILIZED, FOR SOLUTION INTRAVENOUS at 15:22

## 2024-04-26 RX ADMIN — HYDROMORPHONE HYDROCHLORIDE 1 MILLIGRAM(S): 2 INJECTION INTRAMUSCULAR; INTRAVENOUS; SUBCUTANEOUS at 05:43

## 2024-04-26 RX ADMIN — HYDROMORPHONE HYDROCHLORIDE 1 MILLIGRAM(S): 2 INJECTION INTRAMUSCULAR; INTRAVENOUS; SUBCUTANEOUS at 04:43

## 2024-04-26 RX ADMIN — HYDROMORPHONE HYDROCHLORIDE 0.5 MILLIGRAM(S): 2 INJECTION INTRAMUSCULAR; INTRAVENOUS; SUBCUTANEOUS at 18:44

## 2024-04-26 RX ADMIN — Medication 5 MILLIGRAM(S): at 23:35

## 2024-04-26 RX ADMIN — BENZOCAINE AND MENTHOL 1 LOZENGE: 5; 1 LIQUID ORAL at 08:08

## 2024-04-26 RX ADMIN — Medication 50 MILLIEQUIVALENT(S): at 15:23

## 2024-04-26 RX ADMIN — Medication 5 MILLIGRAM(S): at 05:30

## 2024-04-26 RX ADMIN — HYDROMORPHONE HYDROCHLORIDE 0.5 MILLIGRAM(S): 2 INJECTION INTRAMUSCULAR; INTRAVENOUS; SUBCUTANEOUS at 01:09

## 2024-04-26 RX ADMIN — ENOXAPARIN SODIUM 40 MILLIGRAM(S): 100 INJECTION SUBCUTANEOUS at 05:30

## 2024-04-26 RX ADMIN — Medication 50 MILLIEQUIVALENT(S): at 09:02

## 2024-04-26 RX ADMIN — Medication 5 MILLIGRAM(S): at 12:11

## 2024-04-26 RX ADMIN — HYDROMORPHONE HYDROCHLORIDE 0.5 MILLIGRAM(S): 2 INJECTION INTRAMUSCULAR; INTRAVENOUS; SUBCUTANEOUS at 13:15

## 2024-04-26 RX ADMIN — BENZOCAINE AND MENTHOL 1 LOZENGE: 5; 1 LIQUID ORAL at 12:21

## 2024-04-26 RX ADMIN — PIPERACILLIN AND TAZOBACTAM 25 GRAM(S): 4; .5 INJECTION, POWDER, LYOPHILIZED, FOR SOLUTION INTRAVENOUS at 05:30

## 2024-04-26 RX ADMIN — PIPERACILLIN AND TAZOBACTAM 25 GRAM(S): 4; .5 INJECTION, POWDER, LYOPHILIZED, FOR SOLUTION INTRAVENOUS at 22:13

## 2024-04-26 RX ADMIN — METHOCARBAMOL 215 MILLIGRAM(S): 500 TABLET, FILM COATED ORAL at 05:31

## 2024-04-26 RX ADMIN — Medication 5 MILLIGRAM(S): at 17:37

## 2024-04-26 RX ADMIN — HYDROMORPHONE HYDROCHLORIDE 0.5 MILLIGRAM(S): 2 INJECTION INTRAMUSCULAR; INTRAVENOUS; SUBCUTANEOUS at 02:09

## 2024-04-26 RX ADMIN — METHOCARBAMOL 215 MILLIGRAM(S): 500 TABLET, FILM COATED ORAL at 17:56

## 2024-04-26 RX ADMIN — METHOCARBAMOL 215 MILLIGRAM(S): 500 TABLET, FILM COATED ORAL at 13:25

## 2024-04-26 RX ADMIN — Medication 50 MILLIEQUIVALENT(S): at 11:00

## 2024-04-26 RX ADMIN — SODIUM CHLORIDE 100 MILLILITER(S): 9 INJECTION, SOLUTION INTRAVENOUS at 22:10

## 2024-04-26 RX ADMIN — HYDROMORPHONE HYDROCHLORIDE 0.5 MILLIGRAM(S): 2 INJECTION INTRAMUSCULAR; INTRAVENOUS; SUBCUTANEOUS at 12:15

## 2024-04-26 RX ADMIN — HYDROMORPHONE HYDROCHLORIDE 0.5 MILLIGRAM(S): 2 INJECTION INTRAMUSCULAR; INTRAVENOUS; SUBCUTANEOUS at 17:57

## 2024-04-26 RX ADMIN — Medication 50 MICROGRAM(S): at 22:12

## 2024-04-26 RX ADMIN — ENOXAPARIN SODIUM 40 MILLIGRAM(S): 100 INJECTION SUBCUTANEOUS at 17:38

## 2024-04-26 RX ADMIN — PANTOPRAZOLE SODIUM 40 MILLIGRAM(S): 20 TABLET, DELAYED RELEASE ORAL at 17:37

## 2024-04-26 RX ADMIN — PANTOPRAZOLE SODIUM 40 MILLIGRAM(S): 20 TABLET, DELAYED RELEASE ORAL at 05:30

## 2024-04-26 NOTE — PROGRESS NOTE ADULT - ASSESSMENT
Pt is a 67 y/o female POD#2 s/p robotic assisted josé manuel patch repair. NGT in place, awaiting return of bowel function, mild-moderate pain well controlled w/ medications. WBC normalized to 9 today, afebrile.  - Keep NPO w/ IVF  - NGT to remain in place  - Monitor for return of bowel fxn  - Once pt has BM, collect stool to sent for H. pylori  - Continue abx - on Zosyn  - PPI BID  - Pain control as needed  - Pt encourage to be OOB to chair, ambulate if able  - DVT ppx w/ lovenox & SCDs  - Encourage frequent IS use

## 2024-04-26 NOTE — PROGRESS NOTE ADULT - SUBJECTIVE AND OBJECTIVE BOX
SUBJECTIVE / 24H EVENTS: Patient seen and examined at bedside. She reports mild-moderate abdominal pain, R side worse than L. States medications are helping to give adequate relief. She states she has not yet passed gas or had BM. Denies nausea / vomiting. Not yet been OOB. Denies fever or chills, CP or SOB.    MEDICATIONS  (STANDING):  enoxaparin Injectable 40 milliGRAM(s) SubCutaneous every 12 hours  levothyroxine Injectable 50 MICROGram(s) IV Push at bedtime  methocarbamol IVPB 750 milliGRAM(s) IV Intermittent every 6 hours  metoprolol tartrate Injectable 5 milliGRAM(s) IV Push every 6 hours  multiple electrolytes Injection Type 1 1000 milliLiter(s) (100 mL/Hr) IV Continuous <Continuous>  pantoprazole  Injectable 40 milliGRAM(s) IV Push every 12 hours  piperacillin/tazobactam IVPB.. 3.375 Gram(s) IV Intermittent every 8 hours  potassium chloride  20 mEq/100 mL IVPB 20 milliEquivalent(s) IV Intermittent every 2 hours    MEDICATIONS  (PRN):  acetaminophen   IVPB .. 1000 milliGRAM(s) IV Intermittent every 6 hours PRN Mild Pain (1 - 3), Moderate Pain (4 - 6), Severe Pain (7 - 10)  benzocaine/menthol Lozenge 1 Lozenge Oral every 3 hours PRN Sore Throat  HYDROmorphone  Injectable 0.5 milliGRAM(s) IV Push every 4 hours PRN Severe Pain (7 - 10)  HYDROmorphone  Injectable 1 milliGRAM(s) IV Push every 4 hours PRN breakthrough      Vital Signs Last 24 Hrs  T(C): 36.6 (26 Apr 2024 10:34), Max: 37 (26 Apr 2024 04:48)  T(F): 97.8 (26 Apr 2024 10:34), Max: 98.6 (26 Apr 2024 04:48)  HR: 95 (26 Apr 2024 10:34) (85 - 97)  BP: 134/80 (26 Apr 2024 10:34) (114/73 - 146/75)  BP(mean): --  RR: 20 (26 Apr 2024 10:34) (18 - 20)  SpO2: 95% (26 Apr 2024 10:34) (94% - 96%)    Parameters below as of 26 Apr 2024 10:34  Patient On (Oxygen Delivery Method): nasal cannula  O2 Flow (L/min): 2      Constitutional: patient appears comfortable lying in bed, in no apparent distress  Respiratory: respirations are unlabored, no accessory muscle use, no conversational dyspnea  Cardiovascular: regular rate & rhythm  Gastrointestinal: abdomen is softly distended, moderate R sided TTP, mild L sided TTP, no rebound tenderness / guarding, incision sites closed w/ dermabond, clean and dry without drainage or surrounding erythema. NGT in place, brown enteric contents in collection cannister  Neurological: A&O x 3  Musculoskeletal: DONOHUE x 4 spontaneously  Skin: mucous membranes moist, no diaphoresis, pallor, cyanosis or jaundice      I&O's Detail    25 Apr 2024 07:01  -  26 Apr 2024 07:00  --------------------------------------------------------  IN:  Total IN: 0 mL    OUT:    Nasogastric/Oral tube (mL): 950 mL    Voided (mL): 100 mL  Total OUT: 1050 mL    Total NET: -1050 mL          LABS:                        10.3   9.14  )-----------( 272      ( 26 Apr 2024 05:45 )             31.9     04-26    139  |  102  |  25.6<H>  ----------------------------<  97  3.4<L>   |  22.0  |  0.87    Ca    8.0<L>      26 Apr 2024 05:45  Phos  4.0     04-26  Mg     2.2     04-26    TPro  7.1  /  Alb  3.1<L>  /  TBili  0.5  /  DBili  x   /  AST  28  /  ALT  15  /  AlkPhos  100  04-24    PT/INR - ( 24 Apr 2024 16:17 )   PT: 13.2 sec;   INR: 1.20 ratio         PTT - ( 24 Apr 2024 16:17 )  PTT:22.9 sec  Urinalysis Basic - ( 26 Apr 2024 05:45 )    Color: x / Appearance: x / SG: x / pH: x  Gluc: 97 mg/dL / Ketone: x  / Bili: x / Urobili: x   Blood: x / Protein: x / Nitrite: x   Leuk Esterase: x / RBC: x / WBC x   Sq Epi: x / Non Sq Epi: x / Bacteria: x

## 2024-04-27 LAB
ANION GAP SERPL CALC-SCNC: 10 MMOL/L — SIGNIFICANT CHANGE UP (ref 5–17)
BASOPHILS # BLD AUTO: 0 K/UL — SIGNIFICANT CHANGE UP (ref 0–0.2)
BASOPHILS NFR BLD AUTO: 0 % — SIGNIFICANT CHANGE UP (ref 0–2)
BUN SERPL-MCNC: 21.6 MG/DL — HIGH (ref 8–20)
BURR CELLS BLD QL SMEAR: PRESENT — SIGNIFICANT CHANGE UP
CALCIUM SERPL-MCNC: 8.5 MG/DL — SIGNIFICANT CHANGE UP (ref 8.4–10.5)
CHLORIDE SERPL-SCNC: 103 MMOL/L — SIGNIFICANT CHANGE UP (ref 96–108)
CO2 SERPL-SCNC: 26 MMOL/L — SIGNIFICANT CHANGE UP (ref 22–29)
CREAT SERPL-MCNC: 0.81 MG/DL — SIGNIFICANT CHANGE UP (ref 0.5–1.3)
EGFR: 79 ML/MIN/1.73M2 — SIGNIFICANT CHANGE UP
EOSINOPHIL # BLD AUTO: 0 K/UL — SIGNIFICANT CHANGE UP (ref 0–0.5)
EOSINOPHIL NFR BLD AUTO: 0 % — SIGNIFICANT CHANGE UP (ref 0–6)
GLUCOSE SERPL-MCNC: 88 MG/DL — SIGNIFICANT CHANGE UP (ref 70–99)
HCT VFR BLD CALC: 34.2 % — LOW (ref 34.5–45)
HGB BLD-MCNC: 10.9 G/DL — LOW (ref 11.5–15.5)
LYMPHOCYTES # BLD AUTO: 0.95 K/UL — LOW (ref 1–3.3)
LYMPHOCYTES # BLD AUTO: 7.9 % — LOW (ref 13–44)
MAGNESIUM SERPL-MCNC: 2.3 MG/DL — SIGNIFICANT CHANGE UP (ref 1.6–2.6)
MANUAL SMEAR VERIFICATION: SIGNIFICANT CHANGE UP
MCHC RBC-ENTMCNC: 30.9 PG — SIGNIFICANT CHANGE UP (ref 27–34)
MCHC RBC-ENTMCNC: 31.9 GM/DL — LOW (ref 32–36)
MCV RBC AUTO: 96.9 FL — SIGNIFICANT CHANGE UP (ref 80–100)
MONOCYTES # BLD AUTO: 1.04 K/UL — HIGH (ref 0–0.9)
MONOCYTES NFR BLD AUTO: 8.7 % — SIGNIFICANT CHANGE UP (ref 2–14)
MYELOCYTES NFR BLD: 0.9 % — HIGH (ref 0–0)
NEUTROPHILS # BLD AUTO: 9.78 K/UL — HIGH (ref 1.8–7.4)
NEUTROPHILS NFR BLD AUTO: 80.7 % — HIGH (ref 43–77)
NEUTS BAND # BLD: 0.9 % — SIGNIFICANT CHANGE UP (ref 0–8)
OVALOCYTES BLD QL SMEAR: SLIGHT — SIGNIFICANT CHANGE UP
PHOSPHATE SERPL-MCNC: 3.5 MG/DL — SIGNIFICANT CHANGE UP (ref 2.4–4.7)
PLAT MORPH BLD: NORMAL — SIGNIFICANT CHANGE UP
PLATELET # BLD AUTO: 291 K/UL — SIGNIFICANT CHANGE UP (ref 150–400)
POIKILOCYTOSIS BLD QL AUTO: SLIGHT — SIGNIFICANT CHANGE UP
POLYCHROMASIA BLD QL SMEAR: SLIGHT — SIGNIFICANT CHANGE UP
POTASSIUM SERPL-MCNC: 4.3 MMOL/L — SIGNIFICANT CHANGE UP (ref 3.5–5.3)
POTASSIUM SERPL-SCNC: 4.3 MMOL/L — SIGNIFICANT CHANGE UP (ref 3.5–5.3)
RBC # BLD: 3.53 M/UL — LOW (ref 3.8–5.2)
RBC # FLD: 14.2 % — SIGNIFICANT CHANGE UP (ref 10.3–14.5)
RBC BLD AUTO: ABNORMAL
SODIUM SERPL-SCNC: 139 MMOL/L — SIGNIFICANT CHANGE UP (ref 135–145)
VARIANT LYMPHS # BLD: 0.9 % — SIGNIFICANT CHANGE UP (ref 0–6)
WBC # BLD: 11.99 K/UL — HIGH (ref 3.8–10.5)
WBC # FLD AUTO: 11.99 K/UL — HIGH (ref 3.8–10.5)

## 2024-04-27 PROCEDURE — 99024 POSTOP FOLLOW-UP VISIT: CPT

## 2024-04-27 RX ADMIN — HYDROMORPHONE HYDROCHLORIDE 0.5 MILLIGRAM(S): 2 INJECTION INTRAMUSCULAR; INTRAVENOUS; SUBCUTANEOUS at 18:12

## 2024-04-27 RX ADMIN — PIPERACILLIN AND TAZOBACTAM 25 GRAM(S): 4; .5 INJECTION, POWDER, LYOPHILIZED, FOR SOLUTION INTRAVENOUS at 05:22

## 2024-04-27 RX ADMIN — METHOCARBAMOL 215 MILLIGRAM(S): 500 TABLET, FILM COATED ORAL at 14:45

## 2024-04-27 RX ADMIN — ENOXAPARIN SODIUM 40 MILLIGRAM(S): 100 INJECTION SUBCUTANEOUS at 05:24

## 2024-04-27 RX ADMIN — ENOXAPARIN SODIUM 40 MILLIGRAM(S): 100 INJECTION SUBCUTANEOUS at 18:12

## 2024-04-27 RX ADMIN — METHOCARBAMOL 215 MILLIGRAM(S): 500 TABLET, FILM COATED ORAL at 00:26

## 2024-04-27 RX ADMIN — PIPERACILLIN AND TAZOBACTAM 25 GRAM(S): 4; .5 INJECTION, POWDER, LYOPHILIZED, FOR SOLUTION INTRAVENOUS at 22:01

## 2024-04-27 RX ADMIN — HYDROMORPHONE HYDROCHLORIDE 0.5 MILLIGRAM(S): 2 INJECTION INTRAMUSCULAR; INTRAVENOUS; SUBCUTANEOUS at 19:10

## 2024-04-27 RX ADMIN — PANTOPRAZOLE SODIUM 40 MILLIGRAM(S): 20 TABLET, DELAYED RELEASE ORAL at 18:12

## 2024-04-27 RX ADMIN — PANTOPRAZOLE SODIUM 40 MILLIGRAM(S): 20 TABLET, DELAYED RELEASE ORAL at 05:23

## 2024-04-27 RX ADMIN — Medication 50 MICROGRAM(S): at 22:01

## 2024-04-27 RX ADMIN — PIPERACILLIN AND TAZOBACTAM 25 GRAM(S): 4; .5 INJECTION, POWDER, LYOPHILIZED, FOR SOLUTION INTRAVENOUS at 14:45

## 2024-04-27 RX ADMIN — BENZOCAINE AND MENTHOL 1 LOZENGE: 5; 1 LIQUID ORAL at 18:12

## 2024-04-27 RX ADMIN — SODIUM CHLORIDE 100 MILLILITER(S): 9 INJECTION, SOLUTION INTRAVENOUS at 20:06

## 2024-04-27 RX ADMIN — Medication 5 MILLIGRAM(S): at 14:45

## 2024-04-27 RX ADMIN — Medication 5 MILLIGRAM(S): at 20:06

## 2024-04-27 RX ADMIN — SODIUM CHLORIDE 100 MILLILITER(S): 9 INJECTION, SOLUTION INTRAVENOUS at 14:44

## 2024-04-27 RX ADMIN — Medication 5 MILLIGRAM(S): at 05:23

## 2024-04-27 RX ADMIN — METHOCARBAMOL 215 MILLIGRAM(S): 500 TABLET, FILM COATED ORAL at 20:06

## 2024-04-27 RX ADMIN — BENZOCAINE AND MENTHOL 1 LOZENGE: 5; 1 LIQUID ORAL at 08:20

## 2024-04-27 RX ADMIN — METHOCARBAMOL 215 MILLIGRAM(S): 500 TABLET, FILM COATED ORAL at 06:32

## 2024-04-27 NOTE — PROGRESS NOTE ADULT - SUBJECTIVE AND OBJECTIVE BOX
Subjective: Patient seen resting comfortably in bed. No overnight events or acute complaints. NGT in place to low continuos suction. Admits flatus, no BM. Denies N/V, diarrhea, fever, chills, shortness of breath, chest pain or other acute complaints.     STATUS POST:  4/24 RA Angel patch     MEDICATIONS  (STANDING):  enoxaparin Injectable 40 milliGRAM(s) SubCutaneous every 12 hours  levothyroxine Injectable 50 MICROGram(s) IV Push at bedtime  methocarbamol IVPB 750 milliGRAM(s) IV Intermittent every 6 hours  metoprolol tartrate Injectable 5 milliGRAM(s) IV Push every 6 hours  multiple electrolytes Injection Type 1 1000 milliLiter(s) (100 mL/Hr) IV Continuous <Continuous>  pantoprazole  Injectable 40 milliGRAM(s) IV Push every 12 hours  piperacillin/tazobactam IVPB.. 3.375 Gram(s) IV Intermittent every 8 hours    MEDICATIONS  (PRN):  acetaminophen   IVPB .. 1000 milliGRAM(s) IV Intermittent every 6 hours PRN Mild Pain (1 - 3), Moderate Pain (4 - 6), Severe Pain (7 - 10)  benzocaine/menthol Lozenge 1 Lozenge Oral every 3 hours PRN Sore Throat  HYDROmorphone  Injectable 0.5 milliGRAM(s) IV Push every 4 hours PRN Severe Pain (7 - 10)  HYDROmorphone  Injectable 1 milliGRAM(s) IV Push every 4 hours PRN breakthrough      Vital Signs Last 24 Hrs  T(C): 37 (26 Apr 2024 19:10), Max: 37 (26 Apr 2024 04:48)  T(F): 98.6 (26 Apr 2024 19:10), Max: 98.6 (26 Apr 2024 04:48)  HR: 66 (27 Apr 2024 00:22) (66 - 99)  BP: 142/80 (27 Apr 2024 00:22) (117/71 - 146/75)  BP(mean): --  RR: 16 (26 Apr 2024 19:10) (16 - 20)  SpO2: 98% (26 Apr 2024 21:55) (93% - 98%)    Parameters below as of 26 Apr 2024 19:10  Patient On (Oxygen Delivery Method): nasal cannula        Physical Exam:    Constitutional: resting comfortably, NAD  Respiratory: Respirations non-labored, no accessory muscle use  Gastrointestinal: Soft, non-distended, mildly tender, incisional sites c/d/i, NGT in place to wall suction   Neurological: A&O x 3; without gross deficit  Musculoskeletal: no limitation of movement      LABS:                        10.3   9.14  )-----------( 272      ( 26 Apr 2024 05:45 )             31.9     04-26    139  |  102  |  25.6<H>  ----------------------------<  97  3.4<L>   |  22.0  |  0.87    Ca    8.0<L>      26 Apr 2024 05:45  Phos  4.0     04-26  Mg     2.2     04-26        Urinalysis Basic - ( 26 Apr 2024 05:45 )    Color: x / Appearance: x / SG: x / pH: x  Gluc: 97 mg/dL / Ketone: x  / Bili: x / Urobili: x   Blood: x / Protein: x / Nitrite: x   Leuk Esterase: x / RBC: x / WBC x   Sq Epi: x / Non Sq Epi: x / Bacteria: x      A: Pt is a 67 y/o female POD#3 s/p robotic assisted angel patch repair. NGT in place, awaiting return of bowel function. Admits gas, denies BM    Plan:  - Keep NPO w/ IVF  - NGT to remain in place  - Monitor for return of bowel fxn  - Once pt has BM, collect stool to sent for H. pylori  - Continue abx - on Zosyn  - PPI BID  - Pain control as needed  - Pt encourage to be OOB to chair, ambulate if able  - DVT ppx w/ lovenox & SCDs  - Encourage frequent IS use

## 2024-04-28 LAB
ANION GAP SERPL CALC-SCNC: 14 MMOL/L — SIGNIFICANT CHANGE UP (ref 5–17)
BASOPHILS # BLD AUTO: 0.05 K/UL — SIGNIFICANT CHANGE UP (ref 0–0.2)
BASOPHILS NFR BLD AUTO: 0.4 % — SIGNIFICANT CHANGE UP (ref 0–2)
BUN SERPL-MCNC: 17 MG/DL — SIGNIFICANT CHANGE UP (ref 8–20)
CALCIUM SERPL-MCNC: 8.2 MG/DL — LOW (ref 8.4–10.5)
CHLORIDE SERPL-SCNC: 104 MMOL/L — SIGNIFICANT CHANGE UP (ref 96–108)
CO2 SERPL-SCNC: 23 MMOL/L — SIGNIFICANT CHANGE UP (ref 22–29)
CREAT SERPL-MCNC: 0.7 MG/DL — SIGNIFICANT CHANGE UP (ref 0.5–1.3)
EGFR: 94 ML/MIN/1.73M2 — SIGNIFICANT CHANGE UP
EOSINOPHIL # BLD AUTO: 0.05 K/UL — SIGNIFICANT CHANGE UP (ref 0–0.5)
EOSINOPHIL NFR BLD AUTO: 0.4 % — SIGNIFICANT CHANGE UP (ref 0–6)
GLUCOSE SERPL-MCNC: 88 MG/DL — SIGNIFICANT CHANGE UP (ref 70–99)
HCT VFR BLD CALC: 34.7 % — SIGNIFICANT CHANGE UP (ref 34.5–45)
HGB BLD-MCNC: 11.3 G/DL — LOW (ref 11.5–15.5)
IMM GRANULOCYTES NFR BLD AUTO: 2.6 % — HIGH (ref 0–0.9)
LYMPHOCYTES # BLD AUTO: 1.5 K/UL — SIGNIFICANT CHANGE UP (ref 1–3.3)
LYMPHOCYTES # BLD AUTO: 11.4 % — LOW (ref 13–44)
MAGNESIUM SERPL-MCNC: 2 MG/DL — SIGNIFICANT CHANGE UP (ref 1.6–2.6)
MCHC RBC-ENTMCNC: 31.5 PG — SIGNIFICANT CHANGE UP (ref 27–34)
MCHC RBC-ENTMCNC: 32.6 GM/DL — SIGNIFICANT CHANGE UP (ref 32–36)
MCV RBC AUTO: 96.7 FL — SIGNIFICANT CHANGE UP (ref 80–100)
MONOCYTES # BLD AUTO: 0.98 K/UL — HIGH (ref 0–0.9)
MONOCYTES NFR BLD AUTO: 7.4 % — SIGNIFICANT CHANGE UP (ref 2–14)
NEUTROPHILS # BLD AUTO: 10.29 K/UL — HIGH (ref 1.8–7.4)
NEUTROPHILS NFR BLD AUTO: 77.8 % — HIGH (ref 43–77)
PHOSPHATE SERPL-MCNC: 3.7 MG/DL — SIGNIFICANT CHANGE UP (ref 2.4–4.7)
PLATELET # BLD AUTO: 299 K/UL — SIGNIFICANT CHANGE UP (ref 150–400)
POTASSIUM SERPL-MCNC: 3.6 MMOL/L — SIGNIFICANT CHANGE UP (ref 3.5–5.3)
POTASSIUM SERPL-SCNC: 3.6 MMOL/L — SIGNIFICANT CHANGE UP (ref 3.5–5.3)
RBC # BLD: 3.59 M/UL — LOW (ref 3.8–5.2)
RBC # FLD: 14 % — SIGNIFICANT CHANGE UP (ref 10.3–14.5)
SODIUM SERPL-SCNC: 140 MMOL/L — SIGNIFICANT CHANGE UP (ref 135–145)
WBC # BLD: 13.21 K/UL — HIGH (ref 3.8–10.5)
WBC # FLD AUTO: 13.21 K/UL — HIGH (ref 3.8–10.5)

## 2024-04-28 PROCEDURE — 99024 POSTOP FOLLOW-UP VISIT: CPT

## 2024-04-28 RX ORDER — PIPERACILLIN AND TAZOBACTAM 4; .5 G/20ML; G/20ML
3.38 INJECTION, POWDER, LYOPHILIZED, FOR SOLUTION INTRAVENOUS EVERY 8 HOURS
Refills: 0 | Status: DISCONTINUED | OUTPATIENT
Start: 2024-04-28 | End: 2024-05-03

## 2024-04-28 RX ORDER — POTASSIUM CHLORIDE 20 MEQ
10 PACKET (EA) ORAL
Refills: 0 | Status: COMPLETED | OUTPATIENT
Start: 2024-04-28 | End: 2024-04-28

## 2024-04-28 RX ADMIN — BENZOCAINE AND MENTHOL 1 LOZENGE: 5; 1 LIQUID ORAL at 07:38

## 2024-04-28 RX ADMIN — Medication 100 MILLIEQUIVALENT(S): at 09:03

## 2024-04-28 RX ADMIN — PANTOPRAZOLE SODIUM 40 MILLIGRAM(S): 20 TABLET, DELAYED RELEASE ORAL at 17:20

## 2024-04-28 RX ADMIN — PIPERACILLIN AND TAZOBACTAM 25 GRAM(S): 4; .5 INJECTION, POWDER, LYOPHILIZED, FOR SOLUTION INTRAVENOUS at 21:37

## 2024-04-28 RX ADMIN — Medication 100 MILLIEQUIVALENT(S): at 13:27

## 2024-04-28 RX ADMIN — SODIUM CHLORIDE 100 MILLILITER(S): 9 INJECTION, SOLUTION INTRAVENOUS at 05:16

## 2024-04-28 RX ADMIN — SODIUM CHLORIDE 100 MILLILITER(S): 9 INJECTION, SOLUTION INTRAVENOUS at 07:38

## 2024-04-28 RX ADMIN — PANTOPRAZOLE SODIUM 40 MILLIGRAM(S): 20 TABLET, DELAYED RELEASE ORAL at 05:17

## 2024-04-28 RX ADMIN — ENOXAPARIN SODIUM 40 MILLIGRAM(S): 100 INJECTION SUBCUTANEOUS at 05:17

## 2024-04-28 RX ADMIN — Medication 5 MILLIGRAM(S): at 22:29

## 2024-04-28 RX ADMIN — Medication 50 MICROGRAM(S): at 21:37

## 2024-04-28 RX ADMIN — Medication 5 MILLIGRAM(S): at 09:03

## 2024-04-28 RX ADMIN — Medication 5 MILLIGRAM(S): at 13:39

## 2024-04-28 RX ADMIN — Medication 5 MILLIGRAM(S): at 02:07

## 2024-04-28 RX ADMIN — Medication 100 MILLIEQUIVALENT(S): at 11:28

## 2024-04-28 RX ADMIN — PIPERACILLIN AND TAZOBACTAM 25 GRAM(S): 4; .5 INJECTION, POWDER, LYOPHILIZED, FOR SOLUTION INTRAVENOUS at 05:17

## 2024-04-28 RX ADMIN — METHOCARBAMOL 215 MILLIGRAM(S): 500 TABLET, FILM COATED ORAL at 02:04

## 2024-04-28 RX ADMIN — ENOXAPARIN SODIUM 40 MILLIGRAM(S): 100 INJECTION SUBCUTANEOUS at 17:20

## 2024-04-28 RX ADMIN — PIPERACILLIN AND TAZOBACTAM 25 GRAM(S): 4; .5 INJECTION, POWDER, LYOPHILIZED, FOR SOLUTION INTRAVENOUS at 13:27

## 2024-04-28 NOTE — PROGRESS NOTE ADULT - SUBJECTIVE AND OBJECTIVE BOX
Subjective: Patient seen and examined at bedside. Patient was awake and questioning plan, feeling frustrated still having the NGT and not being able to intake anything to drink/eat. NGT in place to wall suction, dark brown output. Denies bowel movement. Denies N/V/F/D, shortness of breath, chest pain, or any other symptoms.     STATUS POST:  4/24: RA josé manuel patch     MEDICATIONS  (STANDING):  enoxaparin Injectable 40 milliGRAM(s) SubCutaneous every 12 hours  levothyroxine Injectable 50 MICROGram(s) IV Push at bedtime  metoprolol tartrate Injectable 5 milliGRAM(s) IV Push every 6 hours  multiple electrolytes Injection Type 1 1000 milliLiter(s) (100 mL/Hr) IV Continuous <Continuous>  pantoprazole  Injectable 40 milliGRAM(s) IV Push every 12 hours  piperacillin/tazobactam IVPB.. 3.375 Gram(s) IV Intermittent every 8 hours    MEDICATIONS  (PRN):  acetaminophen   IVPB .. 1000 milliGRAM(s) IV Intermittent every 6 hours PRN Mild Pain (1 - 3), Moderate Pain (4 - 6), Severe Pain (7 - 10)  benzocaine/menthol Lozenge 1 Lozenge Oral every 3 hours PRN Sore Throat  HYDROmorphone  Injectable 0.5 milliGRAM(s) IV Push every 4 hours PRN Severe Pain (7 - 10)  HYDROmorphone  Injectable 1 milliGRAM(s) IV Push every 4 hours PRN breakthrough      Vital Signs Last 24 Hrs  T(C): 36.6 (28 Apr 2024 02:00), Max: 36.9 (27 Apr 2024 20:23)  T(F): 97.9 (28 Apr 2024 02:00), Max: 98.5 (27 Apr 2024 20:23)  HR: 93 (28 Apr 2024 02:00) (88 - 100)  BP: 138/78 (28 Apr 2024 02:00) (120/76 - 138/79)  BP(mean): --  RR: 18 (28 Apr 2024 02:00) (16 - 20)  SpO2: 92% (28 Apr 2024 02:00) (92% - 96%)    Parameters below as of 28 Apr 2024 02:00  Patient On (Oxygen Delivery Method): room air        Physical Exam:    Constitutional: NAD  Respiratory: Respirations non-labored, no accessory muscle use  Gastrointestinal: Soft, non-tender, non-distended, NGT to wall suction with minimal dark liquid output  Neurological: A&O x 3; without gross deficit  Musculoskeletal:  no limitation of movement      LABS:                        10.9   11.99 )-----------( 291      ( 27 Apr 2024 06:27 )             34.2     04-27    139  |  103  |  21.6<H>  ----------------------------<  88  4.3   |  26.0  |  0.81    Ca    8.5      27 Apr 2024 06:27  Phos  3.5     04-27  Mg     2.3     04-27        Urinalysis Basic - ( 27 Apr 2024 06:27 )    Color: x / Appearance: x / SG: x / pH: x  Gluc: 88 mg/dL / Ketone: x  / Bili: x / Urobili: x   Blood: x / Protein: x / Nitrite: x   Leuk Esterase: x / RBC: x / WBC x   Sq Epi: x / Non Sq Epi: x / Bacteria: x        A: Pt is a 69 y/o female POD#4 s/p robotic assisted josé manuel patch repair. NGT in place, awaiting return of bowel function. Admits gas, denies BM    Plan:  - Keep NPO w/ IVF  - NGT to remain in place  - Monitor for return of bowel fxn  - Once pt has BM, collect stool to sent for H. pylori  - Continue abx - on Zosyn  - PPI BID  - Pain control as needed  - Pt encourage to be OOB to chair and ambulation  - DVT ppx w/ lovenox & SCDs  - Encourage frequent IS use Subjective: Patient seen and examined at bedside. Patient was awake and questioning plan, feeling frustrated still having the NGT and not being able to intake anything to drink/eat. NGT in place to wall suction, dark brown output. Denies bowel movement. Procedure and plan explained to the patient, she understood and was okay with it by the time we left. Denies N/V/F/D, shortness of breath, chest pain, or any other symptoms.     STATUS POST:  4/24: RA josé manuel patch     MEDICATIONS  (STANDING):  enoxaparin Injectable 40 milliGRAM(s) SubCutaneous every 12 hours  levothyroxine Injectable 50 MICROGram(s) IV Push at bedtime  metoprolol tartrate Injectable 5 milliGRAM(s) IV Push every 6 hours  multiple electrolytes Injection Type 1 1000 milliLiter(s) (100 mL/Hr) IV Continuous <Continuous>  pantoprazole  Injectable 40 milliGRAM(s) IV Push every 12 hours  piperacillin/tazobactam IVPB.. 3.375 Gram(s) IV Intermittent every 8 hours    MEDICATIONS  (PRN):  acetaminophen   IVPB .. 1000 milliGRAM(s) IV Intermittent every 6 hours PRN Mild Pain (1 - 3), Moderate Pain (4 - 6), Severe Pain (7 - 10)  benzocaine/menthol Lozenge 1 Lozenge Oral every 3 hours PRN Sore Throat  HYDROmorphone  Injectable 0.5 milliGRAM(s) IV Push every 4 hours PRN Severe Pain (7 - 10)  HYDROmorphone  Injectable 1 milliGRAM(s) IV Push every 4 hours PRN breakthrough      Vital Signs Last 24 Hrs  T(C): 36.6 (28 Apr 2024 02:00), Max: 36.9 (27 Apr 2024 20:23)  T(F): 97.9 (28 Apr 2024 02:00), Max: 98.5 (27 Apr 2024 20:23)  HR: 93 (28 Apr 2024 02:00) (88 - 100)  BP: 138/78 (28 Apr 2024 02:00) (120/76 - 138/79)  BP(mean): --  RR: 18 (28 Apr 2024 02:00) (16 - 20)  SpO2: 92% (28 Apr 2024 02:00) (92% - 96%)    Parameters below as of 28 Apr 2024 02:00  Patient On (Oxygen Delivery Method): room air        Physical Exam:    Constitutional: NAD  Respiratory: Respirations non-labored, no accessory muscle use  Gastrointestinal: Soft, non-tender, non-distended, NGT to wall suction with minimal dark liquid output  Neurological: A&O x 3; without gross deficit  Musculoskeletal:  no limitation of movement      LABS:                        10.9   11.99 )-----------( 291      ( 27 Apr 2024 06:27 )             34.2     04-27    139  |  103  |  21.6<H>  ----------------------------<  88  4.3   |  26.0  |  0.81    Ca    8.5      27 Apr 2024 06:27  Phos  3.5     04-27  Mg     2.3     04-27        Urinalysis Basic - ( 27 Apr 2024 06:27 )    Color: x / Appearance: x / SG: x / pH: x  Gluc: 88 mg/dL / Ketone: x  / Bili: x / Urobili: x   Blood: x / Protein: x / Nitrite: x   Leuk Esterase: x / RBC: x / WBC x   Sq Epi: x / Non Sq Epi: x / Bacteria: x        A: Pt is a 67 y/o female POD#4 s/p robotic assisted josé manuel patch repair. NGT in place, awaiting return of bowel function. Admits gas, denies BM    Plan:  - Keep NPO w/ IVF  - NGT to remain in place  - Monitor for return of bowel fxn  - Once pt has BM, collect stool to sent for H. pylori  - Continue abx - on Zosyn  - PPI BID  - Pain control as needed  - Pt encourage to be OOB to chair and ambulation  - DVT ppx w/ lovenox & SCDs  - Encourage frequent IS use

## 2024-04-29 LAB
ANION GAP SERPL CALC-SCNC: 11 MMOL/L — SIGNIFICANT CHANGE UP (ref 5–17)
BASOPHILS # BLD AUTO: 0.04 K/UL — SIGNIFICANT CHANGE UP (ref 0–0.2)
BASOPHILS NFR BLD AUTO: 0.3 % — SIGNIFICANT CHANGE UP (ref 0–2)
BUN SERPL-MCNC: 15.3 MG/DL — SIGNIFICANT CHANGE UP (ref 8–20)
CALCIUM SERPL-MCNC: 8.1 MG/DL — LOW (ref 8.4–10.5)
CHLORIDE SERPL-SCNC: 103 MMOL/L — SIGNIFICANT CHANGE UP (ref 96–108)
CO2 SERPL-SCNC: 27 MMOL/L — SIGNIFICANT CHANGE UP (ref 22–29)
CREAT SERPL-MCNC: 0.57 MG/DL — SIGNIFICANT CHANGE UP (ref 0.5–1.3)
CULTURE RESULTS: SIGNIFICANT CHANGE UP
CULTURE RESULTS: SIGNIFICANT CHANGE UP
EGFR: 98 ML/MIN/1.73M2 — SIGNIFICANT CHANGE UP
EOSINOPHIL # BLD AUTO: 0.07 K/UL — SIGNIFICANT CHANGE UP (ref 0–0.5)
EOSINOPHIL NFR BLD AUTO: 0.5 % — SIGNIFICANT CHANGE UP (ref 0–6)
GLUCOSE SERPL-MCNC: 80 MG/DL — SIGNIFICANT CHANGE UP (ref 70–99)
HCT VFR BLD CALC: 33.9 % — LOW (ref 34.5–45)
HGB BLD-MCNC: 10.9 G/DL — LOW (ref 11.5–15.5)
IMM GRANULOCYTES NFR BLD AUTO: 2.4 % — HIGH (ref 0–0.9)
LYMPHOCYTES # BLD AUTO: 1.61 K/UL — SIGNIFICANT CHANGE UP (ref 1–3.3)
LYMPHOCYTES # BLD AUTO: 10.8 % — LOW (ref 13–44)
MAGNESIUM SERPL-MCNC: 2 MG/DL — SIGNIFICANT CHANGE UP (ref 1.6–2.6)
MCHC RBC-ENTMCNC: 31.2 PG — SIGNIFICANT CHANGE UP (ref 27–34)
MCHC RBC-ENTMCNC: 32.2 GM/DL — SIGNIFICANT CHANGE UP (ref 32–36)
MCV RBC AUTO: 97.1 FL — SIGNIFICANT CHANGE UP (ref 80–100)
MONOCYTES # BLD AUTO: 0.94 K/UL — HIGH (ref 0–0.9)
MONOCYTES NFR BLD AUTO: 6.3 % — SIGNIFICANT CHANGE UP (ref 2–14)
NEUTROPHILS # BLD AUTO: 11.91 K/UL — HIGH (ref 1.8–7.4)
NEUTROPHILS NFR BLD AUTO: 79.7 % — HIGH (ref 43–77)
PHOSPHATE SERPL-MCNC: 3.7 MG/DL — SIGNIFICANT CHANGE UP (ref 2.4–4.7)
PLATELET # BLD AUTO: 331 K/UL — SIGNIFICANT CHANGE UP (ref 150–400)
POTASSIUM SERPL-MCNC: 4.3 MMOL/L — SIGNIFICANT CHANGE UP (ref 3.5–5.3)
POTASSIUM SERPL-SCNC: 4.3 MMOL/L — SIGNIFICANT CHANGE UP (ref 3.5–5.3)
RBC # BLD: 3.49 M/UL — LOW (ref 3.8–5.2)
RBC # FLD: 13.8 % — SIGNIFICANT CHANGE UP (ref 10.3–14.5)
SODIUM SERPL-SCNC: 141 MMOL/L — SIGNIFICANT CHANGE UP (ref 135–145)
SPECIMEN SOURCE: SIGNIFICANT CHANGE UP
SPECIMEN SOURCE: SIGNIFICANT CHANGE UP
WBC # BLD: 14.93 K/UL — HIGH (ref 3.8–10.5)
WBC # FLD AUTO: 14.93 K/UL — HIGH (ref 3.8–10.5)

## 2024-04-29 PROCEDURE — 74177 CT ABD & PELVIS W/CONTRAST: CPT | Mod: 26

## 2024-04-29 PROCEDURE — 99024 POSTOP FOLLOW-UP VISIT: CPT

## 2024-04-29 RX ADMIN — PANTOPRAZOLE SODIUM 40 MILLIGRAM(S): 20 TABLET, DELAYED RELEASE ORAL at 05:06

## 2024-04-29 RX ADMIN — PIPERACILLIN AND TAZOBACTAM 25 GRAM(S): 4; .5 INJECTION, POWDER, LYOPHILIZED, FOR SOLUTION INTRAVENOUS at 13:26

## 2024-04-29 RX ADMIN — Medication 5 MILLIGRAM(S): at 20:12

## 2024-04-29 RX ADMIN — Medication 5 MILLIGRAM(S): at 03:24

## 2024-04-29 RX ADMIN — ENOXAPARIN SODIUM 40 MILLIGRAM(S): 100 INJECTION SUBCUTANEOUS at 05:06

## 2024-04-29 RX ADMIN — Medication 1000 MILLIGRAM(S): at 22:30

## 2024-04-29 RX ADMIN — ENOXAPARIN SODIUM 40 MILLIGRAM(S): 100 INJECTION SUBCUTANEOUS at 18:36

## 2024-04-29 RX ADMIN — PANTOPRAZOLE SODIUM 40 MILLIGRAM(S): 20 TABLET, DELAYED RELEASE ORAL at 18:36

## 2024-04-29 RX ADMIN — Medication 50 MICROGRAM(S): at 21:30

## 2024-04-29 RX ADMIN — PIPERACILLIN AND TAZOBACTAM 25 GRAM(S): 4; .5 INJECTION, POWDER, LYOPHILIZED, FOR SOLUTION INTRAVENOUS at 05:06

## 2024-04-29 RX ADMIN — Medication 5 MILLIGRAM(S): at 13:26

## 2024-04-29 RX ADMIN — Medication 5 MILLIGRAM(S): at 08:56

## 2024-04-29 RX ADMIN — Medication 400 MILLIGRAM(S): at 21:30

## 2024-04-29 RX ADMIN — PIPERACILLIN AND TAZOBACTAM 25 GRAM(S): 4; .5 INJECTION, POWDER, LYOPHILIZED, FOR SOLUTION INTRAVENOUS at 21:30

## 2024-04-29 NOTE — DIETITIAN INITIAL EVALUATION ADULT - PERTINENT MEDS FT
MEDICATIONS  (STANDING):  levothyroxine Injectable 50 MICROGram(s) IV Push at bedtime  metoprolol tartrate Injectable 5 milliGRAM(s) IV Push every 6 hours  multiple electrolytes Injection Type 1 1000 milliLiter(s) (100 mL/Hr) IV Continuous <Continuous>  pantoprazole  Injectable 40 milliGRAM(s) IV Push every 12 hours  piperacillin/tazobactam IVPB.. 3.375 Gram(s) IV Intermittent every 8 hours

## 2024-04-29 NOTE — DIETITIAN INITIAL EVALUATION ADULT - OTHER INFO
67 y/o female POD#5 s/p robotic assisted josé manuel patch repair. Passing gas, having diarrhea. C/o mild-moderate abd pain. Increasing WBC over the past few days 9.11>13>14.

## 2024-04-29 NOTE — DIETITIAN INITIAL EVALUATION ADULT - PERTINENT LABORATORY DATA
04-29    141  |  103  |  15.3  ----------------------------<  80  4.3   |  27.0  |  0.57    Ca    8.1<L>      29 Apr 2024 05:55  Phos  3.7     04-29  Mg     2.0     04-29    A1C with Estimated Average Glucose Result: 5.3 % (08-15-23 @ 16:15)

## 2024-04-29 NOTE — DIETITIAN INITIAL EVALUATION ADULT - NSFNSGIIOFT_GEN_A_CORE
04-28-24 @ 07:01  -  04-29-24 @ 07:00  --------------------------------------------------------  OUT:    Nasogastric/Oral tube (mL): 345 mL  Total OUT: 345 mL    Total NET: -345 mL

## 2024-04-29 NOTE — DIETITIAN NUTRITION RISK NOTIFICATION - ADDITIONAL COMMENTS/DIETITIAN RECOMMENDATIONS
Advance diet to clear liquids as feasible/tolerable.  Rx: MVI daily. Obtain daily weights to monitor trends.

## 2024-04-29 NOTE — PROGRESS NOTE ADULT - SUBJECTIVE AND OBJECTIVE BOX
SUBJECTIVE / 24H EVENTS: Patient seen and examined at bedside. She reports mild-moderate abdominal pain which she states is mostly unchanged. Medications helping to give adequate relief. She states she had diarrhea overnight, passing gas. Reports her NGT accidentally fell out overnight, as well. Denies nausea or vomiting. Denies fever or chills, CP or SOB.    MEDICATIONS  (STANDING):  enoxaparin Injectable 40 milliGRAM(s) SubCutaneous every 12 hours  levothyroxine Injectable 50 MICROGram(s) IV Push at bedtime  metoprolol tartrate Injectable 5 milliGRAM(s) IV Push every 6 hours  multiple electrolytes Injection Type 1 1000 milliLiter(s) (100 mL/Hr) IV Continuous <Continuous>  pantoprazole  Injectable 40 milliGRAM(s) IV Push every 12 hours  piperacillin/tazobactam IVPB.. 3.375 Gram(s) IV Intermittent every 8 hours    MEDICATIONS  (PRN):  acetaminophen   IVPB .. 1000 milliGRAM(s) IV Intermittent every 6 hours PRN Mild Pain (1 - 3), Moderate Pain (4 - 6), Severe Pain (7 - 10)  benzocaine/menthol Lozenge 1 Lozenge Oral every 3 hours PRN Sore Throat  HYDROmorphone  Injectable 0.5 milliGRAM(s) IV Push every 4 hours PRN Severe Pain (7 - 10)  HYDROmorphone  Injectable 1 milliGRAM(s) IV Push every 4 hours PRN breakthrough      Vital Signs Last 24 Hrs  T(C): 36.8 (29 Apr 2024 05:00), Max: 36.8 (28 Apr 2024 15:41)  T(F): 98.3 (29 Apr 2024 05:00), Max: 98.3 (28 Apr 2024 15:41)  HR: 83 (29 Apr 2024 05:00) (83 - 93)  BP: 142/82 (29 Apr 2024 05:00) (128/82 - 153/86)  BP(mean): --  RR: 18 (29 Apr 2024 05:00) (18 - 18)  SpO2: 95% (29 Apr 2024 05:00) (95% - 97%)    Parameters below as of 29 Apr 2024 05:00  Patient On (Oxygen Delivery Method): room air        Constitutional: patient appears mildly uncomfortable lying in bed, in no apparent distress  Respiratory: respirations are unlabored, no accessory muscle use, no conversational dyspnea  Cardiovascular: regular rate & rhythm  Gastrointestinal: abdomen is obese, non-distended, mild-moderate R sided abdominal tenderness, no rebound / guarding, incision sites are clean & dry without drainage or surrounding erythema  Neurological: A&O x 3  Skin: mucous membranes moist, no diaphoresis, pallor, cyanosis or jaundice      I&O's Detail    28 Apr 2024 07:01  -  29 Apr 2024 07:00  --------------------------------------------------------  IN:    IV PiggyBack: 100 mL    IV PiggyBack: 300 mL    multiple electrolytes Injection Type 1.: 1200 mL  Total IN: 1600 mL    OUT:    Nasogastric/Oral tube (mL): 345 mL  Total OUT: 345 mL    Total NET: 1255 mL          LABS:                        10.9   14.93 )-----------( 331      ( 29 Apr 2024 05:55 )             33.9     04-29    141  |  103  |  15.3  ----------------------------<  80  4.3   |  27.0  |  0.57    Ca    8.1<L>      29 Apr 2024 05:55  Phos  3.7     04-29  Mg     2.0     04-29        Urinalysis Basic - ( 29 Apr 2024 05:55 )    Color: x / Appearance: x / SG: x / pH: x  Gluc: 80 mg/dL / Ketone: x  / Bili: x / Urobili: x   Blood: x / Protein: x / Nitrite: x   Leuk Esterase: x / RBC: x / WBC x   Sq Epi: x / Non Sq Epi: x / Bacteria: x

## 2024-04-29 NOTE — PROGRESS NOTE ADULT - ASSESSMENT
Pt is a 67 y/o female POD#5 s/p robotic assisted josé manuel patch repair. Passing gas, having diarrhea. C/o mild-moderate abd pain. Increasing WBC over the past few days 9.11>13>14.   - Plan for CT scan of abd & pelvis w/ PO & IV contrast to work-up leukocytosis  - Monitor for fevers, continue to trend WBC  - Keep NPO w/ IVF - ok to keep NGT out as patient is having bowel fxn  - Pain control as needed  - Encourage pt to be OOB, ambulate as able  - DVT ppx w/ lovenox & SCDs  - Encourage frequent IS use Pt is a 67 y/o female POD#5 s/p robotic assisted josé manuel patch repair. Passing gas, having diarrhea. C/o mild-moderate abd pain. Increasing WBC over the past few days 9.11>13>14.   - Plan for CT scan of abd & pelvis w/ PO & IV contrast to work-up leukocytosis  - Monitor for fevers, continue to trend WBC  - Continue Zosyn as ordered  - Keep NPO w/ IVF - ok to keep NGT out as patient is having bowel fxn  - Pain control as needed  - Encourage pt to be OOB, ambulate as able  - DVT ppx w/ lovenox & SCDs  - Encourage frequent IS use

## 2024-04-29 NOTE — DIETITIAN INITIAL EVALUATION ADULT - ORAL INTAKE PTA/DIET HISTORY
Pt currently remains NPO. Noted NGT fell out overnight. CT ordered, clears pending results. RD to follow up as feasible.

## 2024-04-29 NOTE — DIETITIAN INITIAL EVALUATION ADULT - ETIOLOGY
related to inability to meet sufficient protein-energy in setting of s/p robotic assisted josé manuel patch repair

## 2024-04-29 NOTE — DIETITIAN INITIAL EVALUATION ADULT - NSICDXPASTMEDICALHX_GEN_ALL_CORE_FT
PAST MEDICAL HISTORY:  Hypertension     Hypothyroid     Morbid obesity     ARSLAN on CPAP     RBBB

## 2024-04-30 LAB
ANION GAP SERPL CALC-SCNC: 11 MMOL/L — SIGNIFICANT CHANGE UP (ref 5–17)
APTT BLD: 26.1 SEC — SIGNIFICANT CHANGE UP (ref 24.5–35.6)
BASOPHILS # BLD AUTO: 0.04 K/UL — SIGNIFICANT CHANGE UP (ref 0–0.2)
BASOPHILS NFR BLD AUTO: 0.3 % — SIGNIFICANT CHANGE UP (ref 0–2)
BUN SERPL-MCNC: 12.5 MG/DL — SIGNIFICANT CHANGE UP (ref 8–20)
CALCIUM SERPL-MCNC: 8.1 MG/DL — LOW (ref 8.4–10.5)
CHLORIDE SERPL-SCNC: 101 MMOL/L — SIGNIFICANT CHANGE UP (ref 96–108)
CO2 SERPL-SCNC: 26 MMOL/L — SIGNIFICANT CHANGE UP (ref 22–29)
CREAT SERPL-MCNC: 0.58 MG/DL — SIGNIFICANT CHANGE UP (ref 0.5–1.3)
EGFR: 98 ML/MIN/1.73M2 — SIGNIFICANT CHANGE UP
EOSINOPHIL # BLD AUTO: 0.11 K/UL — SIGNIFICANT CHANGE UP (ref 0–0.5)
EOSINOPHIL NFR BLD AUTO: 0.8 % — SIGNIFICANT CHANGE UP (ref 0–6)
GLUCOSE SERPL-MCNC: 71 MG/DL — SIGNIFICANT CHANGE UP (ref 70–99)
HCT VFR BLD CALC: 35.7 % — SIGNIFICANT CHANGE UP (ref 34.5–45)
HGB BLD-MCNC: 11.6 G/DL — SIGNIFICANT CHANGE UP (ref 11.5–15.5)
IMM GRANULOCYTES NFR BLD AUTO: 2.3 % — HIGH (ref 0–0.9)
INR BLD: 1.34 RATIO — HIGH (ref 0.85–1.18)
LYMPHOCYTES # BLD AUTO: 1.79 K/UL — SIGNIFICANT CHANGE UP (ref 1–3.3)
LYMPHOCYTES # BLD AUTO: 13.6 % — SIGNIFICANT CHANGE UP (ref 13–44)
MAGNESIUM SERPL-MCNC: 2.1 MG/DL — SIGNIFICANT CHANGE UP (ref 1.6–2.6)
MCHC RBC-ENTMCNC: 31 PG — SIGNIFICANT CHANGE UP (ref 27–34)
MCHC RBC-ENTMCNC: 32.5 GM/DL — SIGNIFICANT CHANGE UP (ref 32–36)
MCV RBC AUTO: 95.5 FL — SIGNIFICANT CHANGE UP (ref 80–100)
MONOCYTES # BLD AUTO: 0.98 K/UL — HIGH (ref 0–0.9)
MONOCYTES NFR BLD AUTO: 7.4 % — SIGNIFICANT CHANGE UP (ref 2–14)
NEUTROPHILS # BLD AUTO: 9.98 K/UL — HIGH (ref 1.8–7.4)
NEUTROPHILS NFR BLD AUTO: 75.6 % — SIGNIFICANT CHANGE UP (ref 43–77)
PHOSPHATE SERPL-MCNC: 3.5 MG/DL — SIGNIFICANT CHANGE UP (ref 2.4–4.7)
PLATELET # BLD AUTO: 342 K/UL — SIGNIFICANT CHANGE UP (ref 150–400)
POTASSIUM SERPL-MCNC: 3.8 MMOL/L — SIGNIFICANT CHANGE UP (ref 3.5–5.3)
POTASSIUM SERPL-SCNC: 3.8 MMOL/L — SIGNIFICANT CHANGE UP (ref 3.5–5.3)
PROTHROM AB SERPL-ACNC: 14.7 SEC — HIGH (ref 9.5–13)
RBC # BLD: 3.74 M/UL — LOW (ref 3.8–5.2)
RBC # FLD: 13.3 % — SIGNIFICANT CHANGE UP (ref 10.3–14.5)
SODIUM SERPL-SCNC: 138 MMOL/L — SIGNIFICANT CHANGE UP (ref 135–145)
WBC # BLD: 13.21 K/UL — HIGH (ref 3.8–10.5)
WBC # FLD AUTO: 13.21 K/UL — HIGH (ref 3.8–10.5)

## 2024-04-30 PROCEDURE — 99221 1ST HOSP IP/OBS SF/LOW 40: CPT

## 2024-04-30 PROCEDURE — 99024 POSTOP FOLLOW-UP VISIT: CPT

## 2024-04-30 RX ORDER — SODIUM CHLORIDE 9 MG/ML
1000 INJECTION, SOLUTION INTRAVENOUS
Refills: 0 | Status: DISCONTINUED | OUTPATIENT
Start: 2024-04-30 | End: 2024-04-30

## 2024-04-30 RX ORDER — POTASSIUM CHLORIDE 20 MEQ
20 PACKET (EA) ORAL ONCE
Refills: 0 | Status: COMPLETED | OUTPATIENT
Start: 2024-04-30 | End: 2024-04-30

## 2024-04-30 RX ADMIN — Medication 5 MILLIGRAM(S): at 20:44

## 2024-04-30 RX ADMIN — Medication 5 MILLIGRAM(S): at 02:18

## 2024-04-30 RX ADMIN — PIPERACILLIN AND TAZOBACTAM 25 GRAM(S): 4; .5 INJECTION, POWDER, LYOPHILIZED, FOR SOLUTION INTRAVENOUS at 21:53

## 2024-04-30 RX ADMIN — PANTOPRAZOLE SODIUM 40 MILLIGRAM(S): 20 TABLET, DELAYED RELEASE ORAL at 17:58

## 2024-04-30 RX ADMIN — PANTOPRAZOLE SODIUM 40 MILLIGRAM(S): 20 TABLET, DELAYED RELEASE ORAL at 05:30

## 2024-04-30 RX ADMIN — Medication 5 MILLIGRAM(S): at 13:24

## 2024-04-30 RX ADMIN — PIPERACILLIN AND TAZOBACTAM 25 GRAM(S): 4; .5 INJECTION, POWDER, LYOPHILIZED, FOR SOLUTION INTRAVENOUS at 05:30

## 2024-04-30 RX ADMIN — Medication 5 MILLIGRAM(S): at 09:41

## 2024-04-30 RX ADMIN — PIPERACILLIN AND TAZOBACTAM 25 GRAM(S): 4; .5 INJECTION, POWDER, LYOPHILIZED, FOR SOLUTION INTRAVENOUS at 13:21

## 2024-04-30 RX ADMIN — ENOXAPARIN SODIUM 40 MILLIGRAM(S): 100 INJECTION SUBCUTANEOUS at 05:30

## 2024-04-30 RX ADMIN — Medication 400 MILLIGRAM(S): at 21:57

## 2024-04-30 RX ADMIN — Medication 50 MICROGRAM(S): at 21:53

## 2024-04-30 RX ADMIN — Medication 20 MILLIEQUIVALENT(S): at 13:24

## 2024-04-30 NOTE — CONSULT NOTE ADULT - SUBJECTIVE AND OBJECTIVE BOX
HPI:  68 year-old female with a history of hypertension, hypothyroidism, arthritis presented to [facility] ED for abdominal pain. CT noted for gastric perforation and patient underwent josé manuel patch on . Repeat CT noted with fluid perihepatic collections. IR consulted for possible drainage.     ============================================================================  Medications:  Home Medications:  amlodipine-valsartan 5 mg-160 mg oral tablet: 1 tab(s) orally once a day (2024 19:13)  carvedilol 12.5 mg oral tablet: 1 tab(s) orally once a day (:47)  clobetasol 0.05% topical cream: Apply topically to affected area (:13)  levothyroxine 75 mcg (0.075 mg) oral tablet: 1 tab(s) orally once a day (:47)  naproxen 375 mg (as sodium) oral tablet, extended release: 2 tab(s) orally 2 times a day (2024 08:04)  Robaxin-750 oral tablet: 2 tab(s) orally every 8 hours as needed for  muscle spasm (:13)  rosuvastatin 5 mg oral tablet: 1 tab(s) orally once a day (at bedtime) (:47)  Tylenol 500 mg oral tablet: 2 tab(s) orally every 6 hours (2024 08:04)  Vitamin D3 50 mcg (2000 intl units) oral tablet: 1 tab(s) orally once a day (:47)  ZyrTEC 10 mg oral tablet: 1 tab(s) orally once a day (2024 08:04)    MEDICATIONS  (STANDING):  levothyroxine Injectable 50 MICROGram(s) IV Push at bedtime  metoprolol tartrate Injectable 5 milliGRAM(s) IV Push every 6 hours  pantoprazole  Injectable 40 milliGRAM(s) IV Push every 12 hours  piperacillin/tazobactam IVPB.. 3.375 Gram(s) IV Intermittent every 8 hours  potassium chloride    Tablet ER 20 milliEquivalent(s) Oral once    MEDICATIONS  (PRN):  acetaminophen   IVPB .. 1000 milliGRAM(s) IV Intermittent every 6 hours PRN Mild Pain (1 - 3), Moderate Pain (4 - 6), Severe Pain (7 - 10)  benzocaine/menthol Lozenge 1 Lozenge Oral every 3 hours PRN Sore Throat  HYDROmorphone  Injectable 0.5 milliGRAM(s) IV Push every 4 hours PRN Severe Pain (7 - 10)  HYDROmorphone  Injectable 1 milliGRAM(s) IV Push every 4 hours PRN breakthrough      Allergies:   No Known Allergies    ============================================================================  PAST MEDICAL & SURGICAL HISTORY:  Hypertension      Hypothyroid      ARSLAN on CPAP      RBBB      Morbid obesity      H/O:  section      H/O cataract extraction      H/O dilation and curettage      S/P left rotator cuff repair          FAMILY HISTORY:  FH: HTN (hypertension)    FH: emphysema    FH: CHF (congestive heart failure)        Social History:      ============================================================================  Vitals:  Vital Signs Last 24 Hrs  T(C): 36.9 (2024 09:15), Max: 36.9 (2024 09:15)  T(F): 98.4 (2024 09:15), Max: 98.4 (2024 09:15)  HR: 71 (2024 09:15) (70 - 81)  BP: 145/82 (2024 09:15) (141/73 - 153/83)  BP(mean): --  RR: 18 (2024 09:15) (18 - 19)  SpO2: 96% (2024 09:15) (96% - 98%)    Parameters below as of 2024 09:15  Patient On (Oxygen Delivery Method): nasal cannula      Labs:                        11.6   13.21 )-----------( 342      ( 2024 05:06 )             35.7     04-30    138  |  101  |  12.5  ----------------------------<  71  3.8   |  26.0  |  0.58    Ca    8.1<L>      2024 05:06  Phos  3.5     04-30  Mg     2.1     04-30      PT/INR - ( 2024 05:06 )   PT: 14.7 sec;   INR: 1.34 ratio         PTT - ( 2024 05:06 )  PTT:26.1 sec    Imaging:   Pertinent Imaging Reviewed.    ============================================================================

## 2024-04-30 NOTE — CONSULT NOTE ADULT - ASSESSMENT
Patient is a 68 year-old female admitted for gastric perforation. IR consulted for perihepatic collections. Patient recieved lovenox this AM. Will plan for drainage tomorrow 5/1. Please keep NPO at MN and hold AC.     Please call extension [phone] with any questions, concerns or issues regarding above.

## 2024-04-30 NOTE — PROGRESS NOTE ADULT - ASSESSMENT
Pt is a 67 y/o female POD#6 s/p robotic assisted josé manuel patch repair. CT scan performed yesterday due to increasing WBC - showed fluid collections. Tolerated CLD yesterday evening. Pain controlled. Has been NPO p MN for poss IR drainage today.   - Will speak with IR this morning to discuss possible drainage of collections seen on CT scan  - Keep NPO w/ IVF & hold DVT ppx pending discussion w/ IR  - Continue to trend WBC - slight downtrend today 14.9>13.2, monitor for fevers  - Continue Zosyn  - Pain control as needed  - Encourage pt to be OOB, ambulate as able  - DVT ppx w/ SCDs - will restart chemoprophylaxis after speaking w/ IR  - Encourage frequent IS use   Pt is a 69 y/o female POD#6 s/p robotic assisted josé manuel patch repair. CT scan performed yesterday due to increasing WBC - showed fluid collections. Tolerated CLD yesterday evening. Pain controlled. Has been NPO p MN for poss IR drainage today.   - Will speak with IR this morning to discuss possible drainage of collections seen on CT scan  - Keep NPO w/ IVF & hold DVT ppx pending discussion w/ IR  - Switched IVF to D5LR as pt hypoglycemic to 71 this AM. Ordered for blood glucose q6h while NPO  - Continue to trend WBC - slight downtrend today 14.9>13.2, monitor for fevers  - Continue Zosyn  - Pain control as needed  - Encourage pt to be OOB, ambulate as able  - DVT ppx w/ SCDs - will restart chemoprophylaxis after speaking w/ IR  - Encourage frequent IS use   Pt is a 67 y/o female POD#6 s/p robotic assisted josé manuel patch repair. CT scan performed yesterday due to increasing WBC - showed fluid collections. Tolerated CLD yesterday evening. Pain controlled. Has been NPO p MN for poss IR drainage today.   - Will speak with IR this morning to discuss possible drainage of collections seen on CT scan  - Keep NPO w/ IVF & hold DVT ppx pending discussion w/ IR  - Switched IVF to D5LR as pt hypoglycemic to 71 this AM. Ordered for blood glucose q6h while NPO  - Continue to trend WBC - slight downtrend today 14.9>13.2, monitor for fevers  - Continue Zosyn  - Pain control as needed  - Encourage pt to be OOB, ambulate as able  - DVT ppx w/ SCDs - will restart chemoprophylaxis after speaking w/ IR  - Encourage frequent IS use        0914 ADDENDUM MM: Dr. Callaway spoke w/ IR physician Dr. Shepard. Plan is for drainage of intraabdominal collections tomorrow, 5/1/24, as patient already rcvd dose of lovenox this morning. Pt given regular diet today. Will d/c IVF. Lovenox already discontinued, made NPO p MN.

## 2024-04-30 NOTE — PROGRESS NOTE ADULT - SUBJECTIVE AND OBJECTIVE BOX
SUBJECTIVE / 24H EVENTS: Patient seen and examined at bedside. She continues to report mild-moderate R sided abd pain. Passing gas and having liquid BMs. Tolerated small amount of CLD yesterday after CT scan obtained. CT showed large fluid collections. Will discuss w/ IR possibility of drainage today. Denies fever or chills, CP or SOB.    MEDICATIONS  (STANDING):  dextrose 5% + lactated ringers. 1000 milliLiter(s) (100 mL/Hr) IV Continuous <Continuous>  levothyroxine Injectable 50 MICROGram(s) IV Push at bedtime  metoprolol tartrate Injectable 5 milliGRAM(s) IV Push every 6 hours  pantoprazole  Injectable 40 milliGRAM(s) IV Push every 12 hours  piperacillin/tazobactam IVPB.. 3.375 Gram(s) IV Intermittent every 8 hours  potassium chloride    Tablet ER 20 milliEquivalent(s) Oral once    MEDICATIONS  (PRN):  acetaminophen   IVPB .. 1000 milliGRAM(s) IV Intermittent every 6 hours PRN Mild Pain (1 - 3), Moderate Pain (4 - 6), Severe Pain (7 - 10)  benzocaine/menthol Lozenge 1 Lozenge Oral every 3 hours PRN Sore Throat  HYDROmorphone  Injectable 1 milliGRAM(s) IV Push every 4 hours PRN breakthrough  HYDROmorphone  Injectable 0.5 milliGRAM(s) IV Push every 4 hours PRN Severe Pain (7 - 10)      Vital Signs Last 24 Hrs  T(C): 36.4 (30 Apr 2024 05:00), Max: 36.8 (29 Apr 2024 16:09)  T(F): 97.5 (30 Apr 2024 05:00), Max: 98.3 (29 Apr 2024 16:09)  HR: 70 (30 Apr 2024 05:00) (70 - 85)  BP: 150/72 (30 Apr 2024 05:00) (141/73 - 153/83)  BP(mean): --  RR: 18 (30 Apr 2024 05:00) (18 - 19)  SpO2: 96% (30 Apr 2024 05:00) (95% - 98%)    Parameters below as of 30 Apr 2024 05:00  Patient On (Oxygen Delivery Method): nasal cannula  O2 Flow (L/min): 2      Constitutional: patient appears comfortable lying in bed, no acute distress  Respiratory: respirations are unlabored, no accessory muscle use, no conversational dyspnea  Cardiovascular: regular rate & rhythm  Gastrointestinal: abdomen is non-distended, mild-moderate TTP of R sided of abdomen w/ no rebound tenderness or guarding. Surgical sites are clean & dry without drainage or erythema  Neurological: A&O x 3  Skin: mucous membranes moist, no diaphoresis, pallor, cyanosis or jaundice      I&O's Detail    29 Apr 2024 07:01  -  30 Apr 2024 07:00  --------------------------------------------------------  IN:    IV PiggyBack: 100 mL    multiple electrolytes Injection Type 1.: 1200 mL    Oral Fluid: 480 mL  Total IN: 1780 mL    OUT:  Total OUT: 0 mL    Total NET: 1780 mL          LABS:                        11.6   13.21 )-----------( 342      ( 30 Apr 2024 05:06 )             35.7     04-30    138  |  101  |  12.5  ----------------------------<  71  3.8   |  26.0  |  0.58    Ca    8.1<L>      30 Apr 2024 05:06  Phos  3.5     04-30  Mg     2.1     04-30      PT/INR - ( 30 Apr 2024 05:06 )   PT: 14.7 sec;   INR: 1.34 ratio         PTT - ( 30 Apr 2024 05:06 )  PTT:26.1 sec  Urinalysis Basic - ( 30 Apr 2024 05:06 )    Color: x / Appearance: x / SG: x / pH: x  Gluc: 71 mg/dL / Ketone: x  / Bili: x / Urobili: x   Blood: x / Protein: x / Nitrite: x   Leuk Esterase: x / RBC: x / WBC x   Sq Epi: x / Non Sq Epi: x / Bacteria: x

## 2024-05-01 LAB
ANION GAP SERPL CALC-SCNC: 10 MMOL/L — SIGNIFICANT CHANGE UP (ref 5–17)
BASOPHILS # BLD AUTO: 0.03 K/UL — SIGNIFICANT CHANGE UP (ref 0–0.2)
BASOPHILS NFR BLD AUTO: 0.3 % — SIGNIFICANT CHANGE UP (ref 0–2)
BLD GP AB SCN SERPL QL: SIGNIFICANT CHANGE UP
BUN SERPL-MCNC: 11.2 MG/DL — SIGNIFICANT CHANGE UP (ref 8–20)
CALCIUM SERPL-MCNC: 8 MG/DL — LOW (ref 8.4–10.5)
CHLORIDE SERPL-SCNC: 100 MMOL/L — SIGNIFICANT CHANGE UP (ref 96–108)
CO2 SERPL-SCNC: 27 MMOL/L — SIGNIFICANT CHANGE UP (ref 22–29)
CREAT SERPL-MCNC: 0.66 MG/DL — SIGNIFICANT CHANGE UP (ref 0.5–1.3)
EGFR: 95 ML/MIN/1.73M2 — SIGNIFICANT CHANGE UP
EOSINOPHIL # BLD AUTO: 0.08 K/UL — SIGNIFICANT CHANGE UP (ref 0–0.5)
EOSINOPHIL NFR BLD AUTO: 0.7 % — SIGNIFICANT CHANGE UP (ref 0–6)
GLUCOSE SERPL-MCNC: 85 MG/DL — SIGNIFICANT CHANGE UP (ref 70–99)
GRAM STN FLD: SIGNIFICANT CHANGE UP
HCT VFR BLD CALC: 34.4 % — LOW (ref 34.5–45)
HGB BLD-MCNC: 11.1 G/DL — LOW (ref 11.5–15.5)
IMM GRANULOCYTES NFR BLD AUTO: 2 % — HIGH (ref 0–0.9)
LYMPHOCYTES # BLD AUTO: 1.58 K/UL — SIGNIFICANT CHANGE UP (ref 1–3.3)
LYMPHOCYTES # BLD AUTO: 13.8 % — SIGNIFICANT CHANGE UP (ref 13–44)
MAGNESIUM SERPL-MCNC: 2 MG/DL — SIGNIFICANT CHANGE UP (ref 1.6–2.6)
MCHC RBC-ENTMCNC: 31.3 PG — SIGNIFICANT CHANGE UP (ref 27–34)
MCHC RBC-ENTMCNC: 32.3 GM/DL — SIGNIFICANT CHANGE UP (ref 32–36)
MCV RBC AUTO: 96.9 FL — SIGNIFICANT CHANGE UP (ref 80–100)
MONOCYTES # BLD AUTO: 0.87 K/UL — SIGNIFICANT CHANGE UP (ref 0–0.9)
MONOCYTES NFR BLD AUTO: 7.6 % — SIGNIFICANT CHANGE UP (ref 2–14)
NEUTROPHILS # BLD AUTO: 8.62 K/UL — HIGH (ref 1.8–7.4)
NEUTROPHILS NFR BLD AUTO: 75.6 % — SIGNIFICANT CHANGE UP (ref 43–77)
PHOSPHATE SERPL-MCNC: 3.2 MG/DL — SIGNIFICANT CHANGE UP (ref 2.4–4.7)
PLATELET # BLD AUTO: 348 K/UL — SIGNIFICANT CHANGE UP (ref 150–400)
POTASSIUM SERPL-MCNC: 4.2 MMOL/L — SIGNIFICANT CHANGE UP (ref 3.5–5.3)
POTASSIUM SERPL-SCNC: 4.2 MMOL/L — SIGNIFICANT CHANGE UP (ref 3.5–5.3)
RBC # BLD: 3.55 M/UL — LOW (ref 3.8–5.2)
RBC # FLD: 13.2 % — SIGNIFICANT CHANGE UP (ref 10.3–14.5)
SODIUM SERPL-SCNC: 137 MMOL/L — SIGNIFICANT CHANGE UP (ref 135–145)
SPECIMEN SOURCE: SIGNIFICANT CHANGE UP
WBC # BLD: 11.41 K/UL — HIGH (ref 3.8–10.5)
WBC # FLD AUTO: 11.41 K/UL — HIGH (ref 3.8–10.5)

## 2024-05-01 PROCEDURE — 99024 POSTOP FOLLOW-UP VISIT: CPT

## 2024-05-01 PROCEDURE — 49406 IMAGE CATH FLUID PERI/RETRO: CPT | Mod: 59

## 2024-05-01 RX ORDER — ENOXAPARIN SODIUM 100 MG/ML
40 INJECTION SUBCUTANEOUS EVERY 12 HOURS
Refills: 0 | Status: DISCONTINUED | OUTPATIENT
Start: 2024-05-01 | End: 2024-05-05

## 2024-05-01 RX ORDER — OXYCODONE HYDROCHLORIDE 5 MG/1
5 TABLET ORAL EVERY 6 HOURS
Refills: 0 | Status: DISCONTINUED | OUTPATIENT
Start: 2024-05-01 | End: 2024-05-01

## 2024-05-01 RX ORDER — ACETAMINOPHEN 500 MG
1000 TABLET ORAL EVERY 6 HOURS
Refills: 0 | Status: DISCONTINUED | OUTPATIENT
Start: 2024-05-01 | End: 2024-05-03

## 2024-05-01 RX ADMIN — Medication 5 MILLIGRAM(S): at 08:19

## 2024-05-01 RX ADMIN — PANTOPRAZOLE SODIUM 40 MILLIGRAM(S): 20 TABLET, DELAYED RELEASE ORAL at 17:20

## 2024-05-01 RX ADMIN — Medication 5 MILLIGRAM(S): at 02:07

## 2024-05-01 RX ADMIN — PIPERACILLIN AND TAZOBACTAM 25 GRAM(S): 4; .5 INJECTION, POWDER, LYOPHILIZED, FOR SOLUTION INTRAVENOUS at 13:45

## 2024-05-01 RX ADMIN — PIPERACILLIN AND TAZOBACTAM 25 GRAM(S): 4; .5 INJECTION, POWDER, LYOPHILIZED, FOR SOLUTION INTRAVENOUS at 21:35

## 2024-05-01 RX ADMIN — Medication 5 MILLIGRAM(S): at 20:15

## 2024-05-01 RX ADMIN — Medication 50 MICROGRAM(S): at 21:36

## 2024-05-01 RX ADMIN — PIPERACILLIN AND TAZOBACTAM 25 GRAM(S): 4; .5 INJECTION, POWDER, LYOPHILIZED, FOR SOLUTION INTRAVENOUS at 06:17

## 2024-05-01 RX ADMIN — Medication 5 MILLIGRAM(S): at 13:45

## 2024-05-01 RX ADMIN — PANTOPRAZOLE SODIUM 40 MILLIGRAM(S): 20 TABLET, DELAYED RELEASE ORAL at 06:17

## 2024-05-01 NOTE — BRIEF OPERATIVE NOTE - OPERATION/FINDINGS
Three 12 fr pigtail catheters placed into 3 abdominal abscesses (subphrenic, pre/sub hepatic, and right mid abdominal). 600 cc, 30cc, and 10 cc purulent fluid drained from collections respectively. Connected to EDEL bulbs. May restart Lovenox tomorrow
diagnostic laparoscopy, evidence of perforated anterior gastric prepyloric ulcer with purulence in RUQ, Angel patch repair using 2-0 silk and tongue of omentum, irrigation of RUQ

## 2024-05-01 NOTE — PROGRESS NOTE ADULT - ASSESSMENT
A: 68F POD#7 s/p RA josé manuel patch repair for gastric perforation pending IR placement of abdominal drains for right abdominal/Perihepatic collections.    Plan:   - IR Drainage for collections.     - dvt ppx: Lov, SCDs   - encourage OOB and IS   - Will collect stool sample for h. pylori   - Continue to monitor WBC and temp for fever  Pt evaluated with senior resident and attending

## 2024-05-01 NOTE — BRIEF OPERATIVE NOTE - NSICDXBRIEFPROCEDURE_GEN_ALL_CORE_FT
PROCEDURES:  Drainage, abscess, abdomen 01-May-2024 13:33:02  Og Shepard  
PROCEDURES:  Robot-assisted laparoscopic gastrorrhaphy using da Laura Si 24-Apr-2024 22:56:32  Teresa Dumont  Repair of perforated pyloric ulcer using Angel patch 24-Apr-2024 22:56:39  Teresa Dumont

## 2024-05-01 NOTE — BRIEF OPERATIVE NOTE - NSICDXBRIEFPOSTOP_GEN_ALL_CORE_FT
POST-OP DIAGNOSIS:  Abscess of abdominal cavity 01-May-2024 13:33:24  Og Shepard  
POST-OP DIAGNOSIS:  Perforated gastric ulcer 24-Apr-2024 22:59:14  Teresa Dumont

## 2024-05-01 NOTE — PROGRESS NOTE ADULT - SUBJECTIVE AND OBJECTIVE BOX
INTERVAL HPI/OVERNIGHT EVENTS: No events overnight. Patient prepped for IR drainage.    MEDICATIONS  (STANDING):  carvedilol 12.5 milliGRAM(s) Oral daily  enoxaparin Injectable 40 milliGRAM(s) SubCutaneous every 12 hours  levothyroxine 75 MICROGram(s) Oral daily  pantoprazole    Tablet 40 milliGRAM(s) Oral two times a day  piperacillin/tazobactam IVPB.. 3.375 Gram(s) IV Intermittent every 8 hours    MEDICATIONS  (PRN):  acetaminophen   IVPB .. 1000 milliGRAM(s) IV Intermittent every 6 hours PRN Mild Pain (1 - 3), Moderate Pain (4 - 6), Severe Pain (7 - 10)  oxyCODONE    IR 5 milliGRAM(s) Oral every 6 hours PRN Moderate Pain (4 - 6)      Vital Signs Last 24 Hrs  T(C): 36.8 (02 May 2024 12:07), Max: 37.2 (02 May 2024 01:05)  T(F): 98.3 (02 May 2024 12:07), Max: 99 (02 May 2024 01:05)  HR: 81 (02 May 2024 12:07) (71 - 97)  BP: 143/85 (02 May 2024 12:07) (137/80 - 147/85)  BP(mean): --  RR: 18 (02 May 2024 12:07) (18 - 19)  SpO2: 94% (02 May 2024 12:07) (93% - 99%)    Parameters below as of 02 May 2024 12:07  Patient On (Oxygen Delivery Method): room air        Physical Exam:    Respiratory: no accessory muscle use    Cardiovascular: Regular rate     Gastrointestinal: Soft, tender in the RUQ        I&O's Detail    01 May 2024 07:01  -  02 May 2024 07:00  --------------------------------------------------------  IN:    IV PiggyBack: 100 mL  Total IN: 100 mL    OUT:    Bulb (mL): 50 mL    Bulb (mL): 100 mL    Bulb (mL): 30 mL    Voided (mL): 700 mL  Total OUT: 880 mL    Total NET: -780 mL      02 May 2024 07:01  -  02 May 2024 14:52  --------------------------------------------------------  IN:    IV PiggyBack: 100 mL  Total IN: 100 mL    OUT:    Bulb (mL): 50 mL    Bulb (mL): 8 mL  Total OUT: 58 mL    Total NET: 42 mL          LABS:                        10.9   8.29  )-----------( 376      ( 02 May 2024 06:16 )             33.1     05-02    134<L>  |  97  |  8.7  ----------------------------<  96  3.6   |  28.0  |  0.65    Ca    7.9<L>      02 May 2024 06:16  Phos  2.9     05-02  Mg     1.9     05-02        Urinalysis Basic - ( 02 May 2024 06:16 )    Color: x / Appearance: x / SG: x / pH: x  Gluc: 96 mg/dL / Ketone: x  / Bili: x / Urobili: x   Blood: x / Protein: x / Nitrite: x   Leuk Esterase: x / RBC: x / WBC x   Sq Epi: x / Non Sq Epi: x / Bacteria: x

## 2024-05-01 NOTE — BRIEF OPERATIVE NOTE - NSICDXBRIEFPREOP_GEN_ALL_CORE_FT
PRE-OP DIAGNOSIS:  Abscess of abdominal cavity 01-May-2024 13:33:14  Og Shepard  
PRE-OP DIAGNOSIS:  Perforated gastric ulcer 24-Apr-2024 22:56:56  Teresa Dumont

## 2024-05-02 ENCOUNTER — TRANSCRIPTION ENCOUNTER (OUTPATIENT)
Age: 69
End: 2024-05-02

## 2024-05-02 LAB
ANION GAP SERPL CALC-SCNC: 9 MMOL/L — SIGNIFICANT CHANGE UP (ref 5–17)
BASOPHILS # BLD AUTO: 0.02 K/UL — SIGNIFICANT CHANGE UP (ref 0–0.2)
BASOPHILS NFR BLD AUTO: 0.2 % — SIGNIFICANT CHANGE UP (ref 0–2)
BUN SERPL-MCNC: 8.7 MG/DL — SIGNIFICANT CHANGE UP (ref 8–20)
CALCIUM SERPL-MCNC: 7.9 MG/DL — LOW (ref 8.4–10.5)
CHLORIDE SERPL-SCNC: 97 MMOL/L — SIGNIFICANT CHANGE UP (ref 96–108)
CO2 SERPL-SCNC: 28 MMOL/L — SIGNIFICANT CHANGE UP (ref 22–29)
CREAT SERPL-MCNC: 0.65 MG/DL — SIGNIFICANT CHANGE UP (ref 0.5–1.3)
EGFR: 95 ML/MIN/1.73M2 — SIGNIFICANT CHANGE UP
EOSINOPHIL # BLD AUTO: 0.1 K/UL — SIGNIFICANT CHANGE UP (ref 0–0.5)
EOSINOPHIL NFR BLD AUTO: 1.2 % — SIGNIFICANT CHANGE UP (ref 0–6)
GLUCOSE SERPL-MCNC: 96 MG/DL — SIGNIFICANT CHANGE UP (ref 70–99)
HCT VFR BLD CALC: 33.1 % — LOW (ref 34.5–45)
HGB BLD-MCNC: 10.9 G/DL — LOW (ref 11.5–15.5)
IMM GRANULOCYTES NFR BLD AUTO: 1.6 % — HIGH (ref 0–0.9)
LYMPHOCYTES # BLD AUTO: 1.56 K/UL — SIGNIFICANT CHANGE UP (ref 1–3.3)
LYMPHOCYTES # BLD AUTO: 18.8 % — SIGNIFICANT CHANGE UP (ref 13–44)
MAGNESIUM SERPL-MCNC: 1.9 MG/DL — SIGNIFICANT CHANGE UP (ref 1.6–2.6)
MCHC RBC-ENTMCNC: 31.1 PG — SIGNIFICANT CHANGE UP (ref 27–34)
MCHC RBC-ENTMCNC: 32.9 GM/DL — SIGNIFICANT CHANGE UP (ref 32–36)
MCV RBC AUTO: 94.3 FL — SIGNIFICANT CHANGE UP (ref 80–100)
MONOCYTES # BLD AUTO: 0.72 K/UL — SIGNIFICANT CHANGE UP (ref 0–0.9)
MONOCYTES NFR BLD AUTO: 8.7 % — SIGNIFICANT CHANGE UP (ref 2–14)
NEUTROPHILS # BLD AUTO: 5.76 K/UL — SIGNIFICANT CHANGE UP (ref 1.8–7.4)
NEUTROPHILS NFR BLD AUTO: 69.5 % — SIGNIFICANT CHANGE UP (ref 43–77)
PHOSPHATE SERPL-MCNC: 2.9 MG/DL — SIGNIFICANT CHANGE UP (ref 2.4–4.7)
PLATELET # BLD AUTO: 376 K/UL — SIGNIFICANT CHANGE UP (ref 150–400)
POTASSIUM SERPL-MCNC: 3.6 MMOL/L — SIGNIFICANT CHANGE UP (ref 3.5–5.3)
POTASSIUM SERPL-SCNC: 3.6 MMOL/L — SIGNIFICANT CHANGE UP (ref 3.5–5.3)
RBC # BLD: 3.51 M/UL — LOW (ref 3.8–5.2)
RBC # FLD: 13.2 % — SIGNIFICANT CHANGE UP (ref 10.3–14.5)
SODIUM SERPL-SCNC: 134 MMOL/L — LOW (ref 135–145)
WBC # BLD: 8.29 K/UL — SIGNIFICANT CHANGE UP (ref 3.8–10.5)
WBC # FLD AUTO: 8.29 K/UL — SIGNIFICANT CHANGE UP (ref 3.8–10.5)

## 2024-05-02 PROCEDURE — 99024 POSTOP FOLLOW-UP VISIT: CPT

## 2024-05-02 RX ORDER — LEVOTHYROXINE SODIUM 125 MCG
75 TABLET ORAL DAILY
Refills: 0 | Status: DISCONTINUED | OUTPATIENT
Start: 2024-05-02 | End: 2024-05-08

## 2024-05-02 RX ORDER — POTASSIUM CHLORIDE 20 MEQ
40 PACKET (EA) ORAL EVERY 4 HOURS
Refills: 0 | Status: COMPLETED | OUTPATIENT
Start: 2024-05-02 | End: 2024-05-02

## 2024-05-02 RX ORDER — CARVEDILOL PHOSPHATE 80 MG/1
12.5 CAPSULE, EXTENDED RELEASE ORAL DAILY
Refills: 0 | Status: DISCONTINUED | OUTPATIENT
Start: 2024-05-02 | End: 2024-05-08

## 2024-05-02 RX ORDER — PANTOPRAZOLE SODIUM 20 MG/1
40 TABLET, DELAYED RELEASE ORAL
Refills: 0 | Status: DISCONTINUED | OUTPATIENT
Start: 2024-05-02 | End: 2024-05-08

## 2024-05-02 RX ADMIN — PIPERACILLIN AND TAZOBACTAM 25 GRAM(S): 4; .5 INJECTION, POWDER, LYOPHILIZED, FOR SOLUTION INTRAVENOUS at 14:05

## 2024-05-02 RX ADMIN — Medication 400 MILLIGRAM(S): at 15:25

## 2024-05-02 RX ADMIN — PANTOPRAZOLE SODIUM 40 MILLIGRAM(S): 20 TABLET, DELAYED RELEASE ORAL at 17:36

## 2024-05-02 RX ADMIN — ENOXAPARIN SODIUM 40 MILLIGRAM(S): 100 INJECTION SUBCUTANEOUS at 05:17

## 2024-05-02 RX ADMIN — ENOXAPARIN SODIUM 40 MILLIGRAM(S): 100 INJECTION SUBCUTANEOUS at 17:36

## 2024-05-02 RX ADMIN — Medication 40 MILLIEQUIVALENT(S): at 09:41

## 2024-05-02 RX ADMIN — PIPERACILLIN AND TAZOBACTAM 25 GRAM(S): 4; .5 INJECTION, POWDER, LYOPHILIZED, FOR SOLUTION INTRAVENOUS at 21:31

## 2024-05-02 RX ADMIN — Medication 40 MILLIEQUIVALENT(S): at 14:05

## 2024-05-02 RX ADMIN — Medication 400 MILLIGRAM(S): at 21:31

## 2024-05-02 RX ADMIN — PIPERACILLIN AND TAZOBACTAM 25 GRAM(S): 4; .5 INJECTION, POWDER, LYOPHILIZED, FOR SOLUTION INTRAVENOUS at 05:16

## 2024-05-02 RX ADMIN — Medication 5 MILLIGRAM(S): at 01:12

## 2024-05-02 RX ADMIN — Medication 1000 MILLIGRAM(S): at 16:25

## 2024-05-02 RX ADMIN — PANTOPRAZOLE SODIUM 40 MILLIGRAM(S): 20 TABLET, DELAYED RELEASE ORAL at 05:16

## 2024-05-02 RX ADMIN — Medication 400 MILLIGRAM(S): at 01:12

## 2024-05-02 RX ADMIN — Medication 1000 MILLIGRAM(S): at 02:12

## 2024-05-02 RX ADMIN — Medication 1000 MILLIGRAM(S): at 22:24

## 2024-05-02 NOTE — PHYSICAL THERAPY INITIAL EVALUATION ADULT - ADDITIONAL COMMENTS
Pt reports she lives with her  in a private home with 3 THEODORE with 0 HR. Pt states that she typically holds onto the side of her house when ascending/descending the 3 front steps.  Pt reports that prior to hospitalization she was I in all ADLs and ambulation with a cane. Pt reports that upon DC to home her  is willing and able to assist her as needed.  DME: pt owns a RW and cane

## 2024-05-02 NOTE — PHYSICAL THERAPY INITIAL EVALUATION ADULT - CRITERIA FOR SKILLED THERAPEUTIC INTERVENTIONS
Home with assistance from family as needed, pending stairs assessment./impairments found/functional limitations in following categories/anticipated discharge recommendation

## 2024-05-02 NOTE — DISCHARGE NOTE PROVIDER - NSDCMRMEDTOKEN_GEN_ALL_CORE_FT
amlodipine-valsartan 5 mg-160 mg oral tablet: 1 tab(s) orally once a day  carvedilol 12.5 mg oral tablet: 1 tab(s) orally once a day  clobetasol 0.05% topical cream: Apply topically to affected area  levothyroxine 75 mcg (0.075 mg) oral tablet: 1 tab(s) orally once a day  naproxen 375 mg (as sodium) oral tablet, extended release: 2 tab(s) orally 2 times a day  Robaxin-750 oral tablet: 2 tab(s) orally every 8 hours as needed for  muscle spasm  rosuvastatin 5 mg oral tablet: 1 tab(s) orally once a day (at bedtime)  Tylenol 500 mg oral tablet: 2 tab(s) orally every 6 hours  Vitamin D3 50 mcg (2000 intl units) oral tablet: 1 tab(s) orally once a day  ZyrTEC 10 mg oral tablet: 1 tab(s) orally once a day   amlodipine-valsartan 5 mg-160 mg oral tablet: 1 tab(s) orally once a day  apixaban 5 mg oral tablet: 2 tab(s) orally every 12 hours  carvedilol 12.5 mg oral tablet: 1 tab(s) orally once a day  clobetasol 0.05% topical cream: Apply topically to affected area  levothyroxine 75 mcg (0.075 mg) oral tablet: 1 tab(s) orally once a day  naproxen 375 mg (as sodium) oral tablet, extended release: 2 tab(s) orally 2 times a day  pantoprazole 40 mg oral delayed release tablet: 1 tab(s) orally 2 times a day  rosuvastatin 5 mg oral tablet: 1 tab(s) orally once a day (at bedtime)  Tylenol 500 mg oral tablet: 2 tab(s) orally every 6 hours  Vitamin D3 50 mcg (2000 intl units) oral tablet: 1 tab(s) orally once a day  ZyrTEC 10 mg oral tablet: 1 tab(s) orally once a day

## 2024-05-02 NOTE — PHYSICAL THERAPY INITIAL EVALUATION ADULT - PHYSICAL ASSIST/NONPHYSICAL ASSIST: GAIT, REHAB EVAL
GASTROINTESTINAL - ADULT    • Minimal or absence of nausea and vomiting Progressing    • Maintains or returns to baseline bowel function Progressing        Impaired Activities of Daily Living    • Achieve highest/safest level of independence in self care P supervision

## 2024-05-02 NOTE — PHYSICAL THERAPY INITIAL EVALUATION ADULT - GENERAL OBSERVATIONS, REHAB EVAL
Pt received reclining in bed, bedrails x 4, CBWR, IV intact, abdominal drains intact, and pt agreeable to PT evaluation.

## 2024-05-02 NOTE — PHYSICAL THERAPY INITIAL EVALUATION ADULT - PERTINENT HX OF CURRENT PROBLEM, REHAB EVAL
Dx: pt is s/p repair of perforated gastric ulcer on , and placement of 3 drains for 3 abdominal abscesses on .    PMH: HTN, hypothyroid, arthritis, , morbid obesity, ARSLAN on CPAP, RBBB

## 2024-05-02 NOTE — PHYSICAL THERAPY INITIAL EVALUATION ADULT - ASSISTIVE DEVICE FOR TRANSFER: SIT/STAND, REHAB EVAL
Left message for patient to call office back.
Patient aware and voiced understanding, no concerns voiced at this time.
Polyp appearance on the CT sinuses  Keep referral to ENT  Call patient
rolling walker

## 2024-05-02 NOTE — PROGRESS NOTE ADULT - SUBJECTIVE AND OBJECTIVE BOX
Subjective: patient evaluated and examined at the bedside. No overnight events. Patient reports that she is feeling well, not endorsing any abdominal pain. Pt continues to have non-bloody BMs. Denies fever, chills, n/v.     MEDICATIONS  (STANDING):  carvedilol 12.5 milliGRAM(s) Oral daily  enoxaparin Injectable 40 milliGRAM(s) SubCutaneous every 12 hours  levothyroxine 75 MICROGram(s) Oral daily  pantoprazole    Tablet 40 milliGRAM(s) Oral two times a day  piperacillin/tazobactam IVPB.. 3.375 Gram(s) IV Intermittent every 8 hours  potassium chloride    Tablet ER 40 milliEquivalent(s) Oral every 4 hours    MEDICATIONS  (PRN):  acetaminophen   IVPB .. 1000 milliGRAM(s) IV Intermittent every 6 hours PRN Mild Pain (1 - 3), Moderate Pain (4 - 6), Severe Pain (7 - 10)  oxyCODONE    IR 5 milliGRAM(s) Oral every 6 hours PRN Moderate Pain (4 - 6)      Vital Signs Last 24 Hrs  T(C): 36.9 (02 May 2024 08:08), Max: 37.2 (02 May 2024 01:05)  T(F): 98.4 (02 May 2024 08:08), Max: 99 (02 May 2024 01:05)  HR: 71 (02 May 2024 08:08) (71 - 97)  BP: 145/83 (02 May 2024 08:08) (131/82 - 147/85)  BP(mean): --  RR: 18 (02 May 2024 08:08) (18 - 19)  SpO2: 93% (02 May 2024 08:08) (93% - 99%)    Parameters below as of 02 May 2024 08:08  Patient On (Oxygen Delivery Method): room air        Physical Exam:    Constitutional: NAD, sitting comfortably in chair at bedside   Respiratory: Respirations non-labored, no accessory muscle use  Gastrointestinal: Soft, non-tender, non-distended; drain #3 serosang, drain#2 purulent, drain #1 serous output     LABS:                        10.9   8.29  )-----------( 376      ( 02 May 2024 06:16 )             33.1     05-02    134<L>  |  97  |  8.7  ----------------------------<  96  3.6   |  28.0  |  0.65    Ca    7.9<L>      02 May 2024 06:16  Phos  2.9     05-02  Mg     1.9     05-02        Urinalysis Basic - ( 02 May 2024 06:16 )    Color: x / Appearance: x / SG: x / pH: x  Gluc: 96 mg/dL / Ketone: x  / Bili: x / Urobili: x   Blood: x / Protein: x / Nitrite: x   Leuk Esterase: x / RBC: x / WBC x   Sq Epi: x / Non Sq Epi: x / Bacteria: x        A: 68F POD#8 s/p RA josé manuel patch repair for gastric perforation now s/p IR placement of abdominal drains for right abdominal/Perihepatic collections. She remains HD Stable and afebrile with appropriate pain control. Tolerating CLD having bowel function. Leukocytosis resolved with wbc 8.29 today from 11. Awaiting drain culture sensitivities.     Plan:   - diet: adv to reg diet today   - monitor drain o/p   - f/u drain culture sensitivities   - dvt ppx: Lov, SCDs   - encourage OOB and IS   - f/u stool h. pylori   - dispo pending: awaiting culture sensitivities to plan for appropriate abx management

## 2024-05-02 NOTE — PHYSICAL THERAPY INITIAL EVALUATION ADULT - BALANCE TRAINING, PT EVAL
Pt will improve standing balance to fair for increased safety and participation in standing ADLs and ambulation.

## 2024-05-02 NOTE — DISCHARGE NOTE PROVIDER - NSDCFUSCHEDAPPT_GEN_ALL_CORE_FT
Phong Martinez  [facility]  Scheduled Appointment: 05/03/2024    [facility]  [facility] [address]  Scheduled Appointment: 06/10/2024     [facility] [address]  Scheduled Appointment: 06/10/2024

## 2024-05-02 NOTE — DISCHARGE NOTE NURSING/CASE MANAGEMENT/SOCIAL WORK - PATIENT PORTAL LINK FT
You can access the FollowMyHealth Patient Portal offered by [facility] by registering at the following website: [email or web address]. By joining [facility]’s FollowMyHealth portal, you will also be able to view your health information using other applications (apps) compatible with our system.

## 2024-05-02 NOTE — DISCHARGE NOTE PROVIDER - DETAILS OF MALNUTRITION DIAGNOSIS/DIAGNOSES
This patient has been assessed with a concern for Malnutrition and was treated during this hospitalization for the following Nutrition diagnosis/diagnoses:     -  04/29/2024: Morbid obesity (BMI > 40)

## 2024-05-02 NOTE — DISCHARGE NOTE PROVIDER - NSFOLLOWUPCLINICS_GEN_ALL_ED_FT
[facility] Acute Care Surgery  Acute Care Surgery  [address] [place] [id number]  Phone: [phone]  Fax:

## 2024-05-02 NOTE — DISCHARGE NOTE NURSING/CASE MANAGEMENT/SOCIAL WORK - NSDCVIVACCINE_GEN_ALL_CORE_FT
Tdap; 17-Mar-2017 11:42; Veronika Terry (RENUKA); [employer]; [id number]; IntraMuscular; Deltoid Right.; 0.5 milliLiter(s); VIS (VIS Published: 09-May-2013, VIS Presented: 17-Mar-2017);   
Acceptable eye movement; lids with acceptable appearance and movement; conjunctiva clear; iris acceptable shape and color; cornea clear; pupils equally round and react to light. Pupil red reflexes present and equal.

## 2024-05-02 NOTE — DISCHARGE NOTE PROVIDER - HOSPITAL COURSE
The patient was admitted for a gastric ulcer perforation. She went to the OR where she received a Angel Patch. The patient was complaining of right sided abdominal pain and was scanned. Perihepatic collections were seen and she received 3 12Fr pigtail catheter drains by IR. The fluid was sent for analysis. A stool sample was also taken and analyzed for possible H. Pylori infection. She is tolerating a regular diet at this time, voiding spontaneously, and ambulating independently. She is stable for discharge at this time. VNS was contacted to assist with flushing the drains with 10cc's of normal saline and record the output of the drains, as well as the color and consistency daily. The patient was also instructed to follow up with Dr. Mclaughlin in the office in 2 weeks. She was also instructed to follow up with her primary care physician to discuss her hospitalization. The patient was admitted for a gastric ulcer perforation. She went to the OR where she received a Robotic Assited Angel Patch. The patient was complaining of right sided abdominal pain and was scanned. Perihepatic collections were seen and she received 3 12Fr pigtail catheter drains by IR. The fluid was sent for analysis.  There was also a question of Portal Vein Thrombosis on the CT. A stool sample was also taken and analyzed for possible H. Pylori infection. Follow up CT was still unclear if Portal Vein thrombosis did exist,  Portal Vein Duplex and MRI Abdomen with contrast both ordered to clarify................She is tolerating a regular diet at this time, voiding spontaneously, and ambulating independently. She is stable for discharge at this time. VNS was contacted to assist with flushing the drains with 10cc's of normal saline and record the output of the drains, as well as the color and consistency daily. The patient was also instructed to follow up with Dr. Mclaughlin in the office in 10-14 days. She was also instructed to follow up with her primary care physician to discuss her hospitalization. The patient was admitted for a gastric ulcer perforation. She went to the OR where she received a Robotic Assited Angel Patch. The patient was complaining of right sided abdominal pain and was scanned. Perihepatic collections were seen and she received 3 12Fr pigtail catheter drains by IR. The fluid was sent for analysis.  There was also a question of Portal Vein Thrombosis on the CT. A stool sample was also taken and analyzed for possible H. Pylori infection. Follow up CT was still unclear if Portal Vein thrombosis did exist,  Portal Vein Duplex and MRI Abdomen with contrast both ordered to clarify.  Duplex was read as negative but MRI is + for a thrombus.  She will be discharged on Eliquis.  She is tolerating a regular diet at this time, voiding spontaneously, and ambulating independently. She is stable for discharge at this time. VNS was contacted to assist with flushing the drains with 10cc's of normal saline and record the output of the drains, as well as the color and consistency daily. The patient was also instructed to follow up with Dr. Mclaughlin in the office in 10-14 days. She was also instructed to follow up with her primary care physician to discuss her hospitalization.

## 2024-05-02 NOTE — DISCHARGE NOTE PROVIDER - NSDCCPCAREPLAN_GEN_ALL_CORE_FT
PRINCIPAL DISCHARGE DIAGNOSIS  Diagnosis: Gastric perforation, acute  Assessment and Plan of Treatment: Follow up: Please call and make an appointment with the Acute Care Surgery Clinic 10-14 days after discharge. Also, please call and make an appointment with your primary care physician as per your usual schedule.   Activity: May return to normal activities as tolerated, however refrain from heavy lifting > 10-15 lbs.  Diet: May continue regular diet.  Medications: Please take all medications listed on your discharge paperwork as prescribed.  You are encouraged to take over-the-counter tylenol and/or ibuprofen for pain relief as directed.  Wound Care: Please, keep wound site clean and dry. You may shower, but do not bathe, swim or submerge in water.  Flush drains with 5cc sterile saline Q 12 hours daily.  Patient is advised to RETURN TO THE EMERGENCY DEPARTMENT for any of the following - worsening pain, fever/chills, nausea/vomiting, altered mental status, chest pain, shortness of breath, or any other new / worsening symptom.     PRINCIPAL DISCHARGE DIAGNOSIS  Diagnosis: Gastric perforation, acute  Assessment and Plan of Treatment: Follow up: Please call and make an appointment with the Acute Care Surgery Clinic 10-14 days after discharge. Also, please call and make an appointment with your primary care physician as per your usual schedule.   Activity: May return to normal activities as tolerated, however refrain from heavy lifting > 10-15 lbs.  Diet: May continue regular diet.  Medications: Please take all medications listed on your discharge paperwork as prescribed.  You are encouraged to take over-the-counter tylenol and/or ibuprofen for pain relief as directed.  Wound Care: Please, keep wound site clean and dry. You may shower, but do not bathe, swim or submerge in water.  Flush drains with 5cc sterile saline Q 12 hours daily.  Patient is advised to RETURN TO THE EMERGENCY DEPARTMENT for any of the following - worsening pain, fever/chills, nausea/vomiting, altered mental status, chest pain, shortness of breath, or any other new / worsening symptom.      SECONDARY DISCHARGE DIAGNOSES  Diagnosis: Portal vein thrombosis  Assessment and Plan of Treatment: Please follow up with your primary care physician regarding this diagnosis and to refill your Eliquis going forward.  Please make sure you take this medication as directed.

## 2024-05-03 ENCOUNTER — APPOINTMENT (OUTPATIENT)
Dept: ORTHOPEDIC SURGERY | Facility: HOSPITAL | Age: 69
End: 2024-05-03

## 2024-05-03 LAB
GRAM STN FLD: ABNORMAL
GRAM STN FLD: ABNORMAL

## 2024-05-03 PROCEDURE — 99024 POSTOP FOLLOW-UP VISIT: CPT

## 2024-05-03 RX ORDER — AMOXICILLIN 250 MG/5ML
1000 SUSPENSION, RECONSTITUTED, ORAL (ML) ORAL
Refills: 0 | Status: DISCONTINUED | OUTPATIENT
Start: 2024-05-03 | End: 2024-05-05

## 2024-05-03 RX ORDER — ACETAMINOPHEN 500 MG
975 TABLET ORAL EVERY 6 HOURS
Refills: 0 | Status: DISCONTINUED | OUTPATIENT
Start: 2024-05-03 | End: 2024-05-08

## 2024-05-03 RX ORDER — CLARITHROMYCIN 500 MG
500 TABLET ORAL EVERY 12 HOURS
Refills: 0 | Status: DISCONTINUED | OUTPATIENT
Start: 2024-05-03 | End: 2024-05-05

## 2024-05-03 RX ADMIN — CARVEDILOL PHOSPHATE 12.5 MILLIGRAM(S): 80 CAPSULE, EXTENDED RELEASE ORAL at 05:53

## 2024-05-03 RX ADMIN — PANTOPRAZOLE SODIUM 40 MILLIGRAM(S): 20 TABLET, DELAYED RELEASE ORAL at 05:53

## 2024-05-03 RX ADMIN — Medication 1000 MILLIGRAM(S): at 16:48

## 2024-05-03 RX ADMIN — Medication 500 MILLIGRAM(S): at 16:48

## 2024-05-03 RX ADMIN — Medication 75 MICROGRAM(S): at 05:53

## 2024-05-03 RX ADMIN — PANTOPRAZOLE SODIUM 40 MILLIGRAM(S): 20 TABLET, DELAYED RELEASE ORAL at 16:48

## 2024-05-03 RX ADMIN — Medication 975 MILLIGRAM(S): at 13:22

## 2024-05-03 RX ADMIN — ENOXAPARIN SODIUM 40 MILLIGRAM(S): 100 INJECTION SUBCUTANEOUS at 05:52

## 2024-05-03 RX ADMIN — ENOXAPARIN SODIUM 40 MILLIGRAM(S): 100 INJECTION SUBCUTANEOUS at 16:48

## 2024-05-03 RX ADMIN — Medication 975 MILLIGRAM(S): at 12:22

## 2024-05-03 RX ADMIN — PIPERACILLIN AND TAZOBACTAM 25 GRAM(S): 4; .5 INJECTION, POWDER, LYOPHILIZED, FOR SOLUTION INTRAVENOUS at 05:53

## 2024-05-03 NOTE — PROGRESS NOTE ADULT - ASSESSMENT
Pt is a 69 y/o female s/p robotic assisted josé manuel patch repair on 4/24. Pt had development of intraabdominal abscesses, s/p IR drainage x3 on 5/1. No growth from IR cultures. WBC normalized, pt afebrile and tolerating diet.   - Will continue to monitor for growth from IR cultures - no growth to date  - Patient transitioned from IV abx to PO abx today - switched to PO amoxicillin and clarithromycin for H. pylori coverage  - Will check WBC in AM and monitor for fevers while in PO abx  - If no fevers or leukocytosis on PO abx, will plan for d/c home  - Pt can continue regular diet  - Continue BID protonix  - Tylenol for pain control as needed  - 3 drains are to remain in place for now - outputs too high to remove, will continue to monitor output, flush w/ 5cc of NS q12h are per IR recommendations  - F/u stool h. pylori  - DVT ppx w/ lovenox & SCDs  - Encourage frequent incentive spirometer use  - PT recs appreciated - plan is for home w/ assist. CM coordinating VNS for home drain care

## 2024-05-03 NOTE — PROGRESS NOTE ADULT - SUBJECTIVE AND OBJECTIVE BOX
SUBJECTIVE / 24H EVENTS: Patient seen and examined at bedside. She reports feeling well over all, states that she has very mild abdominal "discomfort" for which tylenol is giving adequate relief. She reports tolerating a diet, having soft liquid stools, passing gas, and voiding without difficulty. OOB ambulating. Denies fever or chills, CP or SOB.     MEDICATIONS  (STANDING):  amoxicillin 1000 milliGRAM(s) Oral two times a day  carvedilol 12.5 milliGRAM(s) Oral daily  clarithromycin 500 milliGRAM(s) Oral every 12 hours  enoxaparin Injectable 40 milliGRAM(s) SubCutaneous every 12 hours  levothyroxine 75 MICROGram(s) Oral daily  pantoprazole    Tablet 40 milliGRAM(s) Oral two times a day    MEDICATIONS  (PRN):  acetaminophen     Tablet .. 975 milliGRAM(s) Oral every 6 hours PRN Mild Pain (1 - 3)  oxyCODONE    IR 5 milliGRAM(s) Oral every 6 hours PRN Moderate Pain (4 - 6)      Vital Signs Last 24 Hrs  T(C): 36.8 (03 May 2024 09:29), Max: 36.8 (02 May 2024 12:07)  T(F): 98.3 (03 May 2024 09:29), Max: 98.3 (02 May 2024 12:07)  HR: 83 (03 May 2024 09:29) (79 - 87)  BP: 137/81 (03 May 2024 09:29) (124/80 - 143/85)  BP(mean): --  RR: 18 (03 May 2024 09:29) (18 - 18)  SpO2: 94% (03 May 2024 09:29) (92% - 94%)    Parameters below as of 03 May 2024 09:29  Patient On (Oxygen Delivery Method): room air        Constitutional: patient appears comfortable resting in bed, in no apparent distress  Respiratory: respirations are unlabored, no accessory muscle use, no conversational dyspnea  Cardiovascular: regular rate & rhythm  Gastrointestinal: abdomen is soft & non-distended, non-tender, no rebound tenderness / guarding, incision sites are clean & dry without drainage or erythema, 3 IR drains in place, 2 w/ serosanguenous output, 1 with seropurulent output  Neurological: A&O x 3  Skin: mucous membranes moist, no diaphoresis, pallor, cyanosis or jaundice      I&O's Detail    02 May 2024 07:01  -  03 May 2024 07:00  --------------------------------------------------------  IN:    IV PiggyBack: 100 mL    Oral Fluid: 960 mL  Total IN: 1060 mL    OUT:    Bulb (mL): 60 mL    Bulb (mL): 73 mL    Bulb (mL): 70 mL  Total OUT: 203 mL    Total NET: 857 mL          LABS:                        10.9   8.29  )-----------( 376      ( 02 May 2024 06:16 )             33.1     05-02    134<L>  |  97  |  8.7  ----------------------------<  96  3.6   |  28.0  |  0.65    Ca    7.9<L>      02 May 2024 06:16  Phos  2.9     05-02  Mg     1.9     05-02        Urinalysis Basic - ( 02 May 2024 06:16 )    Color: x / Appearance: x / SG: x / pH: x  Gluc: 96 mg/dL / Ketone: x  / Bili: x / Urobili: x   Blood: x / Protein: x / Nitrite: x   Leuk Esterase: x / RBC: x / WBC x   Sq Epi: x / Non Sq Epi: x / Bacteria: x

## 2024-05-04 LAB
BASOPHILS # BLD AUTO: 0.03 K/UL — SIGNIFICANT CHANGE UP (ref 0–0.2)
BASOPHILS NFR BLD AUTO: 0.4 % — SIGNIFICANT CHANGE UP (ref 0–2)
EOSINOPHIL # BLD AUTO: 0.17 K/UL — SIGNIFICANT CHANGE UP (ref 0–0.5)
EOSINOPHIL NFR BLD AUTO: 2.2 % — SIGNIFICANT CHANGE UP (ref 0–6)
H PYLORI AG STL QL: NEGATIVE — SIGNIFICANT CHANGE UP
HCT VFR BLD CALC: 32 % — LOW (ref 34.5–45)
HGB BLD-MCNC: 10.6 G/DL — LOW (ref 11.5–15.5)
IMM GRANULOCYTES NFR BLD AUTO: 1 % — HIGH (ref 0–0.9)
LYMPHOCYTES # BLD AUTO: 1.8 K/UL — SIGNIFICANT CHANGE UP (ref 1–3.3)
LYMPHOCYTES # BLD AUTO: 23.1 % — SIGNIFICANT CHANGE UP (ref 13–44)
MCHC RBC-ENTMCNC: 31.5 PG — SIGNIFICANT CHANGE UP (ref 27–34)
MCHC RBC-ENTMCNC: 33.1 GM/DL — SIGNIFICANT CHANGE UP (ref 32–36)
MCV RBC AUTO: 95 FL — SIGNIFICANT CHANGE UP (ref 80–100)
MONOCYTES # BLD AUTO: 0.73 K/UL — SIGNIFICANT CHANGE UP (ref 0–0.9)
MONOCYTES NFR BLD AUTO: 9.4 % — SIGNIFICANT CHANGE UP (ref 2–14)
NEUTROPHILS # BLD AUTO: 4.97 K/UL — SIGNIFICANT CHANGE UP (ref 1.8–7.4)
NEUTROPHILS NFR BLD AUTO: 63.9 % — SIGNIFICANT CHANGE UP (ref 43–77)
PLATELET # BLD AUTO: 474 K/UL — HIGH (ref 150–400)
RBC # BLD: 3.37 M/UL — LOW (ref 3.8–5.2)
RBC # FLD: 13.2 % — SIGNIFICANT CHANGE UP (ref 10.3–14.5)
WBC # BLD: 7.78 K/UL — SIGNIFICANT CHANGE UP (ref 3.8–10.5)
WBC # FLD AUTO: 7.78 K/UL — SIGNIFICANT CHANGE UP (ref 3.8–10.5)

## 2024-05-04 PROCEDURE — 99024 POSTOP FOLLOW-UP VISIT: CPT

## 2024-05-04 PROCEDURE — 74177 CT ABD & PELVIS W/CONTRAST: CPT | Mod: 26

## 2024-05-04 RX ORDER — ATORVASTATIN CALCIUM 80 MG/1
20 TABLET, FILM COATED ORAL AT BEDTIME
Refills: 0 | Status: DISCONTINUED | OUTPATIENT
Start: 2024-05-04 | End: 2024-05-08

## 2024-05-04 RX ADMIN — Medication 1000 MILLIGRAM(S): at 06:12

## 2024-05-04 RX ADMIN — ENOXAPARIN SODIUM 40 MILLIGRAM(S): 100 INJECTION SUBCUTANEOUS at 06:13

## 2024-05-04 RX ADMIN — CARVEDILOL PHOSPHATE 12.5 MILLIGRAM(S): 80 CAPSULE, EXTENDED RELEASE ORAL at 06:13

## 2024-05-04 RX ADMIN — ATORVASTATIN CALCIUM 20 MILLIGRAM(S): 80 TABLET, FILM COATED ORAL at 21:33

## 2024-05-04 RX ADMIN — Medication 500 MILLIGRAM(S): at 06:12

## 2024-05-04 RX ADMIN — Medication 75 MICROGRAM(S): at 06:13

## 2024-05-04 RX ADMIN — Medication 975 MILLIGRAM(S): at 13:22

## 2024-05-04 RX ADMIN — Medication 975 MILLIGRAM(S): at 14:22

## 2024-05-04 RX ADMIN — ENOXAPARIN SODIUM 40 MILLIGRAM(S): 100 INJECTION SUBCUTANEOUS at 17:14

## 2024-05-04 RX ADMIN — Medication 500 MILLIGRAM(S): at 17:14

## 2024-05-04 RX ADMIN — PANTOPRAZOLE SODIUM 40 MILLIGRAM(S): 20 TABLET, DELAYED RELEASE ORAL at 06:13

## 2024-05-04 RX ADMIN — Medication 1000 MILLIGRAM(S): at 17:15

## 2024-05-04 RX ADMIN — PANTOPRAZOLE SODIUM 40 MILLIGRAM(S): 20 TABLET, DELAYED RELEASE ORAL at 17:14

## 2024-05-04 NOTE — PROGRESS NOTE ADULT - SUBJECTIVE AND OBJECTIVE BOX
Subjective: Patient seen and examined at bedside, c/o diarrhea yesterday and frustrated for "not feeling like herself". She is tolerating a diet, voiding, and ambulating.     STATUS POST:  RA josé manuel patch repair   POST OPERATIVE DAY #: 10    MEDICATIONS  (STANDING):  amoxicillin 1000 milliGRAM(s) Oral two times a day  carvedilol 12.5 milliGRAM(s) Oral daily  clarithromycin 500 milliGRAM(s) Oral every 12 hours  enoxaparin Injectable 40 milliGRAM(s) SubCutaneous every 12 hours  levothyroxine 75 MICROGram(s) Oral daily  pantoprazole    Tablet 40 milliGRAM(s) Oral two times a day    MEDICATIONS  (PRN):  acetaminophen     Tablet .. 975 milliGRAM(s) Oral every 6 hours PRN Mild Pain (1 - 3)  oxyCODONE    IR 5 milliGRAM(s) Oral every 6 hours PRN Moderate Pain (4 - 6)    Vital Signs Last 24 Hrs  T(C): 36.7 (04 May 2024 00:18), Max: 36.8 (03 May 2024 09:29)  T(F): 98 (04 May 2024 00:18), Max: 98.3 (03 May 2024 09:29)  HR: 86 (04 May 2024 00:18) (76 - 91)  BP: 151/80 (04 May 2024 00:18) (124/80 - 151/80)  RR: 18 (04 May 2024 00:18) (18 - 18)  SpO2: 94% (04 May 2024 00:18) (94% - 94%)  Parameters below as of 04 May 2024 00:18  Patient On (Oxygen Delivery Method): room air    Physical Exam:  Constitutional: NAD  HEENT: PERRL, EOMI  Respiratory: Respirations non-labored, no accessory muscle use  Gastrointestinal: Soft, non-tender, non-distended, incisions c/d/i, 3 IR drains in place    Neurological: A&O x 3; without gross deficit    LABS:  labs pending     A: Patient is a 67 yo F with a gastric perforation, s/p RA josé manuel patch 4/24, s/p IR drainage, now with 3 drains in her abdomen.  Most recent labs, WBC normalized, pt afebrile and tolerating diet.     Plan:   Monitor drain output   Cont po abx, switched to po yesterday, amoxicillin and clarithromycin for H. pylori coverage  CBC in AM and monitor for fevers while in PO abx  Regular diet  Continue BID protonix  Pain control   3 drains are to remain in place for now - outputs too high to remove, will continue to monitor output, flush w/ 5cc of NS q12h are per IR recommendations  F/u stool h. pylori  DVT ppx w/ lovenox & SCDs  Encourage frequent incentive spirometer use  PT- DC planning to home w/ assist. CM coordinating VNS for home drain care

## 2024-05-05 LAB
ANION GAP SERPL CALC-SCNC: 12 MMOL/L — SIGNIFICANT CHANGE UP (ref 5–17)
APTT BLD: 29.2 SEC — SIGNIFICANT CHANGE UP (ref 24.5–35.6)
BASOPHILS # BLD AUTO: 0.04 K/UL — SIGNIFICANT CHANGE UP (ref 0–0.2)
BASOPHILS NFR BLD AUTO: 0.5 % — SIGNIFICANT CHANGE UP (ref 0–2)
BUN SERPL-MCNC: 6.1 MG/DL — LOW (ref 8–20)
CALCIUM SERPL-MCNC: 7.9 MG/DL — LOW (ref 8.4–10.5)
CHLORIDE SERPL-SCNC: 101 MMOL/L — SIGNIFICANT CHANGE UP (ref 96–108)
CO2 SERPL-SCNC: 25 MMOL/L — SIGNIFICANT CHANGE UP (ref 22–29)
CREAT SERPL-MCNC: 0.68 MG/DL — SIGNIFICANT CHANGE UP (ref 0.5–1.3)
EGFR: 94 ML/MIN/1.73M2 — SIGNIFICANT CHANGE UP
EOSINOPHIL # BLD AUTO: 0.14 K/UL — SIGNIFICANT CHANGE UP (ref 0–0.5)
EOSINOPHIL NFR BLD AUTO: 1.7 % — SIGNIFICANT CHANGE UP (ref 0–6)
GLUCOSE SERPL-MCNC: 125 MG/DL — HIGH (ref 70–99)
HCT VFR BLD CALC: 32 % — LOW (ref 34.5–45)
HGB BLD-MCNC: 10.4 G/DL — LOW (ref 11.5–15.5)
IMM GRANULOCYTES NFR BLD AUTO: 0.7 % — SIGNIFICANT CHANGE UP (ref 0–0.9)
LYMPHOCYTES # BLD AUTO: 1.65 K/UL — SIGNIFICANT CHANGE UP (ref 1–3.3)
LYMPHOCYTES # BLD AUTO: 20 % — SIGNIFICANT CHANGE UP (ref 13–44)
MAGNESIUM SERPL-MCNC: 1.9 MG/DL — SIGNIFICANT CHANGE UP (ref 1.6–2.6)
MCHC RBC-ENTMCNC: 31.1 PG — SIGNIFICANT CHANGE UP (ref 27–34)
MCHC RBC-ENTMCNC: 32.5 GM/DL — SIGNIFICANT CHANGE UP (ref 32–36)
MCV RBC AUTO: 95.8 FL — SIGNIFICANT CHANGE UP (ref 80–100)
MONOCYTES # BLD AUTO: 0.93 K/UL — HIGH (ref 0–0.9)
MONOCYTES NFR BLD AUTO: 11.3 % — SIGNIFICANT CHANGE UP (ref 2–14)
NEUTROPHILS # BLD AUTO: 5.44 K/UL — SIGNIFICANT CHANGE UP (ref 1.8–7.4)
NEUTROPHILS NFR BLD AUTO: 65.8 % — SIGNIFICANT CHANGE UP (ref 43–77)
PHOSPHATE SERPL-MCNC: 2.7 MG/DL — SIGNIFICANT CHANGE UP (ref 2.4–4.7)
PLATELET # BLD AUTO: 534 K/UL — HIGH (ref 150–400)
POTASSIUM SERPL-MCNC: 3.2 MMOL/L — LOW (ref 3.5–5.3)
POTASSIUM SERPL-SCNC: 3.2 MMOL/L — LOW (ref 3.5–5.3)
RBC # BLD: 3.34 M/UL — LOW (ref 3.8–5.2)
RBC # FLD: 13.3 % — SIGNIFICANT CHANGE UP (ref 10.3–14.5)
SODIUM SERPL-SCNC: 138 MMOL/L — SIGNIFICANT CHANGE UP (ref 135–145)
WBC # BLD: 8.26 K/UL — SIGNIFICANT CHANGE UP (ref 3.8–10.5)
WBC # FLD AUTO: 8.26 K/UL — SIGNIFICANT CHANGE UP (ref 3.8–10.5)

## 2024-05-05 PROCEDURE — 99024 POSTOP FOLLOW-UP VISIT: CPT

## 2024-05-05 RX ORDER — HEPARIN SODIUM 5000 [USP'U]/ML
4500 INJECTION INTRAVENOUS; SUBCUTANEOUS EVERY 6 HOURS
Refills: 0 | Status: DISCONTINUED | OUTPATIENT
Start: 2024-05-05 | End: 2024-05-07

## 2024-05-05 RX ORDER — HEPARIN SODIUM 5000 [USP'U]/ML
9500 INJECTION INTRAVENOUS; SUBCUTANEOUS EVERY 6 HOURS
Refills: 0 | Status: DISCONTINUED | OUTPATIENT
Start: 2024-05-05 | End: 2024-05-07

## 2024-05-05 RX ORDER — HEPARIN SODIUM 5000 [USP'U]/ML
INJECTION INTRAVENOUS; SUBCUTANEOUS
Qty: 25000 | Refills: 0 | Status: DISCONTINUED | OUTPATIENT
Start: 2024-05-05 | End: 2024-05-07

## 2024-05-05 RX ORDER — HEPARIN SODIUM 5000 [USP'U]/ML
9000 INJECTION INTRAVENOUS; SUBCUTANEOUS EVERY 6 HOURS
Refills: 0 | Status: DISCONTINUED | OUTPATIENT
Start: 2024-05-05 | End: 2024-05-05

## 2024-05-05 RX ORDER — POTASSIUM CHLORIDE 20 MEQ
20 PACKET (EA) ORAL
Refills: 0 | Status: COMPLETED | OUTPATIENT
Start: 2024-05-05 | End: 2024-05-05

## 2024-05-05 RX ORDER — LOPERAMIDE HCL 2 MG
2 TABLET ORAL ONCE
Refills: 0 | Status: COMPLETED | OUTPATIENT
Start: 2024-05-05 | End: 2024-05-05

## 2024-05-05 RX ORDER — HEPARIN SODIUM 5000 [USP'U]/ML
4500 INJECTION INTRAVENOUS; SUBCUTANEOUS EVERY 6 HOURS
Refills: 0 | Status: DISCONTINUED | OUTPATIENT
Start: 2024-05-05 | End: 2024-05-05

## 2024-05-05 RX ORDER — HEPARIN SODIUM 5000 [USP'U]/ML
INJECTION INTRAVENOUS; SUBCUTANEOUS
Qty: 25000 | Refills: 0 | Status: DISCONTINUED | OUTPATIENT
Start: 2024-05-05 | End: 2024-05-05

## 2024-05-05 RX ADMIN — HEPARIN SODIUM 2100 UNIT(S)/HR: 5000 INJECTION INTRAVENOUS; SUBCUTANEOUS at 21:41

## 2024-05-05 RX ADMIN — Medication 1000 MILLIGRAM(S): at 04:07

## 2024-05-05 RX ADMIN — Medication 75 MICROGRAM(S): at 04:05

## 2024-05-05 RX ADMIN — PANTOPRAZOLE SODIUM 40 MILLIGRAM(S): 20 TABLET, DELAYED RELEASE ORAL at 04:05

## 2024-05-05 RX ADMIN — HEPARIN SODIUM 9500 UNIT(S): 5000 INJECTION INTRAVENOUS; SUBCUTANEOUS at 21:43

## 2024-05-05 RX ADMIN — PANTOPRAZOLE SODIUM 40 MILLIGRAM(S): 20 TABLET, DELAYED RELEASE ORAL at 18:53

## 2024-05-05 RX ADMIN — Medication 20 MILLIEQUIVALENT(S): at 21:54

## 2024-05-05 RX ADMIN — Medication 20 MILLIEQUIVALENT(S): at 18:53

## 2024-05-05 RX ADMIN — Medication 20 MILLIEQUIVALENT(S): at 14:39

## 2024-05-05 RX ADMIN — ATORVASTATIN CALCIUM 20 MILLIGRAM(S): 80 TABLET, FILM COATED ORAL at 21:54

## 2024-05-05 RX ADMIN — Medication 2 MILLIGRAM(S): at 16:45

## 2024-05-05 RX ADMIN — CARVEDILOL PHOSPHATE 12.5 MILLIGRAM(S): 80 CAPSULE, EXTENDED RELEASE ORAL at 04:06

## 2024-05-05 RX ADMIN — ENOXAPARIN SODIUM 40 MILLIGRAM(S): 100 INJECTION SUBCUTANEOUS at 04:06

## 2024-05-05 RX ADMIN — Medication 500 MILLIGRAM(S): at 04:05

## 2024-05-05 NOTE — PROGRESS NOTE ADULT - NUTRITIONAL ASSESSMENT
This patient has been assessed with a concern for Malnutrition and has been determined to have a diagnosis/diagnoses of Morbid obesity (BMI > 40).    This patient is being managed with:   Diet Clear Liquid-  Entered: May  1 2024  1:10PM  
This patient has been assessed with a concern for Malnutrition and has been determined to have a diagnosis/diagnoses of Morbid obesity (BMI > 40).    This patient is being managed with:   Diet Regular-  Entered: May  2 2024 10:30AM  

## 2024-05-05 NOTE — PROGRESS NOTE ADULT - ASSESSMENT
68F admitted with a gastric perforation, s/p RA josé manuel patch 4/24, POST op with multiple abdominal collections now s/p IR drainage, now with 3 drains in her abdomen.    - f/u official CT report  - ? removal of any of the drains vs. Drain study in IR for adjustment/replacement  - H. Pylori negative  - continue BID Protonix  - continue Abx  - WBC normalized  - DVT ppx

## 2024-05-05 NOTE — PROGRESS NOTE ADULT - SUBJECTIVE AND OBJECTIVE BOX
INTERVAL HPI/OVERNIGHT EVENTS: no complaints when seen, CT done earlier in the evening, no events per bedside RN    STATUS POST:  RA Angel patch 4/24  IR Drains x 3 5/1      MEDICATIONS  (STANDING):  amoxicillin 1000 milliGRAM(s) Oral two times a day  atorvastatin 20 milliGRAM(s) Oral at bedtime  carvedilol 12.5 milliGRAM(s) Oral daily  clarithromycin 500 milliGRAM(s) Oral every 12 hours  enoxaparin Injectable 40 milliGRAM(s) SubCutaneous every 12 hours  levothyroxine 75 MICROGram(s) Oral daily  pantoprazole    Tablet 40 milliGRAM(s) Oral two times a day    MEDICATIONS  (PRN):  acetaminophen     Tablet .. 975 milliGRAM(s) Oral every 6 hours PRN Mild Pain (1 - 3)      Vital Signs Last 24 Hrs  T(C): 36.7 (04 May 2024 23:06), Max: 37 (04 May 2024 15:26)  T(F): 98.1 (04 May 2024 23:06), Max: 98.6 (04 May 2024 15:26)  HR: 92 (04 May 2024 23:06) (77 - 92)  BP: 141/89 (04 May 2024 23:06) (133/83 - 146/73)  BP(mean): --  RR: 18 (04 May 2024 23:06) (18 - 18)  SpO2: 94% (04 May 2024 23:06) (93% - 97%)    Parameters below as of 04 May 2024 23:06  Patient On (Oxygen Delivery Method): room air        PHYSICAL EXAM:      Constitutional: NAD    Respiratory: no accessory muscle use or conversational dyspnea    Cardiovascular: RRR    Gastrointestinal: drain sites/dressings C/D/I, soft, NT/ND    Extremities: DONOHUE          I&O's Detail    03 May 2024 07:01  -  04 May 2024 07:00  --------------------------------------------------------  IN:    Oral Fluid: 240 mL  Total IN: 240 mL    OUT:    Bulb (mL): 100 mL    Bulb (mL): 25 mL    Bulb (mL): 85 mL  Total OUT: 210 mL    Total NET: 30 mL      04 May 2024 07:01  -  05 May 2024 02:59  --------------------------------------------------------  IN:    Oral Fluid: 250 mL  Total IN: 250 mL    OUT:    Bulb (mL): 70 mL    Bulb (mL): 40 mL    Bulb (mL): 5 mL    Voided (mL): 400 mL  Total OUT: 515 mL    Total NET: -265 mL          LABS:                        10.6   7.78  )-----------( 474      ( 04 May 2024 05:37 )             32.0                 RADIOLOGY & ADDITIONAL STUDIES:

## 2024-05-06 LAB
ANION GAP SERPL CALC-SCNC: 7 MMOL/L — SIGNIFICANT CHANGE UP (ref 5–17)
APTT BLD: 124.8 SEC — CRITICAL HIGH (ref 24.5–35.6)
APTT BLD: 83.9 SEC — HIGH (ref 24.5–35.6)
APTT BLD: >200 SEC — CRITICAL HIGH (ref 24.5–35.6)
BASOPHILS # BLD AUTO: 0.05 K/UL — SIGNIFICANT CHANGE UP (ref 0–0.2)
BASOPHILS NFR BLD AUTO: 0.6 % — SIGNIFICANT CHANGE UP (ref 0–2)
BUN SERPL-MCNC: 8.9 MG/DL — SIGNIFICANT CHANGE UP (ref 8–20)
CALCIUM SERPL-MCNC: 8 MG/DL — LOW (ref 8.4–10.5)
CHLORIDE SERPL-SCNC: 102 MMOL/L — SIGNIFICANT CHANGE UP (ref 96–108)
CO2 SERPL-SCNC: 26 MMOL/L — SIGNIFICANT CHANGE UP (ref 22–29)
CREAT SERPL-MCNC: 0.69 MG/DL — SIGNIFICANT CHANGE UP (ref 0.5–1.3)
CULTURE RESULTS: ABNORMAL
CULTURE RESULTS: ABNORMAL
EGFR: 94 ML/MIN/1.73M2 — SIGNIFICANT CHANGE UP
EOSINOPHIL # BLD AUTO: 0.15 K/UL — SIGNIFICANT CHANGE UP (ref 0–0.5)
EOSINOPHIL NFR BLD AUTO: 1.8 % — SIGNIFICANT CHANGE UP (ref 0–6)
GLUCOSE SERPL-MCNC: 111 MG/DL — HIGH (ref 70–99)
HCT VFR BLD CALC: 30.2 % — LOW (ref 34.5–45)
HGB BLD-MCNC: 10.2 G/DL — LOW (ref 11.5–15.5)
IMM GRANULOCYTES NFR BLD AUTO: 0.7 % — SIGNIFICANT CHANGE UP (ref 0–0.9)
LYMPHOCYTES # BLD AUTO: 2.45 K/UL — SIGNIFICANT CHANGE UP (ref 1–3.3)
LYMPHOCYTES # BLD AUTO: 30 % — SIGNIFICANT CHANGE UP (ref 13–44)
MAGNESIUM SERPL-MCNC: 1.9 MG/DL — SIGNIFICANT CHANGE UP (ref 1.6–2.6)
MCHC RBC-ENTMCNC: 32.1 PG — SIGNIFICANT CHANGE UP (ref 27–34)
MCHC RBC-ENTMCNC: 33.8 GM/DL — SIGNIFICANT CHANGE UP (ref 32–36)
MCV RBC AUTO: 95 FL — SIGNIFICANT CHANGE UP (ref 80–100)
MONOCYTES # BLD AUTO: 0.87 K/UL — SIGNIFICANT CHANGE UP (ref 0–0.9)
MONOCYTES NFR BLD AUTO: 10.7 % — SIGNIFICANT CHANGE UP (ref 2–14)
NEUTROPHILS # BLD AUTO: 4.58 K/UL — SIGNIFICANT CHANGE UP (ref 1.8–7.4)
NEUTROPHILS NFR BLD AUTO: 56.2 % — SIGNIFICANT CHANGE UP (ref 43–77)
PHOSPHATE SERPL-MCNC: 3 MG/DL — SIGNIFICANT CHANGE UP (ref 2.4–4.7)
PLATELET # BLD AUTO: 484 K/UL — HIGH (ref 150–400)
POTASSIUM SERPL-MCNC: 4.1 MMOL/L — SIGNIFICANT CHANGE UP (ref 3.5–5.3)
POTASSIUM SERPL-SCNC: 4.1 MMOL/L — SIGNIFICANT CHANGE UP (ref 3.5–5.3)
RBC # BLD: 3.18 M/UL — LOW (ref 3.8–5.2)
RBC # FLD: 13.5 % — SIGNIFICANT CHANGE UP (ref 10.3–14.5)
SODIUM SERPL-SCNC: 135 MMOL/L — SIGNIFICANT CHANGE UP (ref 135–145)
SPECIMEN SOURCE: SIGNIFICANT CHANGE UP
SPECIMEN SOURCE: SIGNIFICANT CHANGE UP
WBC # BLD: 8.16 K/UL — SIGNIFICANT CHANGE UP (ref 3.8–10.5)
WBC # FLD AUTO: 8.16 K/UL — SIGNIFICANT CHANGE UP (ref 3.8–10.5)

## 2024-05-06 RX ORDER — MAGNESIUM SULFATE 500 MG/ML
2 VIAL (ML) INJECTION ONCE
Refills: 0 | Status: COMPLETED | OUTPATIENT
Start: 2024-05-06 | End: 2024-05-06

## 2024-05-06 RX ADMIN — HEPARIN SODIUM 1700 UNIT(S)/HR: 5000 INJECTION INTRAVENOUS; SUBCUTANEOUS at 05:17

## 2024-05-06 RX ADMIN — HEPARIN SODIUM 0 UNIT(S)/HR: 5000 INJECTION INTRAVENOUS; SUBCUTANEOUS at 04:12

## 2024-05-06 RX ADMIN — PANTOPRAZOLE SODIUM 40 MILLIGRAM(S): 20 TABLET, DELAYED RELEASE ORAL at 06:18

## 2024-05-06 RX ADMIN — HEPARIN SODIUM 1700 UNIT(S)/HR: 5000 INJECTION INTRAVENOUS; SUBCUTANEOUS at 09:28

## 2024-05-06 RX ADMIN — ATORVASTATIN CALCIUM 20 MILLIGRAM(S): 80 TABLET, FILM COATED ORAL at 23:15

## 2024-05-06 RX ADMIN — HEPARIN SODIUM 1400 UNIT(S)/HR: 5000 INJECTION INTRAVENOUS; SUBCUTANEOUS at 18:52

## 2024-05-06 RX ADMIN — CARVEDILOL PHOSPHATE 12.5 MILLIGRAM(S): 80 CAPSULE, EXTENDED RELEASE ORAL at 06:18

## 2024-05-06 RX ADMIN — Medication 25 GRAM(S): at 09:28

## 2024-05-06 RX ADMIN — HEPARIN SODIUM 1700 UNIT(S)/HR: 5000 INJECTION INTRAVENOUS; SUBCUTANEOUS at 07:36

## 2024-05-06 RX ADMIN — HEPARIN SODIUM 1400 UNIT(S)/HR: 5000 INJECTION INTRAVENOUS; SUBCUTANEOUS at 12:14

## 2024-05-06 RX ADMIN — Medication 75 MICROGRAM(S): at 06:17

## 2024-05-06 RX ADMIN — HEPARIN SODIUM 1400 UNIT(S)/HR: 5000 INJECTION INTRAVENOUS; SUBCUTANEOUS at 19:30

## 2024-05-06 RX ADMIN — PANTOPRAZOLE SODIUM 40 MILLIGRAM(S): 20 TABLET, DELAYED RELEASE ORAL at 17:00

## 2024-05-06 NOTE — PROGRESS NOTE ADULT - ASSESSMENT
Pt is a 67 y/o female admitted w/ a gastric perforation s/p robotic assisted josé manuel patch repair on 4/24. Pt developed intraabdominal abscesses s/p IR drainage on 5/1. CT scan done on 5/4 showed multiple collections have significantly decreased in size with the only remaining significant residual collection superior to the liver and below the diaphragm. Pigtail catheter is within the inferior right lateral aspect of this collection. CT also demonstrated a branching hypodensity within the R hepatic lobe most likely represents thrombosed portal vein. Pt waiting for MRI to confirm portal vein thrombus. Pt tolerating diet, pain controlled, having bowel fxn, WBC 8, afebrile  - MRI abdomen w/wo contrast pending to further eval possible portal vein thrombus. Spoke w/ MRI who states it will get done today   - Continue hep gtt while awaiting MRI - if MRI confirms thrombus will discuss transition to oral anticoagulant  - Mid abdomen drain (drain #3) removed at bedside as output has been low - pt tolerated well  - Drains #1 and #2 (subphrenic and prehepatic) to remain in place - home care set up for drain care  - Continue regular diet  - Pain control as needed  - Hep gtt for AC & SCDs for DVT ppx  - Encourage frequent IS use  - Dispo planning home w/ home care once MRI done & results reviewed

## 2024-05-06 NOTE — PROGRESS NOTE ADULT - NS ATTEND OPT1 GEN_ALL_CORE

## 2024-05-06 NOTE — PROGRESS NOTE ADULT - SUBJECTIVE AND OBJECTIVE BOX
SUBJECTIVE / 24H EVENTS: Patient seen and examined at bedside. She reports feeling anxious and upset that she is waiting for MRI to be done. CT scan 5/4 PM showed possible portal vein thrombosis so MRI pending to confirm. Pt was started on hep gtt for anticoagulation. Pt otherwise with no complaints. She has mild pain around drain sites that is worse when drains are flushed. Pain is well controlled w/ medications. She has been tolerating diet, passing gas and having BMs. OOB ambulating, voiding without difficulty. Denies fever or chills, CP or SOB.     MEDICATIONS  (STANDING):  atorvastatin 20 milliGRAM(s) Oral at bedtime  carvedilol 12.5 milliGRAM(s) Oral daily  heparin  Infusion.  Unit(s)/Hr (21 mL/Hr) IV Continuous <Continuous>  levothyroxine 75 MICROGram(s) Oral daily  pantoprazole    Tablet 40 milliGRAM(s) Oral two times a day    MEDICATIONS  (PRN):  acetaminophen     Tablet .. 975 milliGRAM(s) Oral every 6 hours PRN Mild Pain (1 - 3)  heparin   Injectable 9500 Unit(s) IV Push every 6 hours PRN For aPTT less than 40  heparin   Injectable 4500 Unit(s) IV Push every 6 hours PRN For aPTT between 40 - 57      Vital Signs Last 24 Hrs  T(C): 36.5 (06 May 2024 09:13), Max: 37 (05 May 2024 19:55)  T(F): 97.7 (06 May 2024 09:13), Max: 98.6 (05 May 2024 19:55)  HR: 73 (06 May 2024 09:13) (73 - 96)  BP: 117/67 (06 May 2024 09:13) (117/67 - 152/83)  BP(mean): --  RR: 18 (06 May 2024 09:13) (18 - 18)  SpO2: 95% (06 May 2024 09:13) (93% - 96%)    Parameters below as of 06 May 2024 09:13  Patient On (Oxygen Delivery Method): room air        Constitutional: patient appears comfortable lying in bed, in no apparent distress  Respiratory: respirations are unlabored, no accessory muscle use, no conversational dyspnea  Cardiovascular: regular rate & rhythm  Gastrointestinal: abdomen obese, soft, non-distended, mild TTP around drains with no rebound tenderness / guarding. Drain dressings are clean and dry. Drain w/ serosangueous output. Incision sites clean and dry as well without surrounding erythema or drainage  Neurological: A&O x 3  Skin: mucous membranes moist, no diaphoresis, pallor, cyanosis or jaundice      I&O's Detail    05 May 2024 07:01  -  06 May 2024 07:00  --------------------------------------------------------  IN:  Total IN: 0 mL    OUT:    Bulb (mL): 20 mL    Bulb (mL): 10 mL    Bulb (mL): 0 mL  Total OUT: 30 mL    Total NET: -30 mL      06 May 2024 07:01  -  06 May 2024 12:21  --------------------------------------------------------  IN:    Heparin Infusion: 51 mL    Other (mL): 15 mL  Total IN: 66 mL    OUT:  Total OUT: 0 mL    Total NET: 66 mL          LABS:                        10.2   8.16  )-----------( 484      ( 06 May 2024 03:29 )             30.2     05-06    135  |  102  |  8.9  ----------------------------<  111<H>  4.1   |  26.0  |  0.69    Ca    8.0<L>      06 May 2024 03:29  Phos  3.0     05-06  Mg     1.9     05-06      PTT - ( 06 May 2024 11:31 )  PTT:124.8 sec  Urinalysis Basic - ( 06 May 2024 03:29 )    Color: x / Appearance: x / SG: x / pH: x  Gluc: 111 mg/dL / Ketone: x  / Bili: x / Urobili: x   Blood: x / Protein: x / Nitrite: x   Leuk Esterase: x / RBC: x / WBC x   Sq Epi: x / Non Sq Epi: x / Bacteria: x

## 2024-05-06 NOTE — PROGRESS NOTE ADULT - NS ATTEND AMEND GEN_ALL_CORE FT
Patient seen and examined at bedside. No acute events overnight. Denies any nausea or vomiting. Pain is well controlled. Afebrile. IR drains with decreasing outputs.    MRI Today to r/o portal venous thrombosi   Continue Diet as tolerated  Continue Heparin Drip pending MRI  Will DC drain #3 licated in the mid abdomen  Dispo planning pending MRI
Patient seen and examined on am rounds. No acute events overnight, no complaints this am. Labs reviewed, leukocytosis normalized, no growth from drains. abd soft, nttp, nd. drain output too high to remove. will treat pt for h pylori (sample sent and result pending), switching to po abx off IV (cultures NGTD). will plan to d/c home tomorrow. VNS set up today.
Above assessment noted.  The patient was seen and examined by myself with the surgical PA.  The patient is with complaints of some pressure near the medial most drain.  The output overnight has tapered off from that drain.  She otherwise has  no nausea or vomit. Abdomen is soft with mild discomfort to palpation of the epigastric area near drain #3.  The remaining drains are with seropurulent output.  Will plan for a follow up CT scan today to reevaluate the drains and collections.  Further recommendations to follow.
Patient seen and examined on am rounds. NG removed overnight accidentally. Patient with persistent vague R sided abdominal pain, worsening leukocytosis. Plan for CT with po and IV contrast today. Clears pending results. Trend WBC. Abd otherwise soft, NTTP, obese, incisions c/d/i
Patient seen and examined. Doing well after drainage. Adv diet to regular, awaiting sensitivities to switch to po abx. Dispo planning, will go home with drains. OOB and ambulating. h pylori collection pending.
Recovering appropriately s/p Angel patch: AVSS, no leukocytosis.   --NPO, NGT to lcs   --Continue zosyn (last dose @ 4 days postop), PPI  --IS, mobilize   --MMPR  --DVT ppx
Above assessment noted.  The patient was seen and examined by myself with the surgical PA.  The patient is still with right sided upper abdominal discomfort and persistent diarrhea. She has no nausea or vomit. Abdomen is obese with mild right upper tenderness, drains in place.  Drains 1 and 2 with turbid output, drain 3 with more serosanguinous output.  Abd/pel CT scan with no new collections, drains remain in place, reactive right pleural effusion.  Will consider removing drain #3 in 24 hours if output remains low.  Will start Immodium for the diarrhea.
Above assessment noted.  The patient was seen and examined by myself with the surgical PA.  The patient is with improvement in her discomfort today.  She reports that she started to pass some flatus this morning. She has no nausea. she still has right sided and upper abdominal discomfort.  Abdomen is obese with tenderness in the right upper abdominal area, no guarding, no rebound, incisions are without infection of drainage.  WBC's up to 14.  If WBC's continue to increase then CT scan in AM. Will continue NPO and NGT for now. Encourage OOB mobilize.
Patient seen and examined on rounds. awaiting IR for perc drainage of intraabdominal abscesses- will go tomorrow per IR. Tolerating clears.

## 2024-05-07 LAB
ANION GAP SERPL CALC-SCNC: 9 MMOL/L — SIGNIFICANT CHANGE UP (ref 5–17)
APTT BLD: 80.1 SEC — HIGH (ref 24.5–35.6)
APTT BLD: 82.6 SEC — HIGH (ref 24.5–35.6)
BASOPHILS # BLD AUTO: 0.04 K/UL — SIGNIFICANT CHANGE UP (ref 0–0.2)
BASOPHILS NFR BLD AUTO: 0.5 % — SIGNIFICANT CHANGE UP (ref 0–2)
BUN SERPL-MCNC: 9.9 MG/DL — SIGNIFICANT CHANGE UP (ref 8–20)
CALCIUM SERPL-MCNC: 8.1 MG/DL — LOW (ref 8.4–10.5)
CHLORIDE SERPL-SCNC: 102 MMOL/L — SIGNIFICANT CHANGE UP (ref 96–108)
CO2 SERPL-SCNC: 26 MMOL/L — SIGNIFICANT CHANGE UP (ref 22–29)
CREAT SERPL-MCNC: 0.76 MG/DL — SIGNIFICANT CHANGE UP (ref 0.5–1.3)
CULTURE RESULTS: ABNORMAL
EGFR: 85 ML/MIN/1.73M2 — SIGNIFICANT CHANGE UP
EOSINOPHIL # BLD AUTO: 0.17 K/UL — SIGNIFICANT CHANGE UP (ref 0–0.5)
EOSINOPHIL NFR BLD AUTO: 2.2 % — SIGNIFICANT CHANGE UP (ref 0–6)
GLUCOSE SERPL-MCNC: 105 MG/DL — HIGH (ref 70–99)
GRAM STN FLD: ABNORMAL
HCT VFR BLD CALC: 30.1 % — LOW (ref 34.5–45)
HGB BLD-MCNC: 10 G/DL — LOW (ref 11.5–15.5)
IMM GRANULOCYTES NFR BLD AUTO: 0.5 % — SIGNIFICANT CHANGE UP (ref 0–0.9)
LYMPHOCYTES # BLD AUTO: 2.46 K/UL — SIGNIFICANT CHANGE UP (ref 1–3.3)
LYMPHOCYTES # BLD AUTO: 32.1 % — SIGNIFICANT CHANGE UP (ref 13–44)
MAGNESIUM SERPL-MCNC: 2.1 MG/DL — SIGNIFICANT CHANGE UP (ref 1.6–2.6)
MCHC RBC-ENTMCNC: 31.4 PG — SIGNIFICANT CHANGE UP (ref 27–34)
MCHC RBC-ENTMCNC: 33.2 GM/DL — SIGNIFICANT CHANGE UP (ref 32–36)
MCV RBC AUTO: 94.7 FL — SIGNIFICANT CHANGE UP (ref 80–100)
MONOCYTES # BLD AUTO: 0.71 K/UL — SIGNIFICANT CHANGE UP (ref 0–0.9)
MONOCYTES NFR BLD AUTO: 9.3 % — SIGNIFICANT CHANGE UP (ref 2–14)
NEUTROPHILS # BLD AUTO: 4.24 K/UL — SIGNIFICANT CHANGE UP (ref 1.8–7.4)
NEUTROPHILS NFR BLD AUTO: 55.4 % — SIGNIFICANT CHANGE UP (ref 43–77)
PHOSPHATE SERPL-MCNC: 3.5 MG/DL — SIGNIFICANT CHANGE UP (ref 2.4–4.7)
PLATELET # BLD AUTO: 482 K/UL — HIGH (ref 150–400)
POTASSIUM SERPL-MCNC: 4 MMOL/L — SIGNIFICANT CHANGE UP (ref 3.5–5.3)
POTASSIUM SERPL-SCNC: 4 MMOL/L — SIGNIFICANT CHANGE UP (ref 3.5–5.3)
RBC # BLD: 3.18 M/UL — LOW (ref 3.8–5.2)
RBC # FLD: 13.4 % — SIGNIFICANT CHANGE UP (ref 10.3–14.5)
SODIUM SERPL-SCNC: 137 MMOL/L — SIGNIFICANT CHANGE UP (ref 135–145)
SPECIMEN SOURCE: SIGNIFICANT CHANGE UP
WBC # BLD: 7.66 K/UL — SIGNIFICANT CHANGE UP (ref 3.8–10.5)
WBC # FLD AUTO: 7.66 K/UL — SIGNIFICANT CHANGE UP (ref 3.8–10.5)

## 2024-05-07 PROCEDURE — 74160 CT ABDOMEN W/CONTRAST: CPT | Mod: 26

## 2024-05-07 PROCEDURE — 93975 VASCULAR STUDY: CPT | Mod: 26

## 2024-05-07 RX ORDER — APIXABAN 2.5 MG/1
10 TABLET, FILM COATED ORAL EVERY 12 HOURS
Refills: 0 | Status: DISCONTINUED | OUTPATIENT
Start: 2024-05-07 | End: 2024-05-08

## 2024-05-07 RX ADMIN — Medication 75 MICROGRAM(S): at 06:40

## 2024-05-07 RX ADMIN — HEPARIN SODIUM 1400 UNIT(S)/HR: 5000 INJECTION INTRAVENOUS; SUBCUTANEOUS at 07:32

## 2024-05-07 RX ADMIN — CARVEDILOL PHOSPHATE 12.5 MILLIGRAM(S): 80 CAPSULE, EXTENDED RELEASE ORAL at 06:40

## 2024-05-07 RX ADMIN — PANTOPRAZOLE SODIUM 40 MILLIGRAM(S): 20 TABLET, DELAYED RELEASE ORAL at 06:40

## 2024-05-07 RX ADMIN — PANTOPRAZOLE SODIUM 40 MILLIGRAM(S): 20 TABLET, DELAYED RELEASE ORAL at 17:53

## 2024-05-07 RX ADMIN — HEPARIN SODIUM 1400 UNIT(S)/HR: 5000 INJECTION INTRAVENOUS; SUBCUTANEOUS at 02:39

## 2024-05-07 RX ADMIN — ATORVASTATIN CALCIUM 20 MILLIGRAM(S): 80 TABLET, FILM COATED ORAL at 22:18

## 2024-05-07 RX ADMIN — APIXABAN 10 MILLIGRAM(S): 2.5 TABLET, FILM COATED ORAL at 17:53

## 2024-05-07 RX ADMIN — HEPARIN SODIUM 1400 UNIT(S)/HR: 5000 INJECTION INTRAVENOUS; SUBCUTANEOUS at 03:02

## 2024-05-07 NOTE — PROGRESS NOTE ADULT - SUBJECTIVE AND OBJECTIVE BOX
Patient seen and examined at bedside. No acute events overnight. Mild pain around drain sites, continues to have bowel function    Vitals:  Vital Signs Last 24 Hrs  T(C): 36.9 (07 May 2024 22:30), Max: 36.9 (07 May 2024 16:13)  T(F): 98.4 (07 May 2024 22:30), Max: 98.4 (07 May 2024 16:13)  HR: 92 (07 May 2024 22:30) (75 - 92)  BP: 136/84 (07 May 2024 22:30) (120/79 - 150/89)  BP(mean): --  RR: 18 (07 May 2024 22:30) (16 - 18)  SpO2: 92% (07 May 2024 22:30) (92% - 94%)    Parameters below as of 07 May 2024 22:30  Patient On (Oxygen Delivery Method): room air        Labs:  05-07    137  |  102  |  9.9  ----------------------------<  105<H>  4.0   |  26.0  |  0.76    Ca    8.1<L>      07 May 2024 02:22  Phos  3.5     05-07  Mg     2.1     05-07                              10.0   7.66  )-----------( 482      ( 07 May 2024 02:22 )             30.1       Exam:  Gen: pt lying in bed, alert, in NAD  Resp: unlabored  CVS: RRR  Abd: soft, NT, ND, drains both serosanguineous  Ext: moving all extremities spontaneously, sensation intact, pulses 2+

## 2024-05-07 NOTE — PROGRESS NOTE ADULT - ASSESSMENT
Pt is a 69 y/o female admitted w/ a gastric perforation s/p robotic assisted josé manuel patch repair on 4/24. Pt developed intraabdominal abscesses s/p IR drainage on 5/1. CT scan done on 5/4 showed multiple collections have significantly decreased in size with the only remaining significant residual collection superior to the liver and below the diaphragm. Pigtail catheter is within the inferior right lateral aspect of this collection. CT also demonstrated a branching hypodensity within the R hepatic lobe most likely represents thrombosed portal vein. Pt waiting for MRI to confirm portal vein thrombus. Pt tolerating diet, pain controlled, having bowel fxn, WBC 7.6, afebrile    Plan:  -CT A/P today to further eval portal vein thrombosis  -plan for abdominal duplex as well  -likely MRI, pending above findings  -transition to eliquis  - Drains #1 and #2 (subphrenic and prehepatic) to remain in place - home care set up for drain care  - Continue regular diet  - Pain control as needed  - SCDs for DVT ppx  - Encourage frequent IS use  - Dispo planning home w/ home care

## 2024-05-07 NOTE — CHART NOTE - NSCHARTNOTEFT_GEN_A_CORE
Post-op Check    Subjective:  Pt states " I am having pain" , added IV robaxin for better pain control.  Denies chest pain, SOB, palpitations. NG tube in place    STATUS POST:  Robot-assisted laparoscopic gastrorrhaphy using da Laura ,Repair of perforated pyloric ulcer using Angel patch, diagnostic laparoscopy, evidence of perforated anterior gastric prepyloric ulcer with purulence in RUQ, Angel patch repair using 2-0 silk and tongue of omentum, irrigation of RUQ    POST OPERATIVE DAY #: 0    MEDICATIONS  (STANDING):  acetaminophen   IVPB .. 1000 milliGRAM(s) IV Intermittent every 6 hours  heparin   Injectable 5000 Unit(s) SubCutaneous every 8 hours  levothyroxine Injectable 50 MICROGram(s) IV Push at bedtime  metoprolol tartrate Injectable 5 milliGRAM(s) IV Push every 6 hours  pantoprazole  Injectable 40 milliGRAM(s) IV Push daily  piperacillin/tazobactam IVPB.. 3.375 Gram(s) IV Intermittent every 8 hours    MEDICATIONS  (PRN):  acetaminophen   IVPB .. 1000 milliGRAM(s) IV Intermittent every 6 hours PRN Mild Pain (1 - 3), Moderate Pain (4 - 6), Severe Pain (7 - 10)  benzocaine/menthol Lozenge 1 Lozenge Oral every 3 hours PRN Sore Throat  HYDROmorphone  Injectable 0.5 milliGRAM(s) IV Push every 4 hours PRN Severe Pain (7 - 10)  HYDROmorphone  Injectable 1 milliGRAM(s) IV Push every 4 hours PRN breakthrough      Vital Signs Last 24 Hrs  T(C): 37 (25 Apr 2024 04:21), Max: 37 (25 Apr 2024 04:21)  T(F): 98.6 (25 Apr 2024 04:21), Max: 98.6 (25 Apr 2024 04:21)  HR: 95 (25 Apr 2024 04:21) (93 - 100)  BP: 111/69 (25 Apr 2024 04:21) (100/69 - 128/80)  BP(mean): 77 (25 Apr 2024 00:20) (76 - 88)  RR: 18 (25 Apr 2024 04:21) (18 - 20)  SpO2: 92% (25 Apr 2024 04:21) (92% - 100%)    Parameters below as of 25 Apr 2024 04:21  Patient On (Oxygen Delivery Method): nasal cannula  O2 Flow (L/min): 2      Physical Exam:  Constitutional: NAD  Respiratory: no accessory muscle use, respirations non-labored  GI: abdomen softly distended, surgical dressing well seated, NG tube well seated   Neurological: A&O x 3;     A:     P:  Continue NG tube for now  NPO/IV  monitor for return of bowel function   Pain control  OOB as tolerated  Encourage IS  DVT ppx  dispo pending
Vascular & Interventional Radiology Pre-Procedure Note    Procedure Name: Imaging guided drainage of abdominal abscesses    HPI: 68y Female with Abdominal abscesses    68y Female with PMH hypertension, hypothyroidism, arthritis and PSH  presents to ER on 24 with complaint of abdominal pain. CT demonstrating gastric perforation with air-fluid collection primarily in RUQ, Pt had Robot-assisted laparoscopic gastrorrhaphy with Angel patch repair of prepyloric ulcer. WBC initially dropped but has been rising for last few days and CT performed on POD#6 showed several right abdominal and perihepatic collections, probably abscesses. IR consulted for drainage.    PMH/PSH  Other disease of stomach and duodenum  Achilles tendinitis, left leg  Allergic contact dermatitis, unspecified cause  Allergic rhinitis, unspecified  Cyst of kidney, acquired  Dehydration  Effusion, left knee  Fatty (change of) liver, not elsewhere classified  Herpesviral infection, unspecified  Hyperlipidemia, unspecified  Impingement syndrome of left shoulder  Lateral epicondylitis, left elbow  Leiomyoma of uterus, unspecified  Lichen sclerosus et atrophicus  Morbid (severe) obesity due to excess calories  Nontoxic multinodular goiter  Plantar fascial fibromatosis  Postmenopausal atrophic vaginitis  Primary osteoarthritis, left hand  Pulmonary hypertension, unspecified  Rheumatic tricuspid insufficiency  Spondylolisthesis, site unspecified  Sprain of medial collateral ligament of left knee, initial encounter  Subacute and chronic vulvitis  Tinea unguium  Unilateral primary osteoarthritis, left knee  Unilateral primary osteoarthritis, right hip  Unspecified cataract  Vitamin D deficiency, unspecified  Hypertension  Hypothyroid  ARSLAN on CPAP  RBBB  Perforated gastric ulcer  Robot-assisted laparoscopic gastrorrhaphy using da Laura Si  Repair of perforated pyloric ulcer using Angel patch  H/O:  section  H/O cataract extraction  H/O dilation and curettage  S/P left rotator cuff repair      Allergies: No Known Allergies      Medications (Abx/Cardiac/Anticoagulation/Blood Products)  acetaminophen   IVPB .. 1000 milliGRAM(s) IV Intermittent every 6 hours PRN  benzocaine/menthol Lozenge 1 Lozenge Oral every 3 hours PRN  HYDROmorphone  Injectable 0.5 milliGRAM(s) IV Push every 4 hours PRN  HYDROmorphone  Injectable 1 milliGRAM(s) IV Push every 4 hours PRN  levothyroxine Injectable 50 MICROGram(s) IV Push at bedtime  metoprolol tartrate Injectable 5 milliGRAM(s) IV Push every 6 hours  pantoprazole  Injectable 40 milliGRAM(s) IV Push every 12 hours  piperacillin/tazobactam IVPB.. 3.375 Gram(s) IV Intermittent every 8 hours      Data:      T(C): 36.3  HR: 74  BP: 144/83  RR: 18  SpO2: 96%      Lab  -WBC 11.41 / HgB 11.1 / Hct 34.4 / Plt 348  -Na 137 / Cl 100 / BUN 11.2 / Glucose 85  -K 4.2 / CO2 27.0 / Cr 0.66  -ALT -- / Alk Phos -- / T.Bili --  -INR1.34      Imaging: Multiple right abdominal/perihepatic collections      Plan:   -68y Female presents for Imaging guided drainage of right abdominal/Perihepatic collections  -Risks/Benefits/alternatives explained with the patient and witnessed informed consent obtained.
Source: Patient [ ]  Family [ ]   other [x ]    Current Diet:  Diet, Regular (05-02-24 @ 10:30)    Patient reports [ ] nausea  [ ] vomiting [ ] diarrhea [ ] constipation  [ ]chewing problems [ ] swallowing issues  [ ] other:     PO intake:  < 50% [ ]   50-75%  [ ]   %  [x ]  other :    Source for PO intake [ ] Patient [ ] family [x ] chart [ ] staff [ ] other    Current Weight:   (5/5) 255.7 lbs  (5/1) 263 lbs  Noted with 1+ b/l ankle edema, continue to trend    Pertinent Medications: MEDICATIONS  (STANDING):  atorvastatin 20 milliGRAM(s) Oral at bedtime  carvedilol 12.5 milliGRAM(s) Oral daily  levothyroxine 75 MICROGram(s) Oral daily  pantoprazole    Tablet 40 milliGRAM(s) Oral two times a day      Pertinent Labs: CBC Full  -  ( 07 May 2024 02:22 )  WBC Count : 7.66 K/uL  RBC Count : 3.18 M/uL  Hemoglobin : 10.0 g/dL  Hematocrit : 30.1 %    05-07 Na137 mmol/L Glu 105 mg/dL<H> K+ 4.0 mmol/L Cr  0.76 mg/dL BUN 9.9 mg/dL Phos 3.5 mg/dL Alb n/a   PAB n/a       Skin: Surgical incision to abdomen per documentation    Nutrition focused physical exam previously conducted - found signs of malnutrition [ ]absent [x ]present    Subcutaneous fat loss: [ ] Orbital fat pads region, [ ]Buccal fat region, [ ]Triceps region,  [ ]Ribs region    Muscle wasting: [ ]Temples region, [ ]Clavicle region, [ ]Shoulder region, [ ]Scapula region, [ ]Interosseous region,  [ ]thigh region, [ ]Calf region    Estimated Needs:   [x ] no change since previous assessment  [ ] recalculated:     Current Nutrition Diagnosis: Pt remains at nutrition risk secondary to altered GI function related to alterations in GI tract as evidenced by s/p robotic assisted josé manuel patch repair. Attempted to interview x 2, pt sleeping soundly then receiving nursing care. Pt with good po intake per documentation. Last BM 5/6. RD to follow up as feasible.     Recommendations:   1) Continue diet as tolerated.  2) Rx: MVI daily.  3) Encourage po intake, monitor diet tolerance, and provide assistance at meals as needed.  4) Obtain daily weights to monitor trends.     Monitoring and Evaluation:   [ x] PO intake [x ] Tolerance to diet prescription [X] Weights  [X] Follow up per protocol [X] Labs: chem 8, mg, phos, H/H
pt has own cpap at bedside, unable to wear due to NG tube.    Delia Cedillo RRT

## 2024-05-08 VITALS
RESPIRATION RATE: 18 BRPM | SYSTOLIC BLOOD PRESSURE: 162 MMHG | DIASTOLIC BLOOD PRESSURE: 83 MMHG | OXYGEN SATURATION: 92 % | TEMPERATURE: 98 F | HEART RATE: 78 BPM

## 2024-05-08 LAB
APTT BLD: 32.5 SEC — SIGNIFICANT CHANGE UP (ref 24.5–35.6)
BASOPHILS # BLD AUTO: 0.04 K/UL — SIGNIFICANT CHANGE UP (ref 0–0.2)
BASOPHILS NFR BLD AUTO: 0.6 % — SIGNIFICANT CHANGE UP (ref 0–2)
CK SERPL-CCNC: 25 U/L — SIGNIFICANT CHANGE UP (ref 25–170)
EOSINOPHIL # BLD AUTO: 0.14 K/UL — SIGNIFICANT CHANGE UP (ref 0–0.5)
EOSINOPHIL NFR BLD AUTO: 2.1 % — SIGNIFICANT CHANGE UP (ref 0–6)
HCT VFR BLD CALC: 31.9 % — LOW (ref 34.5–45)
HGB BLD-MCNC: 10.3 G/DL — LOW (ref 11.5–15.5)
IMM GRANULOCYTES NFR BLD AUTO: 0.6 % — SIGNIFICANT CHANGE UP (ref 0–0.9)
LYMPHOCYTES # BLD AUTO: 1.56 K/UL — SIGNIFICANT CHANGE UP (ref 1–3.3)
LYMPHOCYTES # BLD AUTO: 23.5 % — SIGNIFICANT CHANGE UP (ref 13–44)
MAGNESIUM SERPL-MCNC: 2.1 MG/DL — SIGNIFICANT CHANGE UP (ref 1.6–2.6)
MCHC RBC-ENTMCNC: 30.7 PG — SIGNIFICANT CHANGE UP (ref 27–34)
MCHC RBC-ENTMCNC: 32.3 GM/DL — SIGNIFICANT CHANGE UP (ref 32–36)
MCV RBC AUTO: 94.9 FL — SIGNIFICANT CHANGE UP (ref 80–100)
MONOCYTES # BLD AUTO: 0.74 K/UL — SIGNIFICANT CHANGE UP (ref 0–0.9)
MONOCYTES NFR BLD AUTO: 11.1 % — SIGNIFICANT CHANGE UP (ref 2–14)
NEUTROPHILS # BLD AUTO: 4.13 K/UL — SIGNIFICANT CHANGE UP (ref 1.8–7.4)
NEUTROPHILS NFR BLD AUTO: 62.1 % — SIGNIFICANT CHANGE UP (ref 43–77)
PHOSPHATE SERPL-MCNC: 3.5 MG/DL — SIGNIFICANT CHANGE UP (ref 2.4–4.7)
PLATELET # BLD AUTO: 505 K/UL — HIGH (ref 150–400)
RBC # BLD: 3.36 M/UL — LOW (ref 3.8–5.2)
RBC # FLD: 13.4 % — SIGNIFICANT CHANGE UP (ref 10.3–14.5)
WBC # BLD: 6.65 K/UL — SIGNIFICANT CHANGE UP (ref 3.8–10.5)
WBC # FLD AUTO: 6.65 K/UL — SIGNIFICANT CHANGE UP (ref 3.8–10.5)

## 2024-05-08 PROCEDURE — 74160 CT ABDOMEN W/CONTRAST: CPT | Mod: MC

## 2024-05-08 PROCEDURE — 84295 ASSAY OF SERUM SODIUM: CPT

## 2024-05-08 PROCEDURE — 74183 MRI ABD W/O CNTR FLWD CNTR: CPT | Mod: 26

## 2024-05-08 PROCEDURE — 94760 N-INVAS EAR/PLS OXIMETRY 1: CPT

## 2024-05-08 PROCEDURE — 74183 MRI ABD W/O CNTR FLWD CNTR: CPT | Mod: MC

## 2024-05-08 PROCEDURE — 93005 ELECTROCARDIOGRAM TRACING: CPT

## 2024-05-08 PROCEDURE — 87070 CULTURE OTHR SPECIMN AEROBIC: CPT

## 2024-05-08 PROCEDURE — 82803 BLOOD GASES ANY COMBINATION: CPT

## 2024-05-08 PROCEDURE — C9399: CPT

## 2024-05-08 PROCEDURE — 86850 RBC ANTIBODY SCREEN: CPT

## 2024-05-08 PROCEDURE — 96375 TX/PRO/DX INJ NEW DRUG ADDON: CPT

## 2024-05-08 PROCEDURE — 96374 THER/PROPH/DIAG INJ IV PUSH: CPT

## 2024-05-08 PROCEDURE — 81001 URINALYSIS AUTO W/SCOPE: CPT

## 2024-05-08 PROCEDURE — 87040 BLOOD CULTURE FOR BACTERIA: CPT

## 2024-05-08 PROCEDURE — 82435 ASSAY OF BLOOD CHLORIDE: CPT

## 2024-05-08 PROCEDURE — 83735 ASSAY OF MAGNESIUM: CPT

## 2024-05-08 PROCEDURE — 86901 BLOOD TYPING SEROLOGIC RH(D): CPT

## 2024-05-08 PROCEDURE — 93975 VASCULAR STUDY: CPT

## 2024-05-08 PROCEDURE — 82330 ASSAY OF CALCIUM: CPT

## 2024-05-08 PROCEDURE — 82550 ASSAY OF CK (CPK): CPT

## 2024-05-08 PROCEDURE — 74177 CT ABD & PELVIS W/CONTRAST: CPT | Mod: MC

## 2024-05-08 PROCEDURE — 85018 HEMOGLOBIN: CPT

## 2024-05-08 PROCEDURE — 71045 X-RAY EXAM CHEST 1 VIEW: CPT

## 2024-05-08 PROCEDURE — S2900: CPT

## 2024-05-08 PROCEDURE — 83605 ASSAY OF LACTIC ACID: CPT

## 2024-05-08 PROCEDURE — 82947 ASSAY GLUCOSE BLOOD QUANT: CPT

## 2024-05-08 PROCEDURE — 96376 TX/PRO/DX INJ SAME DRUG ADON: CPT

## 2024-05-08 PROCEDURE — 85025 COMPLETE CBC W/AUTO DIFF WBC: CPT

## 2024-05-08 PROCEDURE — 87086 URINE CULTURE/COLONY COUNT: CPT

## 2024-05-08 PROCEDURE — 85014 HEMATOCRIT: CPT

## 2024-05-08 PROCEDURE — 99285 EMERGENCY DEPT VISIT HI MDM: CPT

## 2024-05-08 PROCEDURE — 85730 THROMBOPLASTIN TIME PARTIAL: CPT

## 2024-05-08 PROCEDURE — 87077 CULTURE AEROBIC IDENTIFY: CPT

## 2024-05-08 PROCEDURE — 85610 PROTHROMBIN TIME: CPT

## 2024-05-08 PROCEDURE — 77012 CT SCAN FOR NEEDLE BIOPSY: CPT

## 2024-05-08 PROCEDURE — 80048 BASIC METABOLIC PNL TOTAL CA: CPT

## 2024-05-08 PROCEDURE — 86900 BLOOD TYPING SEROLOGIC ABO: CPT

## 2024-05-08 PROCEDURE — 76942 ECHO GUIDE FOR BIOPSY: CPT

## 2024-05-08 PROCEDURE — 84132 ASSAY OF SERUM POTASSIUM: CPT

## 2024-05-08 PROCEDURE — 87205 SMEAR GRAM STAIN: CPT

## 2024-05-08 PROCEDURE — 94660 CPAP INITIATION&MGMT: CPT

## 2024-05-08 PROCEDURE — 84100 ASSAY OF PHOSPHORUS: CPT

## 2024-05-08 PROCEDURE — G0463: CPT

## 2024-05-08 PROCEDURE — 97163 PT EVAL HIGH COMPLEX 45 MIN: CPT

## 2024-05-08 PROCEDURE — 87338 HPYLORI STOOL AG IA: CPT

## 2024-05-08 PROCEDURE — 80053 COMPREHEN METABOLIC PANEL: CPT

## 2024-05-08 PROCEDURE — 36415 COLL VENOUS BLD VENIPUNCTURE: CPT

## 2024-05-08 RX ORDER — METHOCARBAMOL 500 MG/1
2 TABLET, FILM COATED ORAL
Refills: 0 | DISCHARGE

## 2024-05-08 RX ORDER — APIXABAN 2.5 MG/1
2 TABLET, FILM COATED ORAL
Qty: 112 | Refills: 0
Start: 2024-05-08 | End: 2024-06-04

## 2024-05-08 RX ORDER — PANTOPRAZOLE SODIUM 20 MG/1
1 TABLET, DELAYED RELEASE ORAL
Qty: 60 | Refills: 0
Start: 2024-05-08 | End: 2024-06-06

## 2024-05-08 RX ADMIN — Medication 75 MICROGRAM(S): at 07:00

## 2024-05-08 RX ADMIN — PANTOPRAZOLE SODIUM 40 MILLIGRAM(S): 20 TABLET, DELAYED RELEASE ORAL at 07:00

## 2024-05-08 RX ADMIN — APIXABAN 10 MILLIGRAM(S): 2.5 TABLET, FILM COATED ORAL at 07:00

## 2024-05-08 RX ADMIN — CARVEDILOL PHOSPHATE 12.5 MILLIGRAM(S): 80 CAPSULE, EXTENDED RELEASE ORAL at 07:00

## 2024-05-08 NOTE — PROGRESS NOTE ADULT - ATTENDING COMMENTS
Above assessment noted.  The patient was seen and examined by myself with the surgical PA and resident. The patient states overall she is uncomfortable and still has upper abdominal discomfort. She has had no flatus or BM.  She states she wishes to eat but has no bowel function at this time. Abdomen is obese with mild upper abdominal tenderness, no guarding, no rebound, incision sites are without infection or drainage.  Continue with NGT, IV hydration, mobilize OOB.  Await improved GI function.
Patient seen and examined at bedside. No acute events overnight. Denies any nausea or vomiting. Pain is well controlled. Afebrile      Pending MRI and duplex reports  DOAC pending final MRI report  Patient is surgically stable for discharge pending DOAC plan  Continue to monitor EDEL outputs
Patient seen and examined on rounds. Doing well, no complaints. Will start clears after drainage, await sensitivities.
Seen and examined during AM rounds.   Pain well controlled, no f/c.   --NPO, NGT, IVF  --MMPR  --Continue Zosyn, PPI BID  --Trend vitals, labs, I&Os  --IS, mobilize, DVT ppx

## 2024-05-08 NOTE — PROGRESS NOTE ADULT - PROVIDER SPECIALTY LIST ADULT
Surgery

## 2024-05-08 NOTE — PROGRESS NOTE ADULT - ASSESSMENT
Pt is a 67 y/o female admitted w/ a gastric perforation s/p robotic assisted josé manuel patch repair on 4/24. Pt developed intraabdominal abscesses s/p IR drainage on 5/1. CT scan done on 5/4 showed multiple collections have significantly decreased in size with the only remaining significant residual collection superior to the liver and below the diaphragm. Pigtail catheter is within the inferior right lateral aspect of this collection. CT also demonstrated a branching hypodensity within the R hepatic lobe most likely represents thrombosed portal vein. Pt waiting for MRI to confirm portal vein thrombus. Pt tolerating diet, pain controlled, having bowel fxn, WBC 7.6, afebrile    Plan:  -CT A/P performed yesterday showing suspicion for thrombosis in anterior inferior branch of R portal vein, however portal vein branches patent on arterial phase  -abdominal duplex performed, pending results  -MRI performed, f/u read  - likely transition to eliquis at home  - Drains #1 and #2 (subphrenic and prehepatic) to remain in place - home care set up for drain care  - Continue regular diet  - Pain control as needed  - SCDs for DVT ppx  - Encourage frequent IS use  - Dispo planning home w/ home care

## 2024-05-08 NOTE — PROGRESS NOTE ADULT - REASON FOR ADMISSION
perforated gastric ulcer

## 2024-05-08 NOTE — PROGRESS NOTE ADULT - SUBJECTIVE AND OBJECTIVE BOX
Patient seen and examined at bedside. No acute events overnight, pain improving.    Vitals:  Vital Signs Last 24 Hrs  T(C): 36.8 (08 May 2024 04:38), Max: 36.9 (07 May 2024 16:13)  T(F): 98.2 (08 May 2024 04:38), Max: 98.4 (07 May 2024 16:13)  HR: 82 (08 May 2024 04:38) (76 - 92)  BP: 149/82 (08 May 2024 04:38) (120/79 - 150/89)  BP(mean): --  RR: 19 (08 May 2024 04:38) (16 - 19)  SpO2: 93% (08 May 2024 04:38) (92% - 94%)    Parameters below as of 08 May 2024 04:38  Patient On (Oxygen Delivery Method): room air        Labs:  05-07    137  |  102  |  9.9  ----------------------------<  105<H>  4.0   |  26.0  |  0.76    Ca    8.1<L>      07 May 2024 02:22  Phos  3.5     05-07  Mg     2.1     05-07                              10.3   6.65  )-----------( 505      ( 08 May 2024 06:59 )             31.9       Exam:  Gen: pt lying in bed, alert, in NAD  Resp: unlabored  CVS: RRR  Abd: soft, NT, ND, RUQ drains seropurulent  Ext: moving all extremities spontaneously, sensation intact, pulses 2+

## 2024-05-13 ENCOUNTER — NON-APPOINTMENT (OUTPATIENT)
Age: 69
End: 2024-05-13

## 2024-05-13 PROBLEM — E66.01 MORBID (SEVERE) OBESITY DUE TO EXCESS CALORIES: Chronic | Status: ACTIVE | Noted: 2024-04-25

## 2024-05-13 RX ORDER — APIXABAN 5 MG/1
5 TABLET, FILM COATED ORAL
Qty: 180 | Refills: 1 | Status: ACTIVE | COMMUNITY
Start: 2024-05-13

## 2024-05-16 ENCOUNTER — APPOINTMENT (OUTPATIENT)
Dept: TRAUMA SURGERY | Facility: CLINIC | Age: 69
End: 2024-05-16
Payer: MEDICARE

## 2024-05-16 VITALS
WEIGHT: 250 LBS | DIASTOLIC BLOOD PRESSURE: 81 MMHG | SYSTOLIC BLOOD PRESSURE: 130 MMHG | HEIGHT: 61.5 IN | RESPIRATION RATE: 18 BRPM | HEART RATE: 88 BPM | TEMPERATURE: 97.5 F | BODY MASS INDEX: 46.6 KG/M2 | OXYGEN SATURATION: 96 %

## 2024-05-16 PROCEDURE — 99024 POSTOP FOLLOW-UP VISIT: CPT

## 2024-05-20 ENCOUNTER — RX RENEWAL (OUTPATIENT)
Age: 69
End: 2024-05-20

## 2024-05-20 RX ORDER — AMLODIPINE AND VALSARTAN 5; 160 MG/1; MG/1
5-160 TABLET, FILM COATED ORAL
Qty: 30 | Refills: 0 | Status: ACTIVE | COMMUNITY
Start: 2020-06-22 | End: 1900-01-01

## 2024-05-23 ENCOUNTER — APPOINTMENT (OUTPATIENT)
Dept: CT IMAGING | Facility: CLINIC | Age: 69
End: 2024-05-23
Payer: MEDICARE

## 2024-05-23 ENCOUNTER — OUTPATIENT (OUTPATIENT)
Dept: OUTPATIENT SERVICES | Facility: HOSPITAL | Age: 69
LOS: 1 days | End: 2024-05-23
Payer: MEDICARE

## 2024-05-23 ENCOUNTER — APPOINTMENT (OUTPATIENT)
Dept: ORTHOPEDIC SURGERY | Facility: CLINIC | Age: 69
End: 2024-05-23

## 2024-05-23 DIAGNOSIS — Z98.890 OTHER SPECIFIED POSTPROCEDURAL STATES: Chronic | ICD-10-CM

## 2024-05-23 DIAGNOSIS — K25.5 CHRONIC OR UNSPECIFIED GASTRIC ULCER WITH PERFORATION: ICD-10-CM

## 2024-05-23 DIAGNOSIS — Z98.891 HISTORY OF UTERINE SCAR FROM PREVIOUS SURGERY: Chronic | ICD-10-CM

## 2024-05-23 DIAGNOSIS — Z98.49 CATARACT EXTRACTION STATUS, UNSPECIFIED EYE: Chronic | ICD-10-CM

## 2024-05-23 PROCEDURE — 74177 CT ABD & PELVIS W/CONTRAST: CPT | Mod: 26,MH

## 2024-05-23 PROCEDURE — 74177 CT ABD & PELVIS W/CONTRAST: CPT

## 2024-05-24 ENCOUNTER — APPOINTMENT (OUTPATIENT)
Dept: FAMILY MEDICINE | Facility: CLINIC | Age: 69
End: 2024-05-24
Payer: MEDICARE

## 2024-05-24 VITALS
HEIGHT: 61.5 IN | DIASTOLIC BLOOD PRESSURE: 68 MMHG | OXYGEN SATURATION: 95 % | HEART RATE: 94 BPM | WEIGHT: 245.8 LBS | BODY MASS INDEX: 45.81 KG/M2 | SYSTOLIC BLOOD PRESSURE: 132 MMHG | TEMPERATURE: 97.5 F

## 2024-05-24 DIAGNOSIS — K65.1 PERITONEAL ABSCESS: ICD-10-CM

## 2024-05-24 DIAGNOSIS — D50.0 IRON DEFICIENCY ANEMIA SECONDARY TO BLOOD LOSS (CHRONIC): ICD-10-CM

## 2024-05-24 PROCEDURE — 36415 COLL VENOUS BLD VENIPUNCTURE: CPT

## 2024-05-24 PROCEDURE — 99495 TRANSJ CARE MGMT MOD F2F 14D: CPT

## 2024-05-24 RX ORDER — DICLOFENAC SODIUM 1% 10 MG/G
1 GEL TOPICAL
Qty: 2 | Refills: 3 | Status: DISCONTINUED | COMMUNITY
Start: 2024-01-08 | End: 2024-05-24

## 2024-05-24 RX ORDER — NAPROXEN 375 MG/1
375 TABLET ORAL
Qty: 60 | Refills: 3 | Status: DISCONTINUED | COMMUNITY
Start: 2023-10-17 | End: 2024-05-24

## 2024-05-24 RX ORDER — PANTOPRAZOLE 40 MG/1
40 TABLET, DELAYED RELEASE ORAL DAILY
Qty: 90 | Refills: 3 | Status: ACTIVE | COMMUNITY
Start: 2024-05-24 | End: 1900-01-01

## 2024-05-24 NOTE — PLAN
[FreeTextEntry1] : Pt recovering nicely from gastric perforation. Abdominal abscess is slowly resolving. Due for surveillance of CBC. Continue Eliquis due to portal vein thrombosis. Labs drawn in office. F/U 1 month.    Pt encounter incorporated clinical review of the medical record including hospital records, review of lab and diagnostic testing with interpretation and discussion of results with patient, general pt counseling, and coordination of care, as well as documentation update within the electronic medical record.  Time spent: 45 mins.

## 2024-05-24 NOTE — HISTORY OF PRESENT ILLNESS
[Post-hospitalization from ___ Hospital] : Post-hospitalization from [unfilled] Hospital [Admitted on: ___] : The patient was admitted on [unfilled] [Discharged on ___] : discharged on [unfilled] [FreeTextEntry2] : The patient is a 68-year-old female with a past medical history of hypertension, hyperlipidemia, generalized osteoarthritis who presented with acute onset of right upper quadrant pain and sent to ER for evaluation found to have perforated gastric ulcer.  The patient underwent emergency robotic surgery.  Hospital course was complicated by intra-abdominal abscess requiring placement of 3 drains as well as a thrombosed portal vein. She is currently on Eliquis.  She has followed up with her surgeon as outpatient and last remaining drain was removed. Pt had a CT of the abdomen / pelvis yesterday to surveil intra-abdominal abscess. She denies any abdominal pain, fever, or sob. She does have easy fatiguability. Hgb on d/c was 10. She is also experiencing mild laryngitis and infrequent cough productive of scant clear sputum since her surgery.

## 2024-05-24 NOTE — PHYSICAL EXAM
[No Acute Distress] : no acute distress [Normal Sclera/Conjunctiva] : normal sclera/conjunctiva [Normal Oropharynx] : the oropharynx was normal [No Lymphadenopathy] : no lymphadenopathy [Supple] : supple [No Respiratory Distress] : no respiratory distress  [Clear to Auscultation] : lungs were clear to auscultation bilaterally [Rounded] : rounded [Soft, Nontender] : the abdomen was soft and nontender [Tapia's] : a negative Tapia's sign [No Mass] : no masses were palpated [No HSM] : no hepatosplenomegaly noted [None] : no CVA tenderness [No Focal Deficits] : no focal deficits [Normal] : affect was normal and insight and judgment were intact [de-identified] : Mild edema of the LEs b/l.

## 2024-05-24 NOTE — RESULTS/DATA
[TextEntry] : EXAM: [id number] - CT ABDOMEN AND PELVIS IC - ORDERED BY: OMARI JO   PROCEDURE DATE: 05/23/2024    INTERPRETATION: CLINICAL INFORMATION: Assess intra-abdominal collection/abscess. History of perforated gastric ulcer.  COMPARISON: CT dated 05/07/2024, MRI dated 05/08/2024.  CONTRAST/COMPLICATIONS: IV Contrast: Isovue 370 90 cc administered 10 cc discarded Oral Contrast: Omnipaque 300 Complications: None reported at time of study completion  PROCEDURE: CT of the Abdomen and Pelvis was performed. Sagittal and coronal reformats were performed.  FINDINGS: LOWER CHEST: Mild to moderate right pleural effusion and associated compression atelectasis involving the right lung base. Atherosclerotic calcification including the coronary arteries  LIVER: Stable 4 left hepatic cysts measuring up to 3 cm. Linear hypodense focus involving the right hepatic lobe, concerning for thrombosis involving the anterior division of right portal vein is again noted although less pronounced in comparison to the prior CT. Stable coarse calcification subdiaphragmatic right lobe. BILE DUCTS: Normal caliber. GALLBLADDER: Cholelithiasis. SPLEEN: Within normal limits. PANCREAS: Within normal limits. ADRENALS: Within normal limits. KIDNEYS/URETERS: Small right renal cyst. 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  BLADDER: Minimally distended. REPRODUCTIVE ORGANS: Approximately 3.6 cm sized subserosal fibroid posteriorly and to the left.  BOWEL: No bowel obstruction. Appendix is not visualized. No evidence of inflammation in the pericecal region. Submucosal fat involving the cecum and proximal ascending colon may be a normal variation or related to chronic inflammation. PERITONEUM: No ascites. Discontinuation of perihepatic drains. Anterior perihepatic collection demonstrates near complete resolution with residual stranding at this time. A peripherally enhancing collection between the dome of the liver and the right hemidiaphragm is still evident measuring approximately 6 x 1.7 cm (900:95), previously 7.8 x 2.2 cm. VESSELS: Within normal limits. 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 RETROPERITONEUM/LYMPH NODES: No lymphadenopathy. ABDOMINAL WALL: Postsurgical changes. BONES: Degenerative changes. Severe degenerative arthritis involving the right hip joint.  IMPRESSION: Interval near complete resolution of anteriorly located perihepatic collection. Subdiaphragmatic collection persists but is smaller as described above.  Additional findings as above.    --- End of Report ---       EDITH MCCRAY MD; Attending Radiologist This document has been electronically signed. May 23 202

## 2024-05-28 LAB
ALBUMIN SERPL ELPH-MCNC: 3.9 G/DL
ALP BLD-CCNC: 94 U/L
ALT SERPL-CCNC: 15 U/L
ANION GAP SERPL CALC-SCNC: 12 MMOL/L
AST SERPL-CCNC: 18 U/L
BASOPHILS # BLD AUTO: 0.04 K/UL
BASOPHILS NFR BLD AUTO: 0.7 %
BILIRUB SERPL-MCNC: 0.4 MG/DL
BUN SERPL-MCNC: 15 MG/DL
CALCIUM SERPL-MCNC: 9.4 MG/DL
CHLORIDE SERPL-SCNC: 103 MMOL/L
CO2 SERPL-SCNC: 25 MMOL/L
CREAT SERPL-MCNC: 1.04 MG/DL
EGFR: 58 ML/MIN/1.73M2
EOSINOPHIL # BLD AUTO: 0.24 K/UL
EOSINOPHIL NFR BLD AUTO: 4.3 %
FERRITIN SERPL-MCNC: 189 NG/ML
GLUCOSE SERPL-MCNC: 115 MG/DL
HCT VFR BLD CALC: 37.1 %
HGB BLD-MCNC: 11.4 G/DL
IMM GRANULOCYTES NFR BLD AUTO: 0.2 %
IRON SATN MFR SERPL: 19 %
IRON SERPL-MCNC: 57 UG/DL
LYMPHOCYTES # BLD AUTO: 1.84 K/UL
LYMPHOCYTES NFR BLD AUTO: 33 %
MAN DIFF?: NORMAL
MCHC RBC-ENTMCNC: 30.5 PG
MCHC RBC-ENTMCNC: 30.7 GM/DL
MCV RBC AUTO: 99.2 FL
MONOCYTES # BLD AUTO: 0.43 K/UL
MONOCYTES NFR BLD AUTO: 7.7 %
NEUTROPHILS # BLD AUTO: 3.01 K/UL
NEUTROPHILS NFR BLD AUTO: 54.1 %
PLATELET # BLD AUTO: 326 K/UL
POTASSIUM SERPL-SCNC: 4.2 MMOL/L
PROT SERPL-MCNC: 7.3 G/DL
RBC # BLD: 3.74 M/UL
RBC # BLD: 3.74 M/UL
RBC # FLD: 14.2 %
RETICS # AUTO: 3.5 %
RETICS AGGREG/RBC NFR: 129.8 K/UL
SODIUM SERPL-SCNC: 139 MMOL/L
TIBC SERPL-MCNC: 297 UG/DL
TRANSFERRIN SERPL-MCNC: 242 MG/DL
UIBC SERPL-MCNC: 240 UG/DL
WBC # FLD AUTO: 5.57 K/UL

## 2024-05-30 ENCOUNTER — APPOINTMENT (OUTPATIENT)
Dept: TRAUMA SURGERY | Facility: CLINIC | Age: 69
End: 2024-05-30
Payer: MEDICARE

## 2024-05-30 VITALS
HEART RATE: 86 BPM | DIASTOLIC BLOOD PRESSURE: 77 MMHG | RESPIRATION RATE: 18 BRPM | SYSTOLIC BLOOD PRESSURE: 118 MMHG | BODY MASS INDEX: 47.24 KG/M2 | TEMPERATURE: 98.1 F | HEIGHT: 60.51 IN | OXYGEN SATURATION: 97 % | WEIGHT: 247 LBS

## 2024-05-30 PROCEDURE — 99024 POSTOP FOLLOW-UP VISIT: CPT

## 2024-05-30 NOTE — HISTORY OF PRESENT ILLNESS
[de-identified] : s/p RA Angel patch complicated by PV thrombus and perihepatic collections s/p IR drainage (05/01/24). She originally had 3 drains, one dc'ed on 05/05 and the other 2 were dc'ed on her last clinic visit [de-identified] : Patient is recovering well at home. Denies any abdominal pain. Denies any nausea or vomiting. States her hip has been more painful than usual. Patient was scheduled for hip replacement surgery that is now postponed due to her recent hospital course.

## 2024-05-30 NOTE — PHYSICAL EXAM
[de-identified] : NAD [de-identified] : no acute respiratory distress [de-identified] : soft, non distended, non tender to palpation, incision sites and drain sites are C/D/I, small nodular mass felt in the left mid abdomen, non tender possibly hematoma

## 2024-05-30 NOTE — PLAN
[FreeTextEntry1] : Continue conservative management at this time Patient to follow up with her ortho team for her hip pain Repeat CT scan in 3 weeks to assess for residual fluid collection RTC after CT scan to reassess the fluid collection

## 2024-05-30 NOTE — ASSESSMENT
[FreeTextEntry1] : 68 year old female s/p RA Angel patch complicated by PV thrombus and perihepatic collections s/p IR drain placement, now with all drains dc'ed, healing well

## 2024-06-04 ENCOUNTER — APPOINTMENT (OUTPATIENT)
Dept: ORTHOPEDIC SURGERY | Facility: CLINIC | Age: 69
End: 2024-06-04
Payer: MEDICARE

## 2024-06-04 VITALS
BODY MASS INDEX: 48.49 KG/M2 | HEIGHT: 60 IN | SYSTOLIC BLOOD PRESSURE: 134 MMHG | DIASTOLIC BLOOD PRESSURE: 83 MMHG | HEART RATE: 89 BPM | WEIGHT: 247 LBS

## 2024-06-04 PROCEDURE — 99213 OFFICE O/P EST LOW 20 MIN: CPT

## 2024-06-04 PROCEDURE — G2211 COMPLEX E/M VISIT ADD ON: CPT

## 2024-06-04 NOTE — HISTORY OF PRESENT ILLNESS
[de-identified] : Patient is a 68-year-old female here today for follow-up of her severe right hip osteoarthritis.  She was scheduled undergo total hip arthroplasty however she unfortunately developed a perforated ulcer as well as intra-abdominal abscesses.  She was hospitalized for this.  Had drains.  States they removed 2 weeks ago.  States that she is now Resolving abscesses in her abdomen.  Her most recent CAT scan showed the presence of abscesses still.  States that her pain is much improved in her abdomen.  Denies any fevers chills or constitutional symptoms.  However she states that her hip is extremely painful.  Has difficulty going up and down stairs rising to seated position.  Is ambulate with a cane.  Is here today to discuss further treatment options and hopefully to proceed with her total hip arthroplasty in the near future

## 2024-06-04 NOTE — DISCUSSION/SUMMARY
[Medication Risks Reviewed] : Medication risks reviewed [Surgical risks reviewed] : Surgical risks reviewed [de-identified] : Patient is a 68-year-old female here today for follow-up of her severe right hip osteoarthritis.  I thorough discussion with her about conservative surgical treatment options.  At this time she is not a surgical candidate due to her active infection in her abdomen.  Her CT scan shows the presence of abscesses.  I have given her referral over to the infectious disease service that she has not seen someone for possible antibiotic coverage.  I recommended an ESR CRP.  She states she is scheduled undergo repeat CT scan in the coming weeks.  After that is done she will follow-up with her surgeon.  After complete resolution of her infection we discussed that we could then proceed with a total hip arthroplasty.  Patient understands and agreement.  She will continue with her low impact activity and exercise.  I will see her back in 6 weeks for repeat evaluation.  All questions were asked and answered

## 2024-06-04 NOTE — PHYSICAL EXAM
[de-identified] :  Ambulates with a cane. Right hip exam showed severe groin pain with SLR, ROM is full flexion with pain at approximately 40 degrees of external rotation and 0 degrees of internal rotation, EDDIE positive, FADIR positive. 5/5 motor strength in bilateral lower extremities. Sensory: Intact in bilateral lower extremities. DTRs: Biceps, brachioradialis, triceps, patellar, ankle and plantar 2+ and symmetric bilaterally. Pulses: dorsalis pedis, posterior tibial, femoral, popliteal, and radial 2+ and symmetric bilaterally.

## 2024-06-10 ENCOUNTER — RESULT REVIEW (OUTPATIENT)
Age: 69
End: 2024-06-10

## 2024-06-10 ENCOUNTER — APPOINTMENT (OUTPATIENT)
Dept: MAMMOGRAPHY | Facility: CLINIC | Age: 69
End: 2024-06-10
Payer: MEDICARE

## 2024-06-10 ENCOUNTER — OUTPATIENT (OUTPATIENT)
Dept: OUTPATIENT SERVICES | Facility: HOSPITAL | Age: 69
LOS: 1 days | End: 2024-06-10
Payer: MEDICARE

## 2024-06-10 DIAGNOSIS — Z98.891 HISTORY OF UTERINE SCAR FROM PREVIOUS SURGERY: Chronic | ICD-10-CM

## 2024-06-10 DIAGNOSIS — Z12.31 ENCOUNTER FOR SCREENING MAMMOGRAM FOR MALIGNANT NEOPLASM OF BREAST: ICD-10-CM

## 2024-06-10 DIAGNOSIS — Z98.890 OTHER SPECIFIED POSTPROCEDURAL STATES: Chronic | ICD-10-CM

## 2024-06-10 PROCEDURE — 77063 BREAST TOMOSYNTHESIS BI: CPT | Mod: 26

## 2024-06-10 PROCEDURE — 77063 BREAST TOMOSYNTHESIS BI: CPT

## 2024-06-10 PROCEDURE — 77067 SCR MAMMO BI INCL CAD: CPT | Mod: 26

## 2024-06-10 PROCEDURE — 77067 SCR MAMMO BI INCL CAD: CPT

## 2024-06-13 ENCOUNTER — APPOINTMENT (OUTPATIENT)
Dept: ORTHOPEDIC SURGERY | Facility: CLINIC | Age: 69
End: 2024-06-13

## 2024-06-17 NOTE — H&P PST ADULT - HEIGHT IN CM
Chief Complaint   Patient presents with    Medication Refill       SUBJECTIVE:   56 y.o. female for annual routine checkup.    Current Outpatient Medications   Medication Sig Dispense Refill    aspirin (ECOTRIN) 81 MG EC tablet Take 81 mg by mouth once daily.      diazePAM (VALIUM) 5 MG tablet TAKE 1 TABLET(S) BY MOUTH EVERY 8 HOURS AS NEEDED. MAY CAUSE DROWSINESS      fluorouraciL (EFUDEX) 5 % cream Use hs for 2 weeks 40 g 3    ibuprofen (ADVIL,MOTRIN) 200 MG tablet Take 200 mg by mouth every 6 (six) hours as needed for Pain.      naloxone (NARCAN) 4 mg/actuation Spry 4mg by nasal route as needed for opioid overdose; may repeat every 2-3 minutes in alternating nostrils until medical help arrives. Call 911 1 each 11    nitroGLYCERIN (NITROSTAT) 0.4 MG SL tablet Place 1 tablet (0.4 mg total) under the tongue every 5 (five) minutes as needed for Chest pain. 90 tablet 3    ondansetron (ZOFRAN) 4 MG tablet Take 1 tablet (4 mg total) by mouth every 6 (six) hours as needed for Nausea. 20 tablet 0    ALPRAZolam (XANAX) 2 MG Tab TAKE 1 TABLET BY MOUTH AT BEDTIME AS NEEDED *MAY CAUSE DROWSINESS 25 tablet 2    atorvastatin (LIPITOR) 40 MG tablet Take 1 tablet (40 mg total) by mouth every evening. (Patient not taking: Reported on 6/17/2024) 90 tablet 3    EScitalopram oxalate (LEXAPRO) 10 MG tablet Take 1 tablet (10 mg total) by mouth every evening. 90 tablet 3    metoprolol tartrate (LOPRESSOR) 25 MG tablet TAKE 1 TABLET BY MOUTH TWICE A  tablet 3    traMADoL (ULTRAM) 50 mg tablet TAKE 1 TABLET(S) BY MOUTH EVERY 4 HOURS WHILE AWAKE AND TAKE 2 TABLET(S) AT BEDTIME Strength: 50 mg 150 tablet 2     No current facility-administered medications for this visit.     Allergies: Erythromycin; Adhesive tape-silicones; Latex, natural rubber; Cetylpyridinium-benzocaine; and Morphine   Patient's last menstrual period was 10/11/2017.    ROS:  Feeling well. No dyspnea or chest pain on exertion.  No abdominal pain, change in bowel  "habits, black or bloody stools.  No urinary tract symptoms. GYN ROS: no new issues.. No neurological complaints.    OBJECTIVE:   The patient appears well, alert, oriented x 3, in no distress.  /68   Pulse 64   Temp 98.2 °F (36.8 °C) (Oral)   Ht 5' 4" (1.626 m)   Wt 73 kg (160 lb 15 oz)   LMP 10/11/2017   SpO2 98%   BMI 27.62 kg/m²   Wt Readings from Last 5 Encounters:   06/17/24 73 kg (160 lb 15 oz)   05/04/23 76 kg (167 lb 8.8 oz)   10/14/21 78 kg (172 lb)   07/29/21 79.8 kg (176 lb)   06/10/21 80 kg (176 lb 5.9 oz)       ENT normal.  Neck supple. No adenopathy or thyromegaly. GERMAN. Lungs are clear, good air entry, no wheezes, rhonchi or rales. S1 and S2 normal, no murmurs, regular rate and rhythm. Abdomen soft without tenderness, guarding, mass or organomegaly. Extremities show no edema, normal peripheral pulses. Neurological is normal, no focal findings.  She has healed from breast and abdominal and the thigh with scarring and done well    BREAST EXAM: deferred    PELVIC EXAM: deferred    ASSESSMENT:   1. Annual physical exam    2. Fibromyalgia    3. Essential hypertension    4. Anxiety    5. Cancer associated pain          PLAN:   Counseled on age appropriate medical preventative services, including age appropriate cancer screenings, over all nutritional health, need for a consistent exercise regimen and an over all push towards maintaining a vigorous and active lifestyle.  Counseled on age appropriate vaccines and discussed upcoming health care needs based on age/gender.  Spent time with patient counseling on need for a good patient/doctor relationship moving forward.  Discussed use of common OTC medications and supplements.  Discussed common dietary aids and use of caffeine and the need for good sleep hygiene and stress management.    Problem List Items Addressed This Visit       Fibromyalgia (Chronic)    Overview     4/17 start norvasc 2.5, increase monthly by 2.5 to 10 mg.         Relevant " Medications    traMADoL (ULTRAM) 50 mg tablet    EScitalopram oxalate (LEXAPRO) 10 MG tablet    Essential hypertension (Chronic)    Relevant Medications    metoprolol tartrate (LOPRESSOR) 25 MG tablet    Anxiety    Relevant Medications    ALPRAZolam (XANAX) 2 MG Tab    EScitalopram oxalate (LEXAPRO) 10 MG tablet     Other Visit Diagnoses       Annual physical exam    -  Primary    Cancer associated pain        Relevant Medications    traMADoL (ULTRAM) 50 mg tablet    EScitalopram oxalate (LEXAPRO) 10 MG tablet            F/u in 1 year for wellness     170.18

## 2024-06-19 ENCOUNTER — APPOINTMENT (OUTPATIENT)
Dept: FAMILY MEDICINE | Facility: CLINIC | Age: 69
End: 2024-06-19
Payer: MEDICARE

## 2024-06-19 VITALS
HEIGHT: 60 IN | WEIGHT: 244 LBS | OXYGEN SATURATION: 95 % | HEART RATE: 72 BPM | SYSTOLIC BLOOD PRESSURE: 122 MMHG | BODY MASS INDEX: 47.91 KG/M2 | DIASTOLIC BLOOD PRESSURE: 74 MMHG | RESPIRATION RATE: 16 BRPM

## 2024-06-19 VITALS
SYSTOLIC BLOOD PRESSURE: 122 MMHG | DIASTOLIC BLOOD PRESSURE: 74 MMHG | HEART RATE: 72 BPM | OXYGEN SATURATION: 95 % | RESPIRATION RATE: 16 BRPM | HEIGHT: 60 IN | WEIGHT: 244 LBS | BODY MASS INDEX: 47.91 KG/M2

## 2024-06-19 DIAGNOSIS — N76.3 SUBACUTE AND CHRONIC VULVITIS: ICD-10-CM

## 2024-06-19 DIAGNOSIS — E03.9 HYPOTHYROIDISM, UNSPECIFIED: ICD-10-CM

## 2024-06-19 DIAGNOSIS — M16.11 UNILATERAL PRIMARY OSTEOARTHRITIS, RIGHT HIP: ICD-10-CM

## 2024-06-19 DIAGNOSIS — D25.9 LEIOMYOMA OF UTERUS, UNSPECIFIED: ICD-10-CM

## 2024-06-19 DIAGNOSIS — K25.5 CHRONIC OR UNSPECIFIED GASTRIC ULCER WITH PERFORATION: ICD-10-CM

## 2024-06-19 DIAGNOSIS — I81 PORTAL VEIN THROMBOSIS: ICD-10-CM

## 2024-06-19 DIAGNOSIS — K65.1 PERITONEAL ABSCESS: ICD-10-CM

## 2024-06-19 DIAGNOSIS — I10 ESSENTIAL (PRIMARY) HYPERTENSION: ICD-10-CM

## 2024-06-19 PROCEDURE — 36415 COLL VENOUS BLD VENIPUNCTURE: CPT

## 2024-06-19 PROCEDURE — 99214 OFFICE O/P EST MOD 30 MIN: CPT

## 2024-06-19 PROCEDURE — G2211 COMPLEX E/M VISIT ADD ON: CPT

## 2024-06-19 RX ORDER — APIXABAN 5 MG/1
5 TABLET, FILM COATED ORAL
Qty: 180 | Refills: 0 | Status: ACTIVE | COMMUNITY
Start: 2024-05-24 | End: 1900-01-01

## 2024-06-21 LAB
CRP SERPL-MCNC: 5 MG/L
ERYTHROCYTE [SEDIMENTATION RATE] IN BLOOD BY WESTERGREN METHOD: 52 MM/HR

## 2024-06-23 PROBLEM — N76.3 CHRONIC VULVITIS: Status: ACTIVE | Noted: 2023-10-31

## 2024-06-23 PROBLEM — D25.9 UTERINE FIBROID: Status: ACTIVE | Noted: 2017-04-12

## 2024-06-23 PROBLEM — M16.11 OSTEOARTHRITIS OF RIGHT HIP: Status: ACTIVE | Noted: 2024-02-09

## 2024-06-23 PROBLEM — K65.1 INTRA-ABDOMINAL ABSCESS: Status: ACTIVE | Noted: 2024-05-24

## 2024-06-23 NOTE — HISTORY OF PRESENT ILLNESS
[FreeTextEntry1] : Pt is following up from a perforated gastric ulcer. She is feeling better. Strength is improving. She is due to have CT abdomen in 5 days. She denies abdominal pain. Still with right hip pain. Using a cane for ambulation. She will be following with ID next week as well. Being followed for peritoneal fluid collection. CT abdomen and pelvis 1 month ago showed fluid had diminished. Pt also has portal vein thrombosis and is tolerating the Eliquis.

## 2024-06-23 NOTE — PHYSICAL EXAM
[No Acute Distress] : no acute distress [Normal Sclera/Conjunctiva] : normal sclera/conjunctiva [Normal Oropharynx] : the oropharynx was normal [No Lymphadenopathy] : no lymphadenopathy [Supple] : supple [No Respiratory Distress] : no respiratory distress  [Clear to Auscultation] : lungs were clear to auscultation bilaterally [Rounded] : rounded [Soft, Nontender] : the abdomen was soft and nontender [No Mass] : no masses were palpated [No HSM] : no hepatosplenomegaly noted [None] : no CVA tenderness [No Focal Deficits] : no focal deficits [Normal] : affect was normal and insight and judgment were intact [Tapia's] : a negative Tapia's sign [de-identified] : Mild edema of the LEs b/l.

## 2024-06-24 ENCOUNTER — APPOINTMENT (OUTPATIENT)
Dept: CT IMAGING | Facility: CLINIC | Age: 69
End: 2024-06-24
Payer: MEDICARE

## 2024-06-24 ENCOUNTER — OUTPATIENT (OUTPATIENT)
Dept: OUTPATIENT SERVICES | Facility: HOSPITAL | Age: 69
LOS: 1 days | End: 2024-06-24
Payer: MEDICARE

## 2024-06-24 DIAGNOSIS — R10.11 RIGHT UPPER QUADRANT PAIN: ICD-10-CM

## 2024-06-24 DIAGNOSIS — Z98.890 OTHER SPECIFIED POSTPROCEDURAL STATES: Chronic | ICD-10-CM

## 2024-06-24 DIAGNOSIS — T81.43XA INFECTION FOLLOWING A PROCEDURE, ORGAN AND SPACE SURGICAL SITE, INITIAL ENCOUNTER: ICD-10-CM

## 2024-06-24 PROCEDURE — 74177 CT ABD & PELVIS W/CONTRAST: CPT | Mod: 26,MH

## 2024-06-24 PROCEDURE — 74177 CT ABD & PELVIS W/CONTRAST: CPT

## 2024-06-28 LAB
ALBUMIN SERPL ELPH-MCNC: 3.9 G/DL
ALP BLD-CCNC: 90 U/L
ALT SERPL-CCNC: 12 U/L
ANION GAP SERPL CALC-SCNC: 13 MMOL/L
AST SERPL-CCNC: 20 U/L
BASOPHILS # BLD AUTO: 0.04 K/UL
BASOPHILS NFR BLD AUTO: 0.6 %
BILIRUB SERPL-MCNC: 0.4 MG/DL
BUN SERPL-MCNC: 13 MG/DL
CALCIUM SERPL-MCNC: 9.8 MG/DL
CHLORIDE SERPL-SCNC: 104 MMOL/L
CO2 SERPL-SCNC: 25 MMOL/L
CREAT SERPL-MCNC: 1 MG/DL
EGFR: 61 ML/MIN/1.73M2
EOSINOPHIL # BLD AUTO: 0.29 K/UL
EOSINOPHIL NFR BLD AUTO: 4.4 %
FERRITIN SERPL-MCNC: 186 NG/ML
GLUCOSE SERPL-MCNC: 104 MG/DL
HCT VFR BLD CALC: 35.5 %
HGB BLD-MCNC: 10.9 G/DL
IMM GRANULOCYTES NFR BLD AUTO: 0.3 %
IRON SATN MFR SERPL: 16 %
IRON SERPL-MCNC: 45 UG/DL
LYMPHOCYTES # BLD AUTO: 1.6 K/UL
LYMPHOCYTES NFR BLD AUTO: 24.2 %
MAN DIFF?: NORMAL
MCHC RBC-ENTMCNC: 29.5 PG
MCHC RBC-ENTMCNC: 30.7 GM/DL
MCV RBC AUTO: 96.2 FL
MONOCYTES # BLD AUTO: 0.42 K/UL
MONOCYTES NFR BLD AUTO: 6.3 %
NEUTROPHILS # BLD AUTO: 4.25 K/UL
NEUTROPHILS NFR BLD AUTO: 64.2 %
PLATELET # BLD AUTO: 340 K/UL
POTASSIUM SERPL-SCNC: 4.4 MMOL/L
PROT SERPL-MCNC: 7.2 G/DL
RBC # BLD: 3.69 M/UL
RBC # FLD: 14.2 %
SODIUM SERPL-SCNC: 142 MMOL/L
T4 FREE SERPL-MCNC: 1.7 NG/DL
TIBC SERPL-MCNC: 282 UG/DL
TRANSFERRIN SERPL-MCNC: 219 MG/DL
TSH SERPL-ACNC: 1.16 UIU/ML
UIBC SERPL-MCNC: 237 UG/DL
WBC # FLD AUTO: 6.62 K/UL

## 2024-07-02 ENCOUNTER — APPOINTMENT (OUTPATIENT)
Dept: TRAUMA SURGERY | Facility: CLINIC | Age: 69
End: 2024-07-02
Payer: MEDICARE

## 2024-07-02 ENCOUNTER — APPOINTMENT (OUTPATIENT)
Dept: ORTHOPEDIC SURGERY | Facility: CLINIC | Age: 69
End: 2024-07-02
Payer: MEDICARE

## 2024-07-02 VITALS
HEIGHT: 60 IN | TEMPERATURE: 98 F | BODY MASS INDEX: 48.33 KG/M2 | DIASTOLIC BLOOD PRESSURE: 77 MMHG | HEART RATE: 69 BPM | WEIGHT: 246.2 LBS | OXYGEN SATURATION: 97 % | RESPIRATION RATE: 16 BRPM | SYSTOLIC BLOOD PRESSURE: 132 MMHG

## 2024-07-02 VITALS
SYSTOLIC BLOOD PRESSURE: 129 MMHG | DIASTOLIC BLOOD PRESSURE: 81 MMHG | WEIGHT: 246 LBS | BODY MASS INDEX: 48.29 KG/M2 | HEIGHT: 60 IN | HEART RATE: 65 BPM

## 2024-07-02 PROBLEM — M16.11 ARTHRITIS OF RIGHT HIP: Status: ACTIVE | Noted: 2023-09-20

## 2024-07-02 PROBLEM — T81.43XA ABSCESS, INTRA-ABDOMINAL, POSTOPERATIVE: Status: ACTIVE | Noted: 2024-05-24

## 2024-07-02 PROCEDURE — G2211 COMPLEX E/M VISIT ADD ON: CPT

## 2024-07-02 PROCEDURE — 99213 OFFICE O/P EST LOW 20 MIN: CPT

## 2024-07-02 PROCEDURE — 99024 POSTOP FOLLOW-UP VISIT: CPT

## 2024-07-08 ENCOUNTER — APPOINTMENT (OUTPATIENT)
Dept: INTERNAL MEDICINE | Facility: CLINIC | Age: 69
End: 2024-07-08
Payer: MEDICARE

## 2024-07-08 VITALS
WEIGHT: 246 LBS | DIASTOLIC BLOOD PRESSURE: 70 MMHG | HEIGHT: 60 IN | BODY MASS INDEX: 48.29 KG/M2 | SYSTOLIC BLOOD PRESSURE: 150 MMHG

## 2024-07-08 DIAGNOSIS — Z01.818 ENCOUNTER FOR OTHER PREPROCEDURAL EXAMINATION: ICD-10-CM

## 2024-07-08 PROCEDURE — 99205 OFFICE O/P NEW HI 60 MIN: CPT

## 2024-07-09 PROBLEM — Z01.818 PREOPERATIVE CLEARANCE: Status: ACTIVE | Noted: 2024-07-09

## 2024-07-11 NOTE — REASON FOR VISIT
Patient discharged to private vehicle via wheelchair in stable condition.    [Follow-Up] : a follow-up visit [Shortness of Breath] : shortness of Breath [Pulmonary Hypertension] : pulmonary hypertension [Sleep Apnea] : sleep apnea [FreeTextEntry2] : obesity, restriction on PFTs

## 2024-07-17 ENCOUNTER — OUTPATIENT (OUTPATIENT)
Dept: OUTPATIENT SERVICES | Facility: HOSPITAL | Age: 69
LOS: 1 days | End: 2024-07-17
Payer: MEDICARE

## 2024-07-17 ENCOUNTER — APPOINTMENT (OUTPATIENT)
Dept: ULTRASOUND IMAGING | Facility: CLINIC | Age: 69
End: 2024-07-17
Payer: MEDICARE

## 2024-07-17 ENCOUNTER — APPOINTMENT (OUTPATIENT)
Dept: FAMILY MEDICINE | Facility: CLINIC | Age: 69
End: 2024-07-17
Payer: MEDICARE

## 2024-07-17 VITALS
RESPIRATION RATE: 17 BRPM | HEIGHT: 60 IN | WEIGHT: 244.2 LBS | OXYGEN SATURATION: 98 % | SYSTOLIC BLOOD PRESSURE: 126 MMHG | TEMPERATURE: 98.6 F | DIASTOLIC BLOOD PRESSURE: 80 MMHG | HEART RATE: 74 BPM | BODY MASS INDEX: 47.94 KG/M2

## 2024-07-17 DIAGNOSIS — R60.0 LOCALIZED EDEMA: ICD-10-CM

## 2024-07-17 DIAGNOSIS — J30.89 OTHER ALLERGIC RHINITIS: ICD-10-CM

## 2024-07-17 DIAGNOSIS — T81.43XA INFECTION FOLLOWING A PROCEDURE, ORGAN AND SPACE SURGICAL SITE, INITIAL ENCTR: ICD-10-CM

## 2024-07-17 DIAGNOSIS — D50.0 IRON DEFICIENCY ANEMIA SECONDARY TO BLOOD LOSS (CHRONIC): ICD-10-CM

## 2024-07-17 DIAGNOSIS — E03.9 HYPOTHYROIDISM, UNSPECIFIED: ICD-10-CM

## 2024-07-17 DIAGNOSIS — K25.5 CHRONIC OR UNSPECIFIED GASTRIC ULCER WITH PERFORATION: ICD-10-CM

## 2024-07-17 DIAGNOSIS — E78.5 HYPERLIPIDEMIA, UNSPECIFIED: ICD-10-CM

## 2024-07-17 DIAGNOSIS — I81 PORTAL VEIN THROMBOSIS: ICD-10-CM

## 2024-07-17 DIAGNOSIS — I10 ESSENTIAL (PRIMARY) HYPERTENSION: ICD-10-CM

## 2024-07-17 PROCEDURE — 93971 EXTREMITY STUDY: CPT | Mod: 26,RT

## 2024-07-17 PROCEDURE — 93971 EXTREMITY STUDY: CPT

## 2024-07-17 PROCEDURE — 99214 OFFICE O/P EST MOD 30 MIN: CPT

## 2024-07-17 RX ORDER — AZELASTINE HYDROCHLORIDE 137 UG/1
0.1 SPRAY, METERED NASAL TWICE DAILY
Qty: 1 | Refills: 2 | Status: ACTIVE | COMMUNITY
Start: 2024-07-17 | End: 1900-01-01

## 2024-07-18 ENCOUNTER — APPOINTMENT (OUTPATIENT)
Dept: ORTHOPEDIC SURGERY | Facility: CLINIC | Age: 69
End: 2024-07-18
Payer: MEDICARE

## 2024-07-18 VITALS
HEIGHT: 60 IN | BODY MASS INDEX: 48.1 KG/M2 | WEIGHT: 245 LBS | SYSTOLIC BLOOD PRESSURE: 114 MMHG | DIASTOLIC BLOOD PRESSURE: 74 MMHG | HEART RATE: 87 BPM

## 2024-07-18 DIAGNOSIS — M16.11 UNILATERAL PRIMARY OSTEOARTHRITIS, RIGHT HIP: ICD-10-CM

## 2024-07-18 PROCEDURE — 99213 OFFICE O/P EST LOW 20 MIN: CPT

## 2024-07-19 LAB
BACTERIA BLD CULT: NORMAL
BACTERIA BLD CULT: NORMAL
CRP SERPL-MCNC: <3 MG/L
ERYTHROCYTE [SEDIMENTATION RATE] IN BLOOD BY WESTERGREN METHOD: 45 MM/HR
HCT VFR BLD CALC: 37.3 %
HGB BLD-MCNC: 11.3 G/DL
MCHC RBC-ENTMCNC: 29.6 PG
MCHC RBC-ENTMCNC: 30.3 GM/DL
MCV RBC AUTO: 97.6 FL
RBC # FLD: 14.9 %
WBC # FLD AUTO: 5.86 K/UL

## 2024-07-25 ENCOUNTER — APPOINTMENT (OUTPATIENT)
Dept: ORTHOPEDIC SURGERY | Facility: CLINIC | Age: 69
End: 2024-07-25

## 2024-07-30 ENCOUNTER — OUTPATIENT (OUTPATIENT)
Dept: OUTPATIENT SERVICES | Facility: HOSPITAL | Age: 69
LOS: 1 days | End: 2024-07-30
Payer: MEDICARE

## 2024-07-30 ENCOUNTER — APPOINTMENT (OUTPATIENT)
Dept: CT IMAGING | Facility: CLINIC | Age: 69
End: 2024-07-30
Payer: MEDICARE

## 2024-07-30 DIAGNOSIS — Z98.890 OTHER SPECIFIED POSTPROCEDURAL STATES: Chronic | ICD-10-CM

## 2024-07-30 DIAGNOSIS — Z98.891 HISTORY OF UTERINE SCAR FROM PREVIOUS SURGERY: Chronic | ICD-10-CM

## 2024-07-30 DIAGNOSIS — Z98.49 CATARACT EXTRACTION STATUS, UNSPECIFIED EYE: Chronic | ICD-10-CM

## 2024-07-30 DIAGNOSIS — T81.43XA INFECTION FOLLOWING A PROCEDURE, ORGAN AND SPACE SURGICAL SITE, INITIAL ENCOUNTER: ICD-10-CM

## 2024-07-30 PROCEDURE — 74177 CT ABD & PELVIS W/CONTRAST: CPT | Mod: 26,MH

## 2024-07-30 PROCEDURE — 74177 CT ABD & PELVIS W/CONTRAST: CPT

## 2024-08-05 ENCOUNTER — APPOINTMENT (OUTPATIENT)
Dept: INTERNAL MEDICINE | Facility: CLINIC | Age: 69
End: 2024-08-05

## 2024-08-05 PROCEDURE — 99215 OFFICE O/P EST HI 40 MIN: CPT

## 2024-08-05 NOTE — ASSESSMENT
[FreeTextEntry1] : 68 FEMALE ADMITTED WITH GASTRIC PERFORATION S/P ROBOTIC ASSISTED PATCH REPAIR ON 4/24 PT DEVELOPED INTRABDOMINAL ABSCESS S/P IR DRAINAGES PT WSA DISHCAHRGED WITH PERIHEPATIC  DRAINS  I DID NOT SEE PATEINT DURING THAT  HOSPITAL ADMSSION PT FOLLOWED UP WIT SURGERY AND DRAINS REMOVED RPEEAST CT SCAN  SHOWED DECREASED SIZE OF COLLECTION  PT HAD A SCHEDULED  A RIGHT HIP SURGERY PRIOR TO  THE APRIL 2024 HOSPITAL STAY PT NO WANTS O GET HIP SURGERY  SEEN IN OFFICE FOR FIRST TIME  7/8.24 SENT BLOOD WORK ADN RECOMMENDED REPEAT CT SCAN TO BETTER EVALUATE  HERE FOR FOLLOWUP  PT AFEBRIEL NON TOIXC  BLOOD WORK SED RATE UP CTP NORMAL BLOOD CX X2 SETS NEGATIVE REPEAT CT SCAN OF ABD SHOWED? INCREASE IN SIZE OF COLLECTION   WILL NEED TO D/W SURGERY AS ? NEED FOR ASPIRATION  AS ORHTOPEDICS IS PLANNIN HIP SURGERY WILL FOLLOWUP  WILL D/W SURGERY ADN ORTHOPEDICS

## 2024-08-05 NOTE — HISTORY OF PRESENT ILLNESS
[FreeTextEntry1] : 68 FEMALE ADMITTED WITH GASTRIC PERFORATION S/P ROBOTIC ASSISTED PATCH REPAIR ON 4/24 PT DEVELOPED INTRABDOMINAL ABSCESS S/P IR DRAINAGES PT WSA DISHCAHRGED WITH PERIHEPATIC  DRAINS  I DID NOT SEE PATEINT DURING THAT  HOSPITAL ADMSSION PT FOLLOWED UP WIT SURGERY AND DRAINS REMOVED RPEEAST CT SCAN  SHOWED DECREASED SIZE OF COLLECTION  PT HAD A SCHEDULED  A RIGHT HIP SURGERY PRIOR TO  THE APRIL 2024 HOSPITAL STAY PT NO WANTS O GET HIP SURGERY  SEEN IN OFFICE FOR FIRST TIME  7/8.24 SENT BLOOD WORK ADN RECOMMENDED REPEAT CT SCAN TO BETTER EVALUATE  HERE FOR FOLLOWUP  .

## 2024-08-05 NOTE — PHYSICAL EXAM
[General Appearance - Alert] : alert [General Appearance - In No Acute Distress] : in no acute distress [Sclera] : the sclera and conjunctiva were normal [PERRL With Normal Accommodation] : pupils were equal in size, round, reactive to light [Extraocular Movements] : extraocular movements were intact [Outer Ear] : the ears and nose were normal in appearance [Oropharynx] : the oropharynx was normal with no thrush [Neck Appearance] : the appearance of the neck was normal [Neck Cervical Mass (___cm)] : no neck mass was observed [Jugular Venous Distention Increased] : there was no jugular-venous distention [Thyroid Diffuse Enlargement] : the thyroid was not enlarged [Auscultation Breath Sounds / Voice Sounds] : lungs were clear to auscultation bilaterally [Heart Rate And Rhythm] : heart rate was normal and rhythm regular [Heart Sounds] : normal S1 and S2 [Heart Sounds Gallop] : no gallops [Murmurs] : no murmurs [Heart Sounds Pericardial Friction Rub] : no pericardial rub [Full Pulse] : the pedal pulses are present [Edema] : there was no peripheral edema [Bowel Sounds] : normal bowel sounds [Abdomen Soft] : soft [Abdomen Tenderness] : non-tender [] : no hepato-splenomegaly [Abdomen Mass (___ Cm)] : no abdominal mass palpated [Costovertebral Angle Tenderness] : no CVA tenderness [No Palpable Adenopathy] : no palpable adenopathy [FreeTextEntry1] : RIGHT HIP PAIN

## 2024-08-06 ENCOUNTER — APPOINTMENT (OUTPATIENT)
Dept: TRAUMA SURGERY | Facility: CLINIC | Age: 69
End: 2024-08-06

## 2024-08-06 PROCEDURE — 99213 OFFICE O/P EST LOW 20 MIN: CPT

## 2024-08-14 PROBLEM — M16.11 UNILATERAL PRIMARY OSTEOARTHRITIS, RIGHT HIP: Chronic | Status: ACTIVE | Noted: 2024-08-13

## 2024-08-14 PROBLEM — K25.5 CHRONIC OR UNSPECIFIED GASTRIC ULCER WITH PERFORATION: Chronic | Status: ACTIVE | Noted: 2024-08-13

## 2024-08-16 ENCOUNTER — INPATIENT (INPATIENT)
Facility: HOSPITAL | Age: 69
LOS: 4 days | Discharge: HOME | DRG: 863 | End: 2024-08-21
Attending: STUDENT IN AN ORGANIZED HEALTH CARE EDUCATION/TRAINING PROGRAM | Admitting: STUDENT IN AN ORGANIZED HEALTH CARE EDUCATION/TRAINING PROGRAM
Payer: MEDICARE

## 2024-08-16 ENCOUNTER — APPOINTMENT (OUTPATIENT)
Dept: FAMILY MEDICINE | Facility: CLINIC | Age: 69
End: 2024-08-16
Payer: MEDICARE

## 2024-08-16 VITALS
SYSTOLIC BLOOD PRESSURE: 122 MMHG | OXYGEN SATURATION: 94 % | BODY MASS INDEX: 46.57 KG/M2 | RESPIRATION RATE: 17 BRPM | DIASTOLIC BLOOD PRESSURE: 64 MMHG | HEART RATE: 96 BPM | WEIGHT: 237.2 LBS | TEMPERATURE: 97.9 F | HEIGHT: 60 IN

## 2024-08-16 VITALS
OXYGEN SATURATION: 98 % | RESPIRATION RATE: 18 BRPM | HEART RATE: 94 BPM | DIASTOLIC BLOOD PRESSURE: 80 MMHG | HEIGHT: 62 IN | TEMPERATURE: 98 F | WEIGHT: 237.22 LBS | SYSTOLIC BLOOD PRESSURE: 135 MMHG

## 2024-08-16 DIAGNOSIS — Z98.890 OTHER SPECIFIED POSTPROCEDURAL STATES: Chronic | ICD-10-CM

## 2024-08-16 DIAGNOSIS — I10 ESSENTIAL (PRIMARY) HYPERTENSION: ICD-10-CM

## 2024-08-16 DIAGNOSIS — R50.9 FEVER, UNSPECIFIED: ICD-10-CM

## 2024-08-16 DIAGNOSIS — T81.43XA INFECTION FOLLOWING A PROCEDURE, ORGAN AND SPACE SURGICAL SITE, INITIAL ENCOUNTER: ICD-10-CM

## 2024-08-16 DIAGNOSIS — Z98.891 HISTORY OF UTERINE SCAR FROM PREVIOUS SURGERY: Chronic | ICD-10-CM

## 2024-08-16 DIAGNOSIS — K65.1 INFECTION FOLLOWING A PROCEDURE, ORGAN AND SPACE SURGICAL SITE, INITIAL ENCTR: ICD-10-CM

## 2024-08-16 DIAGNOSIS — Z01.818 ENCOUNTER FOR OTHER PREPROCEDURAL EXAMINATION: ICD-10-CM

## 2024-08-16 DIAGNOSIS — Z98.49 CATARACT EXTRACTION STATUS, UNSPECIFIED EYE: Chronic | ICD-10-CM

## 2024-08-16 DIAGNOSIS — K25.5 CHRONIC OR UNSPECIFIED GASTRIC ULCER WITH PERFORATION: Chronic | ICD-10-CM

## 2024-08-16 DIAGNOSIS — T81.43XA INFECTION FOLLOWING A PROCEDURE, ORGAN AND SPACE SURGICAL SITE, INITIAL ENCTR: ICD-10-CM

## 2024-08-16 DIAGNOSIS — Z92.89 PERSONAL HISTORY OF OTHER MEDICAL TREATMENT: ICD-10-CM

## 2024-08-16 DIAGNOSIS — K25.5 CHRONIC OR UNSPECIFIED GASTRIC ULCER WITH PERFORATION: ICD-10-CM

## 2024-08-16 LAB
ALBUMIN SERPL ELPH-MCNC: 3.3 G/DL — SIGNIFICANT CHANGE UP (ref 3.3–5.2)
ALP SERPL-CCNC: 80 U/L — SIGNIFICANT CHANGE UP (ref 40–120)
ALT FLD-CCNC: 10 U/L — SIGNIFICANT CHANGE UP
ANION GAP SERPL CALC-SCNC: 13 MMOL/L — SIGNIFICANT CHANGE UP (ref 5–17)
APTT BLD: 35.3 SEC — SIGNIFICANT CHANGE UP (ref 24.5–35.6)
AST SERPL-CCNC: 14 U/L — SIGNIFICANT CHANGE UP
BASOPHILS # BLD AUTO: 0.03 K/UL — SIGNIFICANT CHANGE UP (ref 0–0.2)
BASOPHILS NFR BLD AUTO: 0.3 % — SIGNIFICANT CHANGE UP (ref 0–2)
BILIRUB SERPL-MCNC: 0.5 MG/DL — SIGNIFICANT CHANGE UP (ref 0.4–2)
BUN SERPL-MCNC: 14.4 MG/DL — SIGNIFICANT CHANGE UP (ref 8–20)
CALCIUM SERPL-MCNC: 9 MG/DL — SIGNIFICANT CHANGE UP (ref 8.4–10.5)
CHLORIDE SERPL-SCNC: 100 MMOL/L — SIGNIFICANT CHANGE UP (ref 96–108)
CO2 SERPL-SCNC: 26 MMOL/L — SIGNIFICANT CHANGE UP (ref 22–29)
CREAT SERPL-MCNC: 0.77 MG/DL — SIGNIFICANT CHANGE UP (ref 0.5–1.3)
EGFR: 83 ML/MIN/1.73M2 — SIGNIFICANT CHANGE UP
EOSINOPHIL # BLD AUTO: 0.13 K/UL — SIGNIFICANT CHANGE UP (ref 0–0.5)
EOSINOPHIL NFR BLD AUTO: 1.1 % — SIGNIFICANT CHANGE UP (ref 0–6)
GLUCOSE SERPL-MCNC: 97 MG/DL — SIGNIFICANT CHANGE UP (ref 70–99)
HCT VFR BLD CALC: 29.6 % — LOW (ref 34.5–45)
HGB BLD-MCNC: 9.7 G/DL — LOW (ref 11.5–15.5)
IMM GRANULOCYTES NFR BLD AUTO: 0.5 % — SIGNIFICANT CHANGE UP (ref 0–0.9)
INR BLD: 1.96 RATIO — HIGH (ref 0.85–1.18)
LACTATE BLDV-MCNC: 1.1 MMOL/L — SIGNIFICANT CHANGE UP (ref 0.5–2)
LYMPHOCYTES # BLD AUTO: 1.58 K/UL — SIGNIFICANT CHANGE UP (ref 1–3.3)
LYMPHOCYTES # BLD AUTO: 13.2 % — SIGNIFICANT CHANGE UP (ref 13–44)
MCHC RBC-ENTMCNC: 29.2 PG — SIGNIFICANT CHANGE UP (ref 27–34)
MCHC RBC-ENTMCNC: 32.8 GM/DL — SIGNIFICANT CHANGE UP (ref 32–36)
MCV RBC AUTO: 89.2 FL — SIGNIFICANT CHANGE UP (ref 80–100)
MONOCYTES # BLD AUTO: 1.08 K/UL — HIGH (ref 0–0.9)
MONOCYTES NFR BLD AUTO: 9 % — SIGNIFICANT CHANGE UP (ref 2–14)
NEUTROPHILS # BLD AUTO: 9.12 K/UL — HIGH (ref 1.8–7.4)
NEUTROPHILS NFR BLD AUTO: 75.9 % — SIGNIFICANT CHANGE UP (ref 43–77)
PLATELET # BLD AUTO: 337 K/UL — SIGNIFICANT CHANGE UP (ref 150–400)
POTASSIUM SERPL-MCNC: 4.5 MMOL/L — SIGNIFICANT CHANGE UP (ref 3.5–5.3)
POTASSIUM SERPL-SCNC: 4.5 MMOL/L — SIGNIFICANT CHANGE UP (ref 3.5–5.3)
PROT SERPL-MCNC: 7 G/DL — SIGNIFICANT CHANGE UP (ref 6.6–8.7)
PROTHROM AB SERPL-ACNC: 21.3 SEC — HIGH (ref 9.5–13)
RBC # BLD: 3.32 M/UL — LOW (ref 3.8–5.2)
RBC # FLD: 14.2 % — SIGNIFICANT CHANGE UP (ref 10.3–14.5)
SODIUM SERPL-SCNC: 139 MMOL/L — SIGNIFICANT CHANGE UP (ref 135–145)
WBC # BLD: 12 K/UL — HIGH (ref 3.8–10.5)
WBC # FLD AUTO: 12 K/UL — HIGH (ref 3.8–10.5)

## 2024-08-16 PROCEDURE — 99223 1ST HOSP IP/OBS HIGH 75: CPT

## 2024-08-16 PROCEDURE — 93010 ELECTROCARDIOGRAM REPORT: CPT

## 2024-08-16 PROCEDURE — 49406 IMAGE CATH FLUID PERI/RETRO: CPT

## 2024-08-16 PROCEDURE — 99215 OFFICE O/P EST HI 40 MIN: CPT

## 2024-08-16 PROCEDURE — 99285 EMERGENCY DEPT VISIT HI MDM: CPT | Mod: GC

## 2024-08-16 PROCEDURE — 74177 CT ABD & PELVIS W/CONTRAST: CPT | Mod: 26,MC

## 2024-08-16 PROCEDURE — G2211 COMPLEX E/M VISIT ADD ON: CPT

## 2024-08-16 RX ORDER — MAGNESIUM, ALUMINUM HYDROXIDE 200-225/5
30 SUSPENSION, ORAL (FINAL DOSE FORM) ORAL EVERY 4 HOURS
Refills: 0 | Status: DISCONTINUED | OUTPATIENT
Start: 2024-08-16 | End: 2024-08-21

## 2024-08-16 RX ORDER — PIPERACILLIN SODIUM AND TAZOBACTAM SODIUM 3; .375 G/15ML; G/15ML
3.38 INJECTION, POWDER, FOR SOLUTION INTRAVENOUS ONCE
Refills: 0 | Status: DISCONTINUED | OUTPATIENT
Start: 2024-08-16 | End: 2024-08-16

## 2024-08-16 RX ORDER — ACETAMINOPHEN 325 MG/1
1000 TABLET ORAL ONCE
Refills: 0 | Status: COMPLETED | OUTPATIENT
Start: 2024-08-16 | End: 2024-08-16

## 2024-08-16 RX ORDER — ACETAMINOPHEN 325 MG/1
650 TABLET ORAL EVERY 6 HOURS
Refills: 0 | Status: DISCONTINUED | OUTPATIENT
Start: 2024-08-16 | End: 2024-08-21

## 2024-08-16 RX ORDER — LEVOTHYROXINE SODIUM 100 MCG
75 TABLET ORAL DAILY
Refills: 0 | Status: DISCONTINUED | OUTPATIENT
Start: 2024-08-16 | End: 2024-08-21

## 2024-08-16 RX ORDER — SODIUM CHLORIDE 9 MG/ML
1000 INJECTION INTRAMUSCULAR; INTRAVENOUS; SUBCUTANEOUS ONCE
Refills: 0 | Status: COMPLETED | OUTPATIENT
Start: 2024-08-16 | End: 2024-08-16

## 2024-08-16 RX ORDER — AMLODIPINE BESYLATE 10 MG/1
5 TABLET ORAL DAILY
Refills: 0 | Status: DISCONTINUED | OUTPATIENT
Start: 2024-08-16 | End: 2024-08-21

## 2024-08-16 RX ORDER — CARVEDILOL 6.25 MG/1
12.5 TABLET ORAL EVERY 12 HOURS
Refills: 0 | Status: DISCONTINUED | OUTPATIENT
Start: 2024-08-16 | End: 2024-08-21

## 2024-08-16 RX ORDER — PANTOPRAZOLE SODIUM 40 MG
40 TABLET, DELAYED RELEASE (ENTERIC COATED) ORAL
Refills: 0 | Status: DISCONTINUED | OUTPATIENT
Start: 2024-08-16 | End: 2024-08-21

## 2024-08-16 RX ORDER — APIXABAN 5 MG/1
5 TABLET, FILM COATED ORAL
Refills: 0 | Status: DISCONTINUED | OUTPATIENT
Start: 2024-08-16 | End: 2024-08-16

## 2024-08-16 RX ORDER — VALSARTAN 40 MG/1
160 TABLET ORAL DAILY
Refills: 0 | Status: DISCONTINUED | OUTPATIENT
Start: 2024-08-16 | End: 2024-08-21

## 2024-08-16 RX ORDER — LORATADINE 10 MG/1
10 CAPSULE, LIQUID FILLED ORAL DAILY
Refills: 0 | Status: DISCONTINUED | OUTPATIENT
Start: 2024-08-16 | End: 2024-08-21

## 2024-08-16 RX ORDER — ONDANSETRON 2 MG/ML
4 INJECTION, SOLUTION INTRAMUSCULAR; INTRAVENOUS EVERY 8 HOURS
Refills: 0 | Status: DISCONTINUED | OUTPATIENT
Start: 2024-08-16 | End: 2024-08-21

## 2024-08-16 RX ADMIN — ACETAMINOPHEN 400 MILLIGRAM(S): 325 TABLET ORAL at 13:07

## 2024-08-16 RX ADMIN — SODIUM CHLORIDE 1000 MILLILITER(S): 9 INJECTION INTRAMUSCULAR; INTRAVENOUS; SUBCUTANEOUS at 13:14

## 2024-08-16 RX ADMIN — ACETAMINOPHEN 1000 MILLIGRAM(S): 325 TABLET ORAL at 13:07

## 2024-08-16 RX ADMIN — CARVEDILOL 12.5 MILLIGRAM(S): 6.25 TABLET ORAL at 18:13

## 2024-08-16 RX ADMIN — ACETAMINOPHEN 650 MILLIGRAM(S): 325 TABLET ORAL at 22:08

## 2024-08-16 RX ADMIN — ACETAMINOPHEN 650 MILLIGRAM(S): 325 TABLET ORAL at 22:38

## 2024-08-16 NOTE — PHYSICAL EXAM
[Normal Sclera/Conjunctiva] : normal sclera/conjunctiva [PERRL] : pupils equal round and reactive to light [EOMI] : extraocular movements intact [Normal Oropharynx] : the oropharynx was normal [No Lymphadenopathy] : no lymphadenopathy [Supple] : supple [No Respiratory Distress] : no respiratory distress  [Clear to Auscultation] : lungs were clear to auscultation bilaterally [Obese] : obese [No Mass] : no masses were palpated [No HSM] : no hepatosplenomegaly noted [None] : no hernias were palpable [Normal] : no posterior cervical lymphadenopathy and no anterior cervical lymphadenopathy [Grossly Normal Strength/Tone] : grossly normal strength/tone [de-identified] : Mild pallor, appears fatigued. [de-identified] : Mild edema of the LEs b/l. [de-identified] : Pt is frustrated over chronically not feeling well.

## 2024-08-16 NOTE — ED PROVIDER NOTE - PHYSICAL EXAMINATION
General: NAD, well appearing, A&Ox3  HEAD: Normocephalic, atraumatic  EYES: PERRLA, extraocular movements intact  Pulm: Chest wall symmetric, lungs clear to ascultation. No wheezing, rales, rhonchi.  Cardiac: Regular rate and regular rhythm, S1/S2 present. No murmurs, rubs, gallops.  Abdomen: Nontender, soft, nondistended. Bowel sounds present. No rebound, guarding.  Genitourinary: No CVA tenderness, no suprapubic tenderness.  Skin: Warm, dry and intact without rashes or lesions.  Back: No midline tenderness.  Neuro: Alert, speech clear, no focal deficits  MSK: 1+ peripheral edema  PSYCH: normal mood/affect, normal insight.

## 2024-08-16 NOTE — PLAN
[FreeTextEntry1] : The patient is having fevers with a history of an increasing intra-abdominal fluid collection.  The patient is not an adequate candidate for outpatient management and is not medically cleared for her procedure.  Would rule out bacteremia.  Patient advised to go to [facility] ER now.  Dr. Elias is ID and he was called to inform her that she will be going to the ER.

## 2024-08-16 NOTE — ED ADULT TRIAGE NOTE - CHIEF COMPLAINT QUOTE
sent by Dr jauregui for c/o fevers  A&Ox3,  resp wnl, suppose to have procedure Tuesday for removal of fluid from abdomen

## 2024-08-16 NOTE — ED PROVIDER NOTE - ATTENDING CONTRIBUTION TO CARE
68-year-old female presents to ED complaining of chills with "night sweats"  times last few days.  Patient with history of intra-abdominal abscess after having surgery for gastric ulcer with reported perforation.  Patient had outpatient CAT scan approximately 2 weeks ago which   demonstrated recurrence of abscess and patient was scheduled to have IR drainage on 8/20/2024.  In ED patient febrile 101.1 well-appearing in no acute distress, heart regular, lungs clear bilaterally, abdomen soft no localized tenderness, extremities full range of motion neuro no focal deficits.  Will check labs start IV antibiotics.  Case discussed with IR and recommending repeat CT antibiotics and will arrange for IR drainage to follow-up

## 2024-08-16 NOTE — H&P ADULT - HISTORY OF PRESENT ILLNESS
Patient is a 67yo F w/ PMH of HTN, HLD. OA, Hypothyroidism, Perforate gastric ulcer which was complicated by intraabominal abscess, PVT on Eliquis presentign with chief complaint of fevers. Patient presented to pre-operative clearance for upcomign IR drainage and complained of fevers for a week. She was originally admitted to [facility] from 4/24-5/8 after she was admitted for perforated gastric ulcer s/p robotic assisted josé manuel patch. Hospital course was complicated by perihepatic collections and she received 3 pigtail cathethers by IR which were later removed. She was also started on Eliquis for portal vein thrombosis. She has been following up with Orthopedics for possible R hip replacement however medical clearance was requested therefore CT was repeated showing increasing hepatic collections. She was seen by IR who was planning CT guided right subphrenic fluid collection aspiration and possible drainage catheter placement with anesthesia on 8/20/24. She was seen by her PMD today and was referred to ED for complaints of fevers for the past week. She states she has been having sweats after taking Tylenol which she takes for her arthritis.  Patient is a 67yo F w/ PMH of HTN, HLD. OA, Hypothyroidism, Perforate gastric ulcer which was complicated by intraabominal abscess, PVT on Eliquis presentign with chief complaint of fevers. Patient presented to pre-operative clearance for upcomign IR drainage and complained of fevers for a week. She was originally admitted to [facility] from 4/24-5/8 after she was admitted for perforated gastric ulcer s/p robotic assisted josé manuel patch. Hospital course was complicated by perihepatic collections and she received 3 pigtail catheters by IR which were later removed. She was also started on Eliquis for portal vein thrombosis. She has been following up with Orthopedics for possible R hip replacement however medical clearance was requested therefore CT was repeated showing increasing hepatic collections. She was seen by ACS who referred her to IR who was planning CT guided right subphrenic fluid collection aspiration and possible drainage catheter placement with anesthesia on 8/20/24. She was seen by her PMD today and was referred to ED for complaints of fevers for the past week. She states she has been having sweats after taking Tylenol which she takes for her arthritis.  Patient is a 67yo F w/ PMH of HTN, HLD. OA, Hypothyroidism, Perforated gastric ulcer which was complicated by intraabdominal abscess, PVT on Eliquis presenting with chief complaint of fevers. Patient presented to pre-operative clearance for upcomign IR drainage and complained of fevers for a week. She was originally admitted to [facility] from 4/24-5/8 after she was admitted for perforated gastric ulcer s/p robotic assisted josé manuel patch. Hospital course was complicated by perihepatic collections and she received 3 pigtail catheters by IR which were later removed. She was also started on Eliquis for portal vein thrombosis. She has been following up with Orthopedics for possible R hip replacement however medical clearance was requested therefore CT was repeated showing increasing hepatic collections. She was seen by ACS who referred her to IR who was planning CT guided right subphrenic fluid collection aspiration and possible drainage catheter placement with anesthesia on 8/20/24. She was seen by her PMD today and was referred to ED for complaints of fevers for the past week. She states she has been having sweats after taking Tylenol which she takes for her arthritis.  Patient is a 69yo F w/ PMH of HTN, HLD. OA, Hypothyroidism, Perforated gastric ulcer which was complicated by intraabdominal abscess, PVT on Eliquis presenting with chief complaint of fevers. Patient presented for pre-operative clearance for upcoming IR drainage and complained of fevers for a week. She was originally admitted to [facility] from 4/24-5/8 after she was admitted for perforated gastric ulcer s/p robotic assisted josé manuel patch. Hospital course was complicated by perihepatic collections and she received 3 pigtail catheters by IR which were later removed. She was also started on Eliquis for portal vein thrombosis which was diagnosed during the hospitalization She has been following up with Orthopedics for a R hip replacement however medical clearance was requested therefore CT A/P was repeated which showed increasing perihepatic collections. She was seen by ACS who referred her to IR with plans for CT guided right subphrenic fluid collection aspiration and possible drainage catheter placement with anesthesia scheduled for 8/20/24. She followed up with her PMD today and was referred to ED for complaints of fevers for the past week. She states she has been having sweats after taking Tylenol which she takes for her arthritis.

## 2024-08-16 NOTE — ED PROVIDER NOTE - CLINICAL SUMMARY MEDICAL DECISION MAKING FREE TEXT BOX
68F with PMHx of htn, hld, osteoarthritis, hypothyroidism, osteoarthritis with R hip pain, gastric ulcer s/p repair c/b intraabdominal abscess (April 2024) and thrombosed portal vein on Eliquis (5mg PO qd) presenting from preoperative clearance pending an upcoming IR drainage of the abscess (8/20/2024) with fevers for a week. Recorded temperatures during the past 3 days range from 95 to 101.2F sublingually. Endorses having sweats after taking tylenol, chronic cough from nasal drip, and fevers/chills. Denies nausea, vomiting, diarrhea, abdominal pain, dysuria, SOB, CP.    Temp 101F on rectal, otherwise vitals wnl. PE wnl  Possible sepsis -- f/u on BC, UA, CXR  Reached out to IR to see if CT abd/pelv necessary as patient has IR drainage scheduled 68F with PMHx of htn, hld, osteoarthritis, hypothyroidism, osteoarthritis with R hip pain, gastric ulcer s/p repair c/b intraabdominal abscess (April 2024) and thrombosed portal vein on Eliquis (5mg PO qd) presenting from preoperative clearance pending an upcoming IR drainage of the abscess (8/20/2024) with fevers for a week. Recorded temperatures during the past 3 days range from 95 to 101.2F sublingually. Endorses having sweats after taking tylenol, chronic cough from nasal drip, and fevers/chills. Denies nausea, vomiting, diarrhea, abdominal pain, dysuria, SOB, CP.    Temp 101F on rectal, otherwise vitals wnl. PE wnl  Possible sepsis -- f/u on BC, UA, CXR  Reached out to IR to see if CT abd/pelv necessary as patient has IR drainage scheduled  last eliquis this AM  call from rads saying fluid collection is inside liver >admit to med  pt concerned ab getting cultures from abscess since getting zosyn 68F with PMHx of htn, hld, osteoarthritis, hypothyroidism, osteoarthritis with R hip pain, gastric ulcer s/p repair c/b intraabdominal abscess (April 2024) and thrombosed portal vein on Eliquis (5mg PO qd) presenting from preoperative clearance pending an upcoming IR drainage of the abscess (8/20/2024) with fevers for a week. Recorded temperatures during the past 3 days range from 95 to 101.2F sublingually. Endorses having sweats after taking tylenol, chronic cough from nasal drip, and fevers/chills. Denies nausea, vomiting, diarrhea, abdominal pain, dysuria, SOB, CP.    Temp 101F on rectal, otherwise vitals wnl. PE wnl  Possible sepsis -- f/u on BC, UA, CXR  Reached out to IR to see if CT abd/pelv necessary as patient has IR drainage scheduled  last eliquis this AM  Did NOT start zosyn; infectious disease Dr. Elais saw patient and held off  call from rads saying fluid collection is inside liver >admit to med  pt concerned ab getting cultures from abscess since getting zosyn

## 2024-08-16 NOTE — ED PROVIDER NOTE - NSICDXPASTMEDICALHX_GEN_ALL_CORE_FT
PAST MEDICAL HISTORY:  Hypertension     Hypothyroid     Morbid obesity     ARSLAN on CPAP     Perforated chronic gastric ulcer     RBBB     Unilateral osteoarthritis of hip, right

## 2024-08-16 NOTE — ED PROVIDER NOTE - PROGRESS NOTE DETAILS
call from rads saying fluid collection is inside liver  pt concerned ab getting cultures from abscess since getting zosyn

## 2024-08-16 NOTE — PLAN
[FreeTextEntry1] : The patient is having fevers with a history of an increasing intra-abdominal fluid collection.  The patient is not an adequate candidate for outpatient management and is not medically cleared for her procedure.  Would rule out bacteremia.  Patient advised to go to [facility] ER now.  Dr. Elias is ID and he was called to inform her that she will be going to the ER.

## 2024-08-16 NOTE — RESULTS/DATA
[] : results reviewed [de-identified] : WBC 12.08   hemoglobin 10.2   hematocrit 32.1   MCHC 31.8   platelets 434 mild left shift on differential. [de-identified] : PTT 34.9   PT 19.2   INR 1.76 [de-identified] : WNL [de-identified] : NSR 92 bpm.  RBBB.  No acute or interval change. [de-identified] : HBA1C:  5.4% MRSA PCR 8/13/2024: Not detected. Staph aureus PCR: Not detected

## 2024-08-16 NOTE — PATIENT PROFILE ADULT - NSPROHMSYMPCOND_GEN_A_NUR
Patient states compliant with regimen. No bleeding or thromboembolic side effects noted. No significant med or dietary changes. No significant recent illness or disease state changes. PT/INR done in office per protocol. INR today is 3. Patient understands dosing directions and information discussed. Dosing card given to patient. Progress note faxed to office. INR therapeutic at 3.  Goal 2-3  The patient has taken no Aleve this week  The patient did take 3 Aspirin Tablets this Am for chest discomfort  Encouraged the patient to go to the Emergency Room  She states that she is reluctant as she has gone several times before for the same issue and all tests are normal.  The patient agrees to visit the Emergency Room if her symptoms persist or get worse  Continue warfarin 5 mg Mon/Thurs and 2.5 mg all other days  Recheck INR in two weeks cardiovascular/gastrointestinal/respiratory/sleep

## 2024-08-16 NOTE — H&P ADULT - ASSESSMENT
Patient is a 67yo F w/ PMH of HTN, HLD. OA, Hypothyroidism, Perforate gastric ulcer which was complicated by intraabdominal abscess, PVT on Eliquis presenting with chief complaint of fevers. CT w/ increasing collections.    Sepsis POA 2/2 intra-abdominal abscess  - Tmax: 101, WBC: 12  - CT A/P done, per ED - Radiology called and reported increasing (including intraheptic) collections. Follow up official read  - ID consulted, follow up recs  - IR for aspiration today  - Hold antibiotics until patient asp/cultured  - IVFs  - Tylenol PRN for fever  - Monitor WBC, fever curve.    Hx of HTN  - Continue Amlodipine  - Continue Valsartan  - Continue Coreg    Hx of Perforated Gastric Ulcer  - Continue PPI  - Avoid NSAIDs    Hypothyroid  - Continue Synthroid    Hx of PVT  - Continue Eliquis    OA  - Tylenol PRN    Anemia  - normocytic  - check anemia panel  - monitor CBC, transfuse PRN    DVT ppx: Eliquis    Disposition: Anticipate discharge home depending on the patient's response to treatment, IR procedure and ID clearance. Possibly 3-4 days.     Patient is a 69yo F w/ PMH of HTN, HLD. OA, Hypothyroidism, Perforate gastric ulcer which was complicated by intraabdominal abscess, PVT on Eliquis presenting with chief complaint of fevers. CT w/ increasing collections.    Sepsis POA 2/2 intra-abdominal abscess  - Tmax: 101, WBC: 12  - CT A/P done, per ED - Radiology called and reported increasing (including intrahepatic collections). Follow up official read  - ID consulted, follow up recs  - IR for aspiration today  - Hold antibiotics until patient asp/cultured  - Tylenol PRN for fever  - Monitor WBC, fever curve.    Hx of HTN  - Continue Amlodipine  - Continue Valsartan  - Continue Coreg    Hx of Perforated Gastric Ulcer  - Continue PPI  - Avoid NSAIDs    Hypothyroid  - Continue Synthroid    Hx of PVT  - Continue Eliquis    OA  - Tylenol PRN    Anemia  - normocytic  - check anemia panel  - monitor CBC, transfuse PRN    DVT ppx: Eliquis    Disposition: Anticipate discharge home depending on the patient's response to treatment, IR procedure and ID clearance. Possibly 3-4 days.     Patient is a 67yo F w/ PMH of HTN, HLD. OA, Hypothyroidism, Perforate gastric ulcer which was complicated by intraabdominal abscess, PVT on Eliquis presenting with chief complaint of fevers. CT A/P w/ increasing intraabdominal collections.    Sepsis POA 2/2 intra-abdominal abscess  - Tmax: 101, WBC: 12  - CT A/P done, per ED - Radiology called and reported increasing (including intrahepatic collections). Follow up official read  - ID consulted, follow up recs  - IR for aspiration today  - Hold antibiotics until patient asp/cultured  - Tylenol PRN for fever  - Monitor WBC, fever curve.    Hx of HTN  - Continue Amlodipine  - Continue Valsartan  - Continue Coreg    Hx of Perforated Gastric Ulcer  - Continue PPI  - Avoid NSAIDs    Hypothyroid  - Continue Synthroid    Hx of PVT  - Continue Eliquis    OA  - Tylenol PRN    Anemia  - normocytic  - check anemia panel  - monitor CBC, transfuse PRN    DVT ppx: Eliquis    Disposition: Anticipate discharge home depending on the patient's response to treatment, IR procedure and ID clearance. Possibly 3-4 days.     Patient is a 69yo F w/ PMH of HTN, HLD. OA, Hypothyroidism, Perforate gastric ulcer which was complicated by intraabdominal abscess, PVT on Eliquis presenting with chief complaint of fevers. CT A/P w/ increasing intraabdominal collections.    Sepsis POA 2/2 intra-abdominal abscess  - Tmax: 101, WBC: 12  - CT A/P done, per ED - Radiology called and reported increasing (including intrahepatic collections). Follow up official read  - ID consulted, follow up recs  - IR for aspiration today  - Hold antibiotics until patient asp/cultured  - Tylenol PRN for fever  - Monitor WBC, fever curve.    Hx of HTN  - Continue Amlodipine  - Continue Valsartan  - Continue Coreg    Hx of Perforated Gastric Ulcer  - Continue PPI  - Avoid NSAIDs    Hypothyroid  - Continue Synthroid    Hx of PVT  - Continue Eliquis    OA  - Tylenol PRN    Anemia  - normocytic  - check anemia panel  - monitor CBC, transfuse PRN    LE edema  - LE venous duplex ordered    DVT ppx: Eliquis    Disposition: Anticipate discharge home depending on the patient's response to treatment, IR procedure and ID clearance. Possibly 3-4 days.     Patient is a 69yo F w/ PMH of HTN, HLD. OA, Hypothyroidism, Perforate gastric ulcer which was complicated by intraabdominal abscess, PVT on Eliquis presenting with chief complaint of fevers. CT A/P w/ increasing intraabdominal collections.    Sepsis POA 2/2 intra-abdominal abscess  - Tmax: 101, WBC: 12  - CT A/P done, per ED - Radiology called and reported increasing (including intrahepatic collections). Follow up official read  - ID consulted, follow up recs  - IR for aspiration today  - Hold antibiotics until patient aspirated/cultured  - Tylenol PRN for fever  - Monitor WBC, fever curve.    Hx of HTN  - Continue Amlodipine  - Continue Valsartan  - Continue Coreg    Hx of Perforated Gastric Ulcer  - Continue PPI  - Avoid NSAIDs    Hx of HLD  - No longer taking statin due to myalgias    Hypothyroid  - Continue Synthroid    Hx of PVT  - Continue Eliquis    OA  - Tylenol PRN    Anemia  - normocytic  - check anemia panel  - monitor CBC, transfuse PRN    LE edema  - LE venous duplex ordered    DVT ppx: Eliquis    Disposition: Anticipate discharge home depending on the patient's response to treatment, IR procedure and ID clearance. Possibly 3-4 days.     Patient is a 67yo F w/ PMH of HTN, HLD. OA, Hypothyroidism, Perforate gastric ulcer which was complicated by intraabdominal abscess, PVT on Eliquis presenting with chief complaint of fevers. CT A/P w/ increasing intraabdominal collections.    Sepsis POA 2/2 intra-abdominal abscess  - Tmax: 101, WBC: 12  - CT A/P done, per ED - Radiology called and reported increasing (including intrahepatic collections). Follow up official read  - ID consulted, follow up recs  - IR for aspiration today  - Hold antibiotics until patient aspirated/cultured  - Tylenol PRN for fever  - Monitor WBC, fever curve.    Hx of HTN  - Continue Amlodipine  - Continue Valsartan  - Continue Coreg    Hx of Perforated Gastric Ulcer  - Continue PPI  - Avoid NSAIDs    Hx of HLD  - No longer taking statin due to myalgias    Hypothyroid  - Continue Synthroid    Hx of PVT  - Hold Eliquis for procedure, resume once deemed safe per IR    OA  - Tylenol PRN    Anemia  - normocytic  - check anemia panel  - monitor CBC, transfuse PRN    LE edema  - LE venous duplex ordered    DVT ppx: Holding Eliquis as above    Disposition: Anticipate discharge home depending on the patient's response to treatment, IR procedure and ID clearance. Possibly 3-4 days.

## 2024-08-16 NOTE — H&P ADULT - NSHPPHYSICALEXAM_GEN_ALL_CORE
Vital Signs Last 24 Hrs  T(F): 101 (16 Aug 2024 11:14), Max: 101 (16 Aug 2024 11:14)  HR: 94 (16 Aug 2024 10:23) (94 - 94)  BP: 135/80 (16 Aug 2024 10:23) (135/80 - 135/80)  RR: 18 (16 Aug 2024 10:23) (18 - 18)  SpO2: 98% (16 Aug 2024 10:23) (98% - 98%)    Physical Exam:  Constitutional: NAD  HEENT: NC/AT  Respiratory: CTA bilaterally, symmetrical chest rise  Cardiovascular: RRR, no m/g/r  Gastrointestinal: Soft, NT/ND, BS+  Vascular: 2+ peripheral pulses  Neurological: A/O x 3, no focal neurological deficits  Psych: Fair mood/affect  Musculoskeletal: No edema, cyanosis, deformities. ROM normal  Skin: No obvious rash, lesions. No jaundice. Vital Signs Last 24 Hrs  T(F): 101 (16 Aug 2024 11:14), Max: 101 (16 Aug 2024 11:14)  HR: 94 (16 Aug 2024 10:23) (94 - 94)  BP: 135/80 (16 Aug 2024 10:23) (135/80 - 135/80)  RR: 18 (16 Aug 2024 10:23) (18 - 18)  SpO2: 98% (16 Aug 2024 10:23) (98% - 98%)    Physical Exam:  Constitutional: NAD  HEENT: NC/AT  Respiratory: CTA bilaterally, symmetrical chest rise  Cardiovascular: RRR, no m/g/r  Gastrointestinal: Soft, NT/ND, BS+  Vascular: 2+ peripheral pulses  Neurological: A/O x 3, no focal neurological deficits  Psych: Fair mood/affect  Musculoskeletal: LE edema (R>L)  Skin: No obvious rash, lesions. No jaundice.

## 2024-08-16 NOTE — ASSESSMENT
[Patient NOT optimized for Surgery at this time] : Patient not optimized for surgery at this time [FreeTextEntry4] : The patient is a 68-year-old female with a past medical history of hypertension, hyperlipidemia, obstructive sleep apnea, generalized osteoarthritis, with history of perforated gastric ulcer status post surgical repair complicated by development of intra-abdominal abscess as well as a thrombosed portal vein.  The patient has been followed and treated for persistent intra-abdominal fluid collection which has been increasing in size.  She is currently scheduled for interventional radiology drainage of the intra-abdominal abscess.  The patient has been experiencing night sweats and feeling feverish so she started recording her temperatures at home. Tmax 2 days ago 101.2, yesterday 100, today 101.

## 2024-08-16 NOTE — ED PROVIDER NOTE - NSICDXPASTSURGICALHX_GEN_ALL_CORE_FT
PAST SURGICAL HISTORY:  H/O cataract extraction     H/O dilation and curettage     H/O:  section     Perforated gastric ulcer     S/P left rotator cuff repair

## 2024-08-16 NOTE — RESULTS/DATA
[] : results reviewed [de-identified] : WBC 12.08   hemoglobin 10.2   hematocrit 32.1   MCHC 31.8   platelets 434 mild left shift on differential. [de-identified] : PTT 34.9   PT 19.2   INR 1.76 [de-identified] : WNL [de-identified] : NSR 92 bpm.  RBBB.  No acute or interval change. [de-identified] : HBA1C:  5.4% MRSA PCR 8/13/2024: Not detected. Staph aureus PCR: Not detected

## 2024-08-16 NOTE — PHYSICAL EXAM
[Normal Sclera/Conjunctiva] : normal sclera/conjunctiva [PERRL] : pupils equal round and reactive to light [EOMI] : extraocular movements intact [Normal Oropharynx] : the oropharynx was normal [No Lymphadenopathy] : no lymphadenopathy [Supple] : supple [No Respiratory Distress] : no respiratory distress  [Clear to Auscultation] : lungs were clear to auscultation bilaterally [Obese] : obese [No Mass] : no masses were palpated [No HSM] : no hepatosplenomegaly noted [None] : no hernias were palpable [Normal] : no posterior cervical lymphadenopathy and no anterior cervical lymphadenopathy [Grossly Normal Strength/Tone] : grossly normal strength/tone [de-identified] : Mild pallor, appears fatigued. [de-identified] : Mild edema of the LEs b/l. [de-identified] : Pt is frustrated over chronically not feeling well.

## 2024-08-16 NOTE — HISTORY OF PRESENT ILLNESS
[(Patient denies any chest pain, claudication, dyspnea on exertion, orthopnea, palpitations or syncope)] : Patient denies any chest pain, claudication, dyspnea on exertion, orthopnea, palpitations or syncope [FreeTextEntry1] : CT Guided Right Subphrenic Fluid Collection Aspiration and Possible Drainage Catheter Placement with Anesthesia [FreeTextEntry2] : 8/20/24. [FreeTextEntry3] : Dr. Ko [FreeTextEntry4] : The patient is a 68-year-old female with a past medical history of hypertension, hyperlipidemia, obstructive sleep apnea, generalized osteoarthritis, with history of perforated gastric ulcer status post surgical repair complicated by development of intra-abdominal abscess as well as a thrombosed portal vein.  The patient has been followed and treated for persistent intra-abdominal fluid collection which has been increasing in size.  She is currently scheduled for interventional radiology drainage of the intra-abdominal abscess.  The patient has been experiencing night sweats and feeling feverish so she started recording her temperatures at home. Tmax 2 days ago 101.2, yesterday 100, today 101.  [FreeTextEntry7] : Nuclear exercise stress test 8/18/2023: Normal myocardial perfusion scan with no evidence of infarction or inducible ischemia.  LVEF 69% with stress.  Echo 8/14/2023: EF 70%.  Normal left ventricular diastolic function.  Trace MR.

## 2024-08-16 NOTE — PROCEDURE NOTE - PROCEDURE FINDINGS AND DETAILS
given 1gm ceftriaxone intraprocedurally  200 cc pus aspirated from right sub diag collection  drainage is now bloody  flush TID

## 2024-08-16 NOTE — ED PROVIDER NOTE - OBJECTIVE STATEMENT
68F with PMHx of htn, hld, osteoarthritis, hypothyroidism, osteoarthritis with R hip pain, gastric ulcer s/p repair c/b intraabdominal abscess (April 2024) and thrombosed portal vein on Eliquis (5mg PO qd) presenting from preoperative clearance pending an upcoming IR drainage of the abscess (8/20/2024) with fevers for a week. Recorded temperatures during the past 3 days range from 95 to 101.2F sublingually. Endorses having sweats after taking tylenol, chronic cough from nasal drip, and fevers/chills. Denies nausea, vomiting, diarrhea, abdominal pain, dysuria, SOB, CP.

## 2024-08-16 NOTE — ED ADULT NURSE NOTE - OBJECTIVE STATEMENT
Pt presents to the ed c/o fevers. Pt follows Dr. Elias. Pt reports abdominal surgery in May and has been receiving ct scans to monitor her post operative healing. She states she was supposed to have a paracentesis tuesday, but was recommended to be evaluated today for the fevers. Pt ax4, accompanied by her . Denying other complaints @ this time. All safety/infection/fall precautions maintained.

## 2024-08-17 LAB
ALBUMIN SERPL ELPH-MCNC: 2.8 G/DL — LOW (ref 3.3–5.2)
ALP SERPL-CCNC: 73 U/L — SIGNIFICANT CHANGE UP (ref 40–120)
ALT FLD-CCNC: 9 U/L — SIGNIFICANT CHANGE UP
ANION GAP SERPL CALC-SCNC: 16 MMOL/L — SIGNIFICANT CHANGE UP (ref 5–17)
AST SERPL-CCNC: 15 U/L — SIGNIFICANT CHANGE UP
BASOPHILS # BLD AUTO: 0.03 K/UL — SIGNIFICANT CHANGE UP (ref 0–0.2)
BASOPHILS NFR BLD AUTO: 0.4 % — SIGNIFICANT CHANGE UP (ref 0–2)
BILIRUB SERPL-MCNC: 0.4 MG/DL — SIGNIFICANT CHANGE UP (ref 0.4–2)
BUN SERPL-MCNC: 13 MG/DL — SIGNIFICANT CHANGE UP (ref 8–20)
CALCIUM SERPL-MCNC: 8.9 MG/DL — SIGNIFICANT CHANGE UP (ref 8.4–10.5)
CHLORIDE SERPL-SCNC: 102 MMOL/L — SIGNIFICANT CHANGE UP (ref 96–108)
CO2 SERPL-SCNC: 22 MMOL/L — SIGNIFICANT CHANGE UP (ref 22–29)
CREAT SERPL-MCNC: 0.79 MG/DL — SIGNIFICANT CHANGE UP (ref 0.5–1.3)
EGFR: 81 ML/MIN/1.73M2 — SIGNIFICANT CHANGE UP
EOSINOPHIL # BLD AUTO: 0.19 K/UL — SIGNIFICANT CHANGE UP (ref 0–0.5)
EOSINOPHIL NFR BLD AUTO: 2.7 % — SIGNIFICANT CHANGE UP (ref 0–6)
FERRITIN SERPL-MCNC: 410 NG/ML — HIGH (ref 13–330)
FOLATE SERPL-MCNC: 7.4 NG/ML — SIGNIFICANT CHANGE UP
GLUCOSE SERPL-MCNC: 95 MG/DL — SIGNIFICANT CHANGE UP (ref 70–99)
GRAM STN FLD: ABNORMAL
HCT VFR BLD CALC: 28.7 % — LOW (ref 34.5–45)
HGB BLD-MCNC: 9 G/DL — LOW (ref 11.5–15.5)
IMM GRANULOCYTES NFR BLD AUTO: 0.4 % — SIGNIFICANT CHANGE UP (ref 0–0.9)
IRON SATN MFR SERPL: 15 % — SIGNIFICANT CHANGE UP (ref 14–50)
IRON SATN MFR SERPL: 29 UG/DL — LOW (ref 37–145)
LYMPHOCYTES # BLD AUTO: 1.13 K/UL — SIGNIFICANT CHANGE UP (ref 1–3.3)
LYMPHOCYTES # BLD AUTO: 16.2 % — SIGNIFICANT CHANGE UP (ref 13–44)
MCHC RBC-ENTMCNC: 28 PG — SIGNIFICANT CHANGE UP (ref 27–34)
MCHC RBC-ENTMCNC: 31.4 GM/DL — LOW (ref 32–36)
MCV RBC AUTO: 89.4 FL — SIGNIFICANT CHANGE UP (ref 80–100)
MONOCYTES # BLD AUTO: 0.62 K/UL — SIGNIFICANT CHANGE UP (ref 0–0.9)
MONOCYTES NFR BLD AUTO: 8.9 % — SIGNIFICANT CHANGE UP (ref 2–14)
NEUTROPHILS # BLD AUTO: 4.98 K/UL — SIGNIFICANT CHANGE UP (ref 1.8–7.4)
NEUTROPHILS NFR BLD AUTO: 71.4 % — SIGNIFICANT CHANGE UP (ref 43–77)
PLATELET # BLD AUTO: 325 K/UL — SIGNIFICANT CHANGE UP (ref 150–400)
POTASSIUM SERPL-MCNC: 4.1 MMOL/L — SIGNIFICANT CHANGE UP (ref 3.5–5.3)
POTASSIUM SERPL-SCNC: 4.1 MMOL/L — SIGNIFICANT CHANGE UP (ref 3.5–5.3)
PROCALCITONIN SERPL-MCNC: 0.08 NG/ML — SIGNIFICANT CHANGE UP (ref 0.02–0.1)
PROT SERPL-MCNC: 6.4 G/DL — LOW (ref 6.6–8.7)
RBC # BLD: 3.21 M/UL — LOW (ref 3.8–5.2)
RBC # FLD: 14.1 % — SIGNIFICANT CHANGE UP (ref 10.3–14.5)
SODIUM SERPL-SCNC: 140 MMOL/L — SIGNIFICANT CHANGE UP (ref 135–145)
SPECIMEN SOURCE: SIGNIFICANT CHANGE UP
TIBC SERPL-MCNC: 190 UG/DL — LOW (ref 220–430)
TRANSFERRIN SERPL-MCNC: 133 MG/DL — LOW (ref 192–382)
VIT B12 SERPL-MCNC: 664 PG/ML — SIGNIFICANT CHANGE UP (ref 232–1245)
WBC # BLD: 6.98 K/UL — SIGNIFICANT CHANGE UP (ref 3.8–10.5)
WBC # FLD AUTO: 6.98 K/UL — SIGNIFICANT CHANGE UP (ref 3.8–10.5)

## 2024-08-17 PROCEDURE — 93970 EXTREMITY STUDY: CPT | Mod: 26

## 2024-08-17 PROCEDURE — 99233 SBSQ HOSP IP/OBS HIGH 50: CPT

## 2024-08-17 RX ORDER — PIPERACILLIN SODIUM AND TAZOBACTAM SODIUM 3; .375 G/15ML; G/15ML
3.38 INJECTION, POWDER, FOR SOLUTION INTRAVENOUS ONCE
Refills: 0 | Status: COMPLETED | OUTPATIENT
Start: 2024-08-17 | End: 2024-08-17

## 2024-08-17 RX ORDER — PIPERACILLIN SODIUM AND TAZOBACTAM SODIUM 3; .375 G/15ML; G/15ML
3.38 INJECTION, POWDER, FOR SOLUTION INTRAVENOUS EVERY 8 HOURS
Refills: 0 | Status: DISCONTINUED | OUTPATIENT
Start: 2024-08-17 | End: 2024-08-19

## 2024-08-17 RX ADMIN — Medication 75 MICROGRAM(S): at 05:37

## 2024-08-17 RX ADMIN — PIPERACILLIN SODIUM AND TAZOBACTAM SODIUM 25 GRAM(S): 3; .375 INJECTION, POWDER, FOR SOLUTION INTRAVENOUS at 16:29

## 2024-08-17 RX ADMIN — Medication 40 MILLIGRAM(S): at 05:37

## 2024-08-17 RX ADMIN — CARVEDILOL 12.5 MILLIGRAM(S): 6.25 TABLET ORAL at 16:29

## 2024-08-17 RX ADMIN — PIPERACILLIN SODIUM AND TAZOBACTAM SODIUM 25 GRAM(S): 3; .375 INJECTION, POWDER, FOR SOLUTION INTRAVENOUS at 03:58

## 2024-08-17 RX ADMIN — PIPERACILLIN SODIUM AND TAZOBACTAM SODIUM 25 GRAM(S): 3; .375 INJECTION, POWDER, FOR SOLUTION INTRAVENOUS at 21:55

## 2024-08-17 RX ADMIN — PIPERACILLIN SODIUM AND TAZOBACTAM SODIUM 200 GRAM(S): 3; .375 INJECTION, POWDER, FOR SOLUTION INTRAVENOUS at 00:44

## 2024-08-17 RX ADMIN — AMLODIPINE BESYLATE 5 MILLIGRAM(S): 10 TABLET ORAL at 05:37

## 2024-08-17 RX ADMIN — CARVEDILOL 12.5 MILLIGRAM(S): 6.25 TABLET ORAL at 05:37

## 2024-08-17 RX ADMIN — LORATADINE 10 MILLIGRAM(S): 10 CAPSULE, LIQUID FILLED ORAL at 16:29

## 2024-08-17 RX ADMIN — VALSARTAN 160 MILLIGRAM(S): 40 TABLET ORAL at 05:37

## 2024-08-17 NOTE — PROGRESS NOTE ADULT - SUBJECTIVE AND OBJECTIVE BOX
No additional comment                                            [facility]                                                INFECTIOUS DISEASES  =======================================================                   Ricki Adams#   Cristi Elias MD#    Radha Garrido MD*                           Maxine Ratliff MD*   Cyndie Diez MD*   Nargis Arcos *           Diplomates American Board of Internal Medicine & Infectious Diseases                  # [place] Office - Appt - Tel  [phone] Fax [phone]                * [place] Office - Appt - Tel [phone] Fax [phone]                                  Hospital Consult line:  [phone]  =======================================================      [facility]-[id number]  RANJAN DUMONT   follow up for: intra abdominal abscess  no fever  feeling better  patient seen and examined.       I have personally reviewed the labs and data; pertinent labs and data are listed in this note; please see below.   ===================================================  REVIEW OF SYSTEMS:  CONSTITUTIONAL:  No Fever or chills  HEENT:  No diplopia or blurred vision.  No earache, sore throat or runny nose.  CARDIOVASCULAR:  No pressure, squeezing, strangling, tightness, heaviness or aching about the chest, neck, axilla or epigastrium.  RESPIRATORY:  No cough, shortness of breath  GASTROINTESTINAL:  No nausea, vomiting or diarrhea.  GENITOURINARY:  No dysuria, frequency or urgency. No Blood in urine  MUSCULOSKELETAL:  no joint aches, no muscle pain  SKIN:  No change in skin, hair or nails.  NEUROLOGIC:  No Headaches, seizures or weakness.  PSYCHIATRIC:  No disorder of thought or mood.  ENDOCRINE:  No heat or cold intolerance  HEMATOLOGICAL:  No easy bruising or bleeding.    =======================================================  Allergies    No Known Allergies    Intolerances    Antibiotics:  piperacillin/tazobactam IVPB.. 3.375 Gram(s) IV Intermittent every 8 hours    Other medications:  amLODIPine   Tablet 5 milliGRAM(s) Oral daily  carvedilol 12.5 milliGRAM(s) Oral every 12 hours  levothyroxine 75 MICROGram(s) Oral daily  loratadine 10 milliGRAM(s) Oral daily  pantoprazole    Tablet 40 milliGRAM(s) Oral before breakfast  valsartan 160 milliGRAM(s) Oral daily    ======================================================  Physical Exam:  ============  T(F): 97.6 (17 Aug 2024 08:00), Max: 101 (16 Aug 2024 11:14)  HR: 76 (17 Aug 2024 08:00)  BP: 108/70 (17 Aug 2024 08:00)  RR: 18 (17 Aug 2024 08:00)  SpO2: 96% (17 Aug 2024 08:00) (93% - 96%)  temp max in last 48H T(F): , Max: 101 (08-16-24 @ 11:14)    General:  No acute distress.  Eye: no conjunctival pallor, no scleral icterus    Respiratory: Lungs are clear to auscultation, Respirations are non-labored.  Cardiovascular: Normal rate, Regular rhythm,  s1+s2  Gastrointestinal: Soft, Non-tender, Non-distended, Normal bowel sounds. epigastric drain, EDEL with thick blood tinged purulent fluid  Integumentary: b/l LE edema  Neurologic: Alert, Oriented, No focal deficits  Psychiatric: Appropriate mood & affect.  =======================================================  Labs:                        9.0    6.98  )-----------( 325      ( 17 Aug 2024 05:26 )             28.7     08-17    140  |  102  |  13.0  ----------------------------<  95  4.1   |  22.0  |  0.79    Ca    8.9      17 Aug 2024 05:26    TPro  6.4<L>  /  Alb  2.8<L>  /  TBili  0.4  /  DBili  x   /  AST  15  /  ALT  9   /  AlkPhos  73  08-17      Culture - Abscess with Gram Stain (collected 08-16-24 @ 16:56)  Source: .Abscess right subdiaphragmatic collection  Gram Stain (08-17-24 @ 06:48):    Moderate polymorphonuclear leukocytes seen per low power field    Few Gram positive cocci in pairs seen per oil power field    Rare Gram Negative Rods seen per oil power field    Culture - Abscess with Gram Stain (collected 05-01-24 @ 12:08)  Source: .Abscess right mid-abdominal abscess  Gram Stain (05-07-24 @ 12:44):    Few polymorphonuclear leukocytes per low power field    No organisms seen  Final Report (05-07-24 @ 12:44):    Growth in fluid media only Lactobacillus rhamnosus "Susceptibilities not    performed"    Culture - Abscess with Gram Stain (collected 05-01-24 @ 12:06)  Source: .Abscess prehepatic abscess  Gram Stain (05-03-24 @ 14:03):    Few polymorphonuclear leukocytes per low power field    No organisms seen  Final Report (05-06-24 @ 08:13):    Rare Streptococcus anginosus "Susceptibilities not performed"    Culture - Abscess with Gram Stain (collected 05-01-24 @ 12:04)  Source: .Abscess right subphrenic abscess  Gram Stain (05-03-24 @ 17:04):    Moderate polymorphonuclear leukocytes per low power field    No organisms seen  Final Report (05-06-24 @ 17:34):    Rare Alpha hemolytic strep    "Susceptibilities not performed"    Culture - Blood (collected 04-24-24 @ 17:10)  Source: .Blood Blood  Final Report (04-29-24 @ 22:00):    No growth at 5 days    Culture - Blood (collected 04-24-24 @ 17:00)  Source: .Blood Blood  Final Report (04-29-24 @ 22:00):    No growth at 5 days    Culture - Urine (collected 04-24-24 @ 16:50)  Source: Clean Catch Clean Catch (Midstream)  Final Report (04-26-24 @ 06:53):    <10,000 CFU/mL Normal Urogenital Tanya

## 2024-08-17 NOTE — PROGRESS NOTE ADULT - SUBJECTIVE AND OBJECTIVE BOX
Hospitalist Progress Note    Chief Complaint:  Fever    SUBJECTIVE / OVERNIGHT EVENTS:  Febrile overnight, started on zosyn by night provider. Patient seen at bedside, in NAD, complaining of abd tenderness at site of drainage tube. Patient denies chest pain, SOB, N/V, chills, dysuria or any other complaints. All remainder ROS negative.     MEDICATIONS  (STANDING):  amLODIPine   Tablet 5 milliGRAM(s) Oral daily  carvedilol 12.5 milliGRAM(s) Oral every 12 hours  levothyroxine 75 MICROGram(s) Oral daily  loratadine 10 milliGRAM(s) Oral daily  pantoprazole    Tablet 40 milliGRAM(s) Oral before breakfast  piperacillin/tazobactam IVPB.. 3.375 Gram(s) IV Intermittent every 8 hours  valsartan 160 milliGRAM(s) Oral daily    MEDICATIONS  (PRN):  acetaminophen     Tablet .. 650 milliGRAM(s) Oral every 6 hours PRN Temp greater or equal to 38C (100.4F), Mild Pain (1 - 3)  aluminum hydroxide/magnesium hydroxide/simethicone Suspension 30 milliLiter(s) Oral every 4 hours PRN Dyspepsia  melatonin 3 milliGRAM(s) Oral at bedtime PRN Insomnia  ondansetron Injectable 4 milliGRAM(s) IV Push every 8 hours PRN Nausea and/or Vomiting        I&O's Summary    16 Aug 2024 07:01  -  17 Aug 2024 07:00  --------------------------------------------------------  IN: 0 mL / OUT: 190 mL / NET: -190 mL    17 Aug 2024 07:01  -  17 Aug 2024 10:56  --------------------------------------------------------  IN: 0 mL / OUT: 50 mL / NET: -50 mL        PHYSICAL EXAM:  Vital Signs Last 24 Hrs  T(C): 36.4 (17 Aug 2024 08:00), Max: 38.3 (16 Aug 2024 11:14)  T(F): 97.6 (17 Aug 2024 08:00), Max: 101 (16 Aug 2024 11:14)  HR: 76 (17 Aug 2024 08:00) (73 - 84)  BP: 108/70 (17 Aug 2024 08:00) (108/70 - 199/65)  BP(mean): --  RR: 18 (17 Aug 2024 08:00) (18 - 18)  SpO2: 96% (17 Aug 2024 08:00) (93% - 96%)    Parameters below as of 17 Aug 2024 08:00  Patient On (Oxygen Delivery Method): room air      Constitutional: NAD  HEENT: NC/AT  Respiratory: CTA bilaterally, symmetrical chest rise  Cardiovascular: RRR, no m/g/r  Gastrointestinal: Soft, NT/ND, BS+  Vascular: 2+ peripheral pulses  Neurological: A/O x 3, no focal neurological deficits  Psych: Fair mood/affect  Musculoskeletal: LE edema (R>L)  Skin: No obvious rash, lesions. No jaundice.    LABS:                        9.0    6.98  )-----------( 325      ( 17 Aug 2024 05:26 )             28.7     08-17    140  |  102  |  13.0  ----------------------------<  95  4.1   |  22.0  |  0.79    Ca    8.9      17 Aug 2024 05:26    TPro  6.4<L>  /  Alb  2.8<L>  /  TBili  0.4  /  DBili  x   /  AST  15  /  ALT  9   /  AlkPhos  73  08-17    PT/INR - ( 16 Aug 2024 11:36 )   PT: 21.3 sec;   INR: 1.96 ratio         PTT - ( 16 Aug 2024 11:36 )  PTT:35.3 sec      Urinalysis Basic - ( 17 Aug 2024 05:26 )    Color: x / Appearance: x / SG: x / pH: x  Gluc: 95 mg/dL / Ketone: x  / Bili: x / Urobili: x   Blood: x / Protein: x / Nitrite: x   Leuk Esterase: x / RBC: x / WBC x   Sq Epi: x / Non Sq Epi: x / Bacteria: x        Culture - Abscess with Gram Stain (collected 16 Aug 2024 16:56)  Source: .Abscess right subdiaphragmatic collection  Gram Stain (17 Aug 2024 06:48):    Moderate polymorphonuclear leukocytes seen per low power field    Few Gram positive cocci in pairs seen per oil power field    Rare Gram Negative Rods seen per oil power field      CAPILLARY BLOOD GLUCOSE            RADIOLOGY & ADDITIONAL TESTS:  Results Reviewed: Y  Imaging Personally Reviewed: N  Electrocardiogram Personally Reviewed: N

## 2024-08-18 LAB
ANION GAP SERPL CALC-SCNC: 15 MMOL/L — SIGNIFICANT CHANGE UP (ref 5–17)
BUN SERPL-MCNC: 12.4 MG/DL — SIGNIFICANT CHANGE UP (ref 8–20)
CALCIUM SERPL-MCNC: 9 MG/DL — SIGNIFICANT CHANGE UP (ref 8.4–10.5)
CHLORIDE SERPL-SCNC: 101 MMOL/L — SIGNIFICANT CHANGE UP (ref 96–108)
CO2 SERPL-SCNC: 24 MMOL/L — SIGNIFICANT CHANGE UP (ref 22–29)
CREAT SERPL-MCNC: 0.77 MG/DL — SIGNIFICANT CHANGE UP (ref 0.5–1.3)
CULTURE RESULTS: SIGNIFICANT CHANGE UP
EGFR: 83 ML/MIN/1.73M2 — SIGNIFICANT CHANGE UP
GLUCOSE SERPL-MCNC: 100 MG/DL — HIGH (ref 70–99)
HCT VFR BLD CALC: 30.3 % — LOW (ref 34.5–45)
HGB BLD-MCNC: 9.6 G/DL — LOW (ref 11.5–15.5)
MAGNESIUM SERPL-MCNC: 2 MG/DL — SIGNIFICANT CHANGE UP (ref 1.6–2.6)
MCHC RBC-ENTMCNC: 28.2 PG — SIGNIFICANT CHANGE UP (ref 27–34)
MCHC RBC-ENTMCNC: 31.7 GM/DL — LOW (ref 32–36)
MCV RBC AUTO: 88.9 FL — SIGNIFICANT CHANGE UP (ref 80–100)
PLATELET # BLD AUTO: 367 K/UL — SIGNIFICANT CHANGE UP (ref 150–400)
POTASSIUM SERPL-MCNC: 3.9 MMOL/L — SIGNIFICANT CHANGE UP (ref 3.5–5.3)
POTASSIUM SERPL-SCNC: 3.9 MMOL/L — SIGNIFICANT CHANGE UP (ref 3.5–5.3)
RBC # BLD: 3.41 M/UL — LOW (ref 3.8–5.2)
RBC # FLD: 14 % — SIGNIFICANT CHANGE UP (ref 10.3–14.5)
SODIUM SERPL-SCNC: 140 MMOL/L — SIGNIFICANT CHANGE UP (ref 135–145)
SPECIMEN SOURCE: SIGNIFICANT CHANGE UP
WBC # BLD: 6.14 K/UL — SIGNIFICANT CHANGE UP (ref 3.8–10.5)
WBC # FLD AUTO: 6.14 K/UL — SIGNIFICANT CHANGE UP (ref 3.8–10.5)

## 2024-08-18 PROCEDURE — 99233 SBSQ HOSP IP/OBS HIGH 50: CPT

## 2024-08-18 PROCEDURE — 93975 VASCULAR STUDY: CPT | Mod: 26

## 2024-08-18 RX ADMIN — AMLODIPINE BESYLATE 5 MILLIGRAM(S): 10 TABLET ORAL at 09:44

## 2024-08-18 RX ADMIN — Medication 40 MILLIGRAM(S): at 05:58

## 2024-08-18 RX ADMIN — PIPERACILLIN SODIUM AND TAZOBACTAM SODIUM 25 GRAM(S): 3; .375 INJECTION, POWDER, FOR SOLUTION INTRAVENOUS at 22:13

## 2024-08-18 RX ADMIN — PIPERACILLIN SODIUM AND TAZOBACTAM SODIUM 25 GRAM(S): 3; .375 INJECTION, POWDER, FOR SOLUTION INTRAVENOUS at 05:58

## 2024-08-18 RX ADMIN — VALSARTAN 160 MILLIGRAM(S): 40 TABLET ORAL at 09:44

## 2024-08-18 RX ADMIN — LORATADINE 10 MILLIGRAM(S): 10 CAPSULE, LIQUID FILLED ORAL at 09:44

## 2024-08-18 RX ADMIN — CARVEDILOL 12.5 MILLIGRAM(S): 6.25 TABLET ORAL at 18:38

## 2024-08-18 RX ADMIN — PIPERACILLIN SODIUM AND TAZOBACTAM SODIUM 25 GRAM(S): 3; .375 INJECTION, POWDER, FOR SOLUTION INTRAVENOUS at 14:36

## 2024-08-18 RX ADMIN — Medication 75 MICROGRAM(S): at 05:58

## 2024-08-18 NOTE — PROGRESS NOTE ADULT - SUBJECTIVE AND OBJECTIVE BOX
Hospitalist Progress Note    Chief Complaint:  Fever    SUBJECTIVE / OVERNIGHT EVENTS:  Febrile overnight, started on zosyn by night provider. Patient seen at bedside, in NAD, complaining of abd tenderness at site of drainage tube. Patient denies chest pain, SOB, N/V, chills, dysuria or any other complaints. All remainder ROS negative.       MEDICATIONS  (STANDING):  amLODIPine   Tablet 5 milliGRAM(s) Oral daily  carvedilol 12.5 milliGRAM(s) Oral every 12 hours  levothyroxine 75 MICROGram(s) Oral daily  loratadine 10 milliGRAM(s) Oral daily  pantoprazole    Tablet 40 milliGRAM(s) Oral before breakfast  piperacillin/tazobactam IVPB.. 3.375 Gram(s) IV Intermittent every 8 hours  valsartan 160 milliGRAM(s) Oral daily    MEDICATIONS  (PRN):  acetaminophen     Tablet .. 650 milliGRAM(s) Oral every 6 hours PRN Temp greater or equal to 38C (100.4F), Mild Pain (1 - 3)  aluminum hydroxide/magnesium hydroxide/simethicone Suspension 30 milliLiter(s) Oral every 4 hours PRN Dyspepsia  melatonin 3 milliGRAM(s) Oral at bedtime PRN Insomnia  ondansetron Injectable 4 milliGRAM(s) IV Push every 8 hours PRN Nausea and/or Vomiting        I&O's Summary    17 Aug 2024 07:01  -  18 Aug 2024 07:00  --------------------------------------------------------  IN: 0 mL / OUT: 125 mL / NET: -125 mL        PHYSICAL EXAM:  Vital Signs Last 24 Hrs  T(C): 36.4 (18 Aug 2024 09:00), Max: 36.8 (17 Aug 2024 16:13)  T(F): 97.6 (18 Aug 2024 09:00), Max: 98.3 (17 Aug 2024 18:51)  HR: 73 (18 Aug 2024 09:00) (63 - 86)  BP: 113/74 (18 Aug 2024 09:00) (103/69 - 144/75)  BP(mean): --  RR: 17 (18 Aug 2024 09:00) (17 - 18)  SpO2: 95% (18 Aug 2024 09:00) (94% - 95%)    Parameters below as of 18 Aug 2024 09:00  Patient On (Oxygen Delivery Method): room air          Constitutional: NAD  HEENT: NC/AT  Respiratory: CTA bilaterally, symmetrical chest rise  Cardiovascular: RRR, no m/g/r  Gastrointestinal: Soft, NT/ND, BS+  Vascular: 2+ peripheral pulses  Neurological: A/O x 3, no focal neurological deficits  Psych: Fair mood/affect  Musculoskeletal: LE edema (R>L)  Skin: No obvious rash, lesions. No jaundice.    LABS:                        9.6    6.14  )-----------( 367      ( 18 Aug 2024 07:32 )             30.3     08-18    140  |  101  |  12.4  ----------------------------<  100<H>  3.9   |  24.0  |  0.77    Ca    9.0      18 Aug 2024 07:32  Mg     2.0     08-18    TPro  6.4<L>  /  Alb  2.8<L>  /  TBili  0.4  /  DBili  x   /  AST  15  /  ALT  9   /  AlkPhos  73  08-17    PT/INR - ( 16 Aug 2024 11:36 )   PT: 21.3 sec;   INR: 1.96 ratio         PTT - ( 16 Aug 2024 11:36 )  PTT:35.3 sec      Urinalysis Basic - ( 18 Aug 2024 07:32 )    Color: x / Appearance: x / SG: x / pH: x  Gluc: 100 mg/dL / Ketone: x  / Bili: x / Urobili: x   Blood: x / Protein: x / Nitrite: x   Leuk Esterase: x / RBC: x / WBC x   Sq Epi: x / Non Sq Epi: x / Bacteria: x        Culture - Abscess with Gram Stain (collected 16 Aug 2024 16:56)  Source: .Abscess right subdiaphragmatic collection  Gram Stain (17 Aug 2024 06:48):    Moderate polymorphonuclear leukocytes seen per low power field    Few Gram positive cocci in pairs seen per oil power field    Rare Gram Negative Rods seen per oil power field    Culture - Blood (collected 16 Aug 2024 11:36)  Source: .Blood Blood-Peripheral  Preliminary Report (17 Aug 2024 16:01):    No growth at 24 hours      CAPILLARY BLOOD GLUCOSE            RADIOLOGY & ADDITIONAL TESTS:  Results Reviewed: Y  Imaging Personally Reviewed: N  Electrocardiogram Personally Reviewed: TREASURE

## 2024-08-19 ENCOUNTER — APPOINTMENT (OUTPATIENT)
Dept: ORTHOPEDIC SURGERY | Facility: CLINIC | Age: 69
End: 2024-08-19

## 2024-08-19 LAB
ANION GAP SERPL CALC-SCNC: 12 MMOL/L — SIGNIFICANT CHANGE UP (ref 5–17)
BUN SERPL-MCNC: 15.3 MG/DL — SIGNIFICANT CHANGE UP (ref 8–20)
CALCIUM SERPL-MCNC: 9 MG/DL — SIGNIFICANT CHANGE UP (ref 8.4–10.5)
CHLORIDE SERPL-SCNC: 103 MMOL/L — SIGNIFICANT CHANGE UP (ref 96–108)
CO2 SERPL-SCNC: 25 MMOL/L — SIGNIFICANT CHANGE UP (ref 22–29)
CREAT SERPL-MCNC: 0.8 MG/DL — SIGNIFICANT CHANGE UP (ref 0.5–1.3)
CULTURE RESULTS: ABNORMAL
EGFR: 80 ML/MIN/1.73M2 — SIGNIFICANT CHANGE UP
GLUCOSE SERPL-MCNC: 100 MG/DL — HIGH (ref 70–99)
HCT VFR BLD CALC: 30.9 % — LOW (ref 34.5–45)
HGB BLD-MCNC: 9.5 G/DL — LOW (ref 11.5–15.5)
MCHC RBC-ENTMCNC: 27.7 PG — SIGNIFICANT CHANGE UP (ref 27–34)
MCHC RBC-ENTMCNC: 30.7 GM/DL — LOW (ref 32–36)
MCV RBC AUTO: 90.1 FL — SIGNIFICANT CHANGE UP (ref 80–100)
PLATELET # BLD AUTO: 360 K/UL — SIGNIFICANT CHANGE UP (ref 150–400)
POTASSIUM SERPL-MCNC: 4 MMOL/L — SIGNIFICANT CHANGE UP (ref 3.5–5.3)
POTASSIUM SERPL-SCNC: 4 MMOL/L — SIGNIFICANT CHANGE UP (ref 3.5–5.3)
RBC # BLD: 3.43 M/UL — LOW (ref 3.8–5.2)
RBC # FLD: 14.2 % — SIGNIFICANT CHANGE UP (ref 10.3–14.5)
SODIUM SERPL-SCNC: 140 MMOL/L — SIGNIFICANT CHANGE UP (ref 135–145)
SPECIMEN SOURCE: SIGNIFICANT CHANGE UP
WBC # BLD: 5.46 K/UL — SIGNIFICANT CHANGE UP (ref 3.8–10.5)
WBC # FLD AUTO: 5.46 K/UL — SIGNIFICANT CHANGE UP (ref 3.8–10.5)

## 2024-08-19 PROCEDURE — 99232 SBSQ HOSP IP/OBS MODERATE 35: CPT

## 2024-08-19 RX ORDER — METRONIDAZOLE 250 MG
500 TABLET ORAL EVERY 8 HOURS
Refills: 0 | Status: DISCONTINUED | OUTPATIENT
Start: 2024-08-19 | End: 2024-08-21

## 2024-08-19 RX ORDER — APIXABAN 5 MG/1
5 TABLET, FILM COATED ORAL EVERY 12 HOURS
Refills: 0 | Status: DISCONTINUED | OUTPATIENT
Start: 2024-08-19 | End: 2024-08-20

## 2024-08-19 RX ADMIN — ACETAMINOPHEN 650 MILLIGRAM(S): 325 TABLET ORAL at 11:23

## 2024-08-19 RX ADMIN — Medication 40 MILLIGRAM(S): at 05:23

## 2024-08-19 RX ADMIN — CARVEDILOL 12.5 MILLIGRAM(S): 6.25 TABLET ORAL at 05:22

## 2024-08-19 RX ADMIN — LORATADINE 10 MILLIGRAM(S): 10 CAPSULE, LIQUID FILLED ORAL at 14:09

## 2024-08-19 RX ADMIN — PIPERACILLIN SODIUM AND TAZOBACTAM SODIUM 25 GRAM(S): 3; .375 INJECTION, POWDER, FOR SOLUTION INTRAVENOUS at 14:10

## 2024-08-19 RX ADMIN — Medication 75 MICROGRAM(S): at 05:22

## 2024-08-19 RX ADMIN — CARVEDILOL 12.5 MILLIGRAM(S): 6.25 TABLET ORAL at 17:19

## 2024-08-19 RX ADMIN — VALSARTAN 160 MILLIGRAM(S): 40 TABLET ORAL at 05:22

## 2024-08-19 RX ADMIN — AMLODIPINE BESYLATE 5 MILLIGRAM(S): 10 TABLET ORAL at 05:22

## 2024-08-19 RX ADMIN — APIXABAN 5 MILLIGRAM(S): 5 TABLET, FILM COATED ORAL at 17:19

## 2024-08-19 RX ADMIN — PIPERACILLIN SODIUM AND TAZOBACTAM SODIUM 25 GRAM(S): 3; .375 INJECTION, POWDER, FOR SOLUTION INTRAVENOUS at 05:24

## 2024-08-19 RX ADMIN — Medication 500 MILLIGRAM(S): at 21:29

## 2024-08-19 RX ADMIN — Medication 2000 MILLIGRAM(S): at 19:55

## 2024-08-19 RX ADMIN — ACETAMINOPHEN 650 MILLIGRAM(S): 325 TABLET ORAL at 10:53

## 2024-08-19 NOTE — PROGRESS NOTE ADULT - SUBJECTIVE AND OBJECTIVE BOX
[facility]                                                INFECTIOUS DISEASES  =======================================================                   Ricki Adams#   Cristi Elias MD#    Radha Garrido MD*                           Maxine Ratliff MD*   Cyndie Diez MD*   Nargis Arcos *           Diplomates American Board of Internal Medicine & Infectious Diseases                  # [place] Office - Appt - Tel  [phone] Fax [phone]                * [place] Office - Appt - Tel [phone] Fax [phone]                                  Hospital Consult line:  [phone]  =======================================================      [facility]-[id number]  RANJAN DUMONT   follow up for: intrabdominal abscess  pt feels ok, concerned about recurrence of abscess,  plan for antibiotics, drain management and outpatient f/u going forward  concerned that her hip surgery is delayed due to infection    explained plan for picc and discharge on iv abx, will need outpatient f/u imaging prior to stopping abx  patient seen and examined.       I have personally reviewed the labs and data; pertinent labs and data are listed in this note; please see below.   ===================================================  REVIEW OF SYSTEMS:  CONSTITUTIONAL:  No Fever or chills  HEENT:  No diplopia or blurred vision.  No earache, sore throat or runny nose.  CARDIOVASCULAR:  No pressure, squeezing, strangling, tightness, heaviness or aching about the chest, neck, axilla or epigastrium.  RESPIRATORY:  No cough, shortness of breath  GASTROINTESTINAL:  No nausea, vomiting + diarrhea.  GENITOURINARY:  No dysuria, frequency or urgency. No Blood in urine  MUSCULOSKELETAL:  no joint aches, no muscle pain  SKIN:  No change in skin, hair or nails.  NEUROLOGIC:  No Headaches, seizures or weakness.  PSYCHIATRIC:  No disorder of thought or mood.  ENDOCRINE:  No heat or cold intolerance  HEMATOLOGICAL:  No easy bruising or bleeding.    =======================================================  Allergies    No Known Allergies    Intolerances    Antibiotics:  cefTRIAXone Injectable. 2000 milliGRAM(s) IV Push every 24 hours  metroNIDAZOLE    Tablet 500 milliGRAM(s) Oral every 8 hours    Other medications:  amLODIPine   Tablet 5 milliGRAM(s) Oral daily  apixaban 5 milliGRAM(s) Oral every 12 hours  carvedilol 12.5 milliGRAM(s) Oral every 12 hours  levothyroxine 75 MICROGram(s) Oral daily  loratadine 10 milliGRAM(s) Oral daily  pantoprazole    Tablet 40 milliGRAM(s) Oral before breakfast  valsartan 160 milliGRAM(s) Oral daily    ======================================================  Physical Exam:  ============  T(F): 97.9 (19 Aug 2024 20:03), Max: 98 (19 Aug 2024 09:24)  HR: 77 (19 Aug 2024 20:03)  BP: 107/72 (19 Aug 2024 20:03)  RR: 18 (19 Aug 2024 20:03)  SpO2: 96% (19 Aug 2024 20:03) (94% - 97%)  temp max in last 48H T(F): , Max: 98.2 (08-18-24 @ 18:36)    General:  No acute distress.  Eye: no conjunctival pallor, no scleral icterus    Respiratory: Lungs are clear to auscultation, Respirations are non-labored.  Cardiovascular: Normal rate, Regular rhythm,  s1+s2  Gastrointestinal: Soft, Non-tender, epigastric EDEL serosanguinous Non-distended, Normal bowel sounds.  Genitourinary: No costovertebral angle tenderness.    Neurologic: Alert, Oriented, No focal deficits  Psychiatric: Appropriate mood & affect.  =======================================================  Labs:                        9.5    5.46  )-----------( 360      ( 19 Aug 2024 05:55 )             30.9     08-19    140  |  103  |  15.3  ----------------------------<  100<H>  4.0   |  25.0  |  0.80    Ca    9.0      19 Aug 2024 05:55  Mg     2.0     08-18        Culture - Abscess with Gram Stain (collected 08-16-24 @ 16:56)  Source: .Abscess right subdiaphragmatic collection  Gram Stain (08-17-24 @ 06:48):    Moderate polymorphonuclear leukocytes seen per low power field    Few Gram positive cocci in pairs seen per oil power field    Rare Gram Negative Rods seen per oil power field  Final Report (08-19-24 @ 12:23):    Few Streptococcus constellatus    Numerous Parvimonas micra    "Susceptibilities not performed"    Culture - Urine (collected 08-16-24 @ 12:30)  Source: Clean Catch Clean Catch (Midstream)  Final Report (08-18-24 @ 10:42):    >=3 organisms. Probable collection contamination.    Culture - Blood (collected 08-16-24 @ 11:36)  Source: .Blood Blood-Peripheral  Preliminary Report (08-19-24 @ 16:01):    No growth at 72 Hours    Culture - Abscess with Gram Stain (collected 05-01-24 @ 12:08)  Source: .Abscess right mid-abdominal abscess  Gram Stain (05-07-24 @ 12:44):    Few polymorphonuclear leukocytes per low power field    No organisms seen  Final Report (05-07-24 @ 12:44):    Growth in fluid media only Lactobacillus rhamnosus "Susceptibilities not    performed"    Culture - Abscess with Gram Stain (collected 05-01-24 @ 12:06)  Source: .Abscess prehepatic abscess  Gram Stain (05-03-24 @ 14:03):    Few polymorphonuclear leukocytes per low power field    No organisms seen  Final Report (05-06-24 @ 08:13):    Rare Streptococcus anginosus "Susceptibilities not performed"    Culture - Abscess with Gram Stain (collected 05-01-24 @ 12:04)  Source: .Abscess right subphrenic abscess  Gram Stain (05-03-24 @ 17:04):    Moderate polymorphonuclear leukocytes per low power field    No organisms seen  Final Report (05-06-24 @ 17:34):    Rare Alpha hemolytic strep    "Susceptibilities not performed"    Culture - Blood (collected 04-24-24 @ 17:10)  Source: .Blood Blood  Final Report (04-29-24 @ 22:00):    No growth at 5 days    Culture - Blood (collected 04-24-24 @ 17:00)  Source: .Blood Blood  Final Report (04-29-24 @ 22:00):    No growth at 5 days    Culture - Urine (collected 04-24-24 @ 16:50)  Source: Clean Catch Clean Catch (Midstream)  Final Report (04-26-24 @ 06:53):    <10,000 CFU/mL Normal Urogenital Tanya

## 2024-08-19 NOTE — PROGRESS NOTE ADULT - SUBJECTIVE AND OBJECTIVE BOX
Patient is a 68y old  Female who presents with a chief complaint of Fever (18 Aug 2024 10:20)      INTERVAL HPI/OVERNIGHT EVENTS:  - No acute overnight events    TODAY:  - Patient examined bedside, no complaints. Patient denies CP, SOB, fever, nausea, vomiting    MEDICATIONS  (STANDING):  amLODIPine   Tablet 5 milliGRAM(s) Oral daily  carvedilol 12.5 milliGRAM(s) Oral every 12 hours  levothyroxine 75 MICROGram(s) Oral daily  loratadine 10 milliGRAM(s) Oral daily  pantoprazole    Tablet 40 milliGRAM(s) Oral before breakfast  piperacillin/tazobactam IVPB.. 3.375 Gram(s) IV Intermittent every 8 hours  valsartan 160 milliGRAM(s) Oral daily    MEDICATIONS  (PRN):  acetaminophen     Tablet .. 650 milliGRAM(s) Oral every 6 hours PRN Temp greater or equal to 38C (100.4F), Mild Pain (1 - 3)  aluminum hydroxide/magnesium hydroxide/simethicone Suspension 30 milliLiter(s) Oral every 4 hours PRN Dyspepsia  melatonin 3 milliGRAM(s) Oral at bedtime PRN Insomnia  ondansetron Injectable 4 milliGRAM(s) IV Push every 8 hours PRN Nausea and/or Vomiting      Allergies    No Known Allergies    Intolerances        REVIEW OF SYSTEMS:  as above      Vital Signs Last 24 Hrs  T(C): 36.4 (19 Aug 2024 05:34), Max: 36.8 (18 Aug 2024 18:36)  T(F): 97.5 (19 Aug 2024 05:34), Max: 98.2 (18 Aug 2024 18:36)  HR: 76 (19 Aug 2024 05:34) (73 - 85)  BP: 129/76 (19 Aug 2024 05:34) (99/64 - 129/76)  BP(mean): 76 (18 Aug 2024 20:59) (76 - 76)  RR: 18 (19 Aug 2024 05:34) (17 - 19)  SpO2: 94% (19 Aug 2024 05:34) (94% - 99%)    Parameters below as of 19 Aug 2024 05:34  Patient On (Oxygen Delivery Method): room air        PHYSICAL EXAM:  GENERAL: Not in acute distress, lying comfortably  HEAD:  Atraumatic, Normocephalic  EYES: EOMI, PERRLA, conjunctiva and sclera clear  NECK: Supple, No JVD, Normal thyroid  NERVOUS SYSTEM:  Alert & Oriented X3, No gross focal deficits  CHEST/LUNG: Clear to auscultation bilaterally; No rales, rhonchi, wheezing, or rubs  HEART: Regular rate and rhythm; No murmurs, rubs, or gallops  ABDOMEN: Soft, Nontender, Nondistended; Bowel sounds present  EXTREMITIES:  No clubbing, cyanosis, or edema  SKIN: No rashes or lesions    LABS:                        9.5    5.46  )-----------( 360      ( 19 Aug 2024 05:55 )             30.9     08-18    140  |  101  |  12.4  ----------------------------<  100<H>  3.9   |  24.0  |  0.77    Ca    9.0      18 Aug 2024 07:32  Mg     2.0     08-18        Urinalysis Basic - ( 18 Aug 2024 07:32 )    Color: x / Appearance: x / SG: x / pH: x  Gluc: 100 mg/dL / Ketone: x  / Bili: x / Urobili: x   Blood: x / Protein: x / Nitrite: x   Leuk Esterase: x / RBC: x / WBC x   Sq Epi: x / Non Sq Epi: x / Bacteria: x      CAPILLARY BLOOD GLUCOSE          RADIOLOGY & ADDITIONAL TESTS:    Imaging Personally Reviewed:  [X] YES  [ ] NO    Consultant(s) Notes Reviewed:  [X] YES  [ ] NO    Care Discussed with Consultants/Other Providers [X] YES  [ ] NO    Plan of Care discussed with House Staff: [X]YES [ ] NO

## 2024-08-19 NOTE — PROGRESS NOTE ADULT - ATTENDING COMMENTS
67 y/o F here for Sepsis POA 2/2 Intra-abdominal abscess  #Sepsis POA 2/2 Intra-abdominal Abscess  #Hx of HTN  #Hx of Perforated Gastric Ulcer  c/w drain - test cytopathology  c/w abx  f/u ID  f/u US Doppler of Liver for Thromboembolism pressures  Dispo: Likely home tomorrow 67 y/o F here for Sepsis POA 2/2 Intra-abdominal abscess  #Sepsis POA 2/2 Intra-abdominal Abscess  #Hx of HTN  #Hx of Perforated Gastric Ulcer  c/w drain - test cytopathology  c/w abx  f/u ID  f/u US Doppler of Liver for Thromboembolism pressures    Dispo: Likely home tomorrow

## 2024-08-20 ENCOUNTER — TRANSCRIPTION ENCOUNTER (OUTPATIENT)
Age: 69
End: 2024-08-20

## 2024-08-20 LAB
ALBUMIN SERPL ELPH-MCNC: 2.9 G/DL — LOW (ref 3.3–5.2)
ALP SERPL-CCNC: 69 U/L — SIGNIFICANT CHANGE UP (ref 40–120)
ALT FLD-CCNC: 10 U/L — SIGNIFICANT CHANGE UP
ANION GAP SERPL CALC-SCNC: 12 MMOL/L — SIGNIFICANT CHANGE UP (ref 5–17)
AST SERPL-CCNC: 17 U/L — SIGNIFICANT CHANGE UP
BILIRUB SERPL-MCNC: 0.2 MG/DL — LOW (ref 0.4–2)
BUN SERPL-MCNC: 14.3 MG/DL — SIGNIFICANT CHANGE UP (ref 8–20)
CALCIUM SERPL-MCNC: 8.6 MG/DL — SIGNIFICANT CHANGE UP (ref 8.4–10.5)
CHLORIDE SERPL-SCNC: 105 MMOL/L — SIGNIFICANT CHANGE UP (ref 96–108)
CO2 SERPL-SCNC: 24 MMOL/L — SIGNIFICANT CHANGE UP (ref 22–29)
CREAT SERPL-MCNC: 0.67 MG/DL — SIGNIFICANT CHANGE UP (ref 0.5–1.3)
EGFR: 95 ML/MIN/1.73M2 — SIGNIFICANT CHANGE UP
GLUCOSE SERPL-MCNC: 97 MG/DL — SIGNIFICANT CHANGE UP (ref 70–99)
HCT VFR BLD CALC: 29.3 % — LOW (ref 34.5–45)
HGB BLD-MCNC: 9.2 G/DL — LOW (ref 11.5–15.5)
MAGNESIUM SERPL-MCNC: 2.1 MG/DL — SIGNIFICANT CHANGE UP (ref 1.6–2.6)
MCHC RBC-ENTMCNC: 28.2 PG — SIGNIFICANT CHANGE UP (ref 27–34)
MCHC RBC-ENTMCNC: 31.4 GM/DL — LOW (ref 32–36)
MCV RBC AUTO: 89.9 FL — SIGNIFICANT CHANGE UP (ref 80–100)
PLATELET # BLD AUTO: 356 K/UL — SIGNIFICANT CHANGE UP (ref 150–400)
POTASSIUM SERPL-MCNC: 3.8 MMOL/L — SIGNIFICANT CHANGE UP (ref 3.5–5.3)
POTASSIUM SERPL-SCNC: 3.8 MMOL/L — SIGNIFICANT CHANGE UP (ref 3.5–5.3)
PROT SERPL-MCNC: 6.2 G/DL — LOW (ref 6.6–8.7)
RBC # BLD: 3.26 M/UL — LOW (ref 3.8–5.2)
RBC # FLD: 14.1 % — SIGNIFICANT CHANGE UP (ref 10.3–14.5)
SODIUM SERPL-SCNC: 141 MMOL/L — SIGNIFICANT CHANGE UP (ref 135–145)
WBC # BLD: 5.39 K/UL — SIGNIFICANT CHANGE UP (ref 3.8–10.5)
WBC # FLD AUTO: 5.39 K/UL — SIGNIFICANT CHANGE UP (ref 3.8–10.5)

## 2024-08-20 PROCEDURE — 76942 ECHO GUIDE FOR BIOPSY: CPT | Mod: 26,59

## 2024-08-20 PROCEDURE — 71045 X-RAY EXAM CHEST 1 VIEW: CPT | Mod: 26

## 2024-08-20 PROCEDURE — 99232 SBSQ HOSP IP/OBS MODERATE 35: CPT

## 2024-08-20 PROCEDURE — 88305 TISSUE EXAM BY PATHOLOGIST: CPT | Mod: 26

## 2024-08-20 PROCEDURE — 76937 US GUIDE VASCULAR ACCESS: CPT | Mod: 26,59

## 2024-08-20 RX ORDER — METRONIDAZOLE 250 MG
1 TABLET ORAL
Qty: 75 | Refills: 0
Start: 2024-08-20 | End: 2024-09-13

## 2024-08-20 RX ORDER — L.ACID,PARA/B.BIFIDUM/S.THERM 8B CELL
1 CAPSULE ORAL
Qty: 7 | Refills: 0
Start: 2024-08-20 | End: 2024-08-26

## 2024-08-20 RX ORDER — CHLORHEXIDINE GLUCONATE 40 MG/ML
1 SOLUTION TOPICAL
Refills: 0 | Status: DISCONTINUED | OUTPATIENT
Start: 2024-08-20 | End: 2024-08-21

## 2024-08-20 RX ORDER — SODIUM CHLORIDE 9 MG/ML
10 INJECTION INTRAMUSCULAR; INTRAVENOUS; SUBCUTANEOUS
Refills: 0 | Status: DISCONTINUED | OUTPATIENT
Start: 2024-08-20 | End: 2024-08-21

## 2024-08-20 RX ORDER — L.ACID,PARA/B.BIFIDUM/S.THERM 8B CELL
1 CAPSULE ORAL DAILY
Refills: 0 | Status: DISCONTINUED | OUTPATIENT
Start: 2024-08-20 | End: 2024-08-21

## 2024-08-20 RX ADMIN — ACETAMINOPHEN 650 MILLIGRAM(S): 325 TABLET ORAL at 16:55

## 2024-08-20 RX ADMIN — Medication 1 TABLET(S): at 13:06

## 2024-08-20 RX ADMIN — LORATADINE 10 MILLIGRAM(S): 10 CAPSULE, LIQUID FILLED ORAL at 13:04

## 2024-08-20 RX ADMIN — CHLORHEXIDINE GLUCONATE 1 APPLICATION(S): 40 SOLUTION TOPICAL at 13:59

## 2024-08-20 RX ADMIN — Medication 40 MILLIGRAM(S): at 05:45

## 2024-08-20 RX ADMIN — VALSARTAN 160 MILLIGRAM(S): 40 TABLET ORAL at 05:42

## 2024-08-20 RX ADMIN — APIXABAN 5 MILLIGRAM(S): 5 TABLET, FILM COATED ORAL at 05:41

## 2024-08-20 RX ADMIN — CARVEDILOL 12.5 MILLIGRAM(S): 6.25 TABLET ORAL at 05:41

## 2024-08-20 RX ADMIN — Medication 2000 MILLIGRAM(S): at 16:27

## 2024-08-20 RX ADMIN — ACETAMINOPHEN 650 MILLIGRAM(S): 325 TABLET ORAL at 16:25

## 2024-08-20 RX ADMIN — CARVEDILOL 12.5 MILLIGRAM(S): 6.25 TABLET ORAL at 17:18

## 2024-08-20 RX ADMIN — AMLODIPINE BESYLATE 5 MILLIGRAM(S): 10 TABLET ORAL at 05:41

## 2024-08-20 RX ADMIN — Medication 500 MILLIGRAM(S): at 13:05

## 2024-08-20 RX ADMIN — Medication 75 MICROGRAM(S): at 05:42

## 2024-08-20 RX ADMIN — Medication 500 MILLIGRAM(S): at 21:02

## 2024-08-20 RX ADMIN — Medication 500 MILLIGRAM(S): at 05:42

## 2024-08-20 NOTE — DISCHARGE NOTE PROVIDER - NSDCFUSCHEDAPPT_GEN_ALL_CORE_FT
Doctor, Unknown  [facility]  Scheduled Appointment: 08/20/2024    Mya Peguero  [facility]  FAMILYMED [address]  Scheduled Appointment: 08/21/2024    Monica Beasley  [facility]  OBGYNGEN [address]  Scheduled Appointment: 08/28/2024    Bubba Hurd  [facility]  GASTRO [address]  Scheduled Appointment: 09/05/2024    [facility]  PULED [address]  Scheduled Appointment: 10/21/2024    Henry Garcia  [facility]  PULED [address]  Scheduled Appointment: 10/21/2024     Mya Peguero  [facility]  FAMILYMED [address]  Scheduled Appointment: 08/21/2024    Monica Beasley  [facility]  OBGYNGEN [address]  Scheduled Appointment: 08/28/2024    Bubba Hurd  [facility]  GASTRO [address]  Scheduled Appointment: 09/05/2024    [facility]  PULMMED [address]  Scheduled Appointment: 10/21/2024    Henry Garcia  [facility]  PULMMED [address]  Scheduled Appointment: 10/21/2024     Monica Bealsey  [facility]  OBGYNGEN [address] [facility]  Scheduled Appointment: 08/28/2024    Bubba Hurd  [facility]  GASTRO [address]  Scheduled Appointment: 09/05/2024    [facility] [address]  Scheduled Appointment: 10/21/2024    Henry Garcia  [facility] [address]  Scheduled Appointment: 10/21/2024

## 2024-08-20 NOTE — CONSULT NOTE ADULT - SUBJECTIVE AND OBJECTIVE BOX
HPI:  Patient is a 67yo F w/ PMH of HTN, HLD, OA, Hypothyroidism. She was managed for a Perforated gastric ulcer which was complicated by intraabdominal abscess in 2024. She was admitted to the medical service on  for sepsis 2/2 subphrenic abscess. She went to IR for drainage on that day. Since then she has been improving. Denies abdominal pain, chest pain, fever/chills, shortness of breath, nausea, vomiting, diarrhea, headache. Tolerating diet.       PAST MEDICAL & SURGICAL HISTORY:  Hypertension      Hypothyroid      ARSLAN on CPAP      RBBB      Morbid obesity      Perforated chronic gastric ulcer      Unilateral osteoarthritis of hip, right      H/O:  section      H/O cataract extraction      H/O dilation and curettage      S/P left rotator cuff repair      Perforated gastric ulcer          Home Medications:  amlodipine-valsartan 5 mg-160 mg oral tablet: 1 tab(s) orally once a day (16 Aug 2024 14:06)  azelastine 137 mcg/inh (0.1%) nasal spray: 1 spray(s) in each nostril once a day (16 Aug 2024 14:06)  carvedilol 12.5 mg oral tablet: 1 tab(s) orally 2 times a day (16 Aug 2024 14:06)  clobetasol 0.05% topical cream: Apply topically to affected area (16 Aug 2024 14:06)  levothyroxine 75 mcg (0.075 mg) oral tablet: 1 tab(s) orally once a day (16 Aug 2024 14:06)  Tylenol 500 mg oral tablet: 2 tab(s) orally every 6 hours (16 Aug 2024 14:06)  Vitamin D3 50 mcg (2000 intl units) oral tablet: 1 tab(s) orally once a day (16 Aug 2024 14:06)  ZyrTEC 10 mg oral tablet: 1 tab(s) orally once a day (16 Aug 2024 14:06)      Review of Systems: All negative except where noted in HPI    MEDICATIONS  (STANDING):  amLODIPine   Tablet 5 milliGRAM(s) Oral daily  carvedilol 12.5 milliGRAM(s) Oral every 12 hours  cefTRIAXone Injectable. 2000 milliGRAM(s) IV Push every 24 hours  lactobacillus acidophilus 1 Tablet(s) Oral daily  levothyroxine 75 MICROGram(s) Oral daily  loratadine 10 milliGRAM(s) Oral daily  metroNIDAZOLE    Tablet 500 milliGRAM(s) Oral every 8 hours  pantoprazole    Tablet 40 milliGRAM(s) Oral before breakfast  valsartan 160 milliGRAM(s) Oral daily    MEDICATIONS  (PRN):  acetaminophen     Tablet .. 650 milliGRAM(s) Oral every 6 hours PRN Temp greater or equal to 38C (100.4F), Mild Pain (1 - 3)  aluminum hydroxide/magnesium hydroxide/simethicone Suspension 30 milliLiter(s) Oral every 4 hours PRN Dyspepsia  melatonin 3 milliGRAM(s) Oral at bedtime PRN Insomnia  ondansetron Injectable 4 milliGRAM(s) IV Push every 8 hours PRN Nausea and/or Vomiting      SOCIAL HISTORY:      Vital Signs Last 24 Hrs  T(C): 36.3 (20 Aug 2024 09:00), Max: 36.6 (19 Aug 2024 20:03)  T(F): 97.4 (20 Aug 2024 09:00), Max: 97.9 (19 Aug 2024 20:03)  HR: 77 (20 Aug 2024 09:00) (73 - 78)  BP: 104/64 (20 Aug 2024 09:00) (102/64 - 137/79)  BP(mean): --  RR: 18 (20 Aug 2024 09:00) (18 - 18)  SpO2: 95% (20 Aug 2024 09:00) (95% - 97%)    Parameters below as of 20 Aug 2024 09:00  Patient On (Oxygen Delivery Method): room air        Physical Exam:    General: NAD  HEENT: PERRL, EOMI  Neck: No JVD, FROM without pain  Pulm: Respirations non labored, no accessory muscle use  Abdomen: Obese, soft, NT/ND, without rebound or guarding, IR drain in place collecting SS fluid  Neuro: Alert and oriented x3, no focal deficits      LABS:                        9.2    5.39  )-----------( 356      ( 20 Aug 2024 04:35 )             29.3     08-20    141  |  105  |  14.3  ----------------------------<  97  3.8   |  24.0  |  0.67    Ca    8.6      20 Aug 2024 04:35  Mg     2.1     08-20    TPro  6.2<L>  /  Alb  2.9<L>  /  TBili  0.2<L>  /  DBili  x   /  AST  17  /  ALT  10  /  AlkPhos  69        Urinalysis Basic - ( 20 Aug 2024 04:35 )    Color: x / Appearance: x / SG: x / pH: x  Gluc: 97 mg/dL / Ketone: x  / Bili: x / Urobili: x   Blood: x / Protein: x / Nitrite: x   Leuk Esterase: x / RBC: x / WBC x   Sq Epi: x / Non Sq Epi: x / Bacteria: x        RADIOLOGY & ADDITIONAL STUDIES    < from: CT Abdomen and Pelvis w/ IV Cont (24 @ 12:21) >  ACC: [id number] EXAM:  CT ABDOMEN AND PELVIS IC   ORDERED BY: NALINI LOWE     PROCEDURE DATE:  2024          INTERPRETATION:  CLINICAL INFORMATION: Abdominal abscess    COMPARISON: CT scan of the abdomen and pelvis from 2024    CONTRAST/COMPLICATIONS:  IV Contrast: Omnipaque 350  90 cc administered   10 cc discarded  Oral Contrast: NONE  Complications: None reported at time of study completion    PROCEDURE:  CT of the Abdomen and Pelvis was performed.  Sagittal and coronal reformats were performed.    FINDINGS:  LOWER CHEST: Mild to moderate right-sided pleural effusion and   atelectatic changes at the right lung base.    LIVER: Hepatic cysts. Hepatic calcification at the dome suggesting a   granuloma. Previously described partial thrombosis in a branch of the   right portal vein in segment 6 of the liver is again suspected on series   3 image 61, and very subtle.  BILE DUCTS: Normal caliber.  GALLBLADDER: Layering small gallstones without focal inflammation.  SPLEEN: Within normallimits.  PANCREAS: Within normal limits.  ADRENALS: Within normal limits.  KIDNEYS/URETERS: Fullness in the right extrarenal pelvis is unchanged.    BLADDER: Within normal limits.  REPRODUCTIVE ORGANS: Probable exophytic leiomyoma extending off the lower   uterine segment anteriorly. This does not appear significantly changed.    BOWEL: No bowel obstruction. Appendix grossly unremarkable.  PERITONEUM/RETROPERITONEUM: Large heterogeneous right subphrenic   collection measuring 10.8 x 8.9 cm, increased from 7.9 x 7.1 cm. This is   concerning for an.  VESSELS: Vascular calcifications.  LYMPH NODES: No lymphadenopathy.  ABDOMINAL WALL: Small umbilical hernia containing fat.  BONES: Degenerative changes of bone.    IMPRESSION:  Large subphrenic heterogeneous collection resembling an abscess has   increased in size when compared with 2024. Moderate right pleural   effusion which is presumably reactive.    Previously described partial thrombosis of a branch of the right portal   vein is subtle and still present.        --- End of Report ---            JYOTI CARLISLE MD; Attending Radiologist  This document has been electronically signed. Aug 16 2024  3:01PM    < end of copied text >      Impression and Plan:  68F with history of gastric perforation s/p josé manuel patch. Now being managed for subphrenic abscess. Clinically well. Daily drainage decreasing.   -Continue current plan of care  -Follow up as outpatient in [facility] for continued care  -Pt may require more imaging in the outpatient setting as part of her plan of care. She is agreeable  -No further intervention recommended at this time.   -DC as per primary team    Plan to be discussed with  
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 Recorded temperatures during the past 3 days range from 95 to 101.2F sublingually. Endorses having sweats after taking tylenol, chronic cough from nasal drip, and fevers/chills. Denies nausea, vomiting, diarrhea, abdominal pain, dysuria, SOB, CP.  AS ABOVE WAS SCHEDULED FOR ELECTIVE ASPIRATION OF FLUID COLLECTON ADN WAS SEEN BY PCP FOR PREOP CLEARANCE THIS AM   NOW WITH FEVERS   ASKED TO EVALUATE FROM ID STANDPOINT       PAST MEDICAL & SURGICAL HISTORY:  Hypertension      Hypothyroid      ARSLAN on CPAP      RBBB      Morbid obesity      Perforated chronic gastric ulcer      Unilateral osteoarthritis of hip, right      H/O:  section      H/O cataract extraction      H/O dilation and curettage      S/P left rotator cuff repair      Perforated gastric ulcer          ANTIBIOTICS      Allergies    No Known Allergies    Intolerances        SOCIAL HISTORY:     FAMILY HX   FAMILY HISTORY:  FH: HTN (hypertension)    FH: emphysema    FH: CHF (congestive heart failure)        Vital Signs Last 24 Hrs  T(C): 38.3 (16 Aug 2024 11:14), Max: 38.3 (16 Aug 2024 11:14)  T(F): 101 (16 Aug 2024 11:14), Max: 101 (16 Aug 2024 11:14)  HR: 94 (16 Aug 2024 10:) (94 - 94)  BP: 135/80 (16 Aug 2024 10:23) (135/80 - 135/80)  BP(mean): --  RR: 18 (16 Aug 2024 10:) (18 - 18)  SpO2: 98% (16 Aug 2024 10:) (98% - 98%)    Parameters below as of 16 Aug 2024 10:23  Patient On (Oxygen Delivery Method): room air      Drug Dosing Weight  Height (cm): 157.5 (16 Aug 2024 10:)  Weight (kg): 107.6 (16 Aug 2024 10:)  BMI (kg/m2): 43.4 (16 Aug 2024 10:23)  BSA (m2): 2.06 (16 Aug 2024 10:)      REVIEW OF SYSTEMS:    CONSTITUTIONAL:  As per HPI.    HEENT:  Eyes:  No diplopia or blurred vision. ENT:  No earache, sore throat or runny nose.    CARDIOVASCULAR:  No pressure, squeezing, strangling, tightness, heaviness or aching about the chest, neck, axilla or epigastrium.    RESPIRATORY:  No cough, shortness of breath, PND or orthopnea.    GASTROINTESTINAL:  No nausea, vomiting or diarrhea.    GENITOURINARY:  No dysuria, frequency or urgency.    MUSCULOSKELETAL:  As per HPI.    SKIN:  No change in skin, hair or nails.    NEUROLOGIC:  No paresthesias, fasciculations, seizures or weakness.                  PHYSICAL EXAMINATION:    GENERAL: The patient is a _____in no apparent distress. ___     VITAL SIGNS: T(C): 38.3 (24 @ 11:14), Max: 38.3 (24 @ 11:14)  HR: 94 (24 @ 10:23) (94 - 94)  BP: 135/80 (24 @ 10:23) (135/80 - 135/80)  RR: 18 (24 @ 10:23) (18 - 18)  SpO2: 98% (24 @ 10:23) (98% - 98%)  Wt(kg): --    HEENT: Head is normocephalic and atraumatic.  ANICTERIC  NECK: Supple. No carotid bruits.  No lymphadenopathy or thyromegaly.    LUNGS:COARSE BREATH SOUNDS    HEART: Regular rate and rhythm without murmur.    ABDOMEN: Soft, nontender, and nondistended.  Positive bowel sounds.  No hepatosplenomegaly was noted. NO REBOUND NO GUARDING    EXTREMITIES: NO EDEMA NO ERYTHEMA    NEUROLOGIC: NON FOCAL      SKIN: No ulceration or induration present. NO RASH        BLOOD CULTURES       URINE CX          LABS:                        9.7    12.00 )-----------( 337      ( 16 Aug 2024 11:36 )             29.6     08-16    139  |  100  |  14.4  ----------------------------<  97  4.5   |  26.0  |  0.77    Ca    9.0      16 Aug 2024 11:36    TPro  7.0  /  Alb  3.3  /  TBili  0.5  /  DBili  x   /  AST  14  /  ALT  10  /  AlkPhos  80  08-16    PT/INR - ( 16 Aug 2024 11:36 )   PT: 21.3 sec;   INR: 1.96 ratio         PTT - ( 16 Aug 2024 11:36 )  PTT:35.3 sec  Urinalysis Basic - ( 16 Aug 2024 11:36 )    Color: x / Appearance: x / SG: x / pH: x  Gluc: 97 mg/dL / Ketone: x  / Bili: x / Urobili: x   Blood: x / Protein: x / Nitrite: x   Leuk Esterase: x / RBC: x / WBC x   Sq Epi: x / Non Sq Epi: x / Bacteria: x        RADIOLOGY & ADDITIONAL STUDIES:      ASSESSMENT/PLAN    68F with PMHx of htn, hld, osteoarthritis, hypothyroidism, osteoarthritis with R hip pain, gastric ulcer s/p repair c/b intraabdominal abscess (2024) and 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 Recorded temperatures during the past 3 days range from 95 to 101.2F sublingually. Endorses having sweats after taking tylenol, chronic cough from nasal drip, and fevers/chills. Denies nausea, vomiting, diarrhea, abdominal pain, dysuria, SOB, CP.  AS ABOVE WAS SCHEDULED FOR ELECTIVE ASPIRATION OF FLUID COLLECTION AND  WAS SEEN BY PCP FOR PREOP CLEARANCE THIS AM   NOW WITH FEVERS   PT WAS ORDINALLY SEEN BY MED IN [place] BECAUSE [facility] WANTED ID CLEARANCE FOR HIP SURGERY BOOD CX WERE NEGATIVE AS OUTPT  CT SCAN WAS REPEATED AND HAD INCREASED SIZE OF COLLECTION WAS SEEN BY [facility] IN SURGERY CLINIC WHO ARRANGED FOR OUTP ASPIRATION  AT THAT TIME BUT WITH NO FEVERS  WILL PLAN BLOOD CX  IR CALLED AND  WILL PLAN ON ASPIRATION TODAY  HOLD ALL ABX UNTIL AFTER ASPIRATION PERFORMED  SPOKE TO ER AND HOSPITALIST AND  AT BEDSIDE  WILL FOLLOWUP                 LUBA STRICKLAND MD

## 2024-08-20 NOTE — PROGRESS NOTE ADULT - TIME BILLING
Time spent reviewing the chart documentation, reviewing labs and imaging studies, evaluating the patient, discussing the plan of care with the consultants & medical team, and documenting.
Time spent reviewing the chart documentation, reviewing labs and imaging studies, evaluating the patient, discussing the plan of care with the consultants & medical team, and documenting.
Chart/notes review, interpretation of laboratory and imaging results,  patient assessment, documentation  Review of microbiologic data
Time spent reviewing the chart documentation, reviewing labs and imaging studies, evaluating the patient, discussing the plan of care with the medical team, and documenting.
chart review, patient eval, review of cultures and adjusting antibiotics with [facility]
Time spent reviewing the chart documentation, reviewing labs and imaging studies, evaluating the patient, discussing the plan of care with the consultants & medical team, and documenting.

## 2024-08-20 NOTE — DISCHARGE NOTE PROVIDER - PROVIDER TOKENS
PROVIDER:[TOKEN:[[id number]],FOLLOWUP:[2 weeks]] PROVIDER:[TOKEN:[[id number]],FOLLOWUP:[2 weeks]],PROVIDER:[TOKEN:[[id number]],FOLLOWUP:[1 week],ESTABLISHEDPATIENT:[T]]

## 2024-08-20 NOTE — PROGRESS NOTE ADULT - SUBJECTIVE AND OBJECTIVE BOX
Patient is a 68y old  Female who presents with a chief complaint of Fever (19 Aug 2024 17:18)      INTERVAL HPI/OVERNIGHT EVENTS:  - No acute overnight events    TODAY:  - Patient examined bedside, no complaints. Patient denies CP, SOB, fever, nausea, vomiting    MEDICATIONS  (STANDING):  amLODIPine   Tablet 5 milliGRAM(s) Oral daily  apixaban 5 milliGRAM(s) Oral every 12 hours  carvedilol 12.5 milliGRAM(s) Oral every 12 hours  cefTRIAXone Injectable. 2000 milliGRAM(s) IV Push every 24 hours  levothyroxine 75 MICROGram(s) Oral daily  loratadine 10 milliGRAM(s) Oral daily  metroNIDAZOLE    Tablet 500 milliGRAM(s) Oral every 8 hours  pantoprazole    Tablet 40 milliGRAM(s) Oral before breakfast  valsartan 160 milliGRAM(s) Oral daily    MEDICATIONS  (PRN):  acetaminophen     Tablet .. 650 milliGRAM(s) Oral every 6 hours PRN Temp greater or equal to 38C (100.4F), Mild Pain (1 - 3)  aluminum hydroxide/magnesium hydroxide/simethicone Suspension 30 milliLiter(s) Oral every 4 hours PRN Dyspepsia  melatonin 3 milliGRAM(s) Oral at bedtime PRN Insomnia  ondansetron Injectable 4 milliGRAM(s) IV Push every 8 hours PRN Nausea and/or Vomiting      Allergies    No Known Allergies    Intolerances        REVIEW OF SYSTEMS:  as above      Vital Signs Last 24 Hrs  T(C): 36.4 (20 Aug 2024 04:38), Max: 36.7 (19 Aug 2024 09:24)  T(F): 97.5 (20 Aug 2024 04:38), Max: 98 (19 Aug 2024 09:24)  HR: 78 (20 Aug 2024 04:38) (73 - 78)  BP: 137/79 (20 Aug 2024 04:38) (98/68 - 137/79)  BP(mean): --  RR: 18 (20 Aug 2024 04:38) (18 - 19)  SpO2: 95% (20 Aug 2024 04:38) (95% - 97%)    Parameters below as of 20 Aug 2024 04:38  Patient On (Oxygen Delivery Method): room air        PHYSICAL EXAM:  GENERAL: Not in acute distress, lying comfortably  HEAD:  Atraumatic, Normocephalic  EYES: EOMI, PERRLA, conjunctiva and sclera clear  NECK: Supple, No JVD, Normal thyroid  NERVOUS SYSTEM:  Alert & Oriented X3, No gross focal deficits  CHEST/LUNG: Clear to auscultation bilaterally; No rales, rhonchi, wheezing, or rubs  HEART: Regular rate and rhythm; No murmurs, rubs, or gallops  ABDOMEN: Soft, Nontender, Nondistended; Bowel sounds present  EXTREMITIES:  No clubbing, cyanosis, or edema  SKIN: No rashes or lesions    LABS:                        9.2    5.39  )-----------( 356      ( 20 Aug 2024 04:35 )             29.3     08-20    141  |  105  |  14.3  ----------------------------<  97  3.8   |  24.0  |  0.67    Ca    8.6      20 Aug 2024 04:35  Mg     2.1     08-20    TPro  6.2<L>  /  Alb  2.9<L>  /  TBili  0.2<L>  /  DBili  x   /  AST  17  /  ALT  10  /  AlkPhos  69  08-20      Urinalysis Basic - ( 20 Aug 2024 04:35 )    Color: x / Appearance: x / SG: x / pH: x  Gluc: 97 mg/dL / Ketone: x  / Bili: x / Urobili: x   Blood: x / Protein: x / Nitrite: x   Leuk Esterase: x / RBC: x / WBC x   Sq Epi: x / Non Sq Epi: x / Bacteria: x      CAPILLARY BLOOD GLUCOSE          RADIOLOGY & ADDITIONAL TESTS:    Imaging Personally Reviewed:  [X] YES  [ ] NO    Consultant(s) Notes Reviewed:  [X] YES  [ ] NO    Care Discussed with Consultants/Other Providers [X] YES  [ ] NO    Plan of Care discussed with House Staff: [X]YES [ ] NO Patient is a 68y old  Female who presents with a chief complaint of Fever (19 Aug 2024 17:18)      INTERVAL HPI/OVERNIGHT EVENTS:  - No acute overnight events    TODAY:  - Patient examined bedside.  - Patient experienced 3 episodes of diarrhea in the past 24 hours, no blood in stools  - No pain or discomfort noted except around drain site  - Patient is tolerating solid foods, no abdominal pain or bloating noted  - Patient denies CP, SOB, fever, nausea, vomiting    MEDICATIONS  (STANDING):  amLODIPine   Tablet 5 milliGRAM(s) Oral daily  apixaban 5 milliGRAM(s) Oral every 12 hours  carvedilol 12.5 milliGRAM(s) Oral every 12 hours  cefTRIAXone Injectable. 2000 milliGRAM(s) IV Push every 24 hours  levothyroxine 75 MICROGram(s) Oral daily  loratadine 10 milliGRAM(s) Oral daily  metroNIDAZOLE    Tablet 500 milliGRAM(s) Oral every 8 hours  pantoprazole    Tablet 40 milliGRAM(s) Oral before breakfast  valsartan 160 milliGRAM(s) Oral daily    MEDICATIONS  (PRN):  acetaminophen     Tablet .. 650 milliGRAM(s) Oral every 6 hours PRN Temp greater or equal to 38C (100.4F), Mild Pain (1 - 3)  aluminum hydroxide/magnesium hydroxide/simethicone Suspension 30 milliLiter(s) Oral every 4 hours PRN Dyspepsia  melatonin 3 milliGRAM(s) Oral at bedtime PRN Insomnia  ondansetron Injectable 4 milliGRAM(s) IV Push every 8 hours PRN Nausea and/or Vomiting      Allergies    No Known Allergies    Intolerances        REVIEW OF SYSTEMS:  as above      Vital Signs Last 24 Hrs  T(C): 36.4 (20 Aug 2024 04:38), Max: 36.7 (19 Aug 2024 09:24)  T(F): 97.5 (20 Aug 2024 04:38), Max: 98 (19 Aug 2024 09:24)  HR: 78 (20 Aug 2024 04:38) (73 - 78)  BP: 137/79 (20 Aug 2024 04:38) (98/68 - 137/79)  BP(mean): --  RR: 18 (20 Aug 2024 04:38) (18 - 19)  SpO2: 95% (20 Aug 2024 04:38) (95% - 97%)    Parameters below as of 20 Aug 2024 04:38  Patient On (Oxygen Delivery Method): room air        PHYSICAL EXAM:  GENERAL: Not in acute distress, lying comfortably, obese habitus, tearful regarding current medical issues and concerned they may recur  HEAD:  Atraumatic, Normocephalic  EYES: EOMI, PERRLA, conjunctiva and sclera clear  NECK: Supple, No JVD, Normal thyroid  NERVOUS SYSTEM:  Alert & Oriented X3, No gross focal deficits  CHEST/LUNG: Clear to auscultation bilaterally; No rales, rhonchi, wheezing, or rubs  HEART: Regular rate and rhythm; No murmurs, rubs, or gallops  ABDOMEN: Soft, Nontender, Nondistended; Bowel sounds present, EDEL drain in place in RUQ  EXTREMITIES:  No clubbing or cyanosis, 2+ edema in b/l ankles  SKIN: No rashes or lesions    LABS:                        9.2    5.39  )-----------( 356      ( 20 Aug 2024 04:35 )             29.3     08-20    141  |  105  |  14.3  ----------------------------<  97  3.8   |  24.0  |  0.67    Ca    8.6      20 Aug 2024 04:35  Mg     2.1     08-20    TPro  6.2<L>  /  Alb  2.9<L>  /  TBili  0.2<L>  /  DBili  x   /  AST  17  /  ALT  10  /  AlkPhos  69  08-20      Urinalysis Basic - ( 20 Aug 2024 04:35 )    Color: x / Appearance: x / SG: x / pH: x  Gluc: 97 mg/dL / Ketone: x  / Bili: x / Urobili: x   Blood: x / Protein: x / Nitrite: x   Leuk Esterase: x / RBC: x / WBC x   Sq Epi: x / Non Sq Epi: x / Bacteria: x      CAPILLARY BLOOD GLUCOSE          RADIOLOGY & ADDITIONAL TESTS:    Imaging Personally Reviewed:  [X] YES  [ ] NO    Consultant(s) Notes Reviewed:  [X] YES  [ ] NO    Care Discussed with Consultants/Other Providers [X] YES  [ ] NO    Plan of Care discussed with House Staff: [X]YES [ ] NO

## 2024-08-20 NOTE — DISCHARGE NOTE PROVIDER - ATTENDING DISCHARGE PHYSICAL EXAMINATION:
Vital Signs Last 24 Hrs  T(C): 36.3 (20 Aug 2024 09:00), Max: 36.6 (19 Aug 2024 20:03)  T(F): 97.4 (20 Aug 2024 09:00), Max: 97.9 (19 Aug 2024 20:03)  HR: 77 (20 Aug 2024 09:00) (73 - 78)  BP: 104/64 (20 Aug 2024 09:00) (102/64 - 137/79)  BP(mean): --  RR: 18 (20 Aug 2024 09:00) (18 - 18)  SpO2: 95% (20 Aug 2024 09:00) (95% - 97%)    Parameters below as of 20 Aug 2024 09:00  Patient On (Oxygen Delivery Method): room air    GENERAL: Not in acute distress, lying comfortably, obese habitus, tearful regarding current medical issues and concerned they may recur  HEAD:  Atraumatic, Normocephalic  EYES: EOMI, PERRLA, conjunctiva and sclera clear  NECK: Supple, No JVD, Normal thyroid  NERVOUS SYSTEM:  Alert & Oriented X3, No gross focal deficits  CHEST/LUNG: Clear to auscultation bilaterally; No rales, rhonchi, wheezing, or rubs  HEART: Regular rate and rhythm; No murmurs, rubs, or gallops  ABDOMEN: Soft, Nontender, Nondistended; Bowel sounds present, EDEL drain in place in RUQ  EXTREMITIES:  No clubbing or cyanosis, 2+ edema in b/l ankles  SKIN: No rashes or lesions

## 2024-08-20 NOTE — DISCHARGE NOTE PROVIDER - NSDCFUADDAPPT_GEN_ALL_CORE_FT
APPTS ARE READY TO BE MADE: [x] YES    Best Family or Patient Contact (if needed): [phone]    Additional Information about above appointments (if needed):    1: Needs appointment made with infectious diseases (Dr. Elias if possible) in 2 weeks  2: Needs   3:     Other comments or requests:    APPTS ARE READY TO BE MADE: [x] YES    Best Family or Patient Contact (if needed): [phone]    Additional Information about above appointments (if needed): Please call patient on or after 8/22/2024 to schedule appointments    1: Needs appointment made with infectious diseases (Dr. Elias if possible) in 2 weeks  2: Needs appointment with acute care surgery made  3:     Other comments or requests:    APPTS ARE READY TO BE MADE: [x] YES    Best Family or Patient Contact (if needed): [phone]    Additional Information about above appointments (if needed): Please call patient on or after 8/22/2024 to schedule appointments    1: Needs appointment made with infectious diseases (Dr. Elias if possible) in 2 weeks  2: Needs appointment with acute care surgery made  3:     Other comments or requests:   Patient was provided with referral details by phone or email for a [facility] provider and will coordinate the appointment on their own. ID, PCP, and Acute care surgery

## 2024-08-20 NOTE — DISCHARGE NOTE PROVIDER - NSFOLLOWUPCLINICS_GEN_ALL_ED_FT
[facility] Acute Care Surgery  Acute Care Surgery  [address] [place] [id number]  Phone: [phone]  Fax:

## 2024-08-20 NOTE — PROGRESS NOTE ADULT - ATTENDING SUPERVISION STATEMENT
PT Evaluation     Today's date: 2022  Patient name: Len Groves  : 1962  MRN: [id number]  Referring provider: Anahy Hilario DO  Dx:   Encounter Diagnosis     ICD-10-CM    1  Radiculopathy, cervical region  M54 12 Ambulatory referral to Physical Therapy   2  Arthritis of left acromioclavicular joint  M19 012 Ambulatory referral to Physical Therapy                  Assessment  Assessment details: Patient presents with pain, decreased cervical and shoulder ROM, decreased strength, postural deficits, adverse neural tension and decreased function secondary to cervical radiculopathy and L AC joint OA  Patient would benefit from skilled PT intervention to address these issues and to maximize function  Thank you for the referral   Impairments: abnormal or restricted ROM, impaired physical strength, lacks appropriate home exercise program, pain with function and poor posture   Other impairment: adverse neural tension  Understanding of Dx/Px/POC: good   Prognosis: good    Goals  Short Term:  Pt will report decreased levels of pain by at least 2 subjective ratings in 4 weeks  Pt will demonstrate improved ROM by at least 10 degrees in 4 weeks  Pt will demonstrate improved strength by 1/2 grade MMT in 4 weeks  Long Term:   Pt will be independent in their HEP in 6 weeks  Pt will demonstrate improved FOTO, > predicted level  Pt will not experience N/T in L UE with ADL's    Plan  Plan details: Patient was educated in [address] [place] [address] and Plan of Care  All questions were answered to pt's satisfaction      Patient would benefit from: skilled physical therapy  Planned therapy interventions: neuromuscular re-education, joint mobilization, manual therapy, flexibility, therapeutic exercise, therapeutic activities, stretching, strengthening, postural training, patient education, home exercise program and graded exercise  Frequency: 2x week  Duration in weeks: 6  Plan of Care beginning date: 2022  Plan of Care expiration
date: 2022        Subjective Evaluation    History of Present Illness  Mechanism of injury: Pt is a 63 y o female with a c/o ongoing cervical spine pain radiating down into L UE with associated numbness since August of insidious onset  Pt also developed SOB and chest pain and went to ED via ambulance  Pt had a cardiac work-up, which was negative  Pt then f/u with PCP and had radiographs, which showed AC joint OA and impingement and cervical degenerative changes  Pt saw Ortho and Pain Management  Pt was advised to take PT  Pt is R hand dominant  Pt reports pain/difficulty with lifting things (avoiding)  Pt states this does not keep her from doing any activities and is unsure of anything that makes it worse  Pt notes her numbness subsided about 1-2 weeks after onset, but returned today  Pt notes numbness radiates down her entire L UE into all of her fingers  Pain  Current pain rating: 3  At best pain ratin  At worst pain rating: 10  Location: L cervical spine and radiates down L UE into her hand    Relieving factors: medications      Diagnostic Tests  X-ray: abnormal  Treatments  No previous or current treatments  Patient Goals  Patient goals for therapy: decreased pain  Patient goal: Decreased N/T; to avoid surgery        Objective     Concurrent Complaints      Additional Special Questions  Patient denies loss of bowel/bladder control, saddle anesthesia, unexplained weight loss, history of cancer  Patient denies diplopia, dysarthria, dysphagia, dizziness, drop attacks  Pt has hx of migraines (intermittent occurrence and they last 1 week; pt c/o dizziness and nausea when this occurs)  Postural Observations  Seated posture: poor    Additional Postural Observation Details  Slouched sitting posture fwd head, rounded shoulder with increased T/S kyphosis        Tenderness     Additional Tenderness Details  Mod TTP L UT region  No TTP noted    Neurological Testing     Reflexes   Left   Biceps
(C5/C6): normal (2+)  Brachioradialis (C6): normal (2+)  Triceps (C7): normal (2+)    Right   Biceps (C5/C6): normal (2+)  Brachioradialis (C6): normal (2+)  Triceps (C7): normal (2+)    Active Range of Motion   Cervical/Thoracic Spine       Cervical    Flexion:  WFL and with pain  Extension:  WFL and with pain  Left lateral flexion: 25 degrees      Right lateral flexion: 30 degrees     with pain  Left rotation: 60 degrees  Right rotation: 70 degrees         Left Shoulder   Flexion: 130 degrees with pain  Abduction: 115 degrees with pain  External rotation BTH: WFL and with pain  Internal rotation BTB: WFL and with pain    Right Shoulder   Flexion: WFL  Abduction: WFL  External rotation BTH: WFL  Internal rotation BTB: WFL    Additional Active Range of Motion Details  Pt reports pain radiating down entire L UE with AROM    Passive Range of Motion   Left Shoulder   Flexion: WFL and with pain  Abduction: WFL and with pain  External rotation 90°: WFL  Internal rotation 90°: WFL    Additional Passive Range of Motion Details  Pt notes some pain radiating down L UE at end-range flex, ABD    Joint Play   Joints within functional limits: C1, C3, C4, C5, C6 and C7     Hypomobile: C2     Strength/Myotome Testing   Cervical Spine     Left   Interossei strength (t1): 5    Right   Interossei strength (t1): 5    Left Shoulder     Planes of Motion   Flexion: 5   Abduction: 5   External rotation at 0°: 4+   Internal rotation at 0°: 5     Right Shoulder     Planes of Motion   Flexion: 5   Abduction: 5   External rotation at 0°: 5   Internal rotation at 0°: 5     Left Elbow   Flexion: 5  Extension: 5    Right Elbow   Flexion: 5  Extension: 5    Left Wrist/Hand   Wrist extension: 5  Wrist flexion: 5  Thumb extension: 5    Right Wrist/Hand   Wrist extension: 5  Wrist flexion: 5  Thumb extension: 5    Tests   Cervical   Negative vertical compression, cervical distraction, alar ligament test, Sharp-Thomas test and neck flexor muscle
endurance test      Left   Positive Spurling's Test A and cervical flexion-rotation test       Right   Negative cervical flexion-rotation test      Left Shoulder   Positive Hawkin's and Neer's  Negative crossover, drop arm, empty can, external rotation lag sign, full can and lift-off  Lumbar   Negative vertical compression  Additional Tests Details  Spurling's=L sided neck pain, but not radiating down her arm                Precautions: asthma, diabetes, GERD, migraines, R TKA      Manuals 11/9            L UT stretch             L median nerve glides             Possible C/S distraction if symptomatic                          Neuro Re-Ed             TB/Olya shoulder LPD/ext             TB/olya rows             TB b/l ER w/scap squeeze             Seated chin tucks                                                    Ther Ex             UBE retro             Pulleys flexion             Serratus wall slides             Doorway pec stretch             scap punches                          Pt education+ HEP instruction TP 8 mins                         Ther Activity             pball posture             pball shoulder scaption             Gait Training                                       Modalities
Resident
Student

## 2024-08-20 NOTE — DISCHARGE NOTE PROVIDER - CARE PROVIDER_API CALL
Cristi Elias  Infectious Disease  [address] [place] [address], [place] [address]  Phone: [phone]  Fax: [phone]  Follow Up Time: 2 weeks   Cristi Elias  Infectious Disease  [address] [facility], [address]  [place] [address]  Phone: [phone]  Fax: [phone]  Follow Up Time: 2 weeks    Mya Peguero[address], [address]  [place] [address]  Phone: [phone]  Fax: [phone]  Established Patient  Follow Up Time: 1 week

## 2024-08-20 NOTE — DISCHARGE NOTE PROVIDER - CARE PROVIDERS DIRECT ADDRESSES
,[email or web address]

## 2024-08-20 NOTE — DISCHARGE NOTE PROVIDER - NSDCMRMEDTOKEN_GEN_ALL_CORE_FT
amlodipine-valsartan 5 mg-160 mg oral tablet: 1 tab(s) orally once a day  apixaban 5 mg oral tablet: 2 tab(s) orally every 12 hours  azelastine 137 mcg/inh (0.1%) nasal spray: 1 spray(s) in each nostril once a day  carvedilol 12.5 mg oral tablet: 1 tab(s) orally 2 times a day  clobetasol 0.05% topical cream: Apply topically to affected area  levothyroxine 75 mcg (0.075 mg) oral tablet: 1 tab(s) orally once a day  pantoprazole 40 mg oral delayed release tablet: 1 tab(s) orally 2 times a day  Tylenol 500 mg oral tablet: 2 tab(s) orally every 6 hours  Vitamin D3 50 mcg (2000 intl units) oral tablet: 1 tab(s) orally once a day  ZyrTEC 10 mg oral tablet: 1 tab(s) orally once a day   amlodipine-valsartan 5 mg-160 mg oral tablet: 1 tab(s) orally once a day  azelastine 137 mcg/inh (0.1%) nasal spray: 1 spray(s) in each nostril once a day  carvedilol 12.5 mg oral tablet: 1 tab(s) orally 2 times a day  clobetasol 0.05% topical cream: Apply topically to affected area  levothyroxine 75 mcg (0.075 mg) oral tablet: 1 tab(s) orally once a day  pantoprazole 40 mg oral delayed release tablet: 1 tab(s) orally 2 times a day  Tylenol 500 mg oral tablet: 2 tab(s) orally every 6 hours  Vitamin D3 50 mcg (2000 intl units) oral tablet: 1 tab(s) orally once a day  ZyrTEC 10 mg oral tablet: 1 tab(s) orally once a day   amlodipine-valsartan 5 mg-160 mg oral tablet: 1 tab(s) orally once a day  azelastine 137 mcg/inh (0.1%) nasal spray: 1 spray(s) in each nostril once a day  carvedilol 12.5 mg oral tablet: 1 tab(s) orally 2 times a day  clobetasol 0.05% topical cream: Apply topically to affected area  lactobacillus acidophilus oral capsule: 1 tab(s) orally once a day  levothyroxine 75 mcg (0.075 mg) oral tablet: 1 tab(s) orally once a day  metroNIDAZOLE 500 mg oral tablet: 1 tab(s) orally every 8 hours  pantoprazole 40 mg oral delayed release tablet: 1 tab(s) orally 2 times a day  Tylenol 500 mg oral tablet: 2 tab(s) orally every 6 hours  Vitamin D3 50 mcg (2000 intl units) oral tablet: 1 tab(s) orally once a day  ZyrTEC 10 mg oral tablet: 1 tab(s) orally once a day   amlodipine-valsartan 5 mg-160 mg oral tablet: 1 tab(s) orally once a day  azelastine 137 mcg/inh (0.1%) nasal spray: 1 spray(s) in each nostril once a day  carvedilol 12.5 mg oral tablet: 1 tab(s) orally 2 times a day  cefTRIAXone: 2 gram(s) intravenous once a day 2g daily IV push till 9/13/2024  clobetasol 0.05% topical cream: Apply topically to affected area  lactobacillus acidophilus oral capsule: 1 tab(s) orally once a day  levothyroxine 75 mcg (0.075 mg) oral tablet: 1 tab(s) orally once a day  metroNIDAZOLE 500 mg oral tablet: 1 tab(s) orally every 8 hours  pantoprazole 40 mg oral delayed release tablet: 1 tab(s) orally 2 times a day  Tylenol 500 mg oral tablet: 2 tab(s) orally every 6 hours  Vitamin D3 50 mcg (2000 intl units) oral tablet: 1 tab(s) orally once a day  ZyrTEC 10 mg oral tablet: 1 tab(s) orally once a day

## 2024-08-20 NOTE — PROGRESS NOTE ADULT - ATTENDING COMMENTS
69 y/o F here for Sepsis POA 2/2 Intra-abdominal abscess  #Sepsis POA 2/2 Intra-abdominal Abscess  #Hx of HTN  #Hx of Perforated Gastric Ulcer  c/w drain  c/w abx until 9/13  f/u ID for PICC and recs  US Abd neg for thromboembolism, DC Eliquis  Diarrhea likely 2/2 Zosyn, start probiotic and imodium    Dispo: Likely home tomorrow

## 2024-08-20 NOTE — DISCHARGE NOTE PROVIDER - HOSPITAL COURSE
HPI: 67yo F w/ PMH of HTN, HLD, OA, hypothyroidism, Perforate gastric ulcer which was complicated by intraabdominal abscess, PVT on Eliquis presenting with chief complaint of fevers. Patient presented for preoperative clearance for upcoming IR drain placement for her intra-abdominal abscess. During the preop clearance she complained of fevers for a week. Patient recorded temps between 95 and 101.2 sublingually. She states she was having sweats after taking Tylenol at home along with    CT A/P w/ increasing intraabdominal collections.    Hospital Course:    Important Medication Changes and Reason:  X  Continue all other medications as seen in medication reconciliation.  Active or Pending Issues Requiring Follow-up:    Advanced Directives:   [] Full code/[ ] DNR /[ ]Hospice    Discharge Diagnoses:  Sepsis POA 2/2 intra-abdominal abscess  HTN chronic  History of perforated gastric ulcer  Anemia    Further Requests   HPI: 67yo F w/ PMH of HTN, HLD, OA, hypothyroidism, Perforate gastric ulcer which was complicated by intraabdominal abscess, PVT on Eliquis presenting with chief complaint of fevers. Previously, patient had 3 pigtail catheters placed to drain perforated ulcer. Patient was placed on Eliquis for portal vein thrombosis during prior hospitalization. Patient presented for preoperative clearance for upcoming IR drain placement for her intra-abdominal abscess. During the preop clearance she complained of fevers for a week. Patient recorded temps between 95 and 101.2 sublingually. She states she was having sweats after taking Tylenol at home along with post nasal drip and chills.     Hospital Course: On admission Tmax was 101F and a WBC of 12, meeting sepsis criteria. CT A/P w/ increasing intraabdominal collections. On 8/16, IR aspirated 200 mL of purulent material from right subdiaphragmatic area and placed a EDEL drain into abscess. Culture of abscess material grew Strep constellatus. Patient placed on Zosyn, switched to Ceftriaxone and Flagyl on 8/19. ID ordered a PICC line for home IV antibiotics, until at least 9/13, patient requires f/u imaging before stopping antibiotics. Drain continued to collect serosanguinous fluid, decreasing amounts. On 8/20, patient experienced 3 episodes of diarrhea, completing workup for possible infectious cause.    Important Medication Changes and Reason:  X  Continue all other medications as seen in medication reconciliation.  Active or Pending Issues Requiring Follow-up:  Per ID, follow up imaging before stopping antibiotics  On home IV antibiotics to fully treat abscess    Advanced Directives:   [X] Full code/[ ] DNR /[ ]Hospice    Discharge Diagnoses:  Sepsis POA 2/2 intra-abdominal abscess  HTN chronic  History of perforated gastric ulcer  Anemia   HPI: 69yo F w/ PMH of HTN, HLD, OA, hypothyroidism, Perforate gastric ulcer which was complicated by intraabdominal abscess, PVT on Eliquis presenting with chief complaint of fevers. Previously, patient had 3 pigtail catheters placed to drain perforated ulcer. Patient was placed on Eliquis for portal vein thrombosis during prior hospitalization. Patient presented for preoperative clearance for upcoming IR drain placement for her intra-abdominal abscess. During the preop clearance she complained of fevers for a week. Patient recorded temps between 95 and 101.2 sublingually. She states she was having sweats after taking Tylenol at home along with post nasal drip and chills.     Hospital Course: On admission Tmax was 101F and a WBC of 12, meeting sepsis criteria. CT A/P w/ increasing intraabdominal collections. On 8/16, IR aspirated 200 mL of purulent material from right subdiaphragmatic area and placed a EDEL drain into abscess. Culture of abscess material grew Strep constellatus. Patient placed on Zosyn, switched to Ceftriaxone and Flagyl on 8/19. ID ordered a PICC line for home IV antibiotics, until at least 9/13, patient requires f/u imaging before stopping antibiotics. Drain continued to collect serosanguinous fluid, decreasing amounts. On 8/20, patient experienced 3 episodes of diarrhea, completing workup for possible infectious cause.    Important Medication Changes and Reason:  X  Continue all other medications as seen in medication reconciliation.  Active or Pending Issues Requiring Follow-up:  Per ID, follow up imaging before stopping antibiotics  On home IV antibiotics to fully treat abscess    Advanced Directives:   [X] Full code    Discharge Diagnoses:  Sepsis POA 2/2 intra-abdominal abscess  HTN chronic  History of perforated gastric ulcer  Anemia

## 2024-08-20 NOTE — DISCHARGE NOTE PROVIDER - NSDCCPTREATMENT_GEN_ALL_CORE_FT
PRINCIPAL PROCEDURE  Procedure: Drainage of intra-abdominal abscess  Findings and Treatment: Your underwent IR guided drain placement. Initial images show small right effusion. Left hepatic cyst. Ill-defined collection in the diaphragmatic region. Further images through the needle, wire, and finally catheter within the collection in good position.  Flush drain with 10 cc saline twice a day

## 2024-08-20 NOTE — DISCHARGE NOTE PROVIDER - NSDCCPCAREPLAN_GEN_ALL_CORE_FT
PRINCIPAL DISCHARGE DIAGNOSIS  Diagnosis: Postprocedural intraabdominal abscess  Assessment and Plan of Treatment: You developed an intraabdominal abscess after your procedure to repair your perforated gastric ulcer. We are sending you home on IV and oral antibiotics for several weeks to make sure the infection is fully treated; please follow instructions regarding dosage and frequency. You will likely require follow up imaging before you are able to stop the antibiotics.      SECONDARY DISCHARGE DIAGNOSES  Diagnosis: Acute diarrhea  Assessment and Plan of Treatment: During your stay in the hospital you developed acute diarrhea. We tested your stool for possible infectious causes and gave fluids to ensure you do not become dehydrated.     PRINCIPAL DISCHARGE DIAGNOSIS  Diagnosis: Postprocedural intraabdominal abscess  Assessment and Plan of Treatment: You developed an intraabdominal abscess after your procedure to repair your perforated gastric ulcer. We placed a PICC line so that you can administer IV antibiotics while you are home. An aide will be coming to your house tomorrow to ensure that you are aware of how to administer the amtibiotics and care for the PICC line. We are sending you home on IV and oral antibiotics for around three weeks. You should be continuing the antibiotics until at least 9/13. You should schedule an appointment with the infectious disease (ID) doctor fomana after 9/13. You will require follow up CT scan before you see your ID appointment.      SECONDARY DISCHARGE DIAGNOSES  Diagnosis: Acute diarrhea  Assessment and Plan of Treatment: During your stay in the hospital you developed acute diarrhea. We tested your stool for possible infectious causes and gave fluids to ensure you do not become dehydrated.     PRINCIPAL DISCHARGE DIAGNOSIS  Diagnosis: Postprocedural intraabdominal abscess  Assessment and Plan of Treatment: You developed an intraabdominal abscess after your procedure to repair your perforated gastric ulcer. We placed a PICC line so that you can administer IV antibiotics while you are home. An aide will be coming to your house tomorrow to ensure that you are aware of how to administer the antibiotics and care for the PICC line. We are sending you home on IV and oral antibiotics for around three weeks. You should be continuing the antibiotics until at least 9/13. You should schedule an appointment with the infectious disease (ID) doctor in 2 weeks. You will require follow up CT scan before you see your ID appointment.      SECONDARY DISCHARGE DIAGNOSES  Diagnosis: Acute diarrhea  Assessment and Plan of Treatment: During your stay in the hospital you developed acute diarrhea. We tested your stool for possible infectious causes and gave fluids to ensure you do not become dehydrated.     PRINCIPAL DISCHARGE DIAGNOSIS  Diagnosis: Postprocedural intraabdominal abscess  Assessment and Plan of Treatment: You developed an intraabdominal abscess after your procedure to repair your perforated gastric ulcer. We placed a PICC line so that you can administer IV antibiotics while you are home. An aide will be coming to your house tomorrow to ensure that you are aware of how to administer the antibiotics and care for the PICC line. We are sending you home on IV and oral antibiotics for around three weeks. You should be continuing the antibiotics until at least 9/13. You should schedule an appointment with the infectious disease (ID) doctor in 2 weeks. You will require follow up CT scan before your ID appointment.  Additionally, you had a drain placed to ensure that the abscess does not reform. We obtained cell samples from the area, the results should be followed up with your primary care physician. The drain should be flushed and cared for in accordance with the instructions provided. Care for and removal of the drain can be discussed with ACS outpatient during a follow up appointment.      SECONDARY DISCHARGE DIAGNOSES  Diagnosis: Acute diarrhea  Assessment and Plan of Treatment: During your stay in the hospital you developed acute diarrhea. We tested your stool for possible infectious causes and gave fluids to ensure you do not become dehydrated. You can follow up with your GI doctor regarding the diarrhea and any probiotics that may be advisable.     PRINCIPAL DISCHARGE DIAGNOSIS  Diagnosis: Postprocedural intraabdominal abscess  Assessment and Plan of Treatment: You developed an intraabdominal abscess after your procedure to repair your perforated gastric ulcer. We placed a PICC line so that you can administer IV antibiotics while you are home. An aide will be coming to your house tomorrow to ensure that you are aware of how to administer the antibiotics and care for the PICC line. We are sending you home on IV and oral antibiotics for around three weeks. You should be continuing the antibiotics until at least 9/13. You should schedule an appointment with the infectious disease (ID) doctor in 2 weeks. You will require follow up CT scan before your ID appointment.  Additionally, you had a drain placed to ensure that the abscess does not reform. We obtained cell samples from the area, the results should be followed up with your primary care physician. The drain should be flushed and cared for in accordance with the instructions provided. Care for and removal of the drain can be discussed with ACS outpatient during a follow up appointment.      SECONDARY DISCHARGE DIAGNOSES  Diagnosis: Acute diarrhea  Assessment and Plan of Treatment: During your stay in the hospital you developed acute diarrhea. We tested your stool for possible infectious causes and gave fluids to ensure you do not become dehydrated. You can follow up with your GI doctor regarding the diarrhea and any probiotics that may be advisable.    Diagnosis: HTN (hypertension)  Assessment and Plan of Treatment: Your home medications were continued, please follow up with your PCP

## 2024-08-21 ENCOUNTER — TRANSCRIPTION ENCOUNTER (OUTPATIENT)
Age: 69
End: 2024-08-21

## 2024-08-21 ENCOUNTER — APPOINTMENT (OUTPATIENT)
Dept: FAMILY MEDICINE | Facility: CLINIC | Age: 69
End: 2024-08-21

## 2024-08-21 VITALS
TEMPERATURE: 99 F | DIASTOLIC BLOOD PRESSURE: 73 MMHG | HEART RATE: 76 BPM | OXYGEN SATURATION: 96 % | SYSTOLIC BLOOD PRESSURE: 116 MMHG | RESPIRATION RATE: 18 BRPM

## 2024-08-21 LAB
ANION GAP SERPL CALC-SCNC: 9 MMOL/L — SIGNIFICANT CHANGE UP (ref 5–17)
BUN SERPL-MCNC: 16.2 MG/DL — SIGNIFICANT CHANGE UP (ref 8–20)
CALCIUM SERPL-MCNC: 8.9 MG/DL — SIGNIFICANT CHANGE UP (ref 8.4–10.5)
CHLORIDE SERPL-SCNC: 103 MMOL/L — SIGNIFICANT CHANGE UP (ref 96–108)
CO2 SERPL-SCNC: 25 MMOL/L — SIGNIFICANT CHANGE UP (ref 22–29)
CREAT SERPL-MCNC: 0.76 MG/DL — SIGNIFICANT CHANGE UP (ref 0.5–1.3)
CULTURE RESULTS: SIGNIFICANT CHANGE UP
EGFR: 85 ML/MIN/1.73M2 — SIGNIFICANT CHANGE UP
GLUCOSE SERPL-MCNC: 94 MG/DL — SIGNIFICANT CHANGE UP (ref 70–99)
HCT VFR BLD CALC: 31.3 % — LOW (ref 34.5–45)
HGB BLD-MCNC: 9.8 G/DL — LOW (ref 11.5–15.5)
MCHC RBC-ENTMCNC: 27.8 PG — SIGNIFICANT CHANGE UP (ref 27–34)
MCHC RBC-ENTMCNC: 31.3 GM/DL — LOW (ref 32–36)
MCV RBC AUTO: 88.9 FL — SIGNIFICANT CHANGE UP (ref 80–100)
PLATELET # BLD AUTO: 348 K/UL — SIGNIFICANT CHANGE UP (ref 150–400)
POTASSIUM SERPL-MCNC: 3.8 MMOL/L — SIGNIFICANT CHANGE UP (ref 3.5–5.3)
POTASSIUM SERPL-SCNC: 3.8 MMOL/L — SIGNIFICANT CHANGE UP (ref 3.5–5.3)
RBC # BLD: 3.52 M/UL — LOW (ref 3.8–5.2)
RBC # FLD: 14.3 % — SIGNIFICANT CHANGE UP (ref 10.3–14.5)
SODIUM SERPL-SCNC: 137 MMOL/L — SIGNIFICANT CHANGE UP (ref 135–145)
SPECIMEN SOURCE: SIGNIFICANT CHANGE UP
WBC # BLD: 5.65 K/UL — SIGNIFICANT CHANGE UP (ref 3.8–10.5)
WBC # FLD AUTO: 5.65 K/UL — SIGNIFICANT CHANGE UP (ref 3.8–10.5)

## 2024-08-21 PROCEDURE — 99239 HOSP IP/OBS DSCHRG MGMT >30: CPT

## 2024-08-21 RX ADMIN — Medication 2000 MILLIGRAM(S): at 11:06

## 2024-08-21 RX ADMIN — VALSARTAN 160 MILLIGRAM(S): 40 TABLET ORAL at 05:41

## 2024-08-21 RX ADMIN — Medication 75 MICROGRAM(S): at 05:40

## 2024-08-21 RX ADMIN — Medication 40 MILLIGRAM(S): at 05:41

## 2024-08-21 RX ADMIN — Medication 500 MILLIGRAM(S): at 11:06

## 2024-08-21 RX ADMIN — Medication 1 TABLET(S): at 11:06

## 2024-08-21 RX ADMIN — CARVEDILOL 12.5 MILLIGRAM(S): 6.25 TABLET ORAL at 05:40

## 2024-08-21 RX ADMIN — Medication 500 MILLIGRAM(S): at 05:41

## 2024-08-21 RX ADMIN — LORATADINE 10 MILLIGRAM(S): 10 CAPSULE, LIQUID FILLED ORAL at 11:06

## 2024-08-21 RX ADMIN — CHLORHEXIDINE GLUCONATE 1 APPLICATION(S): 40 SOLUTION TOPICAL at 05:40

## 2024-08-21 RX ADMIN — AMLODIPINE BESYLATE 5 MILLIGRAM(S): 10 TABLET ORAL at 05:39

## 2024-08-21 NOTE — CHART NOTE - NSCHARTNOTEFT_GEN_A_CORE
Cd. to evaluate picc line placed last morning for not flushing per nursing.  Dressing C/D/I, afebrile. Pt. states dressing taught and feels "tight".    Routine dressing change with discontinue of gauze wick performed, sterile technique observed. New dressing applied with a more  comfortable dressing and occlusive to insertion site.  Catheter flushes smoothly and has good flash currently.  May use PICC for IV infusions. Pt. tolerated procedure well.

## 2024-08-21 NOTE — DISCHARGE NOTE NURSING/CASE MANAGEMENT/SOCIAL WORK - PATIENT PORTAL LINK FT
You can access the FollowMyHealth Patient Portal offered by [facility] by registering at the following website: [email or web address]. By joining [employer]’s FollowMyHealth portal, you will also be able to view your health information using other applications (apps) compatible with our system.

## 2024-08-21 NOTE — DISCHARGE NOTE NURSING/CASE MANAGEMENT/SOCIAL WORK - NSDCFUADDAPPT_GEN_ALL_CORE_FT
APPTS ARE READY TO BE MADE: [x] YES    Best Family or Patient Contact (if needed): [phone]    Additional Information about above appointments (if needed):    1: Needs appointment made with infectious diseases (Dr. Elias if possible) in 2 weeks  2: Needs   3:     Other comments or requests:    APPTS ARE READY TO BE MADE: [x] YES    Best Family or Patient Contact (if needed): [phone]    Additional Information about above appointments (if needed):    1: Needs appointment made with infectious diseases (Dr. Elias if possible) in 2 weeks  2: Needs   3:   Other comments or requests:     You have a scheduled appointment with [facility] in [place] at [address], [facility] [address] on Wednesday 8/28 at 4pm.  If you are unable to attend your scheduled appointment please call to reschedule prior to appointment date at [phone].      APPTS ARE READY TO BE MADE: [x] YES    Best Family or Patient Contact (if needed): [phone]    Additional Information about above appointments (if needed):    1: Needs appointment made with infectious diseases (Dr. Elias if possible) in 2 weeks  2: Needs   3:   Other comments or requests:

## 2024-08-21 NOTE — DISCHARGE NOTE NURSING/CASE MANAGEMENT/SOCIAL WORK - NSDCVIVACCINE_GEN_ALL_CORE_FT
Tdap; 17-Mar-2017 11:42; Veronika Terry (RENUKA); [employer]; [id number]; IntraMuscular; Deltoid Right.; 0.5 milliLiter(s); VIS (VIS Published: 09-May-2013, VIS Presented: 17-Mar-2017);   
no

## 2024-08-21 NOTE — PROGRESS NOTE ADULT - PROVIDER SPECIALTY LIST ADULT
Internal Medicine
Infectious Disease
Hospitalist
Infectious Disease
Hospitalist

## 2024-08-21 NOTE — PROGRESS NOTE ADULT - SUBJECTIVE AND OBJECTIVE BOX
Patient is a 68y old  Female who presents with a chief complaint of Fever (20 Aug 2024 09:52)      INTERVAL HPI/OVERNIGHT EVENTS:  - No acute overnight events    TODAY:  - Patient examined bedside, no complaints. Patient denies CP, SOB, fever, nausea, vomiting    MEDICATIONS  (STANDING):  amLODIPine   Tablet 5 milliGRAM(s) Oral daily  carvedilol 12.5 milliGRAM(s) Oral every 12 hours  cefTRIAXone Injectable. 2000 milliGRAM(s) IV Push every 24 hours  chlorhexidine 2% Cloths 1 Application(s) Topical <User Schedule>  lactobacillus acidophilus 1 Tablet(s) Oral daily  levothyroxine 75 MICROGram(s) Oral daily  loratadine 10 milliGRAM(s) Oral daily  metroNIDAZOLE    Tablet 500 milliGRAM(s) Oral every 8 hours  pantoprazole    Tablet 40 milliGRAM(s) Oral before breakfast  valsartan 160 milliGRAM(s) Oral daily    MEDICATIONS  (PRN):  acetaminophen     Tablet .. 650 milliGRAM(s) Oral every 6 hours PRN Temp greater or equal to 38C (100.4F), Mild Pain (1 - 3)  aluminum hydroxide/magnesium hydroxide/simethicone Suspension 30 milliLiter(s) Oral every 4 hours PRN Dyspepsia  melatonin 3 milliGRAM(s) Oral at bedtime PRN Insomnia  ondansetron Injectable 4 milliGRAM(s) IV Push every 8 hours PRN Nausea and/or Vomiting  sodium chloride 0.9% lock flush 10 milliLiter(s) IV Push every 1 hour PRN Pre/post blood products, medications, blood draw, and to maintain line patency      Allergies    No Known Allergies    Intolerances        REVIEW OF SYSTEMS:  as above      Vital Signs Last 24 Hrs  T(C): 36.7 (21 Aug 2024 05:12), Max: 36.7 (20 Aug 2024 16:55)  T(F): 98.1 (21 Aug 2024 05:12), Max: 98.1 (21 Aug 2024 05:12)  HR: 73 (21 Aug 2024 05:12) (73 - 77)  BP: 122/73 (21 Aug 2024 05:12) (104/64 - 122/73)  BP(mean): --  RR: 18 (21 Aug 2024 05:12) (18 - 18)  SpO2: 95% (21 Aug 2024 05:12) (95% - 95%)    Parameters below as of 21 Aug 2024 05:12  Patient On (Oxygen Delivery Method): room air        PHYSICAL EXAM:  GENERAL: Not in acute distress, lying comfortably  HEAD:  Atraumatic, Normocephalic  EYES: EOMI, PERRLA, conjunctiva and sclera clear  NECK: Supple, No JVD, Normal thyroid  NERVOUS SYSTEM:  Alert & Oriented X3, No gross focal deficits  CHEST/LUNG: Clear to auscultation bilaterally; No rales, rhonchi, wheezing, or rubs  HEART: Regular rate and rhythm; No murmurs, rubs, or gallops  ABDOMEN: Soft, Nontender, Nondistended; Bowel sounds present  EXTREMITIES:  No clubbing, cyanosis, or edema  SKIN: No rashes or lesions    LABS:                        9.8    5.65  )-----------( 348      ( 21 Aug 2024 05:25 )             31.3     08-21    137  |  103  |  16.2  ----------------------------<  94  3.8   |  25.0  |  0.76    Ca    8.9      21 Aug 2024 05:25  Mg     2.1     08-20    TPro  6.2<L>  /  Alb  2.9<L>  /  TBili  0.2<L>  /  DBili  x   /  AST  17  /  ALT  10  /  AlkPhos  69  08-20      Urinalysis Basic - ( 21 Aug 2024 05:25 )    Color: x / Appearance: x / SG: x / pH: x  Gluc: 94 mg/dL / Ketone: x  / Bili: x / Urobili: x   Blood: x / Protein: x / Nitrite: x   Leuk Esterase: x / RBC: x / WBC x   Sq Epi: x / Non Sq Epi: x / Bacteria: x      CAPILLARY BLOOD GLUCOSE          RADIOLOGY & ADDITIONAL TESTS:    Imaging Personally Reviewed:  [X] YES  [ ] NO    Consultant(s) Notes Reviewed:  [X] YES  [ ] NO    Care Discussed with Consultants/Other Providers [X] YES  [ ] NO    Plan of Care discussed with House Staff: [X]YES [ ] NO Patient is a 68y old  Female who presents with a chief complaint of Fever (20 Aug 2024 09:52)      INTERVAL HPI/OVERNIGHT EVENTS:  - No acute overnight events    TODAY:  - Patient examined bedside.  - Patient has not experienced  any diarrhea in the past 24 hours, monitoring for additional episodes  - No pain or discomfort noted except around drain site  - Patient is tolerating solid foods, no abdominal pain or bloating noted  - Patient denies CP, SOB, fever, nausea, vomiting  - Patient is concerned about being discharged and managing PICC line and drain at home, discussed care with patient and answered questions, reassuring patient that she will be able to manage with help provided  -Patient appeared more comfortable regarding discharge after discussion     MEDICATIONS  (STANDING):  amLODIPine   Tablet 5 milliGRAM(s) Oral daily  carvedilol 12.5 milliGRAM(s) Oral every 12 hours  cefTRIAXone Injectable. 2000 milliGRAM(s) IV Push every 24 hours  chlorhexidine 2% Cloths 1 Application(s) Topical <User Schedule>  lactobacillus acidophilus 1 Tablet(s) Oral daily  levothyroxine 75 MICROGram(s) Oral daily  loratadine 10 milliGRAM(s) Oral daily  metroNIDAZOLE    Tablet 500 milliGRAM(s) Oral every 8 hours  pantoprazole    Tablet 40 milliGRAM(s) Oral before breakfast  valsartan 160 milliGRAM(s) Oral daily    MEDICATIONS  (PRN):  acetaminophen     Tablet .. 650 milliGRAM(s) Oral every 6 hours PRN Temp greater or equal to 38C (100.4F), Mild Pain (1 - 3)  aluminum hydroxide/magnesium hydroxide/simethicone Suspension 30 milliLiter(s) Oral every 4 hours PRN Dyspepsia  melatonin 3 milliGRAM(s) Oral at bedtime PRN Insomnia  ondansetron Injectable 4 milliGRAM(s) IV Push every 8 hours PRN Nausea and/or Vomiting  sodium chloride 0.9% lock flush 10 milliLiter(s) IV Push every 1 hour PRN Pre/post blood products, medications, blood draw, and to maintain line patency      Allergies    No Known Allergies    Intolerances        REVIEW OF SYSTEMS:  as above      Vital Signs Last 24 Hrs  T(C): 36.7 (21 Aug 2024 05:12), Max: 36.7 (20 Aug 2024 16:55)  T(F): 98.1 (21 Aug 2024 05:12), Max: 98.1 (21 Aug 2024 05:12)  HR: 73 (21 Aug 2024 05:12) (73 - 77)  BP: 122/73 (21 Aug 2024 05:12) (104/64 - 122/73)  BP(mean): --  RR: 18 (21 Aug 2024 05:12) (18 - 18)  SpO2: 95% (21 Aug 2024 05:12) (95% - 95%)    Parameters below as of 21 Aug 2024 05:12  Patient On (Oxygen Delivery Method): room air        PHYSICAL EXAM:  GENERAL: Not in acute distress, lying comfortably, obese habitus, tearful regarding current medical issues and concerned regarding care while at home related to PICC line and drain management  HEAD:  Atraumatic, Normocephalic  EYES: EOMI, PERRLA, conjunctiva and sclera clear  NECK: Supple, No JVD, Normal thyroid  NERVOUS SYSTEM:  Alert & Oriented X3, No gross focal deficits  CHEST/LUNG: Clear to auscultation bilaterally; No rales, rhonchi, wheezing, or rubs  HEART: Regular rate and rhythm; No murmurs, rubs, or gallops  ABDOMEN: Soft, Nontender, Nondistended; Bowel sounds present, EDEL drain in place in RUQ  EXTREMITIES:  No clubbing or cyanosis, 2+ edema in b/l ankles  SKIN: No rashes or lesions    LABS:                        9.8    5.65  )-----------( 348      ( 21 Aug 2024 05:25 )             31.3     08-21    137  |  103  |  16.2  ----------------------------<  94  3.8   |  25.0  |  0.76    Ca    8.9      21 Aug 2024 05:25  Mg     2.1     08-20    TPro  6.2<L>  /  Alb  2.9<L>  /  TBili  0.2<L>  /  DBili  x   /  AST  17  /  ALT  10  /  AlkPhos  69  08-20      Urinalysis Basic - ( 21 Aug 2024 05:25 )    Color: x / Appearance: x / SG: x / pH: x  Gluc: 94 mg/dL / Ketone: x  / Bili: x / Urobili: x   Blood: x / Protein: x / Nitrite: x   Leuk Esterase: x / RBC: x / WBC x   Sq Epi: x / Non Sq Epi: x / Bacteria: x      CAPILLARY BLOOD GLUCOSE          RADIOLOGY & ADDITIONAL TESTS:    Imaging Personally Reviewed:  [X] YES  [ ] NO    Consultant(s) Notes Reviewed:  [X] YES  [ ] NO    Care Discussed with Consultants/Other Providers [X] YES  [ ] NO    Plan of Care discussed with House Staff: [X]YES [ ] NO

## 2024-08-21 NOTE — PROGRESS NOTE ADULT - ASSESSMENT
//Sepsis POA 2/2 intra-abdominal abscess  - Tmax: 101, WBC: 12  - CT A/P done, per ED - Radiology called and reported increasing (including intrahepatic collections). Follow up official read  - S/P IR 8/16/2024, actively draining serosanguinous fluid, decreasing amounts  - Zosyn stopped  ---ID recs Ceftriaxone 2g IV daily and Flagyl PO 500mg q8h  ---Continue till at least 9/13, needs f/u imaging before being stopped  ---PICC line placed on 8/20  - RUQ US showed no evidence of PVT  ---Eliquis D/C'ed  - Cytology of abscess fluid results pending  - Tylenol PRN for fever  - Monitor WBC, fever curve  -Patient was nervous about discharge and the amount of follow up required and at home management  --- Discussed at home management of IV antibiotics and drain, now seems more confident regarding care and follow up instructions    //Diarrhea, resolved  - 3 episodes of diarrhea on 8/20  - Stool cultures sent for possible infectious cause  - Possibly caused by antibiotics being given    //Hx of HTN  - Continue Amlodipine  - Continue Valsartan  - Continue Coreg    //Hx of Perforated Gastric Ulcer  - Continue PPI  - Avoid NSAIDs    //Hx of HLD  - No longer taking statin due to myalgias    //Hypothyroid  - Continue Synthroid    //Hx of PVT  - Eliquis restarted, as per IR    //OA  - Tylenol PRN    //Anemia  - normocytic  - check anemia panel  - monitor CBC, transfuse PRN  - asymptomatic    //LE edema  - reviewed  - Pt will try to start walking when able    DVT ppx: Eliquis stopped    Disposition: Anticipate discharge home today.
69yo F w/ PMH of HTN, HLD. OA, Hypothyroidism, Perforate gastric ulcer which was complicated by intraabdominal abscess, PVT on Eliquis presenting with chief complaint of fevers. CT A/P w/ increasing intraabdominal collections.    Sepsis POA 2/2 intra-abdominal abscess  - Tmax: 101, WBC: 12  - CT A/P done, per ED - Radiology called and reported increasing (including intrahepatic collections). Follow up official read  - S/P IR 8/16/2024, actively draining serosanguinous fluid  - Zosyn started, continue for now, f/u ID recs  - Obtain RUQ US for possible portal vein thrombophlebitis  - obtain Cytology of abscess fluid  - Tylenol PRN for fever  - Monitor WBC, fever curve.    Hx of HTN  - Continue Amlodipine  - Continue Valsartan  - Continue Coreg    Hx of Perforated Gastric Ulcer  - Continue PPI  - Avoid NSAIDs    Hx of HLD  - No longer taking statin due to myalgias    Hypothyroid  - Continue Synthroid    Hx of PVT  - Hold Eliquis for procedure, resume once deemed safe per IR    OA  - Tylenol PRN    Anemia  - normocytic  - check anemia panel  - monitor CBC, transfuse PRN    LE edema  - reviewed    DVT ppx: Restart eliquis tomorrow? SCDs in the meantime    Disposition: Anticipate discharge home depending on the patient's response to treatment, ID clearance. Possibly 3-4 days.
//Sepsis POA 2/2 intra-abdominal abscess  - Tmax: 101, WBC: 12  - CT A/P done, per ED - Radiology called and reported increasing (including intrahepatic collections). Follow up official read  - S/P IR 8/16/2024, actively draining serosanguinous fluid, decreasing amounts  - Zosyn stopped  ---ID recs Ceftriaxone 2g IV daily and Flagyl PO 500mg q8h  ---Continue till at least 9/13, needs f/u imaging before being stopped  ---ID ordered PICC line for 8/20 for home IV antibiotics  - RUQ US showed no evidence of PVT  ---Eliquis D/C'ed  - Cytology of abscess fluid results pending  - Tylenol PRN for fever  - Monitor WBC, fever curve.    //Diarrhea  - 3 episodes of diarrhea in past 24 hours  - Stool cultures sent for possible infectious cause  - Possibly caused by antibiotics being given    //Hx of HTN  - Continue Amlodipine  - Continue Valsartan  - Continue Coreg    //Hx of Perforated Gastric Ulcer  - Continue PPI  - Avoid NSAIDs    //Hx of HLD  - No longer taking statin due to myalgias    //Hypothyroid  - Continue Synthroid    //Hx of PVT  - Eliquis restarted, as per IR    //OA  - Tylenol PRN    //Anemia  - normocytic  - check anemia panel  - monitor CBC, transfuse PRN    //LE edema  - reviewed  - Pt will try to start walking when able    DVT ppx: Eliquis stopped    Disposition: Anticipate discharge home pending resolution of diarrhea. Possibly 1-2 days.
68F with PMHx of htn, hld, osteoarthritis, hypothyroidism, osteoarthritis with R hip pain, gastric ulcer s/p repair c/b intraabdominal abscess (April 2024) and thrombosed portal vein on Eliquis (5mg PO qd) presenting from preoperative clearance pending an upcoming IR drainage of the abscess (8/20/2024) with fevers for a week. Recorded temperatures during the past 3 days range from 95 to 101.2F sublingually. Endorses having sweats after taking tylenol, chronic cough from nasal drip, and fevers/chills. Denies nausea, vomiting, diarrhea, abdominal pain, dysuria, SOB, CP.  AS ABOVE WAS SCHEDULED FOR ELECTIVE ASPIRATION OF FLUID COLLECTION AND  WAS SEEN BY PCP FOR PREOP CLEARANCE 8/16/24 sent to ED with fever  PT WAS ORIGINALLY SEEN BY MED IN [place] BECAUSE ORTHOPEDIC WANTED ID CLEARANCE FOR HIP SURGERY BLOOD CX WERE NEGATIVE AS OUTPT  CT SCAN WAS REPEATED AND HAD INCREASED SIZE OF COLLECTION WAS SEEN BY ACS IN SURGERY CLINIC WHO ARRANGED FOR OUTPt ASPIRATION  AT THAT TIME BUT WITH NO FEVERS    Intraabdominal abscess  Leukocytosis  Fever    s/p 8/16/24 IR aspiration, 200 cc pus drained from right subdiaphragmatic collection, (received ceftriaxone intra procedurally) drain in place  IR CX 8/16/24 strep constellatus, parvimonas micra  f/u BCX 8/16/24 gngtd  dc zosyn  ceftriaxone 2g iv daily and   po flagyl 500 mg q8h (will need script sent to her pharmacy)  weekly CBC CMP  plan for PICC am  tentative end 9/13/24, should have f/u imaging prior to this to assess collection    surgery f/u  patient requesting to speak to IR team prior to discharge regarding drain management  monitor WBC  monitor fever  outpatient f/u Dr Elias in 2 weeks. Would plan for f/u Ct a/p before stopping antibiotics    d/w RN, lab, Dr Fuentes  
67yo F w/ PMH of HTN, HLD. OA, Hypothyroidism, Perforate gastric ulcer which was complicated by intraabdominal abscess, PVT on Eliquis presenting with chief complaint of fevers. CT A/P w/ increasing intraabdominal collections.    Sepsis POA 2/2 intra-abdominal abscess  - Tmax: 101, WBC: 12  - CT A/P done, per ED - Radiology called and reported increasing (including intrahepatic collections). Follow up official read  - S/P IR yesterday, actively draining serosanguinous fluid  - Febrile overnight, Zosyn started, continue for now, f/u ID recs  - Tylenol PRN for fever  - Monitor WBC, fever curve.    Hx of HTN  - Continue Amlodipine  - Continue Valsartan  - Continue Coreg    Hx of Perforated Gastric Ulcer  - Continue PPI  - Avoid NSAIDs    Hx of HLD  - No longer taking statin due to myalgias    Hypothyroid  - Continue Synthroid    Hx of PVT  - Hold Eliquis for procedure, resume once deemed safe per IR    OA  - Tylenol PRN    Anemia  - normocytic  - check anemia panel  - monitor CBC, transfuse PRN    LE edema  - LE venous duplex ordered    DVT ppx: Holding Eliquis as above    Disposition: Anticipate discharge home depending on the patient's response to treatment, IR procedure and ID clearance. Possibly 3-4 days.
68F with PMHx of htn, hld, osteoarthritis, hypothyroidism, osteoarthritis with R hip pain, gastric ulcer s/p repair c/b intraabdominal abscess (April 2024) and thrombosed portal vein on Eliquis (5mg PO qd) presenting from preoperative clearance pending an upcoming IR drainage of the abscess (8/20/2024) with fevers for a week. Recorded temperatures during the past 3 days range from 95 to 101.2F sublingually. Endorses having sweats after taking tylenol, chronic cough from nasal drip, and fevers/chills. Denies nausea, vomiting, diarrhea, abdominal pain, dysuria, SOB, CP.  AS ABOVE WAS SCHEDULED FOR ELECTIVE ASPIRATION OF FLUID COLLECTION AND  WAS SEEN BY PCP FOR PREOP CLEARANCE 8/16/24 sent to ED with fever  PT WAS ORIGINALLY SEEN BY MED IN [place] BECAUSE ORTHOPEDIC WANTED ID CLEARANCE FOR HIP SURGERY BLOOD CX WERE NEGATIVE AS OUTPT  CT SCAN WAS REPEATED AND HAD INCREASED SIZE OF COLLECTION WAS SEEN BY ACS IN SURGERY CLINIC WHO ARRANGED FOR OUTP ASPIRATION  AT THAT TIME BUT WITH NO FEVERS    Intraabdominal abscess  Leukocytosis  Fever    s/p 8/16/24 IR aspiration, 200 cc pus drained from right subdiaphragmatic collection, (received ceftriaxone intra procedurally) drain in place  IR CX 8/16/24 GNR and GPC pairs on gram stain , f/u cultures  f/u BCX 8/16/24 gngtd  c/w zosyn  monitor WBC  monitor fever    d/w RN, lab  will f/u
69yo F w/ PMH of HTN, HLD. OA, Hypothyroidism, Perforate gastric ulcer which was complicated by intraabdominal abscess, PVT on Eliquis presenting with chief complaint of fevers. CT A/P w/ increasing intraabdominal collections.    //Sepsis POA 2/2 intra-abdominal abscess  - Tmax: 101, WBC: 12  - CT A/P done, per ED - Radiology called and reported increasing (including intrahepatic collections). Follow up official read  - S/P IR 8/16/2024, actively draining serosanguinous fluid, decreasing amounts  - Zosyn started, continue for now, f/u ID recs  - Obtain RUQ US for possible portal vein thrombophlebitis  - Cytology of abscess fluid results pending  - Tylenol PRN for fever  - Monitor WBC, fever curve.    //Hx of HTN  - Continue Amlodipine  - Continue Valsartan  - Continue Coreg    //Hx of Perforated Gastric Ulcer  - Continue PPI  - Avoid NSAIDs    //Hx of HLD  - No longer taking statin due to myalgias    //Hypothyroid  - Continue Synthroid    //Hx of PVT  - Eliquis restarted, as per IR    //OA  - Tylenol PRN    //Anemia  - normocytic  - check anemia panel  - monitor CBC, transfuse PRN    //LE edema  - reviewed    DVT ppx: Eliquis restarted    Disposition: Anticipate discharge home depending on the patient's response to treatment, ID clearance. Possibly 3-4 days.

## 2024-08-22 LAB
CULTURE RESULTS: ABNORMAL
SPECIMEN SOURCE: SIGNIFICANT CHANGE UP

## 2024-08-23 LAB — NON-GYNECOLOGICAL CYTOLOGY STUDY: SIGNIFICANT CHANGE UP

## 2024-08-27 DIAGNOSIS — K65.1 PERITONEAL ABSCESS: ICD-10-CM

## 2024-08-28 ENCOUNTER — APPOINTMENT (OUTPATIENT)
Dept: OBGYN | Facility: CLINIC | Age: 69
End: 2024-08-28
Payer: MEDICARE

## 2024-08-28 VITALS
HEIGHT: 60 IN | BODY MASS INDEX: 46.57 KG/M2 | DIASTOLIC BLOOD PRESSURE: 68 MMHG | SYSTOLIC BLOOD PRESSURE: 124 MMHG | WEIGHT: 237.2 LBS

## 2024-08-28 DIAGNOSIS — L30.9 DERMATITIS, UNSPECIFIED: ICD-10-CM

## 2024-08-28 PROCEDURE — 99214 OFFICE O/P EST MOD 30 MIN: CPT

## 2024-08-28 RX ORDER — CLOBETASOL PROPIONATE 0.5 MG/G
0.05 OINTMENT TOPICAL
Qty: 1 | Refills: 2 | Status: ACTIVE | COMMUNITY
Start: 2024-08-28 | End: 1900-01-01

## 2024-08-28 NOTE — HISTORY OF PRESENT ILLNESS
[FreeTextEntry1] : here to fu  using clobetasol biw-tiw for vulvitis.  had perforrated ulcer, has been hopsitalized sev times for recurring collections assos w perforated ulcer. having mult drainages.  having home iv therapy , picc line not functioning .  getting iv abs til mid sept at least.  has joseph draining.

## 2024-08-28 NOTE — PLAN
[FreeTextEntry1] : cont clobetasol  free and clear detergent stop fabric softenier allergen/irritant avoisdance hygiene  lotrisone erxd.

## 2024-09-04 ENCOUNTER — OUTPATIENT (OUTPATIENT)
Dept: OUTPATIENT SERVICES | Facility: HOSPITAL | Age: 69
LOS: 1 days | End: 2024-09-04
Payer: MEDICARE

## 2024-09-04 ENCOUNTER — APPOINTMENT (OUTPATIENT)
Dept: FAMILY MEDICINE | Facility: CLINIC | Age: 69
End: 2024-09-04
Payer: MEDICARE

## 2024-09-04 ENCOUNTER — APPOINTMENT (OUTPATIENT)
Dept: INTERVENTIONAL RADIOLOGY/VASCULAR | Facility: CLINIC | Age: 69
End: 2024-09-04

## 2024-09-04 VITALS
DIASTOLIC BLOOD PRESSURE: 76 MMHG | HEART RATE: 101 BPM | HEIGHT: 60 IN | SYSTOLIC BLOOD PRESSURE: 136 MMHG | BODY MASS INDEX: 46.33 KG/M2 | WEIGHT: 236 LBS | RESPIRATION RATE: 18 BRPM | TEMPERATURE: 97 F | OXYGEN SATURATION: 96 %

## 2024-09-04 DIAGNOSIS — D50.0 IRON DEFICIENCY ANEMIA SECONDARY TO BLOOD LOSS (CHRONIC): ICD-10-CM

## 2024-09-04 DIAGNOSIS — Z98.890 OTHER SPECIFIED POSTPROCEDURAL STATES: Chronic | ICD-10-CM

## 2024-09-04 DIAGNOSIS — I81 PORTAL VEIN THROMBOSIS: ICD-10-CM

## 2024-09-04 DIAGNOSIS — Z98.891 HISTORY OF UTERINE SCAR FROM PREVIOUS SURGERY: Chronic | ICD-10-CM

## 2024-09-04 DIAGNOSIS — Z98.49 CATARACT EXTRACTION STATUS, UNSPECIFIED EYE: Chronic | ICD-10-CM

## 2024-09-04 DIAGNOSIS — K25.5 CHRONIC OR UNSPECIFIED GASTRIC ULCER WITH PERFORATION: Chronic | ICD-10-CM

## 2024-09-04 DIAGNOSIS — A41.9 SEPSIS, UNSPECIFIED ORGANISM: ICD-10-CM

## 2024-09-04 DIAGNOSIS — T81.43XA INFECTION FOLLOWING A PROCEDURE, ORGAN AND SPACE SURGICAL SITE, INITIAL ENCOUNTER: ICD-10-CM

## 2024-09-04 PROCEDURE — 36573 INSJ PICC RS&I 5 YR+: CPT

## 2024-09-04 PROCEDURE — 99496 TRANSJ CARE MGMT HIGH F2F 7D: CPT

## 2024-09-04 RX ORDER — METRONIDAZOLE 500 MG/1
500 TABLET ORAL
Refills: 0 | Status: ACTIVE | COMMUNITY

## 2024-09-04 RX ORDER — CETIRIZINE HCL 10 MG
TABLET ORAL
Refills: 0 | Status: ACTIVE | COMMUNITY

## 2024-09-04 NOTE — HISTORY OF PRESENT ILLNESS
[FreeTextEntry1] : 69 y/o female pt is here for a f/u from hospital stay for sepsis and persistent abdominal abscess. The patient is status post gastric ulcer with perforation status postrepair complicated by intra-abdominal abscess and April 2024 as well as portal vein thrombosis currently on Eliquis.  Patient presented for medical clearance 2 weeks ago for IR drainage of the intra-abdominal abscess and was noted to have temperatures at home for 3 days prior to presentation.  Patient was sent to the emergency room and noted with temperature of 101.2.  She was admitted on the sepsis protocol.  During hospital stay patient had intra-abdominal abscess drained and she currently has an indwelling drain at the epigastrium which contains a small amount of clear fluid.  Patient denies any fever since she has been discharged from the hospital and is on home IV antibiotics.  During her hospital stay she had 200 cc of pus drained from her right subdiaphragmatic intra-abdominal fluid collection which grew out strep constellatus.  She is currently receiving ceftriaxone 2 g IV daily as well as p.o. Flagyl.  She is scheduled for PICC line later this afternoon.  Patient denies any fever since she has been discharged from the hospital.  She complains of persistent fatigue.  Patient denies any shortness of breath or abdominal pain.

## 2024-09-04 NOTE — HISTORY OF PRESENT ILLNESS
[FreeTextEntry1] : History of Present Illness PRE CALL PRIOR TO APPOINTMENT: Phone Number: [phone]  RX on file: yes Referring MD: cheyanne Clotting or Bleeding disorders: NPO status advised: n/a History of fall: no Assistant device for walking: no  home: na Blood Thinners: no Prescribing MD agree to have blood thinner medication held: n/a Capacity to make decisions: yes HCP: no DNR: no   Person contact for Pre-Call:  Geena- Operative Assessment: (Day of Procedure) NPO status:n/a Accompanied by: Falls risk: no Labs: IN CHART REVIEWED IVL:  JULIO MD: Urine Pregnancy: n/a PRE-OP instructions:yes POST-OP teaching initiated: yes Allergy bracelet on: nkda Antibiotic given: no

## 2024-09-04 NOTE — ASSESSMENT
[FreeTextEntry1] : Pt is afebrile and doing better. Continue with home IV. Plan is for CT abdomen prior to abd drains being withdrawn. F/U in 1 month, sooner if worse. Pt to seek immediate medical attention if she develops fever or abdominal pain.      Pt encounter incorporated clinical review of the medical record including hospital records, consultation from specialists, review of lab and diagnostic testing with interpretation and discussion of results with patient, general pt counseling, and coordination of care, as well as documentation update within the electronic medical record.  Time spent: 42 mins.

## 2024-09-04 NOTE — HISTORY OF PRESENT ILLNESS
[FreeTextEntry1] : History of Present Illness PRE CALL PRIOR TO APPOINTMENT: Phone Number: [phone]  RX on file: yes Referring MD: cheyanne Clotting or Bleeding disorders: NPO status advised: n/a History of fall: no Assistant device for walking: no  home: na Blood Thinners: no Prescribing MD agree to have blood thinner medication held: n/a Capacity to make decisions: yes HCP: no DNR: no   Person contact for Pre-Call:  Geena- Operative Assessment: (Day of Procedure) NPO status:n/a Accompanied by: Falls risk: no Labs: IN CHART REVIEWED IVL:  JULIO MD: Urine Pregnancy: n/a PRE-OP instructions:yes POST-OP teaching initiated: yes Allergy bracelet on: nkda Antibiotic given: no

## 2024-09-04 NOTE — PHYSICAL EXAM
[Normal Sclera/Conjunctiva] : normal sclera/conjunctiva [PERRL] : pupils equal round and reactive to light [No JVD] : no jugular venous distention [No Lymphadenopathy] : no lymphadenopathy [No Edema] : there was no peripheral edema [Obese] : obese [Soft, Nontender] : the abdomen was soft and nontender [No Mass] : no masses were palpated [No HSM] : no hepatosplenomegaly noted [None] : no CVA tenderness [Normal] : no posterior cervical lymphadenopathy and no anterior cervical lymphadenopathy [No Rash] : no rash [No Focal Deficits] : no focal deficits [de-identified] : Mild pallor. [de-identified] : Frustrated. Wants to feel completely better.

## 2024-09-04 NOTE — HISTORY OF PRESENT ILLNESS
[FreeTextEntry1] : 67 y/o female pt is here for a f/u from hospital stay for sepsis and persistent abdominal abscess. The patient is status post gastric ulcer with perforation status postrepair complicated by intra-abdominal abscess and April 2024 as well as portal vein thrombosis currently on Eliquis.  Patient presented for medical clearance 2 weeks ago for IR drainage of the intra-abdominal abscess and was noted to have temperatures at home for 3 days prior to presentation.  Patient was sent to the emergency room and noted with temperature of 101.2.  She was admitted on the sepsis protocol.  During hospital stay patient had intra-abdominal abscess drained and she currently has an indwelling drain at the epigastrium which contains a small amount of clear fluid.  Patient denies any fever since she has been discharged from the hospital and is on home IV antibiotics.  During her hospital stay she had 200 cc of pus drained from her right subdiaphragmatic intra-abdominal fluid collection which grew out strep constellatus.  She is currently receiving ceftriaxone 2 g IV daily as well as p.o. Flagyl.  She is scheduled for PICC line later this afternoon.  Patient denies any fever since she has been discharged from the hospital.  She complains of persistent fatigue.  Patient denies any shortness of breath or abdominal pain.

## 2024-09-04 NOTE — PHYSICAL EXAM
[Normal Sclera/Conjunctiva] : normal sclera/conjunctiva [PERRL] : pupils equal round and reactive to light [No JVD] : no jugular venous distention [No Lymphadenopathy] : no lymphadenopathy [No Edema] : there was no peripheral edema [Obese] : obese [Soft, Nontender] : the abdomen was soft and nontender [No Mass] : no masses were palpated [No HSM] : no hepatosplenomegaly noted [None] : no CVA tenderness [Normal] : no posterior cervical lymphadenopathy and no anterior cervical lymphadenopathy [No Rash] : no rash [No Focal Deficits] : no focal deficits [de-identified] : Mild pallor. [de-identified] : Frustrated. Wants to feel completely better.

## 2024-09-05 ENCOUNTER — APPOINTMENT (OUTPATIENT)
Dept: INTERNAL MEDICINE | Facility: CLINIC | Age: 69
End: 2024-09-05
Payer: MEDICARE

## 2024-09-05 VITALS — BODY MASS INDEX: 43.43 KG/M2 | WEIGHT: 236 LBS | HEIGHT: 62 IN

## 2024-09-05 VITALS — DIASTOLIC BLOOD PRESSURE: 35 MMHG | SYSTOLIC BLOOD PRESSURE: 68 MMHG

## 2024-09-05 DIAGNOSIS — Z79.2 LONG TERM (CURRENT) USE OF ANTIBIOTICS: ICD-10-CM

## 2024-09-05 DIAGNOSIS — K65.1 INFECTION FOLLOWING A PROCEDURE, ORGAN AND SPACE SURGICAL SITE, INITIAL ENCTR: ICD-10-CM

## 2024-09-05 DIAGNOSIS — T81.43XA INFECTION FOLLOWING A PROCEDURE, ORGAN AND SPACE SURGICAL SITE, INITIAL ENCTR: ICD-10-CM

## 2024-09-05 DIAGNOSIS — Z09 ENCOUNTER FOR FOLLOW-UP EXAMINATION AFTER COMPLETED TREATMENT FOR CONDITIONS OTHER THAN MALIGNANT NEOPLASM: ICD-10-CM

## 2024-09-05 PROCEDURE — 99495 TRANSJ CARE MGMT MOD F2F 14D: CPT

## 2024-09-05 PROCEDURE — 99215 OFFICE O/P EST HI 40 MIN: CPT

## 2024-09-05 NOTE — ASSESSMENT
[FreeTextEntry1] : 68 FEMALE ADMITTED WITH GASTRIC PERFORATION S/P ROBOTIC ASSISTED PATCH REPAIR ON 4/24 PT DEVELOPED INTRABDOMINAL ABSCESS S/P IR DRAINAGES PT WSA DISHCAHRGED WITH PERIHEPATIC  DRAINS  I DID NOT SEE PATEINT DURING THAT  HOSPITAL ADMSSION PT FOLLOWED UP WIT SURGERY AND DRAINS REMOVED RPEEAST CT SCAN  SHOWED DECREASED SIZE OF COLLECTION  PT HAD A SCHEDULED  A RIGHT HIP SURGERY PRIOR TO  THE APRIL 2024 HOSPITAL STAY PT NOW WANTS O GET HIP SURGERY  SEEN IN OFFICE FOR FIRST TIME  7/8.24 SENT BLOOD WORK ADN RECOMMENDED REPEAT CT SCAN WAS SEEN BY SURGERY  WAS AT PCP FOR PREPROCEDURE EVAL AND HAD FEVER AN DWAS SENT TO THE ER PT UDEREWENT ASPIRATION AND   HAD POLYMICROBIAL TYESHA WAS SENT JAKE NEGRITO IV ROCEPHIN AND ORAL FLAGYL  WAS DISCHARGED BY DR SEO  ON ABOVE REGIMEN WTIH PLAN TO TREAT THROUGH 9/13 BUT NEEDS REPEAT IMAGING BEFORE D/C IV ABX WILL RX CT SCAN FOR NEXT WEEK TO BE DOEN AT [facility] AS PT HAS DRAIN IN PLACE AND HOPEFULLY IR CAN REMOVE PT TOLD SHE NEEDS TO FOLLOWUP WITH SURGERY AS WELL LABS DONE AT HOME REVIEWED OK WILL CALLPT WITH CT RESULTS  MAY NEED ORAL ABX AFTER IV WILL FOLLOUWP

## 2024-09-05 NOTE — HISTORY OF PRESENT ILLNESS
[FreeTextEntry1] : 68 FEMALE ADMITTED WITH GASTRIC PERFORATION S/P ROBOTIC ASSISTED PATCH REPAIR ON 4/24 PT DEVELOPED INTRABDOMINAL ABSCESS S/P IR DRAINAGES PT WSA DISHCAHRGED WITH PERIHEPATIC  DRAINS  I DID NOT SEE PATEINT DURING THAT  HOSPITAL ADMSSION PT FOLLOWED UP WIT SURGERY AND DRAINS REMOVED RPEEAST CT SCAN  SHOWED DECREASED SIZE OF COLLECTION  PT HAD A SCHEDULED  A RIGHT HIP SURGERY PRIOR TO  THE APRIL 2024 HOSPITAL STAY PT NOW WANTS O GET HIP SURGERY  SEEN IN OFFICE FOR FIRST TIME  7/8.24 SENT BLOOD WORK ADN RECOMMENDED REPEAT CT SCAN WAS SEEN BY SURGERY  WAS AT PCP FOR PREPROCEDURE EVAL AND HAD FEVER AN DWAS SENT TO THE ER PT UDEREWENT ASPIRATION AND   HAD POLYMICROBIAL TYESHA WAS SENT JAKE NEGRITO IV ROCEPHIN AND ORAL FLAGYL  WAS DISCHARGED BY DR SEO  ON ABOVE REGIMEN WTIH PLAN TO TREAT THROUGH 9/13 BUT NEEDS REPEAT IMAGING BEFORE D/C IV ABX WILL RX CT SCAN FOR NEXT WEEK TO BE DOEN AT [facility] AS PT HAS DRAIN IN PLACE AND HOPEFULLY IR CAN REMOVE PT TOLD SHE NEEDS TO FOLLOWUP WITH SURGERY AS WELL LABS DONE AT HOME REVIEWED OK WILL CALLPT WITH CT RESULTS  MAY NEED ORAL ABX AFTER IV WILL FOLLOUWP    .

## 2024-09-05 NOTE — REASON FOR VISIT
[Post Hospitalization] : a post hospitalization visit [Spouse] : spouse [FreeTextEntry1] : ABDOMINAL ABSCESS

## 2024-09-05 NOTE — HISTORY OF PRESENT ILLNESS
[FreeTextEntry1] : 68 FEMALE ADMITTED WITH GASTRIC PERFORATION S/P ROBOTIC ASSISTED PATCH REPAIR ON 4/24 PT DEVELOPED INTRABDOMINAL ABSCESS S/P IR DRAINAGES PT WSA DISHCAHRGED WITH PERIHEPATIC  DRAINS  I DID NOT SEE PATEINT DURING THAT  HOSPITAL ADMSSION PT FOLLOWED UP WIT SURGERY AND DRAINS REMOVED RPEEAST CT SCAN  SHOWED DECREASED SIZE OF COLLECTION  PT HAD A SCHEDULED  A RIGHT HIP SURGERY PRIOR TO  THE APRIL 2024 HOSPITAL STAY PT NOW WANTS O GET HIP SURGERY  SEEN IN OFFICE FOR FIRST TIME  7/8.24 SENT BLOOD WORK ADN RECOMMENDED REPEAT CT SCAN WAS SEEN BY SURGERY  WAS AT PCP FOR PREPROCEDURE EVAL AND HAD FEVER AN DWAS SENT TO THE ER PT UDEREWENT ASPIRATION AND   HAD POLYMICROBIAL TYESHA WAS SENT JAKE NEGRITO IV ROCEPHIN AND ORAL FLAGYL  WAS DISCHARGED BY DR SEO  ON ABOVE REGIMEN WTIH PLAN TO TREAT THROUGH 9/13 BUT NEEDS REPEAT IMAGING BEFORE D/C IV ABX WILL RX CT SCAN FOR NEXT WEEK TO BE DOEN AT [facility] AS PT HAS DRAIN IN PLACE AND HOPEFULLY IR CAN REMOVE PT TOLD SHE NEEDS TO FOLLOWUP WITH SURGERY AS WELL LABS DONE AT HOME REVIEWED OK WILL CALLPT WITH CT RESULTS  MAY NEED ORAL ABX AFTER IV WILL FOLLOUWP    .

## 2024-09-05 NOTE — PHYSICAL EXAM
[General Appearance - Alert] : alert [General Appearance - In No Acute Distress] : in no acute distress [Sclera] : the sclera and conjunctiva were normal [PERRL With Normal Accommodation] : pupils were equal in size, round, reactive to light [Extraocular Movements] : extraocular movements were intact [Neck Appearance] : the appearance of the neck was normal [Neck Cervical Mass (___cm)] : no neck mass was observed [Jugular Venous Distention Increased] : there was no jugular-venous distention [Thyroid Diffuse Enlargement] : the thyroid was not enlarged [] : no respiratory distress [Auscultation Breath Sounds / Voice Sounds] : lungs were clear to auscultation bilaterally [Heart Rate And Rhythm] : heart rate was normal and rhythm regular [Heart Sounds] : normal S1 and S2 [Heart Sounds Gallop] : no gallops [Murmurs] : no murmurs [Heart Sounds Pericardial Friction Rub] : no pericardial rub [Full Pulse] : the pedal pulses are present [Edema] : there was no peripheral edema [FreeTextEntry1] : PICC RIGHT ARM

## 2024-09-05 NOTE — ASSESSMENT
[FreeTextEntry1] : 68 FEMALE ADMITTED WITH GASTRIC PERFORATION S/P ROBOTIC ASSISTED PATCH REPAIR ON 4/24 PT DEVELOPED INTRABDOMINAL ABSCESS S/P IR DRAINAGES PT WSA DISHCAHRGED WITH PERIHEPATIC  DRAINS  I DID NOT SEE P[facility]INT DURING THAT  HOSPITAL ADMSSION PT FOLLOWED UP WIT SURGERY AND DRAINS REMOVED RPEEAST CT SCAN  SHOWED DECREASED SIZE OF COLLECTION  PT HAD A SCHEDULED  A RIGHT HIP SURGERY PRIOR TO  THE APRIL 2024 HOSPITAL STAY PT NOW WANTS O GET HIP SURGERY  SEEN IN OFFICE FOR FIRST TIME  7/8.24 SENT BLOOD WORK ADN RECOMMENDED REPEAT CT SCAN WAS SEEN BY SURGERY  WAS AT PCP FOR PREPROCEDURE EVAL AND HAD FEVER AN DWAS SENT TO THE ER PT UDEREWENT ASPIRATION AND   HAD POLYMICROBIAL TYESHA WAS SENT JAKE NEGRITO IV ROCEPHIN AND ORAL FLAGYL  WAS DISCHARGED BY DR SEO  ON ABOVE REGIMEN WTIH PLAN TO TREAT THROUGH 9/13 BUT NEEDS REPEAT IMAGING BEFORE D/C IV ABX WILL RX CT SCAN FOR NEXT WEEK TO BE DOEN AT [facility] AS PT HAS DRAIN IN PLACE AND HOPEFULLY IR CAN REMOVE PT TOLD SHE NEEDS TO FOLLOWUP WITH SURGERY AS WELL LABS DONE AT HOME REVIEWED OK WILL CALLPT WITH CT RESULTS  MAY NEED ORAL ABX AFTER IV WILL FOLLOUWP

## 2024-09-10 ENCOUNTER — APPOINTMENT (OUTPATIENT)
Dept: TRAUMA SURGERY | Facility: CLINIC | Age: 69
End: 2024-09-10

## 2024-09-10 VITALS
HEART RATE: 87 BPM | RESPIRATION RATE: 16 BRPM | SYSTOLIC BLOOD PRESSURE: 123 MMHG | DIASTOLIC BLOOD PRESSURE: 74 MMHG | WEIGHT: 236 LBS | TEMPERATURE: 87 F | BODY MASS INDEX: 43.43 KG/M2 | HEIGHT: 62 IN | OXYGEN SATURATION: 95 %

## 2024-09-10 DIAGNOSIS — K65.1 PERITONEAL ABSCESS: ICD-10-CM

## 2024-09-10 PROCEDURE — 99213 OFFICE O/P EST LOW 20 MIN: CPT

## 2024-09-10 NOTE — ASSESSMENT
[FreeTextEntry1] : 68 year old female s/p josé manuel's patch for perforated ulcer complicated by intra-abdominal fluid collections

## 2024-09-10 NOTE — HISTORY OF PRESENT ILLNESS
[de-identified] : 68 year old female, well known to our service, s/p josé manuel's patch for perforated ulcer that was complicated with intra-abdominal fluid collections requiring long term drainage. [de-identified] : After removal of the drains, patient complained of subjective fevers at home. She was seen in the ED and was hospitalized for fevers. CT scan at that time showed the subphrenic fluid collection to be bigger in size requiring IR drainage. ID discharged the patient on IV Abx. She comes today for follow up. She states she feels a lot better since the drainage procedure. She is scheduled for a follow up CT scan to assess for the subphrenic collection.

## 2024-09-10 NOTE — PLAN
[FreeTextEntry1] : Patient to return to clinic after follow-up CT scan is done continue IV Abx as per ID monitor drain output monitor for fevers

## 2024-09-10 NOTE — PHYSICAL EXAM
[de-identified] : NAD [de-identified] : No acute respiratory distress noted [de-identified] : soft, non distended, non tender to palpation, IR drain in place with serous drainage in the tubing, previous incisions are well healed

## 2024-09-11 ENCOUNTER — OUTPATIENT (OUTPATIENT)
Dept: OUTPATIENT SERVICES | Facility: HOSPITAL | Age: 69
LOS: 1 days | End: 2024-09-11
Payer: MEDICARE

## 2024-09-11 ENCOUNTER — RESULT REVIEW (OUTPATIENT)
Age: 69
End: 2024-09-11

## 2024-09-11 DIAGNOSIS — Z98.890 OTHER SPECIFIED POSTPROCEDURAL STATES: Chronic | ICD-10-CM

## 2024-09-11 DIAGNOSIS — Z98.49 CATARACT EXTRACTION STATUS, UNSPECIFIED EYE: Chronic | ICD-10-CM

## 2024-09-11 DIAGNOSIS — Z98.891 HISTORY OF UTERINE SCAR FROM PREVIOUS SURGERY: Chronic | ICD-10-CM

## 2024-09-11 DIAGNOSIS — T81.43XA INFECTION FOLLOWING A PROCEDURE, ORGAN AND SPACE SURGICAL SITE, INITIAL ENCOUNTER: ICD-10-CM

## 2024-09-11 DIAGNOSIS — K25.5 CHRONIC OR UNSPECIFIED GASTRIC ULCER WITH PERFORATION: Chronic | ICD-10-CM

## 2024-09-11 PROCEDURE — 74177 CT ABD & PELVIS W/CONTRAST: CPT

## 2024-09-11 PROCEDURE — 71045 X-RAY EXAM CHEST 1 VIEW: CPT

## 2024-09-11 PROCEDURE — 74177 CT ABD & PELVIS W/CONTRAST: CPT | Mod: 26,MH

## 2024-09-11 PROCEDURE — 71045 X-RAY EXAM CHEST 1 VIEW: CPT | Mod: 26

## 2024-09-11 PROCEDURE — 47537 REMOVAL BILIARY DRG CATH: CPT

## 2024-09-11 NOTE — PROCEDURE NOTE - PROCEDURE FINDINGS AND DETAILS
The patient presented to IR Dept for drain check and removal. Patient underwent CT scan, which showed resolution of collection. Given this finishing the drain was cut and removed.

## 2024-09-13 RX ORDER — AMOXICILLIN AND CLAVULANATE POTASSIUM 875; 125 MG/1; MG/1
875-125 TABLET, COATED ORAL
Qty: 56 | Refills: 1 | Status: ACTIVE | COMMUNITY
Start: 2024-09-13 | End: 1900-01-01

## 2024-09-17 ENCOUNTER — APPOINTMENT (OUTPATIENT)
Dept: TRAUMA SURGERY | Facility: CLINIC | Age: 69
End: 2024-09-17

## 2024-09-17 VITALS
WEIGHT: 230 LBS | DIASTOLIC BLOOD PRESSURE: 73 MMHG | HEIGHT: 62 IN | BODY MASS INDEX: 42.33 KG/M2 | OXYGEN SATURATION: 95 % | HEART RATE: 88 BPM | TEMPERATURE: 97.9 F | RESPIRATION RATE: 16 BRPM | SYSTOLIC BLOOD PRESSURE: 121 MMHG

## 2024-09-17 DIAGNOSIS — K25.5 CHRONIC OR UNSPECIFIED GASTRIC ULCER WITH PERFORATION: ICD-10-CM

## 2024-09-17 PROCEDURE — 99212 OFFICE O/P EST SF 10 MIN: CPT

## 2024-09-29 PROCEDURE — 87086 URINE CULTURE/COLONY COUNT: CPT

## 2024-09-29 PROCEDURE — 93975 VASCULAR STUDY: CPT

## 2024-09-29 PROCEDURE — 87077 CULTURE AEROBIC IDENTIFY: CPT

## 2024-09-29 PROCEDURE — 88305 TISSUE EXAM BY PATHOLOGIST: CPT

## 2024-09-29 PROCEDURE — 83540 ASSAY OF IRON: CPT

## 2024-09-29 PROCEDURE — 85025 COMPLETE CBC W/AUTO DIFF WBC: CPT

## 2024-09-29 PROCEDURE — 87045 FECES CULTURE AEROBIC BACT: CPT

## 2024-09-29 PROCEDURE — 87040 BLOOD CULTURE FOR BACTERIA: CPT

## 2024-09-29 PROCEDURE — 84466 ASSAY OF TRANSFERRIN: CPT

## 2024-09-29 PROCEDURE — 87205 SMEAR GRAM STAIN: CPT

## 2024-09-29 PROCEDURE — 83550 IRON BINDING TEST: CPT

## 2024-09-29 PROCEDURE — 85730 THROMBOPLASTIN TIME PARTIAL: CPT

## 2024-09-29 PROCEDURE — 82728 ASSAY OF FERRITIN: CPT

## 2024-09-29 PROCEDURE — G0463: CPT

## 2024-09-29 PROCEDURE — 87070 CULTURE OTHR SPECIMN AEROBIC: CPT

## 2024-09-29 PROCEDURE — 82607 VITAMIN B-12: CPT

## 2024-09-29 PROCEDURE — 74177 CT ABD & PELVIS W/CONTRAST: CPT | Mod: MC

## 2024-09-29 PROCEDURE — 83036 HEMOGLOBIN GLYCOSYLATED A1C: CPT

## 2024-09-29 PROCEDURE — 85610 PROTHROMBIN TIME: CPT

## 2024-09-29 PROCEDURE — 77012 CT SCAN FOR NEEDLE BIOPSY: CPT

## 2024-09-29 PROCEDURE — 93005 ELECTROCARDIOGRAM TRACING: CPT

## 2024-09-29 PROCEDURE — 87641 MR-STAPH DNA AMP PROBE: CPT

## 2024-09-29 PROCEDURE — 36415 COLL VENOUS BLD VENIPUNCTURE: CPT

## 2024-09-29 PROCEDURE — 80053 COMPREHEN METABOLIC PANEL: CPT

## 2024-09-29 PROCEDURE — C1769: CPT

## 2024-09-29 PROCEDURE — 87046 STOOL CULTR AEROBIC BACT EA: CPT

## 2024-09-29 PROCEDURE — 99285 EMERGENCY DEPT VISIT HI MDM: CPT | Mod: 25

## 2024-09-29 PROCEDURE — 93970 EXTREMITY STUDY: CPT

## 2024-09-29 PROCEDURE — 71045 X-RAY EXAM CHEST 1 VIEW: CPT

## 2024-09-29 PROCEDURE — 85027 COMPLETE CBC AUTOMATED: CPT

## 2024-09-29 PROCEDURE — 80048 BASIC METABOLIC PNL TOTAL CA: CPT

## 2024-09-29 PROCEDURE — 84145 PROCALCITONIN (PCT): CPT

## 2024-09-29 PROCEDURE — C1729: CPT

## 2024-09-29 PROCEDURE — 87640 STAPH A DNA AMP PROBE: CPT

## 2024-09-29 PROCEDURE — 83605 ASSAY OF LACTIC ACID: CPT

## 2024-09-29 PROCEDURE — 82746 ASSAY OF FOLIC ACID SERUM: CPT

## 2024-09-29 PROCEDURE — 83735 ASSAY OF MAGNESIUM: CPT

## 2024-10-04 ENCOUNTER — APPOINTMENT (OUTPATIENT)
Dept: ORTHOPEDIC SURGERY | Facility: CLINIC | Age: 69
End: 2024-10-04

## 2024-10-08 ENCOUNTER — APPOINTMENT (OUTPATIENT)
Dept: ORTHOPEDIC SURGERY | Facility: CLINIC | Age: 69
End: 2024-10-08
Payer: MEDICARE

## 2024-10-08 VITALS
WEIGHT: 236 LBS | SYSTOLIC BLOOD PRESSURE: 131 MMHG | HEART RATE: 76 BPM | DIASTOLIC BLOOD PRESSURE: 84 MMHG | BODY MASS INDEX: 43.43 KG/M2 | HEIGHT: 62 IN

## 2024-10-08 DIAGNOSIS — M16.11 UNILATERAL PRIMARY OSTEOARTHRITIS, RIGHT HIP: ICD-10-CM

## 2024-10-08 PROCEDURE — 99213 OFFICE O/P EST LOW 20 MIN: CPT

## 2024-10-08 PROCEDURE — G2211 COMPLEX E/M VISIT ADD ON: CPT

## 2024-10-09 ENCOUNTER — APPOINTMENT (OUTPATIENT)
Dept: CT IMAGING | Facility: CLINIC | Age: 69
End: 2024-10-09
Payer: MEDICARE

## 2024-10-09 ENCOUNTER — OUTPATIENT (OUTPATIENT)
Dept: OUTPATIENT SERVICES | Facility: HOSPITAL | Age: 69
LOS: 1 days | End: 2024-10-09
Payer: MEDICARE

## 2024-10-09 DIAGNOSIS — T81.43XA INFECTION FOLLOWING A PROCEDURE, ORGAN AND SPACE SURGICAL SITE, INITIAL ENCOUNTER: ICD-10-CM

## 2024-10-09 DIAGNOSIS — Z98.890 OTHER SPECIFIED POSTPROCEDURAL STATES: Chronic | ICD-10-CM

## 2024-10-09 PROCEDURE — 74177 CT ABD & PELVIS W/CONTRAST: CPT | Mod: 26,MH

## 2024-10-10 ENCOUNTER — APPOINTMENT (OUTPATIENT)
Dept: ORTHOPEDIC SURGERY | Facility: CLINIC | Age: 69
End: 2024-10-10
Payer: MEDICARE

## 2024-10-10 VITALS
WEIGHT: 232 LBS | HEART RATE: 77 BPM | BODY MASS INDEX: 42.69 KG/M2 | HEIGHT: 62 IN | DIASTOLIC BLOOD PRESSURE: 79 MMHG | SYSTOLIC BLOOD PRESSURE: 131 MMHG

## 2024-10-10 DIAGNOSIS — Z98.890 OTHER SPECIFIED POSTPROCEDURAL STATES: ICD-10-CM

## 2024-10-10 PROCEDURE — 99213 OFFICE O/P EST LOW 20 MIN: CPT

## 2024-10-15 ENCOUNTER — APPOINTMENT (OUTPATIENT)
Dept: TRAUMA SURGERY | Facility: CLINIC | Age: 69
End: 2024-10-15
Payer: MEDICARE

## 2024-10-15 VITALS
RESPIRATION RATE: 16 BRPM | DIASTOLIC BLOOD PRESSURE: 74 MMHG | OXYGEN SATURATION: 97 % | SYSTOLIC BLOOD PRESSURE: 116 MMHG | HEART RATE: 82 BPM | WEIGHT: 231 LBS | BODY MASS INDEX: 42.51 KG/M2 | TEMPERATURE: 97.5 F | HEIGHT: 62 IN

## 2024-10-15 DIAGNOSIS — K65.1 INFECTION FOLLOWING A PROCEDURE, ORGAN AND SPACE SURGICAL SITE, INITIAL ENCTR: ICD-10-CM

## 2024-10-15 DIAGNOSIS — T81.43XA INFECTION FOLLOWING A PROCEDURE, ORGAN AND SPACE SURGICAL SITE, INITIAL ENCTR: ICD-10-CM

## 2024-10-15 PROCEDURE — 99213 OFFICE O/P EST LOW 20 MIN: CPT

## 2024-10-21 ENCOUNTER — APPOINTMENT (OUTPATIENT)
Dept: PULMONOLOGY | Facility: CLINIC | Age: 69
End: 2024-10-21
Payer: MEDICARE

## 2024-10-21 VITALS
DIASTOLIC BLOOD PRESSURE: 68 MMHG | RESPIRATION RATE: 16 BRPM | SYSTOLIC BLOOD PRESSURE: 126 MMHG | HEART RATE: 68 BPM | OXYGEN SATURATION: 98 %

## 2024-10-21 DIAGNOSIS — Z77.22 CONTACT WITH AND (SUSPECTED) EXPOSURE TO ENVIRONMENTAL TOBACCO SMOKE (ACUTE) (CHRONIC): ICD-10-CM

## 2024-10-21 DIAGNOSIS — E66.01 MORBID (SEVERE) OBESITY DUE TO EXCESS CALORIES: ICD-10-CM

## 2024-10-21 DIAGNOSIS — K25.5 CHRONIC OR UNSPECIFIED GASTRIC ULCER WITH PERFORATION: ICD-10-CM

## 2024-10-21 DIAGNOSIS — R06.02 SHORTNESS OF BREATH: ICD-10-CM

## 2024-10-21 DIAGNOSIS — I27.20 PULMONARY HYPERTENSION, UNSPECIFIED: ICD-10-CM

## 2024-10-21 DIAGNOSIS — G47.33 OBSTRUCTIVE SLEEP APNEA (ADULT) (PEDIATRIC): ICD-10-CM

## 2024-10-21 DIAGNOSIS — J30.9 ALLERGIC RHINITIS, UNSPECIFIED: ICD-10-CM

## 2024-10-21 DIAGNOSIS — K65.1 PERITONEAL ABSCESS: ICD-10-CM

## 2024-10-21 PROCEDURE — 94010 BREATHING CAPACITY TEST: CPT

## 2024-10-21 PROCEDURE — 99215 OFFICE O/P EST HI 40 MIN: CPT | Mod: 25

## 2024-10-28 ENCOUNTER — APPOINTMENT (OUTPATIENT)
Dept: OBGYN | Facility: CLINIC | Age: 69
End: 2024-10-28
Payer: MEDICARE

## 2024-10-28 VITALS
DIASTOLIC BLOOD PRESSURE: 68 MMHG | HEIGHT: 61 IN | BODY MASS INDEX: 43.8 KG/M2 | SYSTOLIC BLOOD PRESSURE: 132 MMHG | WEIGHT: 232 LBS

## 2024-10-28 DIAGNOSIS — N95.2 POSTMENOPAUSAL ATROPHIC VAGINITIS: ICD-10-CM

## 2024-10-28 DIAGNOSIS — L30.9 DERMATITIS, UNSPECIFIED: ICD-10-CM

## 2024-10-28 PROCEDURE — 99214 OFFICE O/P EST MOD 30 MIN: CPT

## 2024-11-01 LAB — BACTERIA GENITAL AEROBE CULT: ABNORMAL

## 2024-11-19 ENCOUNTER — APPOINTMENT (OUTPATIENT)
Dept: GASTROENTEROLOGY | Facility: CLINIC | Age: 69
End: 2024-11-19
Payer: MEDICARE

## 2024-11-19 VITALS
HEIGHT: 61 IN | DIASTOLIC BLOOD PRESSURE: 80 MMHG | HEART RATE: 68 BPM | RESPIRATION RATE: 14 BRPM | BODY MASS INDEX: 44.18 KG/M2 | OXYGEN SATURATION: 98 % | WEIGHT: 234 LBS | SYSTOLIC BLOOD PRESSURE: 145 MMHG

## 2024-11-19 DIAGNOSIS — G47.33 OBSTRUCTIVE SLEEP APNEA (ADULT) (PEDIATRIC): ICD-10-CM

## 2024-11-19 DIAGNOSIS — K76.0 FATTY (CHANGE OF) LIVER, NOT ELSEWHERE CLASSIFIED: ICD-10-CM

## 2024-11-19 DIAGNOSIS — R60.0 LOCALIZED EDEMA: ICD-10-CM

## 2024-11-19 DIAGNOSIS — T81.43XA INFECTION FOLLOWING A PROCEDURE, ORGAN AND SPACE SURGICAL SITE, INITIAL ENCTR: ICD-10-CM

## 2024-11-19 DIAGNOSIS — M76.62 ACHILLES TENDINITIS, LEFT LEG: ICD-10-CM

## 2024-11-19 DIAGNOSIS — K26.5 CHRONIC OR UNSPECIFIED DUODENAL ULCER WITH PERFORATION: ICD-10-CM

## 2024-11-19 DIAGNOSIS — L23.9 ALLERGIC CONTACT DERMATITIS, UNSPECIFIED CAUSE: ICD-10-CM

## 2024-11-19 DIAGNOSIS — N95.2 POSTMENOPAUSAL ATROPHIC VAGINITIS: ICD-10-CM

## 2024-11-19 DIAGNOSIS — I45.10 UNSPECIFIED RIGHT BUNDLE-BRANCH BLOCK: ICD-10-CM

## 2024-11-19 DIAGNOSIS — I81 PORTAL VEIN THROMBOSIS: ICD-10-CM

## 2024-11-19 DIAGNOSIS — H26.9 UNSPECIFIED CATARACT: ICD-10-CM

## 2024-11-19 DIAGNOSIS — E03.9 HYPOTHYROIDISM, UNSPECIFIED: ICD-10-CM

## 2024-11-19 DIAGNOSIS — B00.9 HERPESVIRAL INFECTION, UNSPECIFIED: ICD-10-CM

## 2024-11-19 DIAGNOSIS — I27.20 PULMONARY HYPERTENSION, UNSPECIFIED: ICD-10-CM

## 2024-11-19 DIAGNOSIS — I07.1 RHEUMATIC TRICUSPID INSUFFICIENCY: ICD-10-CM

## 2024-11-19 DIAGNOSIS — E55.9 VITAMIN D DEFICIENCY, UNSPECIFIED: ICD-10-CM

## 2024-11-19 DIAGNOSIS — Z98.890 OTHER SPECIFIED POSTPROCEDURAL STATES: ICD-10-CM

## 2024-11-19 DIAGNOSIS — D25.9 LEIOMYOMA OF UTERUS, UNSPECIFIED: ICD-10-CM

## 2024-11-19 DIAGNOSIS — M72.2 PLANTAR FASCIAL FIBROMATOSIS: ICD-10-CM

## 2024-11-19 DIAGNOSIS — K65.1 INFECTION FOLLOWING A PROCEDURE, ORGAN AND SPACE SURGICAL SITE, INITIAL ENCTR: ICD-10-CM

## 2024-11-19 DIAGNOSIS — E04.2 NONTOXIC MULTINODULAR GOITER: ICD-10-CM

## 2024-11-19 DIAGNOSIS — Z12.11 ENCOUNTER FOR SCREENING FOR MALIGNANT NEOPLASM OF COLON: ICD-10-CM

## 2024-11-19 DIAGNOSIS — R31.29 OTHER MICROSCOPIC HEMATURIA: ICD-10-CM

## 2024-11-19 DIAGNOSIS — J30.9 ALLERGIC RHINITIS, UNSPECIFIED: ICD-10-CM

## 2024-11-19 PROCEDURE — 99204 OFFICE O/P NEW MOD 45 MIN: CPT

## 2024-11-19 RX ORDER — SODIUM SULFATE, POTASSIUM SULFATE AND MAGNESIUM SULFATE 1.6; 3.13; 17.5 G/177ML; G/177ML; G/177ML
17.5-3.13-1.6 SOLUTION ORAL
Qty: 354 | Refills: 0 | Status: ACTIVE | COMMUNITY
Start: 2024-11-19 | End: 1900-01-01

## 2024-11-20 PROCEDURE — 74177 CT ABD & PELVIS W/CONTRAST: CPT

## 2024-12-03 ENCOUNTER — APPOINTMENT (OUTPATIENT)
Dept: CARDIOLOGY | Facility: CLINIC | Age: 69
End: 2024-12-03
Payer: MEDICARE

## 2024-12-03 ENCOUNTER — NON-APPOINTMENT (OUTPATIENT)
Age: 69
End: 2024-12-03

## 2024-12-03 ENCOUNTER — APPOINTMENT (OUTPATIENT)
Dept: CT IMAGING | Facility: CLINIC | Age: 69
End: 2024-12-03
Payer: MEDICARE

## 2024-12-03 ENCOUNTER — OUTPATIENT (OUTPATIENT)
Dept: OUTPATIENT SERVICES | Facility: HOSPITAL | Age: 69
LOS: 1 days | End: 2024-12-03

## 2024-12-03 VITALS
WEIGHT: 234 LBS | SYSTOLIC BLOOD PRESSURE: 144 MMHG | HEART RATE: 69 BPM | BODY MASS INDEX: 44.18 KG/M2 | OXYGEN SATURATION: 99 % | DIASTOLIC BLOOD PRESSURE: 78 MMHG | HEIGHT: 61 IN

## 2024-12-03 VITALS — DIASTOLIC BLOOD PRESSURE: 64 MMHG | SYSTOLIC BLOOD PRESSURE: 122 MMHG

## 2024-12-03 DIAGNOSIS — Z98.890 OTHER SPECIFIED POSTPROCEDURAL STATES: Chronic | ICD-10-CM

## 2024-12-03 DIAGNOSIS — E78.5 HYPERLIPIDEMIA, UNSPECIFIED: ICD-10-CM

## 2024-12-03 DIAGNOSIS — K25.5 CHRONIC OR UNSPECIFIED GASTRIC ULCER WITH PERFORATION: Chronic | ICD-10-CM

## 2024-12-03 DIAGNOSIS — I10 ESSENTIAL (PRIMARY) HYPERTENSION: ICD-10-CM

## 2024-12-03 DIAGNOSIS — E66.01 MORBID (SEVERE) OBESITY DUE TO EXCESS CALORIES: ICD-10-CM

## 2024-12-03 DIAGNOSIS — Z01.810 ENCOUNTER FOR PREPROCEDURAL CARDIOVASCULAR EXAMINATION: ICD-10-CM

## 2024-12-03 DIAGNOSIS — Z98.891 HISTORY OF UTERINE SCAR FROM PREVIOUS SURGERY: Chronic | ICD-10-CM

## 2024-12-03 DIAGNOSIS — Z98.49 CATARACT EXTRACTION STATUS, UNSPECIFIED EYE: Chronic | ICD-10-CM

## 2024-12-03 PROCEDURE — 93000 ELECTROCARDIOGRAM COMPLETE: CPT

## 2024-12-03 PROCEDURE — 99214 OFFICE O/P EST MOD 30 MIN: CPT

## 2024-12-03 PROCEDURE — 73700 CT LOWER EXTREMITY W/O DYE: CPT | Mod: 26,RT

## 2024-12-09 ENCOUNTER — OUTPATIENT (OUTPATIENT)
Dept: OUTPATIENT SERVICES | Facility: HOSPITAL | Age: 69
LOS: 1 days | End: 2024-12-09
Payer: MEDICARE

## 2024-12-09 ENCOUNTER — NON-APPOINTMENT (OUTPATIENT)
Age: 69
End: 2024-12-09

## 2024-12-09 VITALS
DIASTOLIC BLOOD PRESSURE: 68 MMHG | WEIGHT: 233.69 LBS | OXYGEN SATURATION: 97 % | TEMPERATURE: 98 F | RESPIRATION RATE: 18 BRPM | HEART RATE: 66 BPM | SYSTOLIC BLOOD PRESSURE: 120 MMHG | HEIGHT: 62 IN

## 2024-12-09 DIAGNOSIS — G47.33 OBSTRUCTIVE SLEEP APNEA (ADULT) (PEDIATRIC): ICD-10-CM

## 2024-12-09 DIAGNOSIS — M16.11 UNILATERAL PRIMARY OSTEOARTHRITIS, RIGHT HIP: ICD-10-CM

## 2024-12-09 DIAGNOSIS — I10 ESSENTIAL (PRIMARY) HYPERTENSION: ICD-10-CM

## 2024-12-09 DIAGNOSIS — K25.5 CHRONIC OR UNSPECIFIED GASTRIC ULCER WITH PERFORATION: Chronic | ICD-10-CM

## 2024-12-09 DIAGNOSIS — E03.9 HYPOTHYROIDISM, UNSPECIFIED: ICD-10-CM

## 2024-12-09 DIAGNOSIS — Z98.49 CATARACT EXTRACTION STATUS, UNSPECIFIED EYE: Chronic | ICD-10-CM

## 2024-12-09 DIAGNOSIS — Z98.890 OTHER SPECIFIED POSTPROCEDURAL STATES: Chronic | ICD-10-CM

## 2024-12-09 DIAGNOSIS — Z01.818 ENCOUNTER FOR OTHER PREPROCEDURAL EXAMINATION: ICD-10-CM

## 2024-12-09 DIAGNOSIS — I45.10 UNSPECIFIED RIGHT BUNDLE-BRANCH BLOCK: ICD-10-CM

## 2024-12-09 DIAGNOSIS — E66.01 MORBID (SEVERE) OBESITY DUE TO EXCESS CALORIES: ICD-10-CM

## 2024-12-09 DIAGNOSIS — Z98.891 HISTORY OF UTERINE SCAR FROM PREVIOUS SURGERY: Chronic | ICD-10-CM

## 2024-12-09 DIAGNOSIS — Z29.9 ENCOUNTER FOR PROPHYLACTIC MEASURES, UNSPECIFIED: ICD-10-CM

## 2024-12-09 LAB
A1C WITH ESTIMATED AVERAGE GLUCOSE RESULT: 5.1 % — SIGNIFICANT CHANGE UP (ref 4–5.6)
ALBUMIN SERPL ELPH-MCNC: 4 G/DL — SIGNIFICANT CHANGE UP (ref 3.3–5.2)
ALP SERPL-CCNC: 106 U/L — SIGNIFICANT CHANGE UP (ref 40–120)
ALT FLD-CCNC: 15 U/L — SIGNIFICANT CHANGE UP
AMYLASE P1 CFR SERPL: 85 U/L — SIGNIFICANT CHANGE UP (ref 36–128)
ANION GAP SERPL CALC-SCNC: 11 MMOL/L — SIGNIFICANT CHANGE UP (ref 5–17)
APTT BLD: 33 SEC — SIGNIFICANT CHANGE UP (ref 24.5–35.6)
AST SERPL-CCNC: 21 U/L — SIGNIFICANT CHANGE UP
BASOPHILS # BLD AUTO: 0.03 K/UL — SIGNIFICANT CHANGE UP (ref 0–0.2)
BASOPHILS NFR BLD AUTO: 0.5 % — SIGNIFICANT CHANGE UP (ref 0–2)
BILIRUB SERPL-MCNC: 0.4 MG/DL — SIGNIFICANT CHANGE UP (ref 0.4–2)
BLD GP AB SCN SERPL QL: SIGNIFICANT CHANGE UP
BUN SERPL-MCNC: 15.8 MG/DL — SIGNIFICANT CHANGE UP (ref 8–20)
CALCIUM SERPL-MCNC: 9.1 MG/DL — SIGNIFICANT CHANGE UP (ref 8.4–10.5)
CHLORIDE SERPL-SCNC: 103 MMOL/L — SIGNIFICANT CHANGE UP (ref 96–108)
CO2 SERPL-SCNC: 26 MMOL/L — SIGNIFICANT CHANGE UP (ref 22–29)
CREAT SERPL-MCNC: 0.9 MG/DL — SIGNIFICANT CHANGE UP (ref 0.5–1.3)
EGFR: 69 ML/MIN/1.73M2 — SIGNIFICANT CHANGE UP
EOSINOPHIL # BLD AUTO: 0.31 K/UL — SIGNIFICANT CHANGE UP (ref 0–0.5)
EOSINOPHIL NFR BLD AUTO: 5.7 % — SIGNIFICANT CHANGE UP (ref 0–6)
ESTIMATED AVERAGE GLUCOSE: 100 MG/DL — SIGNIFICANT CHANGE UP (ref 68–114)
GLUCOSE SERPL-MCNC: 81 MG/DL — SIGNIFICANT CHANGE UP (ref 70–99)
HCT VFR BLD CALC: 38.5 % — SIGNIFICANT CHANGE UP (ref 34.5–45)
HGB BLD-MCNC: 13.1 G/DL — SIGNIFICANT CHANGE UP (ref 11.5–15.5)
IMM GRANULOCYTES NFR BLD AUTO: 0.2 % — SIGNIFICANT CHANGE UP (ref 0–0.9)
INR BLD: 0.98 RATIO — SIGNIFICANT CHANGE UP (ref 0.85–1.16)
LIDOCAIN IGE QN: 39 U/L — SIGNIFICANT CHANGE UP (ref 22–51)
LYMPHOCYTES # BLD AUTO: 1.95 K/UL — SIGNIFICANT CHANGE UP (ref 1–3.3)
LYMPHOCYTES # BLD AUTO: 35.7 % — SIGNIFICANT CHANGE UP (ref 13–44)
MCHC RBC-ENTMCNC: 31.4 PG — SIGNIFICANT CHANGE UP (ref 27–34)
MCHC RBC-ENTMCNC: 34 G/DL — SIGNIFICANT CHANGE UP (ref 32–36)
MCV RBC AUTO: 92.3 FL — SIGNIFICANT CHANGE UP (ref 80–100)
MONOCYTES # BLD AUTO: 0.45 K/UL — SIGNIFICANT CHANGE UP (ref 0–0.9)
MONOCYTES NFR BLD AUTO: 8.2 % — SIGNIFICANT CHANGE UP (ref 2–14)
MRSA PCR RESULT.: SIGNIFICANT CHANGE UP
NEUTROPHILS # BLD AUTO: 2.71 K/UL — SIGNIFICANT CHANGE UP (ref 1.8–7.4)
NEUTROPHILS NFR BLD AUTO: 49.7 % — SIGNIFICANT CHANGE UP (ref 43–77)
PLATELET # BLD AUTO: 243 K/UL — SIGNIFICANT CHANGE UP (ref 150–400)
POTASSIUM SERPL-MCNC: 3.8 MMOL/L — SIGNIFICANT CHANGE UP (ref 3.5–5.3)
POTASSIUM SERPL-SCNC: 3.8 MMOL/L — SIGNIFICANT CHANGE UP (ref 3.5–5.3)
PROT SERPL-MCNC: 6.9 G/DL — SIGNIFICANT CHANGE UP (ref 6.6–8.7)
PROTHROM AB SERPL-ACNC: 11.1 SEC — SIGNIFICANT CHANGE UP (ref 9.9–13.4)
RBC # BLD: 4.17 M/UL — SIGNIFICANT CHANGE UP (ref 3.8–5.2)
RBC # FLD: 12.7 % — SIGNIFICANT CHANGE UP (ref 10.3–14.5)
S AUREUS DNA NOSE QL NAA+PROBE: SIGNIFICANT CHANGE UP
SODIUM SERPL-SCNC: 140 MMOL/L — SIGNIFICANT CHANGE UP (ref 135–145)
T3 SERPL-MCNC: 98 NG/DL — SIGNIFICANT CHANGE UP (ref 80–200)
T4 AB SER-ACNC: 9.3 UG/DL — SIGNIFICANT CHANGE UP (ref 4.5–12)
TSH SERPL-MCNC: 1.81 UIU/ML — SIGNIFICANT CHANGE UP (ref 0.27–4.2)
WBC # BLD: 5.46 K/UL — SIGNIFICANT CHANGE UP (ref 3.8–10.5)
WBC # FLD AUTO: 5.46 K/UL — SIGNIFICANT CHANGE UP (ref 3.8–10.5)

## 2024-12-09 PROCEDURE — G0463: CPT

## 2024-12-09 PROCEDURE — 83690 ASSAY OF LIPASE: CPT

## 2024-12-09 PROCEDURE — 83036 HEMOGLOBIN GLYCOSYLATED A1C: CPT

## 2024-12-09 PROCEDURE — 87640 STAPH A DNA AMP PROBE: CPT

## 2024-12-09 PROCEDURE — 87641 MR-STAPH DNA AMP PROBE: CPT

## 2024-12-09 PROCEDURE — 82150 ASSAY OF AMYLASE: CPT

## 2024-12-09 PROCEDURE — 93005 ELECTROCARDIOGRAM TRACING: CPT

## 2024-12-09 PROCEDURE — 85610 PROTHROMBIN TIME: CPT

## 2024-12-09 PROCEDURE — 86901 BLOOD TYPING SEROLOGIC RH(D): CPT

## 2024-12-09 PROCEDURE — 85025 COMPLETE CBC W/AUTO DIFF WBC: CPT

## 2024-12-09 PROCEDURE — 93010 ELECTROCARDIOGRAM REPORT: CPT

## 2024-12-09 PROCEDURE — 84436 ASSAY OF TOTAL THYROXINE: CPT

## 2024-12-09 PROCEDURE — 80053 COMPREHEN METABOLIC PANEL: CPT

## 2024-12-09 PROCEDURE — 85730 THROMBOPLASTIN TIME PARTIAL: CPT

## 2024-12-09 PROCEDURE — 84443 ASSAY THYROID STIM HORMONE: CPT

## 2024-12-09 PROCEDURE — 36415 COLL VENOUS BLD VENIPUNCTURE: CPT

## 2024-12-09 PROCEDURE — 86850 RBC ANTIBODY SCREEN: CPT

## 2024-12-09 PROCEDURE — 84480 ASSAY TRIIODOTHYRONINE (T3): CPT

## 2024-12-09 PROCEDURE — 86900 BLOOD TYPING SEROLOGIC ABO: CPT

## 2024-12-09 NOTE — H&P PST ADULT - HISTORY OF PRESENT ILLNESS
68 y/o  F seen in PST 12/9   in anticipation of scheduled RIGHT TOTAL HIP REPLACEMENT  on 12/27/24  at [facility] with DR JARAD MARTIN.  he patient was previously scheduled to undergo a total hip arthroplasty, however, she unfortunately developed a perforated ulcer due to NSAID use as well as intra-abdominal abscess leading to hospitalization and multiple drainages. The patient has been following closely with an ID and general surgery team as well as her primary care physician. The patient was recently de-escalated from IV antibiotics to oral antibiotics. She had a repeat CT AP and per GI Surgeon note "She is now recovered from all of her intraabdominal infections. Repeat CT A/P  shows complete resolution of intraabdominal collections and inflammation. Patient is cleared from a surgical standpoint for the planned orthopedic surgery procedure."  She reports RIGHT HIP continues to cause severe pain and dysfunction. She has been taking Tylenol for the pain with moderate relief. Has been trying to stay as active as possible with walking but states that is difficult due to the severity of her pain. Pain is rated as /10.          68-year-old female presents to the office for follow-up of her right hip pain. T She is pending a repeat CT abdomen pelvis on 10/9/2024 to confirm resolution of   her fluid collections. She will follow-up with her general surgery team after the scan for orthopedic clearance to   proceed with her total hip arthroplasty. The patient states that her right hip catient states that she is looking   forward to being cleared to proceed with surgery. She denies any recent injuries falls or trauma, neurovascular   compromise   PMHX: HTN, RBBB, HYPOTHYROID, ARSLAN/CPAP, OBESITY, GASTRIC ULCER  70 y/o  F seen in PST 12/9  in anticipation of scheduled RIGHT POSTERIOR TOTAL HIP REPLACEMENTon 12/27/24  at [facility] with DR JARAD MARTIN.  The patient was previously scheduled to undergo a total hip arthroplasty,  however, she due to NSAID use she  developed a perforated ulcer as well as intra-abdominal abscess leading to hospitalization and multiple drainages. The patient has been following closely with an ID and general surgery team as well as her primary care physician. The patient was recently de-escalated from IV antibiotics to oral antibiotics. She had a repeat CT AP and per GI Surgeon note "She is now recovered from all of her intraabdominal infections. Repeat CT A/P  shows complete resolution of intraabdominal collections and inflammation. Patient is cleared from a surgical standpoint for the planned orthopedic surgery procedure."  She reports RIGHT HIP continues to cause severe pain and dysfunction. She has been taking Tylenol for the pain with moderate relief. Has been trying to stay as active as possible with walking but states that is difficult due to the severity of her pain. Pain is rated as 1/10 at rest and 5/10 when attempting to go from sitting to standing.  Reports once she starts moving the discomfort goes away.  Denies numbness/tingling to leg/foot.  Reports edema to both legs since the GI hospitalization. Was previously on Elliquis for a blood clot in her liver but this was discontinued in August.  She was seen by PULM and CARDIAC for clearance (printed in chart and reviewed in HIE). She is also due to follow up with infectious disease MD.  PENDING MEDICAL CLEARANCE WITH PCP KATHERYN IBARRA [phone]  CARDIAC/PULM in CHART PMHX: HTN, RBBB, HYPOTHYROID, ARSLAN/CPAP, OBESITY, GASTRIC ULCER  70 y/o  F seen in PST 12/9  in anticipation of scheduled RIGHT POSTERIOR TOTAL HIP REPLACEMENTon 12/27/24  at [facility] with DR JARAD MARTIN.  The patient was previously scheduled to undergo a total hip arthroplasty,  however, due to NSAID use she  developed a perforated ulcer as well as intra-abdominal abscess leading to hospitalization and multiple drainages. The patient has been following closely with an ID and general surgery team as well as her primary care physician. The patient was recently de-escalated from IV antibiotics to oral antibiotics. She had a repeat CT AP and per GI Surgeon note "She is now recovered from all of her intraabdominal infections. Repeat CT A/P  shows complete resolution of intraabdominal collections and inflammation. Patient is cleared from a surgical standpoint for the planned orthopedic surgery procedure."  She reports RIGHT HIP continues to cause severe pain and dysfunction. She has been taking Tylenol for the pain with moderate relief. Has been trying to stay as active as possible with walking but states that is difficult due to the severity of her pain. Pain is rated as 1/10 at rest and 5/10 when attempting to go from sitting to standing.  Reports once she starts moving the discomfort goes away.  Denies numbness/tingling to leg/foot.  Reports edema to both legs since the GI hospitalization. Was previously on Elliquis for a blood clot in her liver but this was discontinued in August.  She was seen by PULM and CARDIAC for clearance (printed in chart and reviewed in HIE). She is also due to follow up with infectious disease MD.  PENDING MEDICAL OPTIMIZATION WITH PCP KATHERYN IBARRA [phone]  CARDIAC/PULM in CHART

## 2024-12-09 NOTE — H&P PST ADULT - PROBLEM SELECTOR PLAN 7
Instructed to bring CPAP to hospital for admission  PENDING MEDICAL OPTIMIZATION WITH PCP KATHERYN IBARRA [phone]  CARDIAC/PULM in CHART

## 2024-12-09 NOTE — H&P PST ADULT - ASSESSMENT
PMHX: HTN, RBBB, HYPOTHYROID, ARSLAN/CPAP, OBESITY, GASTRIC ULCER  68 y/o  F seen in PST 12/9  in anticipation of scheduled RIGHT POSTERIOR TOTAL HIP REPLACEMENTon 12/27/24  at [facility] with DR JARAD MARTIN.  The patient was previously scheduled to undergo a total hip arthroplasty,  however, she due to NSAID use she  developed a perforated ulcer as well as intra-abdominal abscess leading to hospitalization and multiple drainages. The patient has been following closely with an ID and general surgery team as well as her primary care physician. The patient was recently de-escalated from IV antibiotics to oral antibiotics. She had a repeat CT AP and per GI Surgeon note "She is now recovered from all of her intraabdominal infections. Repeat CT A/P  shows complete resolution of intraabdominal collections and inflammation. Patient is cleared from a surgical standpoint for the planned orthopedic surgery procedure."  She reports RIGHT HIP continues to cause severe pain and dysfunction. She has been taking Tylenol for the pain with moderate relief. Has been trying to stay as active as possible with walking but states that is difficult due to the severity of her pain. Pain is rated as 1/10 at rest and 5/10 when attempting to go from sitting to standing.  Reports once she starts moving the discomfort goes away.  Denies numbness/tingling to leg/foot.  Reports edema to both legs since the GI hospitalization. Was previously on Elliquis for a blood clot in her liver but this was discontinued in August.  She was seen by PULM and CARDIAC for clearance (printed in chart and reviewed in HIE). She is also due to follow up with infectious disease MD.  PENDING MEDICAL CLEARANCE WITH PCP KATHERYN IBARRA [phone]  CARDIAC/PULM in CHART PMHX: HTN, RBBB, HYPOTHYROID, ARSLAN/CPAP, OBESITY, GASTRIC ULCER  70 y/o  F seen in PST   in anticipation of scheduled RIGHT POSTERIOR TOTAL HIP REPLACEMENTon 24  at [facility] with DR JARAD MARTIN.  The patient was previously scheduled to undergo a total hip arthroplasty,  however, due to NSAID use she  developed a perforated ulcer as well as intra-abdominal abscess leading to hospitalization and multiple drainages. The patient has been following closely with an ID and general surgery team as well as her primary care physician. The patient was recently de-escalated from IV antibiotics to oral antibiotics. She had a repeat CT AP and per GI Surgeon note "She is now recovered from all of her intraabdominal infections. Repeat CT A/P  shows complete resolution of intraabdominal collections and inflammation. Patient is cleared from a surgical standpoint for the planned orthopedic surgery procedure."  She reports RIGHT HIP continues to cause severe pain and dysfunction. She has been taking Tylenol for the pain with moderate relief. Has been trying to stay as active as possible with walking but states that is difficult due to the severity of her pain. Pain is rated as 1/10 at rest and 5/10 when attempting to go from sitting to standing.  Reports once she starts moving the discomfort goes away.  Denies numbness/tingling to leg/foot.  Reports edema to both legs since the GI hospitalization. Was previously on Elliquis for a blood clot in her liver but this was discontinued in August.  She was seen by PULM and CARDIAC for clearance (printed in chart and reviewed in HIE). She is also due to follow up with infectious disease MD.  PENDING MEDICAL OPTIMIZATION WITH PCP KATHERYN IBARRA [phone]  CARDIAC/PULM in CHART    OPIOID RISK TOOL    CATHY EACH BOX THAT APPLIES AND ADD TOTALS AT THE END    FAMILY HISTORY OF SUBSTANCE ABUSE                 FEMALE         MALE                                               Alcohol                             [  ]1 pt          [  ]3pts                                               Illegal Durgs                     [  ]2 pts        [  ]3pts                                               Rx Drugs                           [  ]4 pts        [  ]4 pts    PERSONAL HISTORY OF SUBSTANCE ABUSE                                                                                         Alcohol                             [  ]3 pts       [  ]3 pts                                               Illegal Durgs                     [  ]4 pts        [  ]4 pts                                               Rx Drugs                           [  ]5 pts        [  ]5 pts    AGE BETWEEN 16-45 YEARS                                      [  ]1 pt         [  ]1 pt    HISTORY OF PREADOLESCENT  SEXUAL ABUSE                                                             [  ]3 pts        [  ]0pts    PSYCHOLOGICAL DISEASE                     ADD, OCD, Bipolar, Schizophrenia        [  ]2 pts         [  ]2 pts                      Depression                                               [  ]1 pt           [  ]1 pt          Total: 0  A score of 3 or lower indicated LOW risk for future opiod abuse  A score of 4 to 7 indicated moderate risk for future opiod abuse  A score of 8 or higher indicates a high risk for opiod abuse       CAPRINI SCORE    AGE RELATED RISK FACTORS                                                             [ ] Age 41-60 years                                            (1 Point)  [X ] Age: 61-74 years                                           (2 Points)                 [ ] Age= 75 years                                                (3 Points)             DISEASE RELATED RISK FACTORS                                                       [X ] Edema in the lower extremities                 (1 Point)                     [ ] Varicose veins                                               (1 Point)                                 [ X] BMI > 25 Kg/m2                                            (1 Point)                                  [ ] Serious infection (ie PNA)                            (1 Point)                     [ ] Lung disease ( COPD, Emphysema)            (1 Point)                                                                          [ ] Acute myocardial infarction                         (1 Point)                  [ ] Congestive heart failure (in the previous month)  (1 Point)         [ ] Inflammatory bowel disease                            (1 Point)                  [ ] Central venous access, PICC or Port               (2 points)       (within the last month)                                                                [ ] Stroke (in the previous month)                        (5 Points)    [ ] Previous or present malignancy                       (2 points)                                                                                                                                                         HEMATOLOGY RELATED FACTORS                                                         [ X] Prior episodes of VTE                                     (3 Points)                     [ ] Positive family history for VTE                      (3 Points)                  [ ] Prothrombin 71168 A                                     (3 Points)                     [ ] Factor V Leiden                                                (3 Points)                        [ ] Lupus anticoagulants                                      (3 Points)                                                           [ ] Anticardiolipin antibodies                              (3 Points)                                                       [ ] High homocysteine in the blood                   (3 Points)                                             [ ] Other congenital or acquired thrombophilia      (3 Points)                                                [ ] Heparin induced thrombocytopenia                  (3 Points)                                        MOBILITY RELATED FACTORS  [ ] Bed rest                                                         (1 Point)  [ ] Plaster cast                                                    (2 points)  [ ] Bed bound for more than 72 hours           (2 Points)    GENDER SPECIFIC FACTORS  [ ] Pregnancy or had a baby within the last month   (1 Point)  [ ] Post-partum < 6 weeks                                   (1 Point)  [ ] Hormonal therapy  or oral contraception   (1 Point)  [ ] History of pregnancy complications              (1 point)  [ ] Unexplained or recurrent              (1 Point)    OTHER RISK FACTORS                                           (1 Point)  [X ] BMI >40, smoking, diabetes requiring insulin, chemotherapy  blood transfusions and length of surgery over 2 hours    SURGERY RELATED RISK FACTORS  [ ]  Section within the last month     (1 Point)  [ ] Minor surgery                                                  (1 Point)  [ ] Arthroscopic surgery                                       (2 Points)  [ ] Planned major surgery lasting more            (2 Points)      than 45 minutes     [X ] Elective hip or knee joint replacement       (5 points)       surgery                                                TRAUMA RELATED RISK FACTORS  [ ] Fracture of the hip, pelvis, or leg                       (5 Points)  [ ] Spinal cord injury resulting in paralysis             (5 points)       (in the previous month)    [ ] Paralysis  (less than 1 month)                             (5 Points)  [ ] Multiple Trauma within 1 month                        (5 Points)    Total Score [   13  ]    Caprini Score 0-2: Low Risk, NO VTE prophylaxis required for most patients, encourage ambulation  Caprini Score 3-6: Moderate Risk , pharmacologic VTE prophylaxis is indicated for most patients (in the absence of contraindications)  Caprini Score Greater than or =7: High risk, pharmocologic VTE prophylaxis indicated for most patients (in the absence of contraindications)

## 2024-12-09 NOTE — H&P PST ADULT - PROBLEM SELECTOR PLAN 4
Instructed STOP AMLODOPINE/VALSARTAN combo pill after the morning dose on 12/26.  Do not take evening dose on 12/26 and DO NOT take on day of surgery.  Can resume post-operatively per prescriber/surgeon instructions  Instructed DO TAKE Carvedilol on day of procedure with small sip of water/ERP fluids at least 2 hours prior to start time.  PENDING MEDICAL OPTIMIZATION WITH PCP KATHERYN IBARRA [phone]  CARDIAC/PULM in CHART

## 2024-12-09 NOTE — H&P PST ADULT - PROBLEM SELECTOR PLAN 8
Surgical team to address  Total Score [   13  ]  Caprini Score Greater than or =7: High risk, pharmocologic VTE prophylaxis indicated for most patients (in the absence of contraindications)

## 2024-12-09 NOTE — H&P PST ADULT - PROBLEM SELECTOR PLAN 3
Management per PCP  PENDING MEDICAL OPTIMIZATION WITH PCP KATHERYN IBARRA [phone]  CARDIAC/PULM in CHART

## 2024-12-09 NOTE — H&P PST ADULT - PROBLEM SELECTOR PLAN 1
PST 12/9  in anticipation of scheduled RIGHT POSTERIOR TOTAL HIP REPLACEMENTon 12/27/24  at [facility] with DR JARAD MARTIN. Patient educated on no shaving x 3 days prior to procedure, correct use of surgical scrub, NPO after MN, ERP fluid protocol, preadmission instructions, day of procedure medications, MEDICAL/CARDIAC/PULM OPTIMIZATION needed.  Pt instructed to stop vitamins/supplements/herbal medications/ASA/NSAIDS for one week prior to surgery and discuss with PMD/PRESCIRBER/SPECIALIST any outstanding items or questions about prescriptions/medications. Written and oral instructions given to patient.   PENDING MEDICAL OPTIMIZATION WITH PCP KATHERYN IBARRA [phone]  CARDIAC/PULM in CHART  LABS DONE TODAY IN PST:CBC CMP A1C T&S MRSA/MSSA THYROID PT INR AMYLASE LIPASE EKG

## 2024-12-09 NOTE — H&P PST ADULT - MUSCULOSKELETAL
no joint erythema/no joint warmth/no calf tenderness/strength 5/5 bilateral upper extremities/strength 5/5 bilateral lower extremities/extremities exam/abnormal gait details…

## 2024-12-09 NOTE — H&P PST ADULT - GASTROINTESTINAL
normal/soft/nontender/nondistended/normal active bowel sounds/no guarding/no rigidity/no organomegaly/no palpable sri details…

## 2024-12-09 NOTE — H&P PST ADULT - PROBLEM SELECTOR PLAN 6
EKG unchanged from prior  PENDING MEDICAL OPTIMIZATION WITH PCP KATHERYN IBARRA [phone]  CARDIAC/PULM in CHART

## 2024-12-09 NOTE — H&P PST ADULT - PROBLEM SELECTOR PLAN 2
Management per PCP. Continue RX meds as ordered by prescriber. Verbal and written instructions given to continue and take meds day of surgery with small sip of water/ERP fluids at least 2 hours prior to start time   PENDING MEDICAL OPTIMIZATION WITH PCP KATHERYN IBARRA [phone]  CARDIAC/PULM in CHART

## 2024-12-09 NOTE — H&P PST ADULT - PROBLEM SELECTOR PLAN 5
Followed by GI and Infectious disease  Clearance/notes in HIE  PENDING MEDICAL OPTIMIZATION WITH PCP KATHERYN IBARRA [phone]  CARDIAC/PULM in CHART

## 2024-12-09 NOTE — H&P PST ADULT - OTHER CARE PROVIDERS
PCP  PULMONARY PUSHPA SHAH  CARDIAC [facility] EUBANKS NP PCP KATHERYN IBARRA  PULMONARY PUSHPA SHAH  CARDIAC [facility] OMARI FULTON   INFECTIOUS DISEASE LUBA STRICKLAND

## 2024-12-12 ENCOUNTER — NON-APPOINTMENT (OUTPATIENT)
Age: 69
End: 2024-12-12

## 2024-12-12 ENCOUNTER — APPOINTMENT (OUTPATIENT)
Dept: INTERNAL MEDICINE | Facility: CLINIC | Age: 69
End: 2024-12-12
Payer: MEDICARE

## 2024-12-12 VITALS
WEIGHT: 234 LBS | HEIGHT: 61 IN | SYSTOLIC BLOOD PRESSURE: 130 MMHG | DIASTOLIC BLOOD PRESSURE: 65 MMHG | BODY MASS INDEX: 44.18 KG/M2

## 2024-12-12 DIAGNOSIS — Z01.818 ENCOUNTER FOR OTHER PREPROCEDURAL EXAMINATION: ICD-10-CM

## 2024-12-12 DIAGNOSIS — K65.1 PERITONEAL ABSCESS: ICD-10-CM

## 2024-12-12 PROCEDURE — 99215 OFFICE O/P EST HI 40 MIN: CPT

## 2024-12-19 ENCOUNTER — APPOINTMENT (OUTPATIENT)
Dept: DERMATOLOGY | Facility: CLINIC | Age: 69
End: 2024-12-19
Payer: MEDICARE

## 2024-12-19 PROCEDURE — 99213 OFFICE O/P EST LOW 20 MIN: CPT

## 2024-12-24 RX ORDER — CEFAZOLIN SODIUM 1 G
2000 VIAL (EA) INJECTION ONCE
Refills: 0 | Status: DISCONTINUED | OUTPATIENT
Start: 2024-12-27 | End: 2024-12-27

## 2024-12-24 RX ORDER — POVIDONE IODINE USP, 10% W/W 10 MG/ML
1 SWAB TOPICAL ONCE
Refills: 0 | Status: COMPLETED | OUTPATIENT
Start: 2024-12-27 | End: 2024-12-27

## 2024-12-24 RX ORDER — TRANEXAMIC ACID 650 MG/1
1000 TABLET ORAL ONCE
Refills: 0 | Status: DISCONTINUED | OUTPATIENT
Start: 2024-12-27 | End: 2024-12-27

## 2024-12-27 ENCOUNTER — INPATIENT (INPATIENT)
Facility: HOSPITAL | Age: 69
LOS: 0 days | Discharge: HOME HEALTH | End: 2024-12-28
Attending: STUDENT IN AN ORGANIZED HEALTH CARE EDUCATION/TRAINING PROGRAM | Admitting: STUDENT IN AN ORGANIZED HEALTH CARE EDUCATION/TRAINING PROGRAM
Payer: MEDICARE

## 2024-12-27 ENCOUNTER — TRANSCRIPTION ENCOUNTER (OUTPATIENT)
Age: 69
End: 2024-12-27

## 2024-12-27 ENCOUNTER — APPOINTMENT (OUTPATIENT)
Dept: ORTHOPEDIC SURGERY | Facility: HOSPITAL | Age: 69
End: 2024-12-27

## 2024-12-27 VITALS
WEIGHT: 233.69 LBS | TEMPERATURE: 98 F | SYSTOLIC BLOOD PRESSURE: 147 MMHG | RESPIRATION RATE: 18 BRPM | DIASTOLIC BLOOD PRESSURE: 81 MMHG | HEART RATE: 77 BPM | HEIGHT: 62 IN | OXYGEN SATURATION: 100 %

## 2024-12-27 DIAGNOSIS — K25.5 CHRONIC OR UNSPECIFIED GASTRIC ULCER WITH PERFORATION: Chronic | ICD-10-CM

## 2024-12-27 DIAGNOSIS — Z98.890 OTHER SPECIFIED POSTPROCEDURAL STATES: Chronic | ICD-10-CM

## 2024-12-27 DIAGNOSIS — Z98.891 HISTORY OF UTERINE SCAR FROM PREVIOUS SURGERY: Chronic | ICD-10-CM

## 2024-12-27 DIAGNOSIS — Z98.49 CATARACT EXTRACTION STATUS, UNSPECIFIED EYE: Chronic | ICD-10-CM

## 2024-12-27 LAB
APTT BLD: 33.8 SEC — SIGNIFICANT CHANGE UP (ref 24.5–35.6)
INR BLD: 1.05 RATIO — SIGNIFICANT CHANGE UP (ref 0.85–1.16)
PROTHROM AB SERPL-ACNC: 12.2 SEC — SIGNIFICANT CHANGE UP (ref 9.9–13.4)

## 2024-12-27 PROCEDURE — 73501 X-RAY EXAM HIP UNI 1 VIEW: CPT | Mod: 26,RT

## 2024-12-27 PROCEDURE — 20985 CPTR-ASST DIR MS PX: CPT

## 2024-12-27 PROCEDURE — 27130 TOTAL HIP ARTHROPLASTY: CPT | Mod: 22,RT

## 2024-12-27 PROCEDURE — 27130 TOTAL HIP ARTHROPLASTY: CPT | Mod: AS,RT

## 2024-12-27 DEVICE — MAKO BONE PIN 4MM X 170MM: Type: IMPLANTABLE DEVICE | Site: RIGHT | Status: FUNCTIONAL

## 2024-12-27 DEVICE — STEM FEM ACTIS HIGH COLLAR SZ 3: Type: IMPLANTABLE DEVICE | Site: RIGHT | Status: FUNCTIONAL

## 2024-12-27 DEVICE — HEAD DELTA CER 32MM: Type: IMPLANTABLE DEVICE | Site: RIGHT | Status: FUNCTIONAL

## 2024-12-27 DEVICE — LINER ACET TRIDENT X3 0 DEG 32MM B: Type: IMPLANTABLE DEVICE | Site: RIGHT | Status: FUNCTIONAL

## 2024-12-27 DEVICE — SHELL ACET TRIDENT II B 44MM: Type: IMPLANTABLE DEVICE | Site: RIGHT | Status: FUNCTIONAL

## 2024-12-27 DEVICE — HEAD BIOLOX CERAMIC PLUS5 32MM: Type: IMPLANTABLE DEVICE | Site: RIGHT | Status: FUNCTIONAL

## 2024-12-27 DEVICE — MAKO KNEE TIBIAL CHECKPOINT: Type: IMPLANTABLE DEVICE | Site: RIGHT | Status: FUNCTIONAL

## 2024-12-27 RX ORDER — VALSARTAN 80 MG/1
160 TABLET ORAL DAILY
Refills: 0 | Status: CANCELLED | OUTPATIENT
Start: 2024-12-29 | End: 2024-12-28

## 2024-12-27 RX ORDER — POLYETHYLENE GLYCOL 3350 17 G/DOSE
17 POWDER (GRAM) ORAL AT BEDTIME
Refills: 0 | Status: DISCONTINUED | OUTPATIENT
Start: 2024-12-27 | End: 2024-12-28

## 2024-12-27 RX ORDER — LEVOTHYROXINE SODIUM 175 UG/1
75 TABLET ORAL DAILY
Refills: 0 | Status: DISCONTINUED | OUTPATIENT
Start: 2024-12-27 | End: 2024-12-28

## 2024-12-27 RX ORDER — FENTANYL 75 UG/H
25 PATCH, EXTENDED RELEASE TRANSDERMAL
Refills: 0 | Status: DISCONTINUED | OUTPATIENT
Start: 2024-12-27 | End: 2024-12-27

## 2024-12-27 RX ORDER — SENNOSIDES 8.6 MG/1
2 TABLET, FILM COATED ORAL AT BEDTIME
Refills: 0 | Status: DISCONTINUED | OUTPATIENT
Start: 2024-12-27 | End: 2024-12-28

## 2024-12-27 RX ORDER — SODIUM CHLORIDE 9 MG/ML
1000 INJECTION, SOLUTION INTRAVENOUS
Refills: 0 | Status: DISCONTINUED | OUTPATIENT
Start: 2024-12-27 | End: 2024-12-27

## 2024-12-27 RX ORDER — FLUTICASONE PROPIONATE 220 MCG
1 AEROSOL WITH ADAPTER (GRAM) INHALATION
Refills: 0 | DISCHARGE

## 2024-12-27 RX ORDER — KETOROLAC TROMETHAMINE 30 MG/ML
15 INJECTION INTRAMUSCULAR; INTRAVENOUS ONCE
Refills: 0 | Status: DISCONTINUED | OUTPATIENT
Start: 2024-12-27 | End: 2024-12-27

## 2024-12-27 RX ORDER — OXYCODONE HCL 15 MG
5 TABLET ORAL
Refills: 0 | Status: DISCONTINUED | OUTPATIENT
Start: 2024-12-27 | End: 2024-12-28

## 2024-12-27 RX ORDER — ACETAMINOPHEN 80 MG/.8ML
1000 SOLUTION/ DROPS ORAL ONCE
Refills: 0 | Status: COMPLETED | OUTPATIENT
Start: 2024-12-27 | End: 2024-12-27

## 2024-12-27 RX ORDER — PANTOPRAZOLE 40 MG/1
40 TABLET, DELAYED RELEASE ORAL
Refills: 0 | Status: DISCONTINUED | OUTPATIENT
Start: 2024-12-27 | End: 2024-12-28

## 2024-12-27 RX ORDER — CARVEDILOL 25 MG/1
12.5 TABLET, FILM COATED ORAL EVERY 12 HOURS
Refills: 0 | Status: DISCONTINUED | OUTPATIENT
Start: 2024-12-27 | End: 2024-12-28

## 2024-12-27 RX ORDER — HYDROMORPHONE HCL 4 MG
4 TABLET ORAL
Refills: 0 | Status: DISCONTINUED | OUTPATIENT
Start: 2024-12-27 | End: 2024-12-28

## 2024-12-27 RX ORDER — ACETAMINOPHEN 80 MG/.8ML
1000 SOLUTION/ DROPS ORAL ONCE
Refills: 0 | Status: COMPLETED | OUTPATIENT
Start: 2024-12-27 | End: 2024-12-28

## 2024-12-27 RX ORDER — MAGNESIUM HYDROXIDE 400 MG/5ML
30 SUSPENSION, ORAL (FINAL DOSE FORM) ORAL DAILY
Refills: 0 | Status: DISCONTINUED | OUTPATIENT
Start: 2024-12-27 | End: 2024-12-28

## 2024-12-27 RX ORDER — APIXABAN 5 MG/1
2.5 TABLET, FILM COATED ORAL
Refills: 0 | Status: DISCONTINUED | OUTPATIENT
Start: 2024-12-28 | End: 2024-12-28

## 2024-12-27 RX ORDER — APREPITANT 40 MG/1
40 CAPSULE ORAL ONCE
Refills: 0 | Status: COMPLETED | OUTPATIENT
Start: 2024-12-27 | End: 2024-12-27

## 2024-12-27 RX ORDER — ONDANSETRON 4 MG/1
4 TABLET ORAL ONCE
Refills: 0 | Status: COMPLETED | OUTPATIENT
Start: 2024-12-27 | End: 2024-12-27

## 2024-12-27 RX ORDER — BISACODYL 5 MG
10 TABLET, DELAYED RELEASE (ENTERIC COATED) ORAL ONCE
Refills: 0 | Status: CANCELLED | OUTPATIENT
Start: 2024-12-29 | End: 2024-12-28

## 2024-12-27 RX ORDER — OXYCODONE HCL 15 MG
10 TABLET ORAL
Refills: 0 | Status: DISCONTINUED | OUTPATIENT
Start: 2024-12-27 | End: 2024-12-28

## 2024-12-27 RX ORDER — SODIUM CHLORIDE 9 MG/ML
3 INJECTION, SOLUTION INTRAMUSCULAR; INTRAVENOUS; SUBCUTANEOUS EVERY 8 HOURS
Refills: 0 | Status: DISCONTINUED | OUTPATIENT
Start: 2024-12-27 | End: 2024-12-27

## 2024-12-27 RX ORDER — CEFAZOLIN SODIUM 1 G
2000 VIAL (EA) INJECTION
Refills: 0 | Status: COMPLETED | OUTPATIENT
Start: 2024-12-27 | End: 2024-12-28

## 2024-12-27 RX ORDER — SODIUM CHLORIDE 9 MG/ML
1000 INJECTION, SOLUTION INTRAMUSCULAR; INTRAVENOUS; SUBCUTANEOUS
Refills: 0 | Status: DISCONTINUED | OUTPATIENT
Start: 2024-12-27 | End: 2024-12-28

## 2024-12-27 RX ORDER — ACETAMINOPHEN 80 MG/.8ML
975 SOLUTION/ DROPS ORAL ONCE
Refills: 0 | Status: COMPLETED | OUTPATIENT
Start: 2024-12-27 | End: 2024-12-27

## 2024-12-27 RX ORDER — FENTANYL 75 UG/H
50 PATCH, EXTENDED RELEASE TRANSDERMAL
Refills: 0 | Status: DISCONTINUED | OUTPATIENT
Start: 2024-12-27 | End: 2024-12-27

## 2024-12-27 RX ORDER — ONDANSETRON 4 MG/1
4 TABLET ORAL EVERY 6 HOURS
Refills: 0 | Status: DISCONTINUED | OUTPATIENT
Start: 2024-12-27 | End: 2024-12-28

## 2024-12-27 RX ORDER — ACETAMINOPHEN 80 MG/.8ML
975 SOLUTION/ DROPS ORAL EVERY 8 HOURS
Refills: 0 | Status: DISCONTINUED | OUTPATIENT
Start: 2024-12-27 | End: 2024-12-28

## 2024-12-27 RX ORDER — B COMPLEX, C NO.20/FOLIC ACID 1 MG
1 CAPSULE ORAL DAILY
Refills: 0 | Status: DISCONTINUED | OUTPATIENT
Start: 2024-12-27 | End: 2024-12-28

## 2024-12-27 RX ADMIN — ACETAMINOPHEN 975 MILLIGRAM(S): 80 SOLUTION/ DROPS ORAL at 22:49

## 2024-12-27 RX ADMIN — ACETAMINOPHEN 400 MILLIGRAM(S): 80 SOLUTION/ DROPS ORAL at 16:06

## 2024-12-27 RX ADMIN — Medication 5 MILLIGRAM(S): at 18:47

## 2024-12-27 RX ADMIN — Medication 5 MILLIGRAM(S): at 19:20

## 2024-12-27 RX ADMIN — FENTANYL 50 MICROGRAM(S): 75 PATCH, EXTENDED RELEASE TRANSDERMAL at 14:20

## 2024-12-27 RX ADMIN — ACETAMINOPHEN 975 MILLIGRAM(S): 80 SOLUTION/ DROPS ORAL at 21:49

## 2024-12-27 RX ADMIN — Medication 2000 MILLIGRAM(S): at 21:48

## 2024-12-27 RX ADMIN — APREPITANT 40 MILLIGRAM(S): 40 CAPSULE ORAL at 10:08

## 2024-12-27 RX ADMIN — Medication 5 MILLIGRAM(S): at 15:45

## 2024-12-27 RX ADMIN — CARVEDILOL 12.5 MILLIGRAM(S): 25 TABLET, FILM COATED ORAL at 18:05

## 2024-12-27 RX ADMIN — ACETAMINOPHEN 975 MILLIGRAM(S): 80 SOLUTION/ DROPS ORAL at 10:08

## 2024-12-27 RX ADMIN — ONDANSETRON 4 MILLIGRAM(S): 4 TABLET ORAL at 15:21

## 2024-12-27 RX ADMIN — POVIDONE IODINE USP, 10% W/W 1 APPLICATION(S): 10 SWAB TOPICAL at 09:58

## 2024-12-27 RX ADMIN — Medication 5 MILLIGRAM(S): at 15:24

## 2024-12-27 RX ADMIN — FENTANYL 50 MICROGRAM(S): 75 PATCH, EXTENDED RELEASE TRANSDERMAL at 14:30

## 2024-12-27 NOTE — PROVIDER CONTACT NOTE (OTHER) - SITUATION
Attended joint education session on 12/17/2024 via in person method with opportunity to ask questions. Contact information for follow up questions given.

## 2024-12-27 NOTE — PHYSICAL THERAPY INITIAL EVALUATION ADULT - ACTIVE RANGE OF MOTION EXAMINATION, REHAB EVAL
decreased AROM RLE/bilateral upper extremity Active ROM was WFL (within functional limits)/Left LE Active ROM was WFL (within functional limits)/deficits as listed below

## 2024-12-27 NOTE — ASU PREOP CHECKLIST - LAST TOOK
Type of outreach:  Discussed HM needs at OV   Health Maintenance Due   Topic Date Due     PREVENTIVE CARE VISIT  1977     URINE DRUG SCREEN  03/08/2018     PAP  01/25/2019     PHQ-9  07/22/2019     Patient had full hysterectomy in 2017, no longer needing paps per OB/GYN.  Kerri Dallas MA        solids

## 2024-12-27 NOTE — DISCHARGE NOTE PROVIDER - NSDCFUSCHEDAPPT_GEN_ALL_CORE_FT
Phong Martinez  [facility] Physician [facility]  ORTHOSURG [address]  Scheduled Appointment: 01/21/2025    Snadee Andino  [facility] Physician [facility]  CARDIOLOGY [address]  Scheduled Appointment: 02/04/2025    Phong Martinez  [facility]  ORTHOSURG [address]  Scheduled Appointment: 02/11/2025    Bubba Hurd  [facility] Physician [facility]  GASTRO MOYA [address]  Scheduled Appointment: 02/19/2025    Henry Garcia  [facility] Physician [facility]  PULMMED [address]  Scheduled Appointment: 02/24/2025    [facility]  ORTHOSURG [address]  Scheduled Appointment: 03/25/2025

## 2024-12-27 NOTE — DISCHARGE NOTE PROVIDER - NSDCMRMEDTOKEN_GEN_ALL_CORE_FT
amlodipine-valsartan 5 mg-160 mg oral tablet: 1 tab(s) orally once a day  azelastine 137 mcg/inh (0.1%) nasal spray: 1 spray(s) in each nostril once a day  carvedilol 12.5 mg oral tablet: 1 tab(s) orally 2 times a day  clobetasol 0.05% topical cream: Apply topically to affected area  fluticasone 50 mcg/inh inhalation powder: 1 inhaled as needed for  allergy symptoms  levothyroxine 75 mcg (0.075 mg) oral tablet: 1 tab(s) orally once a day  pantoprazole 40 mg oral delayed release tablet: 1 tab(s) orally 2 times a day  Tylenol 500 mg oral tablet: 2 tab(s) orally every 6 hours  Vitamin D3 50 mcg (2000 intl units) oral tablet: 1 tab(s) orally once a day  ZyrTEC 10 mg oral tablet: 1 tab(s) orally once a day   acetaminophen 325 mg oral tablet: 3 tab(s) orally every 8 hours  amlodipine-valsartan 5 mg-160 mg oral tablet: 1 tab(s) orally once a day  azelastine 137 mcg/inh (0.1%) nasal spray: 1 spray(s) in each nostril once a day  carvedilol 12.5 mg oral tablet: 1 tab(s) orally 2 times a day  Eliquis 2.5 mg oral tablet: 1 tab(s) orally 2 times a day  fluticasone 50 mcg/inh inhalation powder: 1 inhaled as needed for  allergy symptoms  levothyroxine 75 mcg (0.075 mg) oral tablet: 1 tab(s) orally once a day  Narcan 4 mg/0.1 mL nasal spray: 1 spray(s) intranasally once as needed for oversedation  omeprazole 20 mg oral delayed release tablet: 1 tab(s) orally once a day  oxyCODONE 5 mg oral tablet: 1 tab(s) orally every 4 hours as needed for Pain MDD: 6  Senna S 50 mg-8.6 mg oral tablet: 2 tab(s) orally once a day  ZyrTEC 10 mg oral tablet: 1 tab(s) orally once a day

## 2024-12-27 NOTE — DISCHARGE NOTE PROVIDER - NSDCFUADDINST_GEN_ALL_CORE_FT
The patient will be seen in the office between 2-3 weeks for wound check.   **Your first post-operative visit has been scheduled prior to your admission. PLEASE CONTACT OFFICE TO CONFIRM THE APPOINTMENT DATE.   **  The silver based dressing is to be removed 7 days from the date of surgery.   ** CONTACT THE OFFICE IF THE FOLLOWING DEVELOP:  - the dressing becomes soiled or saturated  - you develop a fever greater that 101F  - the wound becomes red or you develop blistering around the wound  * Patient may shower after post-op day #3.   * The patient will continue home PT consistent with posterior total hip replacement protocol and will continue to practice posterior total hip precautions for a minimum of 6 week. Transition to outpatient PT will occur at the time of the first office visit.   * The patient is FULL weight bearing.  * The patient will continue ELIQUIS for 4 weeks after surgery for blood clot prevention.  * The patient will take OXYCODONE AND TYLENOL for pain control and adjust according to prescription and patient needs. Contact the office if pain increases while taking prescribed pain medications or related concerns develop.  * Celebrex will be taken twice daily for 3 weeks for pain control and prevention of excessive bone growth. Additional prescription may be requested at your office follow-up visit.  * The patient will take Senna S while taking oxycodone to prevent narcotic associated constipation.  Additionally, increase water intake (drink at least 8 glasses of water daily) and try adding fiber to the diet by eating fruits, vegetables and foods that are rich in grains. If constipation is experienced, contact the medical/primary care provider to discuss further treatment options.  * To avoid injury at home:  - continue use of rolling walker until cleared by physical therapist  - have family or friend remove all throw rug or objects in hallways that may present a trip hazard.  - if you experience any dizziness or medical concerns, call your medical doctor or  911.  * The implant may activate metal detection devices.  The patient will be seen in the office between 2-3 weeks for wound check.   **Your first post-operative visit has been scheduled prior to your admission. PLEASE CONTACT OFFICE TO CONFIRM THE APPOINTMENT DATE.   **  The silver based dressing is to be removed 7 days from the date of surgery.   ** CONTACT THE OFFICE IF THE FOLLOWING DEVELOP:  - the dressing becomes soiled or saturated  - you develop a fever greater that 101F  - the wound becomes red or you develop blistering around the wound  * Patient may shower after post-op day #3.   * The patient will continue home PT consistent with posterior total hip replacement protocol and will continue to practice posterior total hip precautions for a minimum of 6 week. Transition to outpatient PT will occur at the time of the first office visit.   * The patient is FULL weight bearing.  * The patient will continue ELIQUIS for 4 weeks after surgery for blood clot prevention.  Omeprazole for GI protection.  * The patient will take OXYCODONE for pain control and adjust according to prescription and patient needs. AND TYLENOL 975mg every 8 hours for pain. Contact the office if pain increases while taking prescribed pain medications or related concerns develop.  * The patient will take Senna S while taking oxycodone to prevent narcotic associated constipation.  Additionally, increase water intake (drink at least 8 glasses of water daily) and try adding fiber to the diet by eating fruits, vegetables and foods that are rich in grains. If constipation is experienced, contact the medical/primary care provider to discuss further treatment options.  * To avoid injury at home:  - continue use of rolling walker until cleared by physical therapist  - have family or friend remove all throw rug or objects in hallways that may present a trip hazard.  - if you experience any dizziness or medical concerns, call your medical doctor or  911.  * The implant may activate metal detection devices.

## 2024-12-27 NOTE — DISCHARGE NOTE PROVIDER - CARE PROVIDER_API CALL
Phong Martinez  Joint Reconstruction  [address] [place] [address]  Phone: [phone]  Fax: [phone]  Follow Up Time:

## 2024-12-27 NOTE — DISCHARGE NOTE PROVIDER - HOSPITAL COURSE
The patient underwent a RIGHT POSTERIOR TOTAL HIP REPLACEMENT on 12/27/24. The patient received antibiotics consistent with SCIP guidelines. The patient underwent the procedure and had no intra-operative complications. Post-operatively, the patient was seen by medicine and PT. The patient received ELIQUIS for DVTP. The patient received pain medications per orthopedic pain management pathway and the pain was appropriately controlled. Patient was instructed on posterior total hip precautions by PT. The patient did not have any post-operative medical complications. The patient was discharged in stable condition.

## 2024-12-27 NOTE — PHYSICAL THERAPY INITIAL EVALUATION ADULT - GENERAL OBSERVATIONS, REHAB EVAL
Pt received in bed with bed alarm on, IV, bilateral SCD, NAD. Pt agreeable to PT, reported 1-2/10 pain in hip at rest, 3/10 pain when walling.

## 2024-12-27 NOTE — PHYSICAL THERAPY INITIAL EVALUATION ADULT - ADDITIONAL COMMENTS
as per pt, lives with  in private house with 2+1 [place], 12 stairs to basement (only for laundry,  is able to do), modified independent prior with cane, owns RW and raised toilet/commode, drives

## 2024-12-28 ENCOUNTER — TRANSCRIPTION ENCOUNTER (OUTPATIENT)
Age: 69
End: 2024-12-28

## 2024-12-28 VITALS
OXYGEN SATURATION: 96 % | SYSTOLIC BLOOD PRESSURE: 110 MMHG | RESPIRATION RATE: 18 BRPM | TEMPERATURE: 99 F | HEART RATE: 70 BPM | DIASTOLIC BLOOD PRESSURE: 70 MMHG

## 2024-12-28 LAB
ANION GAP SERPL CALC-SCNC: 10 MMOL/L — SIGNIFICANT CHANGE UP (ref 5–17)
BUN SERPL-MCNC: 16.6 MG/DL — SIGNIFICANT CHANGE UP (ref 8–20)
CALCIUM SERPL-MCNC: 8.4 MG/DL — SIGNIFICANT CHANGE UP (ref 8.4–10.5)
CHLORIDE SERPL-SCNC: 105 MMOL/L — SIGNIFICANT CHANGE UP (ref 96–108)
CO2 SERPL-SCNC: 21 MMOL/L — LOW (ref 22–29)
CREAT SERPL-MCNC: 0.79 MG/DL — SIGNIFICANT CHANGE UP (ref 0.5–1.3)
EGFR: 80 ML/MIN/1.73M2 — SIGNIFICANT CHANGE UP
GLUCOSE SERPL-MCNC: 124 MG/DL — HIGH (ref 70–99)
HCT VFR BLD CALC: 30.8 % — LOW (ref 34.5–45)
HGB BLD-MCNC: 10.6 G/DL — LOW (ref 11.5–15.5)
MCHC RBC-ENTMCNC: 31.3 PG — SIGNIFICANT CHANGE UP (ref 27–34)
MCHC RBC-ENTMCNC: 34.4 G/DL — SIGNIFICANT CHANGE UP (ref 32–36)
MCV RBC AUTO: 90.9 FL — SIGNIFICANT CHANGE UP (ref 80–100)
PLATELET # BLD AUTO: 176 K/UL — SIGNIFICANT CHANGE UP (ref 150–400)
POTASSIUM SERPL-MCNC: 4.8 MMOL/L — SIGNIFICANT CHANGE UP (ref 3.5–5.3)
POTASSIUM SERPL-SCNC: 4.8 MMOL/L — SIGNIFICANT CHANGE UP (ref 3.5–5.3)
RBC # BLD: 3.39 M/UL — LOW (ref 3.8–5.2)
RBC # FLD: 13 % — SIGNIFICANT CHANGE UP (ref 10.3–14.5)
SODIUM SERPL-SCNC: 136 MMOL/L — SIGNIFICANT CHANGE UP (ref 135–145)
WBC # BLD: 9.82 K/UL — SIGNIFICANT CHANGE UP (ref 3.8–10.5)
WBC # FLD AUTO: 9.82 K/UL — SIGNIFICANT CHANGE UP (ref 3.8–10.5)

## 2024-12-28 PROCEDURE — 97163 PT EVAL HIGH COMPLEX 45 MIN: CPT

## 2024-12-28 PROCEDURE — 36415 COLL VENOUS BLD VENIPUNCTURE: CPT

## 2024-12-28 PROCEDURE — 85027 COMPLETE CBC AUTOMATED: CPT

## 2024-12-28 PROCEDURE — 85730 THROMBOPLASTIN TIME PARTIAL: CPT

## 2024-12-28 PROCEDURE — 80048 BASIC METABOLIC PNL TOTAL CA: CPT

## 2024-12-28 PROCEDURE — 73501 X-RAY EXAM HIP UNI 1 VIEW: CPT

## 2024-12-28 PROCEDURE — C1713: CPT

## 2024-12-28 PROCEDURE — C1889: CPT

## 2024-12-28 PROCEDURE — S2900: CPT

## 2024-12-28 PROCEDURE — C1776: CPT

## 2024-12-28 PROCEDURE — 99222 1ST HOSP IP/OBS MODERATE 55: CPT

## 2024-12-28 PROCEDURE — 27130 TOTAL HIP ARTHROPLASTY: CPT

## 2024-12-28 PROCEDURE — 85610 PROTHROMBIN TIME: CPT

## 2024-12-28 RX ORDER — NALOXONE HCL 0.4 MG/ML
1 VIAL (ML) INJECTION
Qty: 1 | Refills: 0
Start: 2024-12-28

## 2024-12-28 RX ORDER — OMEPRAZOLE MAGNESIUM 20 MG/1
1 CAPSULE, DELAYED RELEASE ORAL
Qty: 42 | Refills: 0
Start: 2024-12-28 | End: 2025-02-07

## 2024-12-28 RX ORDER — OXYCODONE HCL 15 MG
1 TABLET ORAL
Qty: 28 | Refills: 0
Start: 2024-12-28

## 2024-12-28 RX ORDER — ACETAMINOPHEN 80 MG/.8ML
3 SOLUTION/ DROPS ORAL
Qty: 0 | Refills: 0 | DISCHARGE
Start: 2024-12-28

## 2024-12-28 RX ORDER — APIXABAN 5 MG/1
1 TABLET, FILM COATED ORAL
Qty: 56 | Refills: 0
Start: 2024-12-28 | End: 2025-01-24

## 2024-12-28 RX ORDER — SENNOSIDES AND DOCUSATE SODIUM 50; 8.6 MG/1; MG/1
2 TABLET ORAL
Qty: 30 | Refills: 0
Start: 2024-12-28 | End: 2025-01-11

## 2024-12-28 RX ADMIN — ACETAMINOPHEN 400 MILLIGRAM(S): 80 SOLUTION/ DROPS ORAL at 05:22

## 2024-12-28 RX ADMIN — Medication 5 MILLIGRAM(S): at 09:37

## 2024-12-28 RX ADMIN — ACETAMINOPHEN 1000 MILLIGRAM(S): 80 SOLUTION/ DROPS ORAL at 06:22

## 2024-12-28 RX ADMIN — Medication 2000 MILLIGRAM(S): at 05:21

## 2024-12-28 RX ADMIN — LEVOTHYROXINE SODIUM 75 MICROGRAM(S): 175 TABLET ORAL at 05:21

## 2024-12-28 RX ADMIN — APIXABAN 2.5 MILLIGRAM(S): 5 TABLET, FILM COATED ORAL at 05:21

## 2024-12-28 RX ADMIN — Medication 5 MILLIGRAM(S): at 08:37

## 2024-12-28 RX ADMIN — SODIUM CHLORIDE 125 MILLILITER(S): 9 INJECTION, SOLUTION INTRAMUSCULAR; INTRAVENOUS; SUBCUTANEOUS at 01:56

## 2024-12-28 RX ADMIN — PANTOPRAZOLE 40 MILLIGRAM(S): 40 TABLET, DELAYED RELEASE ORAL at 05:22

## 2024-12-28 RX ADMIN — CARVEDILOL 12.5 MILLIGRAM(S): 25 TABLET, FILM COATED ORAL at 10:54

## 2024-12-28 NOTE — PATIENT PROFILE ADULT - FALL HARM RISK - HARM RISK INTERVENTIONS
Assistance with ambulation/Assistance OOB with selected safe patient handling equipment/Communicate Risk of Fall with Harm to all staff/Discuss with provider need for PT consult/Monitor gait and stability/Provide patient with walking aids - walker, cane, crutches/Reinforce activity limits and safety measures with patient and family/Sit up slowly, dangle for a short time, stand at bedside before walking/Tailored Fall Risk Interventions/Use of alarms - bed, chair and/or voice tab/Visual Cue: Yellow wristband and red socks/Bed in lowest position, wheels locked, appropriate side rails in place/Call bell, personal items and telephone in reach/Instruct patient to call for assistance before getting out of bed or chair/Non-slip footwear when patient is out of bed/[place] to call system/Physically safe environment - no spills, clutter or unnecessary equipment/Purposeful Proactive Rounding/Room/bathroom lighting operational, light cord in reach

## 2024-12-28 NOTE — DISCHARGE NOTE NURSING/CASE MANAGEMENT/SOCIAL WORK - PATIENT PORTAL LINK FT
You can access the FollowMyHealth Patient Portal offered by [facility] by registering at the following website: [email or web address]. By joining [employer]’s FollowMyHealth portal, you will also be able to view your health information using other applications (apps) compatible with our system.

## 2024-12-28 NOTE — CONSULT NOTE ADULT - SUBJECTIVE AND OBJECTIVE BOX
PMD : Dr Patel     Patient is a 69y old  Female who presents with a chief complaint of RIGHT total hip arthroplasty  (27 Dec 2024 11:10)      HPI:  The patient is a 68 year old female with a past medical history of hypertension, hypothyroidism, ARSLAN on nocturnal CPAP, gastric ulcer status post resection 2024 who presented for  right posterior total hip replacement. Patient complained of worsening right hip pain for over a year. Pain located in the right groin radiating to the thigh intermittent 5/10 exacerbated by ambulation. Pain relieved by rest and tylenol as needed.     Patient now POD 1 pain controlled. Ambulated well with PT. Last BM yesterday + Flatus.      (2024 07:23)      PAST MEDICAL & SURGICAL HISTORY:  Hypertension      Hypothyroid      ARSLAN on CPAP      RBBB      Morbid obesity      Perforated chronic gastric ulcer      Unilateral osteoarthritis of hip, right      H/O:  section      H/O cataract extraction      H/O dilation and curettage      S/P left rotator cuff repair      Perforated gastric ulcer          Social History:  Tabacco -  Non Smoker  ETOH -  No Etoh   Illicit drug abuse - denies    FAMILY HISTORY:  FH: HTN (hypertension)    FH: emphysema    FH: CHF (congestive heart failure)        Allergies    No Known Allergies    Intolerances        HOME MEDICATIONS :   Levothyroxine 75mcg OD   Amlodipine/ Valsartan 5mg/ 160mg PO OD   COreg 12.5mg PO BID   Pantoprazole 20mg PO OD   Zyrtec 10mg PO OD     REVIEW OF SYSTEMS:    CONSTITUTIONAL: No fever, weight loss, or fatigue  EYES: No eye pain, visual disturbances, or discharge  NECK: No pain or stiffness  RESPIRATORY: No cough, wheezing, chills or hemoptysis; No shortness of breath  CARDIOVASCULAR: No chest pain, palpitations, dizziness, or leg swelling  GASTROINTESTINAL: No abdominal or epigastric pain. No nausea, vomiting, or hematemesis; No diarrhea or constipation. No melena or hematochezia.  GENITOURINARY: No dysuria, frequency, hematuria, or incontinence  NEUROLOGICAL: No headaches, memory loss, loss of strength, numbness, or tremors  SKIN: No itching, burning, rashes, or lesions   LYMPH NODES: No enlarged glands  ENDOCRINE: No heat or cold intolerance; No hair loss  MUSCULOSKELETAL:   PSYCHIATRIC: No depression, anxiety, mood swings, or difficulty sleeping  HEME/LYMPH: No easy bruising, or bleeding gums  ALLERGY AND IMMUNOLOGIC: No hives or eczema    MEDICATIONS  (STANDING):  acetaminophen     Tablet .. 975 milliGRAM(s) Oral every 8 hours  apixaban 2.5 milliGRAM(s) Oral two times a day  carvedilol 12.5 milliGRAM(s) Oral every 12 hours  levothyroxine 75 MICROGram(s) Oral daily  multivitamin 1 Tablet(s) Oral daily  pantoprazole    Tablet 40 milliGRAM(s) Oral before breakfast  polyethylene glycol 3350 17 Gram(s) Oral at bedtime  senna 2 Tablet(s) Oral at bedtime  sodium chloride 0.9%. 1000 milliLiter(s) (125 mL/Hr) IV Continuous <Continuous>    MEDICATIONS  (PRN):  HYDROmorphone   Tablet 4 milliGRAM(s) Oral every 3 hours PRN Severe Pain (7 - 10)  magnesium hydroxide Suspension 30 milliLiter(s) Oral daily PRN Constipation  ondansetron Injectable 4 milliGRAM(s) IV Push every 6 hours PRN Nausea and/or Vomiting  oxyCODONE    IR 5 milliGRAM(s) Oral every 3 hours PRN Mild Pain (1 - 3)  oxyCODONE    IR 10 milliGRAM(s) Oral every 3 hours PRN Moderate Pain (4 - 6)      Vital Signs Last 24 Hrs  T(C): 36.6 (28 Dec 2024 04:46), Max: 36.9 (27 Dec 2024 15:45)  T(F): 97.9 (28 Dec 2024 04:46), Max: 98.4 (27 Dec 2024 15:45)  HR: 66 (28 Dec 2024 04:46) (59 - 78)  BP: 116/71 (28 Dec 2024 04:46) (105/62 - 144/81)  BP(mean): 85 (27 Dec 2024 15:45) (83 - 91)  RR: 18 (28 Dec 2024 04:46) (12 - 18)  SpO2: 97% (28 Dec 2024 04:46) (94% - 100%)    Parameters below as of 28 Dec 2024 04:46  Patient On (Oxygen Delivery Method): room air        PHYSICAL EXAM:    GENERAL: NAD, AOX3  HEAD:  Atraumatic, Normocephalic  EYES: conjunctiva and sclera clear  NECK: Supple, No JVD,   CHEST/LUNG: CTA  b/l,  no rales, rhonchi, wheezing, or rubs  HEART: Regular rate and rhythm; No murmurs, rubs, or gallops  ABDOMEN: Soft, Nontender, Nondistended; Bowel sounds present  EXTREMITIES:  2+ Peripheral Pulses, No clubbing, cyanosis, or edema ,   Right hIp C/D/I     LABS:                        10.6   9.82  )-----------( 176      ( 28 Dec 2024 05:47 )             30.8         136  |  105  |  16.6  ----------------------------<  124[H]  4.8   |  21.0[L]  |  0.79    Ca    8.4      28 Dec 2024 05:47      PT/INR - ( 27 Dec 2024 10:13 )   PT: 12.2 sec;   INR: 1.05 ratio         PTT - ( 27 Dec 2024 10:13 )  PTT:33.8 sec  Urinalysis Basic - ( 28 Dec 2024 05:47 )    Color: x / Appearance: x / SG: x / pH: x  Gluc: 124 mg/dL / Ketone: x  / Bili: x / Urobili: x   Blood: x / Protein: x / Nitrite: x   Leuk Esterase: x / RBC: x / WBC x   Sq Epi: x / Non Sq Epi: x / Bacteria: x          RADIOLOGY & ADDITIONAL STUDIES:

## 2024-12-28 NOTE — DISCHARGE NOTE NURSING/CASE MANAGEMENT/SOCIAL WORK - FINANCIAL ASSISTANCE
[facility] provides services at a reduced cost to those who are determined to be eligible through [facility]’s financial assistance program. Information regarding [facility]’s financial assistance program can be found by going to [email or web address] or by calling [phone].

## 2024-12-28 NOTE — PROGRESS NOTE ADULT - SUBJECTIVE AND OBJECTIVE BOX
Ortho Post Op Check    Name: RANJAN DUMONT    MR #: [id number]    Procedure: Right total hip arthroplasty   Surgeon: Dr Martinez    Pt comfortable without complaints, pain controlled  Denies CP, SOB, N/V, numbness/tingling     MEDICATIONS  (STANDING):  acetaminophen   IVPB .. 1000 milliGRAM(s) IV Intermittent once  lactated ringers. 1000 milliLiter(s) (75 mL/Hr) IV Continuous <Continuous>    MEDICATIONS  (PRN):  fentaNYL    Injectable 25 MICROGram(s) IV Push every 5 minutes PRN Moderate Pain (4 - 6)  fentaNYL    Injectable 50 MICROGram(s) IV Push every 5 minutes PRN Severe Pain (7 - 10)  oxyCODONE    IR 5 milliGRAM(s) Oral every 3 hours PRN Mild Pain (1 - 3)      General Exam:  Vital Signs Last 24 Hrs  T(C): 36.9 (12-27-24 @ 15:45), Max: 36.9 (12-27-24 @ 15:45)  T(F): 98.4 (12-27-24 @ 15:45), Max: 98.4 (12-27-24 @ 15:45)  HR: 70 (12-27-24 @ 15:45) (59 - 70)  BP: 135/63 (12-27-24 @ 15:45) (118/66 - 135/63)  BP(mean): 85 (12-27-24 @ 15:45) (83 - 91)  RR: 16 (12-27-24 @ 15:45) (12 - 18)  SpO2: 95% (12-27-24 @ 15:45) (95% - 100%)    General: Pt Alert and oriented, NAD, controlled pain.  Dressings C/D/I. No bleeding.  Pulses: 2+ dorsalis pedis pulse. Cap refill < 2 sec.  Sensation: Grossly intact to light touch without deficit.  Motor: + EHL/FHL/TA/GS      Post-op X-Ray: prosthesis in place. No fx/dislocation. Radiology read pending    A/P: 69yFemale POD#0 s/p Right total hip arthroplasty   - Stable  - Pain Control  - DVT ppx: SCDs/eliquis  - Post op abx: ancef  - PT eval pending  - Weight bearing status: wbat  - medicine following
RANJAN JULIA    [id number]    History: Patient is status post right posterior total hip arthroplasty, POD#1. Patient is doing well. The patient's pain is controlled using the prescribed pain medications. The patient is participating in physical therapy. She ambulated with assistance and did well. Denies nausea, vomiting, chest pain, shortness of breath, abdominal pain or dizziness. No new complaints.                              10.6   9.82  )-----------( 176      ( 28 Dec 2024 05:47 )             30.8     12-28    136  |  105  |  16.6  ----------------------------<  124[H]  4.8   |  21.0[L]  |  0.79    Ca    8.4      28 Dec 2024 05:47        MEDICATIONS  (STANDING):  acetaminophen     Tablet .. 975 milliGRAM(s) Oral every 8 hours  apixaban 2.5 milliGRAM(s) Oral two times a day  carvedilol 12.5 milliGRAM(s) Oral every 12 hours  levothyroxine 75 MICROGram(s) Oral daily  multivitamin 1 Tablet(s) Oral daily  pantoprazole    Tablet 40 milliGRAM(s) Oral before breakfast  polyethylene glycol 3350 17 Gram(s) Oral at bedtime  senna 2 Tablet(s) Oral at bedtime  sodium chloride 0.9%. 1000 milliLiter(s) (125 mL/Hr) IV Continuous <Continuous>    MEDICATIONS  (PRN):  HYDROmorphone   Tablet 4 milliGRAM(s) Oral every 3 hours PRN Severe Pain (7 - 10)  magnesium hydroxide Suspension 30 milliLiter(s) Oral daily PRN Constipation  ondansetron Injectable 4 milliGRAM(s) IV Push every 6 hours PRN Nausea and/or Vomiting  oxyCODONE    IR 5 milliGRAM(s) Oral every 3 hours PRN Mild Pain (1 - 3)  oxyCODONE    IR 10 milliGRAM(s) Oral every 3 hours PRN Moderate Pain (4 - 6)    Vital Signs Last 24 Hrs  T(C): 36.6 (28 Dec 2024 04:46), Max: 36.9 (27 Dec 2024 15:45)  T(F): 97.9 (28 Dec 2024 04:46), Max: 98.4 (27 Dec 2024 15:45)  HR: 66 (28 Dec 2024 04:46) (59 - 78)  BP: 116/71 (28 Dec 2024 04:46) (105/62 - 147/81)  BP(mean): 85 (27 Dec 2024 15:45) (83 - 91)  RR: 18 (28 Dec 2024 04:46) (12 - 18)  SpO2: 97% (28 Dec 2024 04:46) (94% - 100%)    Parameters below as of 28 Dec 2024 04:46  Patient On (Oxygen Delivery Method): room air  Lying in bed in NAD, awake and alert    Physical exam: Right lower extremity- The right hip dressings are clean, dry and intact. No drainage or discharge. No erythema is noted. No blistering. No ecchymosis. The calf is supple. No calf tenderness. Sensation to light touch is grossly intact distally. Motor function distally is 5/5. +DF/PF. +EHL/FHL. No foot drop. 2+ dorsalis pedis pulse. Capillary refill is less than 2 seconds. No cyanosis.    Primary Orthopedic Assessment:  • s/p RIGHT POSTERIOR total hip replacement, POD#1      Plan:   • DVT prophylaxis as prescribed- eliquis, including use of compression devices and ankle pumps  • Continue physical therapy  • Weightbearing as tolerated of the right lower extremity with assistance of a walker  • Incentive spirometry encouraged  • Pain control as clinically indicated  • Posterior hip precautions reviewed with patient  • Discharge planning – anticipated discharge is Home after cleared by PT and medicine, likely today  
Xrays reviewed, patient may WBAT with posterior hip precautions

## 2024-12-28 NOTE — PATIENT PROFILE ADULT - CAREGIVER ADDRESS
66y POD# 3 , s/p Radical vulvectomy, R. inguinal LN dissection, Gluteal fold with V-Y advancement. She is stable and doing well post operatively. [address] [place] [phone]

## 2024-12-28 NOTE — CONSULT NOTE ADULT - ASSESSMENT
The patient is a 68 year old female with a past medical history of hypertension, hypothyroidism, ARSLAN on nocturnal CPAP, gastric ulcer status post resection 08/2024. POD Day 1 Right total hip arthroplasty    Assessment/Plan:    1. Right hip arthroplasty  - WBAT  - PT following; Ambulating well; pain controlled   - Pain controlled: Tylenol ATC, Dilaudid 4mg Q3 hours PRn for severe pain  OXycodone IR 5mg Q3 hours for mild pain  Oxycodone IR 10mg Q3 hours for moderate pain  - Bowel regimen: Senna QHS, Miralax OD  - VTE prophylaxis per ortho: Eliquis 2.5mg PO BID    2. Hypertension  - Continue Coreg 12.5 PO BID  - Resume Amlodipine/valsartan on Discharge  - BP stable    3 Hypothyroidism  - Continue SYnthroid PO    4. History of perforated gastric ulcer  - Continue Protonix PO OD  - Avoid NSAIDs    VTE- Eliquis 5mg PO BID    Medically stable for discharge home

## 2024-12-28 NOTE — DISCHARGE NOTE NURSING/CASE MANAGEMENT/SOCIAL WORK - NSDCVIVACCINE_GEN_ALL_CORE_FT
Tdap; 17-Mar-2017 11:42; Veronika Terry (RENUKA); [employer]; [id number]; IntraMuscular; Deltoid Right.; 0.5 milliLiter(s); VIS (VIS Published: 09-May-2013, VIS Presented: 17-Mar-2017);

## 2024-12-28 NOTE — PATIENT PROFILE ADULT - AVIAN FLU SYMPTOMS
concerned about the new meds that were prescribed discussed flecainide change disucssed avs with patients , he verbalized what her new changes are   Reason for Disposition   Caller has medicine question, adult has minor symptoms, caller declines triage, AND triager answers question    Additional Information   Negative: [1] Intentional drug overdose AND [2] suicidal thoughts or ideas   Negative: Drug overdose and triager unable to answer question   Negative: Caller requesting a renewal or refill of a medicine patient is currently taking   Negative: Caller requesting information unrelated to medicine   Negative: Caller requesting information about COVID-19 Vaccine   Negative: Caller requesting information about Emergency Contraception   Negative: Caller requesting information about Combined Birth Control Pills   Negative: Caller requesting information about Progestin Birth Control Pills   Negative: Caller requesting information about Post-Op pain or medicines   Negative: Caller requesting a prescription antibiotic (such as Penicillin) for Strep throat and has a positive culture result   Negative: Caller requesting a prescription anti-viral med (such as Tamiflu) and has influenza (flu) symptoms   Negative: Immunization reaction suspected   Negative: Rash while taking a medicine or within 3 days of stopping it   Negative: [1] Asthma and [2] having symptoms of asthma (cough, wheezing, etc.)   Negative: [1] Symptom of illness (e.g., headache, abdominal pain, earache, vomiting) AND [2] more than mild   Negative: Breastfeeding questions about mother's medicines and diet   Negative: MORE THAN A DOUBLE DOSE of a prescription or over-the-counter (OTC) drug   Negative: [1] DOUBLE DOSE (an extra dose or lesser amount) of prescription drug AND [2] any symptoms (e.g., dizziness, nausea, pain, sleepiness)   Negative: [1] DOUBLE DOSE (an extra dose or lesser amount) of over-the-counter (OTC) drug AND [2] any symptoms (e.g.,  "dizziness, nausea, pain, sleepiness)   Negative: Took another person's prescription drug   Negative: [1] DOUBLE DOSE (an extra dose or lesser amount) of prescription drug AND [2] NO symptoms  (Exception: A double dose of antibiotics.)   Negative: Diabetes drug error or overdose (e.g., took wrong type of insulin or took extra dose)   Negative: [1] Prescription not at pharmacy AND [2] was prescribed by PCP recently (Exception: Triager has access to EMR and prescription is recorded there. Go to Home Care and confirm for pharmacy.)   Negative: [1] Pharmacy calling with prescription question AND [2] triager unable to answer question   Negative: [1] Caller has URGENT medicine question about med that PCP or specialist prescribed AND [2] triager unable to answer question   Negative: Medicine patch causing local rash or itching   Negative: [1] Caller has medicine question about med NOT prescribed by PCP AND [2] triager unable to answer question (e.g., compatibility with other med, storage)   Negative: Prescription request for new medicine (not a refill)   Negative: [1] Caller has NON-URGENT medicine question about med that PCP prescribed AND [2] triager unable to answer question   Negative: Caller wants to use a complementary or alternative medicine   Negative: [1] Prescription prescribed recently is not at pharmacy AND [2] triager has access to patient's EMR AND [3] prescription is recorded in the EMR   Negative: [1] DOUBLE DOSE (an extra dose or lesser amount) of over-the-counter (OTC) drug AND [2] NO symptoms   Negative: [1] DOUBLE DOSE (an extra dose or lesser amount) of antibiotic drug AND [2] NO symptoms   Negative: Caller has medicine question only, adult not sick, AND triager answers question    Answer Assessment - Initial Assessment Questions  1. NAME of MEDICINE: \"What medicine(s) are you calling about?\"      flecainide  2. QUESTION: \"What is your question?\" (e.g., double dose of medicine, side effect)      Wanted " "to make sure she it to take now bid  3. PRESCRIBER: \"Who prescribed the medicine?\" Reason: if prescribed by specialist, call should be referred to that group.      Cardiologist   4. SYMPTOMS: \"Do you have any symptoms?\" If Yes, ask: \"What symptoms are you having?\"  \"How bad are the symptoms (e.g., mild, moderate, severe)      A fib that's why was in ed  5. PREGNANCY:  \"Is there any chance that you are pregnant?\" \"When was your last menstrual period?\"      na    Protocols used: Medication Question Call-ADULT-    " No

## 2025-01-06 ENCOUNTER — NON-APPOINTMENT (OUTPATIENT)
Age: 70
End: 2025-01-06

## 2025-01-21 ENCOUNTER — APPOINTMENT (OUTPATIENT)
Dept: ORTHOPEDIC SURGERY | Facility: CLINIC | Age: 70
End: 2025-01-21
Payer: MEDICARE

## 2025-01-21 DIAGNOSIS — Z96.641 PRESENCE OF RIGHT ARTIFICIAL HIP JOINT: ICD-10-CM

## 2025-01-21 PROCEDURE — 99024 POSTOP FOLLOW-UP VISIT: CPT

## 2025-01-21 PROCEDURE — 73502 X-RAY EXAM HIP UNI 2-3 VIEWS: CPT

## 2025-02-04 ENCOUNTER — APPOINTMENT (OUTPATIENT)
Dept: CARDIOLOGY | Facility: CLINIC | Age: 70
End: 2025-02-04

## 2025-02-04 VITALS
BODY MASS INDEX: 44.56 KG/M2 | HEIGHT: 61 IN | WEIGHT: 236 LBS | DIASTOLIC BLOOD PRESSURE: 80 MMHG | SYSTOLIC BLOOD PRESSURE: 153 MMHG | HEART RATE: 71 BPM | OXYGEN SATURATION: 97 %

## 2025-02-04 VITALS — SYSTOLIC BLOOD PRESSURE: 128 MMHG | DIASTOLIC BLOOD PRESSURE: 62 MMHG

## 2025-02-04 VITALS — SYSTOLIC BLOOD PRESSURE: 122 MMHG | DIASTOLIC BLOOD PRESSURE: 70 MMHG

## 2025-02-04 PROCEDURE — 99214 OFFICE O/P EST MOD 30 MIN: CPT

## 2025-02-04 RX ORDER — ASPIRIN 81 MG
81 TABLET, DELAYED RELEASE (ENTERIC COATED) ORAL
Refills: 0 | Status: ACTIVE | COMMUNITY

## 2025-02-07 ENCOUNTER — NON-APPOINTMENT (OUTPATIENT)
Age: 70
End: 2025-02-07

## 2025-02-11 ENCOUNTER — APPOINTMENT (OUTPATIENT)
Dept: ORTHOPEDIC SURGERY | Facility: CLINIC | Age: 70
End: 2025-02-11
Payer: MEDICARE

## 2025-02-11 DIAGNOSIS — Z96.641 PRESENCE OF RIGHT ARTIFICIAL HIP JOINT: ICD-10-CM

## 2025-02-11 PROCEDURE — 99024 POSTOP FOLLOW-UP VISIT: CPT

## 2025-02-11 PROCEDURE — 73502 X-RAY EXAM HIP UNI 2-3 VIEWS: CPT

## 2025-02-24 ENCOUNTER — APPOINTMENT (OUTPATIENT)
Dept: PULMONOLOGY | Facility: CLINIC | Age: 70
End: 2025-02-24
Payer: MEDICARE

## 2025-02-24 VITALS — BODY MASS INDEX: 44.18 KG/M2 | WEIGHT: 234 LBS | HEIGHT: 61 IN

## 2025-02-24 VITALS
HEART RATE: 74 BPM | OXYGEN SATURATION: 98 % | DIASTOLIC BLOOD PRESSURE: 68 MMHG | RESPIRATION RATE: 16 BRPM | SYSTOLIC BLOOD PRESSURE: 122 MMHG

## 2025-02-24 DIAGNOSIS — Z77.22 CONTACT WITH AND (SUSPECTED) EXPOSURE TO ENVIRONMENTAL TOBACCO SMOKE (ACUTE) (CHRONIC): ICD-10-CM

## 2025-02-24 DIAGNOSIS — J30.9 ALLERGIC RHINITIS, UNSPECIFIED: ICD-10-CM

## 2025-02-24 DIAGNOSIS — I27.20 PULMONARY HYPERTENSION, UNSPECIFIED: ICD-10-CM

## 2025-02-24 DIAGNOSIS — R06.02 SHORTNESS OF BREATH: ICD-10-CM

## 2025-02-24 DIAGNOSIS — G47.33 OBSTRUCTIVE SLEEP APNEA (ADULT) (PEDIATRIC): ICD-10-CM

## 2025-02-24 DIAGNOSIS — E66.01 MORBID (SEVERE) OBESITY DUE TO EXCESS CALORIES: ICD-10-CM

## 2025-02-24 PROCEDURE — G2211 COMPLEX E/M VISIT ADD ON: CPT

## 2025-02-24 PROCEDURE — 99214 OFFICE O/P EST MOD 30 MIN: CPT

## 2025-03-25 ENCOUNTER — APPOINTMENT (OUTPATIENT)
Dept: ORTHOPEDIC SURGERY | Facility: CLINIC | Age: 70
End: 2025-03-25
Payer: MEDICARE

## 2025-03-25 DIAGNOSIS — Z96.641 PRESENCE OF RIGHT ARTIFICIAL HIP JOINT: ICD-10-CM

## 2025-03-25 PROCEDURE — 99024 POSTOP FOLLOW-UP VISIT: CPT

## 2025-03-25 PROCEDURE — 73502 X-RAY EXAM HIP UNI 2-3 VIEWS: CPT | Mod: 26,RT

## 2025-03-25 RX ORDER — AMOXICILLIN 500 MG/1
500 TABLET, FILM COATED ORAL
Qty: 4 | Refills: 2 | Status: ACTIVE | COMMUNITY
Start: 2025-03-25 | End: 1900-01-01

## 2025-04-28 ENCOUNTER — APPOINTMENT (OUTPATIENT)
Dept: OBGYN | Facility: CLINIC | Age: 70
End: 2025-04-28
Payer: MEDICARE

## 2025-04-28 VITALS
WEIGHT: 273 LBS | HEIGHT: 61 IN | BODY MASS INDEX: 51.54 KG/M2 | DIASTOLIC BLOOD PRESSURE: 83 MMHG | SYSTOLIC BLOOD PRESSURE: 147 MMHG

## 2025-04-28 DIAGNOSIS — L30.9 DERMATITIS, UNSPECIFIED: ICD-10-CM

## 2025-04-28 DIAGNOSIS — Z87.42 PERSONAL HISTORY OF OTHER DISEASES OF THE FEMALE GENITAL TRACT: ICD-10-CM

## 2025-04-28 DIAGNOSIS — N95.2 POSTMENOPAUSAL ATROPHIC VAGINITIS: ICD-10-CM

## 2025-04-28 DIAGNOSIS — N94.10 UNSPECIFIED DYSPAREUNIA: ICD-10-CM

## 2025-04-28 PROCEDURE — 99214 OFFICE O/P EST MOD 30 MIN: CPT

## 2025-04-28 RX ORDER — ESTRADIOL 0.1 MG/G
0.1 CREAM VAGINAL
Qty: 1 | Refills: 1 | Status: ACTIVE | COMMUNITY
Start: 2025-04-28 | End: 1900-01-01

## 2025-05-06 LAB
MYCOPLASMA HOMINIS CULTURE: NEGATIVE
UREAPLASMA CULTURE: NEGATIVE

## 2025-05-19 ENCOUNTER — TRANSCRIPTION ENCOUNTER (OUTPATIENT)
Age: 70
End: 2025-05-19

## 2025-05-23 VITALS — BODY MASS INDEX: 43.79 KG/M2 | HEIGHT: 61.75 IN | WEIGHT: 238 LBS

## 2025-05-26 LAB
ALBUMIN SERPL ELPH-MCNC: 4.2 G/DL
ALP BLD-CCNC: 116 U/L
ALT SERPL-CCNC: 21 U/L
ANION GAP SERPL CALC-SCNC: 13 MMOL/L
AST SERPL-CCNC: 25 U/L
BILIRUB SERPL-MCNC: 0.5 MG/DL
BUN SERPL-MCNC: 14 MG/DL
CALCIUM SERPL-MCNC: 9.5 MG/DL
CHLORIDE SERPL-SCNC: 107 MMOL/L
CO2 SERPL-SCNC: 25 MMOL/L
CREAT SERPL-MCNC: 0.98 MG/DL
EGFRCR SERPLBLD CKD-EPI 2021: 62 ML/MIN/1.73M2
GLUCOSE SERPL-MCNC: 85 MG/DL
HCT VFR BLD CALC: 37.8 %
HGB BLD-MCNC: 12.5 G/DL
INR PPP: 1.03 RATIO
MCHC RBC-ENTMCNC: 30.6 PG
MCHC RBC-ENTMCNC: 33.1 G/DL
MCV RBC AUTO: 92.6 FL
PLATELET # BLD AUTO: 196 K/UL
POTASSIUM SERPL-SCNC: 4.6 MMOL/L
PROT SERPL-MCNC: 6.6 G/DL
PT BLD: 12.1 SEC
RBC # BLD: 4.08 M/UL
RBC # FLD: 14 %
SODIUM SERPL-SCNC: 144 MMOL/L
WBC # FLD AUTO: 4.42 K/UL

## 2025-05-27 ENCOUNTER — APPOINTMENT (OUTPATIENT)
Dept: GASTROENTEROLOGY | Facility: GI CENTER | Age: 70
End: 2025-05-27
Payer: MEDICARE

## 2025-05-27 ENCOUNTER — OUTPATIENT (OUTPATIENT)
Dept: OUTPATIENT SERVICES | Facility: HOSPITAL | Age: 70
LOS: 1 days | End: 2025-05-27
Payer: MEDICARE

## 2025-05-27 ENCOUNTER — TRANSCRIPTION ENCOUNTER (OUTPATIENT)
Age: 70
End: 2025-05-27

## 2025-05-27 DIAGNOSIS — Z98.891 HISTORY OF UTERINE SCAR FROM PREVIOUS SURGERY: Chronic | ICD-10-CM

## 2025-05-27 DIAGNOSIS — Z98.890 OTHER SPECIFIED POSTPROCEDURAL STATES: Chronic | ICD-10-CM

## 2025-05-27 DIAGNOSIS — Z12.11 ENCOUNTER FOR SCREENING FOR MALIGNANT NEOPLASM OF COLON: ICD-10-CM

## 2025-05-27 DIAGNOSIS — Z98.49 CATARACT EXTRACTION STATUS, UNSPECIFIED EYE: Chronic | ICD-10-CM

## 2025-05-27 DIAGNOSIS — K25.5 CHRONIC OR UNSPECIFIED GASTRIC ULCER WITH PERFORATION: Chronic | ICD-10-CM

## 2025-05-27 PROCEDURE — 45378 DIAGNOSTIC COLONOSCOPY: CPT | Mod: PT

## 2025-05-27 PROCEDURE — G0121: CPT

## 2025-06-04 NOTE — ED ADULT TRIAGE NOTE - WEIGHT IN KG
[TextBox_4] : 59 year old female with social smoking hx (1 cigarette every 2 months or so) who presents for evaluation of shortness of breath.   She reports a history of "Chronic bronchitis" since the age of 19 where she episodes of wheezing and chest tightness. Since the pandemic, these symptoms have gotten worse and are associated with significant nasal congestion, post nasal drip, and a dry cough. She wakes up in the morning and feels like she has to clear her throat and nose. She has recently been started on flonase which she says has improved her nasal symptoms. She noted that dust exposure at her job and strong odors worsen her breathing. She recently took a trip to [place] and felt that her symptoms completely resolved, and then she had her symptoms return when she came back to the [place]. She has not tried inhaler therapy though reports she was offered an inhaler a few years ago.   Of note, Pt had a sleep study in 2021 which showed mild KYRIE for which she declined CPAP. She endorses snoring and daytime sleepiness but does not want to pursue a cpap machine.  107.6

## 2025-07-24 ENCOUNTER — APPOINTMENT (OUTPATIENT)
Dept: PULMONOLOGY | Facility: CLINIC | Age: 70
End: 2025-07-24

## 2025-07-24 ENCOUNTER — APPOINTMENT (OUTPATIENT)
Dept: PULMONOLOGY | Facility: CLINIC | Age: 70
End: 2025-07-24
Payer: MEDICARE

## 2025-07-24 VITALS
RESPIRATION RATE: 16 BRPM | OXYGEN SATURATION: 98 % | SYSTOLIC BLOOD PRESSURE: 124 MMHG | DIASTOLIC BLOOD PRESSURE: 68 MMHG | HEART RATE: 75 BPM

## 2025-07-24 VITALS — WEIGHT: 235 LBS | BODY MASS INDEX: 43.24 KG/M2 | HEIGHT: 61.75 IN

## 2025-07-24 DIAGNOSIS — I27.20 PULMONARY HYPERTENSION, UNSPECIFIED: ICD-10-CM

## 2025-07-24 DIAGNOSIS — Z77.22 CONTACT WITH AND (SUSPECTED) EXPOSURE TO ENVIRONMENTAL TOBACCO SMOKE (ACUTE) (CHRONIC): ICD-10-CM

## 2025-07-24 DIAGNOSIS — J30.9 ALLERGIC RHINITIS, UNSPECIFIED: ICD-10-CM

## 2025-07-24 DIAGNOSIS — G47.33 OBSTRUCTIVE SLEEP APNEA (ADULT) (PEDIATRIC): ICD-10-CM

## 2025-07-24 DIAGNOSIS — R06.02 SHORTNESS OF BREATH: ICD-10-CM

## 2025-07-24 DIAGNOSIS — E66.01 MORBID (SEVERE) OBESITY DUE TO EXCESS CALORIES: ICD-10-CM

## 2025-07-24 PROCEDURE — 94010 BREATHING CAPACITY TEST: CPT

## 2025-07-24 PROCEDURE — 99214 OFFICE O/P EST MOD 30 MIN: CPT | Mod: 25

## 2025-08-07 ENCOUNTER — APPOINTMENT (OUTPATIENT)
Dept: CARDIOLOGY | Facility: CLINIC | Age: 70
End: 2025-08-07

## 2025-08-07 VITALS
DIASTOLIC BLOOD PRESSURE: 76 MMHG | WEIGHT: 235 LBS | BODY MASS INDEX: 44.37 KG/M2 | OXYGEN SATURATION: 98 % | SYSTOLIC BLOOD PRESSURE: 120 MMHG | HEIGHT: 61 IN | HEART RATE: 69 BPM

## 2025-08-07 DIAGNOSIS — I10 ESSENTIAL (PRIMARY) HYPERTENSION: ICD-10-CM

## 2025-08-07 DIAGNOSIS — E78.5 HYPERLIPIDEMIA, UNSPECIFIED: ICD-10-CM

## 2025-08-07 PROCEDURE — 93000 ELECTROCARDIOGRAM COMPLETE: CPT

## 2025-08-07 PROCEDURE — G2211 COMPLEX E/M VISIT ADD ON: CPT

## 2025-08-07 PROCEDURE — 99214 OFFICE O/P EST MOD 30 MIN: CPT

## (undated) DEVICE — DRSG OPSITE 2.5 X 2"

## (undated) DEVICE — SHAVER BLADE GREAT WHITE 4.2MM

## (undated) DEVICE — ELCTR AQUAMANTYS BIPOLAR SEALER 6.0

## (undated) DEVICE — DRAPE SPLIT SHEET 77" X 108"

## (undated) DEVICE — TUBING CONNECTING 6MM 20FT

## (undated) DEVICE — NDL HYPO SAFE 22G X 1.5" (BLACK)

## (undated) DEVICE — PACK TOTAL HIP

## (undated) DEVICE — SOL IRR POUR NS 0.9% 1000ML

## (undated) DEVICE — XI DRAPE COLUMN

## (undated) DEVICE — POSITIONER FOAM EGG CRATE ULNAR 2PCS (PINK)

## (undated) DEVICE — DRAPE STICKY U BLUE 60 X 84"

## (undated) DEVICE — XI DRAPE ARM

## (undated) DEVICE — SUT MONOCRYL 4-0 27" PS-2 UNDYED

## (undated) DEVICE — STAPLER SKIN PROXIMATE

## (undated) DEVICE — S&N ARTHROCARE WAND TURBOVAC 90 DEGREE

## (undated) DEVICE — WOUND IRR SURGIPHOR

## (undated) DEVICE — ARTHREX MULTIFIRE SCORPION NEEDLE

## (undated) DEVICE — XI ARM PERMANENT CAUTERY HOOK

## (undated) DEVICE — SUT VICRYL 2-0 27" CT-2 UNDYED

## (undated) DEVICE — LAP PAD 18 X 18"

## (undated) DEVICE — DRSG MEDIPORE DRESS IT 7-7/8 X 11"

## (undated) DEVICE — SOL IRR POUR H2O 1000ML

## (undated) DEVICE — MAKO VIZADISC HIP PROCEDURE TRACKING KIT

## (undated) DEVICE — DRAPE GENERAL ENDOSCOPY

## (undated) DEVICE — ELCTR STRYKER NEPTUNE SMOKE EVACUATION PENCIL (GREEN)

## (undated) DEVICE — DRSG XEROFORM 5 X 9"

## (undated) DEVICE — DRSG COMBINE 5X9"

## (undated) DEVICE — DRAPE U POUCH 35X30"

## (undated) DEVICE — DRAPE XL SHEET 77X98"

## (undated) DEVICE — XI ARM CLIP APPLIER LARGE

## (undated) DEVICE — DRAPE TOWEL BLUE STICKY

## (undated) DEVICE — SUT STRATAFIX SPIRAL PDS PLUS 1 35X35CM CTX VIOLET

## (undated) DEVICE — DRAPE U LONG 47X70"

## (undated) DEVICE — KIT DEFENDO 4 OLY 4 PC

## (undated) DEVICE — XI ARM FORCE BIPOLAR 8MM

## (undated) DEVICE — DRAPE IOBAN 33" X 23"

## (undated) DEVICE — SOL IRR BAG NS 0.9% 3000ML

## (undated) DEVICE — DRSG DERMABOND PRINEO 60CM

## (undated) DEVICE — XI ARM FORCEP PROGRASP 8MM

## (undated) DEVICE — NDL SPINAL 18G X 3.5" (PINK)

## (undated) DEVICE — DRAPE 1/2 SHEET 40X57"

## (undated) DEVICE — SUT NDL FOR EXPRESSEW III FLEXIBLE SUTURE PASSER

## (undated) DEVICE — SYR LUER LOK 30CC

## (undated) DEVICE — DRAPE 3/4 SHEET 52X76"

## (undated) DEVICE — TUBING LINVATEC ARTHROSCOPY IN/OUTFLOW

## (undated) DEVICE — ENDOCATCH 10MM SPECIMEN POUCH

## (undated) DEVICE — NDL HYPO SAFE 20G X 1.5" (YELLOW)

## (undated) DEVICE — WARMING BLANKET LOWER ADULT

## (undated) DEVICE — PACK KNEE ARTHROSCOPY

## (undated) DEVICE — ELCTR GROUNDING PAD ADULT COVIDIEN

## (undated) DEVICE — XI ENDOWRIST SUCTION IRRIGATOR 8MM

## (undated) DEVICE — SLING SHOULDER IMMOBILIZER CLINIC LARGE

## (undated) DEVICE — ELCTR BOVIE PENCIL SMOKE EVACUATION 15FT

## (undated) DEVICE — SUT SILK 2-0 18" TIES

## (undated) DEVICE — MAKO DRAPE KIT

## (undated) DEVICE — SUT VICRYL 0 27" CT-2 UNDYED

## (undated) DEVICE — XI SEAL UNIVERSIAL 5-12MM

## (undated) DEVICE — BUR LINVATEC OVAL 4MM

## (undated) DEVICE — CANNULA ARTHREX TWIST IN 8.25MMX7CM

## (undated) DEVICE — HOOD FLYTE STRYKER SURGICOOL W PEELAWAY

## (undated) DEVICE — VENODYNE/SCD SLEEVE CALF MEDIUM

## (undated) DEVICE — GLV 8 PROTEXIS ORTHO (BROWN)

## (undated) DEVICE — SUT STRATAFIX SPIRAL MONOCRYL PLUS 3-0 30CM PS-2 UNDYED

## (undated) DEVICE — XI OBTURATOR OPTICAL BLADELESS 8MM

## (undated) DEVICE — XI CORD MONOPOLAR CAUTERY (GREEN)

## (undated) DEVICE — SUT ETHIBOND 5 4-30" CCS

## (undated) DEVICE — WARMING BLANKET UPPER ADULT

## (undated) DEVICE — GLV 8 PROTEXIS (BLUE)

## (undated) DEVICE — DRAPE TOWEL BLUE 17" X 24"

## (undated) DEVICE — XI CORD BIPOLAR CAUTERY (BLUE)

## (undated) DEVICE — GLV 7.5 PROTEXIS (WHITE)

## (undated) DEVICE — DRSG DERMABOND 0.7ML

## (undated) DEVICE — DRSG 4 X 8

## (undated) DEVICE — SUT ETHILON 3-0 18" PS-1

## (undated) DEVICE — GLV 8 PROTEXIS (WHITE)

## (undated) DEVICE — PACK ROBOTIC

## (undated) DEVICE — DRSG MEPILEX 10 X 25CM (4 X 10") POST OP AG SILVER

## (undated) DEVICE — FLOORMAT WHITE PAD 36X40

## (undated) DEVICE — POSITIONER SHOULDER ARTHROSCOPY SUSPENSION KIT